# Patient Record
Sex: FEMALE | Race: WHITE | NOT HISPANIC OR LATINO | Employment: OTHER | URBAN - METROPOLITAN AREA
[De-identification: names, ages, dates, MRNs, and addresses within clinical notes are randomized per-mention and may not be internally consistent; named-entity substitution may affect disease eponyms.]

---

## 2020-07-24 ENCOUNTER — ANCILLARY PROCEDURE (OUTPATIENT)
Dept: MAMMOGRAPHY | Facility: CLINIC | Age: 75
End: 2020-07-24
Payer: COMMERCIAL

## 2020-07-24 DIAGNOSIS — Z12.31 VISIT FOR SCREENING MAMMOGRAM: ICD-10-CM

## 2020-07-24 PROCEDURE — 77067 SCR MAMMO BI INCL CAD: CPT | Mod: TC

## 2020-08-10 ENCOUNTER — ANCILLARY PROCEDURE (OUTPATIENT)
Dept: ULTRASOUND IMAGING | Facility: CLINIC | Age: 75
End: 2020-08-10
Attending: FAMILY MEDICINE
Payer: COMMERCIAL

## 2020-08-10 ENCOUNTER — ANCILLARY PROCEDURE (OUTPATIENT)
Dept: MAMMOGRAPHY | Facility: CLINIC | Age: 75
End: 2020-08-10
Attending: FAMILY MEDICINE
Payer: COMMERCIAL

## 2020-08-10 DIAGNOSIS — Z11.59 ENCOUNTER FOR SCREENING FOR OTHER VIRAL DISEASES: Primary | ICD-10-CM

## 2020-08-10 DIAGNOSIS — R92.8 ABNORMAL MAMMOGRAM: ICD-10-CM

## 2020-08-10 PROCEDURE — G0279 TOMOSYNTHESIS, MAMMO: HCPCS

## 2020-08-10 PROCEDURE — 76642 ULTRASOUND BREAST LIMITED: CPT | Mod: 50

## 2020-08-10 PROCEDURE — 77066 DX MAMMO INCL CAD BI: CPT

## 2020-08-18 DIAGNOSIS — Z11.59 ENCOUNTER FOR SCREENING FOR OTHER VIRAL DISEASES: ICD-10-CM

## 2020-08-18 PROCEDURE — U0003 INFECTIOUS AGENT DETECTION BY NUCLEIC ACID (DNA OR RNA); SEVERE ACUTE RESPIRATORY SYNDROME CORONAVIRUS 2 (SARS-COV-2) (CORONAVIRUS DISEASE [COVID-19]), AMPLIFIED PROBE TECHNIQUE, MAKING USE OF HIGH THROUGHPUT TECHNOLOGIES AS DESCRIBED BY CMS-2020-01-R: HCPCS | Performed by: FAMILY MEDICINE

## 2020-08-20 LAB
SARS-COV-2 RNA SPEC QL NAA+PROBE: NOT DETECTED
SPECIMEN SOURCE: NORMAL

## 2020-08-21 ENCOUNTER — ANCILLARY PROCEDURE (OUTPATIENT)
Dept: MAMMOGRAPHY | Facility: CLINIC | Age: 75
End: 2020-08-21
Attending: FAMILY MEDICINE
Payer: COMMERCIAL

## 2020-08-21 ENCOUNTER — ANCILLARY PROCEDURE (OUTPATIENT)
Dept: ULTRASOUND IMAGING | Facility: CLINIC | Age: 75
End: 2020-08-21
Attending: FAMILY MEDICINE
Payer: COMMERCIAL

## 2020-08-21 ENCOUNTER — ANCILLARY PROCEDURE (OUTPATIENT)
Dept: CT IMAGING | Facility: CLINIC | Age: 75
End: 2020-08-21
Attending: FAMILY MEDICINE
Payer: COMMERCIAL

## 2020-08-21 DIAGNOSIS — R92.8 ABNORMAL MAMMOGRAM: ICD-10-CM

## 2020-08-21 LAB
CREAT BLD-MCNC: 0.8 MG/DL (ref 0.52–1.04)
GFR SERPL CREATININE-BSD FRML MDRD: 70 ML/MIN/{1.73_M2}

## 2020-08-21 PROCEDURE — 88342 IMHCHEM/IMCYTCHM 1ST ANTB: CPT | Mod: 59

## 2020-08-21 PROCEDURE — 88305 TISSUE EXAM BY PATHOLOGIST: CPT

## 2020-08-21 PROCEDURE — 19083 BX BREAST 1ST LESION US IMAG: CPT | Mod: RT

## 2020-08-21 PROCEDURE — 88342 IMHCHEM/IMCYTCHM 1ST ANTB: CPT | Mod: 26

## 2020-08-21 PROCEDURE — 00000159 ZZHCL STATISTIC H-SEND OUTS PREP

## 2020-08-21 PROCEDURE — 99000 SPECIMEN HANDLING OFFICE-LAB: CPT

## 2020-08-21 PROCEDURE — 00000158 ZZHCL STATISTIC H-FISH PROCESS B/S

## 2020-08-21 PROCEDURE — 88360 TUMOR IMMUNOHISTOCHEM/MANUAL: CPT

## 2020-08-21 PROCEDURE — 88341 IMHCHEM/IMCYTCHM EA ADD ANTB: CPT | Mod: 59

## 2020-08-21 PROCEDURE — 88377 M/PHMTRC ALYS ISHQUANT/SEMIQ: CPT

## 2020-08-21 PROCEDURE — 77066 DX MAMMO INCL CAD BI: CPT

## 2020-08-21 RX ORDER — LIDOCAINE HYDROCHLORIDE 10 MG/ML
9 INJECTION, SOLUTION EPIDURAL; INFILTRATION; INTRACAUDAL; PERINEURAL ONCE
Status: COMPLETED | OUTPATIENT
Start: 2020-08-21 | End: 2020-08-21

## 2020-08-21 RX ORDER — IOPAMIDOL 755 MG/ML
100 INJECTION, SOLUTION INTRAVASCULAR ONCE
Status: COMPLETED | OUTPATIENT
Start: 2020-08-21 | End: 2020-08-21

## 2020-08-21 RX ADMIN — LIDOCAINE HYDROCHLORIDE 9 ML: 10 INJECTION, SOLUTION EPIDURAL; INFILTRATION; INTRACAUDAL; PERINEURAL at 14:38

## 2020-08-21 RX ADMIN — IOPAMIDOL 100 ML: 755 INJECTION, SOLUTION INTRAVASCULAR at 13:33

## 2020-08-26 ENCOUNTER — TELEPHONE (OUTPATIENT)
Dept: GENERAL RADIOLOGY | Facility: CLINIC | Age: 75
End: 2020-08-26

## 2020-08-26 DIAGNOSIS — C50.919 BREAST CANCER (H): Primary | ICD-10-CM

## 2020-08-26 NOTE — TELEPHONE ENCOUNTER
Spoke with patient regarding right breast biopsy results, which indicate high grade anaplastic cancer, favor breast primary. Notified pt that the radiologist's recommendation is oncologic and surgical consultations. Pt verbalized understanding of these results and all questions answered to her satisfaction. Referral order placed for consultations, pt will be contacted by scheduling.

## 2020-08-27 ENCOUNTER — PATIENT OUTREACH (OUTPATIENT)
Dept: SURGERY | Facility: CLINIC | Age: 75
End: 2020-08-27

## 2020-08-27 NOTE — TELEPHONE ENCOUNTER
New Patient Oncology Nurse Navigator Note     Referring provider: Dr. Carrington     Referring Clinic/Organization: [facility]     Referred to: Surgical Oncology - Breast Surgery    Requested provider (if applicable): First available provider    Referral Received: 08/27/20       Evaluation for : Invasive carcinoma is estrogen receptor positive and progesterone   receptor negative, HER2 -      Records Location: [facility]     Records Needed:     - None at this time.     Additional testing needed prior to consult:     - none at this time.     Referral updates and Plan:       Consult with Surgical Oncology   Labs      08/27/2020 2:11 PM - Called and spoke with patient regarding referral and that we are working on confirming appointment with Dr. Newsome. She requested we speak with her Daughter, added to chart regarding appointment info. Called and spoke with patients daughter and informed her that we are working on getting a schedule opened on 9/3 and that the scheduling team will reach out to confirm the time and information once that is opened.     She verbalized understanding and agreed with plan.       Christina Martin, RN, BSN   Surgical Oncology New Patient Nurse Navigator  [facility]  [phone]

## 2020-08-28 ENCOUNTER — PRE VISIT (OUTPATIENT)
Dept: ONCOLOGY | Facility: CLINIC | Age: 75
End: 2020-08-28

## 2020-08-28 NOTE — TELEPHONE ENCOUNTER
ONCOLOGY INTAKE: Records Information      APPT INFORMATION:  Referring provider:  Dr. Carrington  Referring provider s clinic:  [facility]  Reason for visit/diagnosis:  breast cancer  Has patient been notified of appointment date and time?: yes    RECORDS INFORMATION:  Were the records received with the referral (via [employer])? no    Has patient been seen for any external appt for this diagnosis? no    If yes, where? n/a    Has patient had any imaging or procedures outside of [place] for this condition? no      If Yes, where? n/a    ADDITIONAL INFORMATION:  none

## 2020-08-31 NOTE — TELEPHONE ENCOUNTER
RECORDS STATUS - BREAST    RECORDS REQUESTED FROM: [facility] - Internal Referral   DATE REQUESTED: 8/31/20   NOTES DETAILS STATUS   OFFICE NOTE from referring provider Epic Dr. Rita Carrington   OFFICE NOTE from medical oncologist     OFFICE NOTE from surgeon     OFFICE NOTE from radiation oncologist     DISCHARGE SUMMARY from hospital     DISCHARGE REPORT from the      OPERATIVE REPORT     MEDICATION LIST     CLINICAL TRIAL TREATMENTS TO DATE     LABS     PATHOLOGY REPORTS  (Tissue diagnosis, Stage, ER/KS percentage positive and intensity of staining, HER2 IHC, FISH, and all biopsies from breast and any distant metastasis)                 [facility] 8/21/20: Surg Path & Her 2   GENONOMIC TESTING     TYPE:   (Next Generation Sequencing, including Foundation One testing, and Oncotype score)     IMAGING (NEED IMAGES & REPORT)     CT SCANS     MRI     MAMMO PACS 8/21/20, 8/10/20, 7/24/20   ULTRASOUND PACS 8/21/20, 8/10/20   PET     BONE SCAN     BRAIN MRI

## 2020-09-03 ENCOUNTER — ONCOLOGY VISIT (OUTPATIENT)
Dept: ONCOLOGY | Facility: CLINIC | Age: 75
End: 2020-09-03
Attending: FAMILY MEDICINE
Payer: COMMERCIAL

## 2020-09-03 VITALS
WEIGHT: 169.3 LBS | SYSTOLIC BLOOD PRESSURE: 114 MMHG | RESPIRATION RATE: 16 BRPM | HEART RATE: 82 BPM | OXYGEN SATURATION: 98 % | TEMPERATURE: 98 F | HEIGHT: 66 IN | BODY MASS INDEX: 27.21 KG/M2 | DIASTOLIC BLOOD PRESSURE: 71 MMHG

## 2020-09-03 DIAGNOSIS — Z85.3 PERSONAL HISTORY OF MALIGNANT NEOPLASM OF BREAST: ICD-10-CM

## 2020-09-03 DIAGNOSIS — C50.919 BREAST CANCER (H): ICD-10-CM

## 2020-09-03 DIAGNOSIS — C50.911 INVASIVE DUCTAL CARCINOMA OF BREAST, FEMALE, RIGHT (H): ICD-10-CM

## 2020-09-03 PROCEDURE — 36415 COLL VENOUS BLD VENIPUNCTURE: CPT

## 2020-09-03 PROCEDURE — G0463 HOSPITAL OUTPT CLINIC VISIT: HCPCS | Mod: ZF

## 2020-09-03 PROCEDURE — 40000803 ZZHCL STATISTIC DNA ISOL HIGH PURITY: Performed by: SURGERY

## 2020-09-03 RX ORDER — ATORVASTATIN CALCIUM 40 MG/1
20 TABLET, FILM COATED ORAL 2 TIMES DAILY
COMMUNITY
Start: 2019-11-25 | End: 2021-11-19

## 2020-09-03 RX ORDER — FUROSEMIDE 80 MG
80 TABLET ORAL 2 TIMES DAILY
COMMUNITY
Start: 2019-11-26 | End: 2021-06-02

## 2020-09-03 RX ORDER — TRIAMCINOLONE ACETONIDE 1 MG/G
CREAM TOPICAL
COMMUNITY
Start: 2020-05-28 | End: 2021-06-02

## 2020-09-03 RX ORDER — BENAZEPRIL HYDROCHLORIDE 20 MG/1
TABLET ORAL
COMMUNITY
Start: 2019-11-26 | End: 2021-06-02

## 2020-09-03 RX ORDER — AMLODIPINE BESYLATE 5 MG/1
5 TABLET ORAL 2 TIMES DAILY
COMMUNITY
Start: 2020-05-28 | End: 2021-06-02

## 2020-09-03 RX ORDER — POTASSIUM CHLORIDE 1500 MG/1
40 TABLET, EXTENDED RELEASE ORAL 3 TIMES DAILY
COMMUNITY
Start: 2019-09-17 | End: 2021-06-02

## 2020-09-03 SDOH — HEALTH STABILITY: MENTAL HEALTH: HOW OFTEN DO YOU HAVE 6 OR MORE DRINKS ON ONE OCCASION?: DAILY OR ALMOST DAILY

## 2020-09-03 SDOH — HEALTH STABILITY: MENTAL HEALTH: HOW OFTEN DO YOU HAVE A DRINK CONTAINING ALCOHOL?: 4 OR MORE TIMES A WEEK

## 2020-09-03 SDOH — HEALTH STABILITY: MENTAL HEALTH: HOW MANY STANDARD DRINKS CONTAINING ALCOHOL DO YOU HAVE ON A TYPICAL DAY?: 3 OR 4

## 2020-09-03 ASSESSMENT — MIFFLIN-ST. JEOR: SCORE: 1266.75

## 2020-09-03 ASSESSMENT — PAIN SCALES - GENERAL: PAINLEVEL: NO PAIN (0)

## 2020-09-03 NOTE — PROGRESS NOTES
HISTORY OF PRESENT ILLNESS:  Talya Zuleta is a 75-year-old woman who presents with a new right breast cancer.  She underwent a screening mammogram which demonstrated bilateral scattered densities.  She had a diagnostic mammogram on 08/10.  There was a 2.7 cm mass in her right breast at the 12 o'clock position with extension towards the duct.  Her axilla looked normal.  She had a contrast-enhanced mammography which demonstrated the mass at 12 o'clock position with the total size including extension to 4.9 cm.  She underwent a needle biopsy which demonstrated invasive ductal cancer, grade 3, ER positive, TN negative, HER-2/nathaniel negative but the cancer had anaplastic giant cells present.  She has had no prior history of any breast problems.  She denies any other palpable masses.      PAST MEDICAL HISTORY:  Significant for:    1.  Hypertension.   2.  Diabetes.   3.  COPD.    4.  Hypothyroidism with resultant alopecia.        PAST SURGICAL HISTORY:  She had no major surgeries in the past.      FAMILY HISTORY:  Unknown.      PHYSICAL EXAMINATION:   GENERAL:  Shows a well-appearing woman in no apparent distress.   HEAD AND NECK EXAMINATION:  Reveals no cervical, supraclavicular or infraclavicular lymphadenopathy.   CHEST:  Right breast examination reveals a palpable mass at the 12 o'clock position.  It measures about 3 cm in size.  There are no other palpable masses.  Examination of her axilla demonstrates bilateral hidradenitis but I cannot appreciate any palpable lymphadenopathy.  Left breast examination is normal.      IMPRESSION:  Stage II to stage III, ER-positive breast cancer.      PLAN:  I talked to her about the various treatment options including a mastectomy with or without reconstruction versus a lumpectomy plus radiation.  She is not a candidate for immediate reconstruction right now due to her smoking about half a pack of cigarettes per day.  I did recommend a sentinel lymph node biopsy.  I told her that  endocrine therapy would be recommended.  The decision regarding chemotherapy would not be made until she met with a medical oncologist.  Because of her unusual tumor and large size, I am going to obtain a PET CT scan to make sure that she does not have a primary or that she does not have metastatic disease.  I am also going to obtain genetic testing on her as well.  I am going to have her see Medical Oncology as soon as possible to determine whether or not she would benefit from chemotherapy.  If the consensus is that she would benefit from chemotherapy, then we will do chemotherapy first followed by surgery.  All of her questions were asked and answered.  Will get her set up with medical oncologist.  TT: 40 min CT: 30 min

## 2020-09-03 NOTE — PATIENT INSTRUCTIONS
Surgical Oncology RN Care Coordination Note:     - Genetic testing.     - PET scan - our scheduling team will reach out to you to get this scheduled.     - Referral to the Medical oncology, we will plan to get PET scan prior to the visit so we have that result to finalize planning for treatment.     Christina Martin, RN, BSN  Care Coordinator   [phone]

## 2020-09-03 NOTE — NURSING NOTE
"Oncology Rooming Note    September 3, 2020 11:40 AM   Talya Zuleta is a 75 year old female who presents for:    Chief Complaint   Patient presents with     Oncology Clinic Visit     [facility] NEW - BREAST CANCER     Initial Vitals: /71 (BP Location: Right arm, Patient Position: Chair, Cuff Size: Adult Regular)   Pulse 82   Temp 98  F (36.7  C) (Oral)   Resp 16   Ht 1.664 m (5' 5.5\")   Wt 76.8 kg (169 lb 4.8 oz)   SpO2 98%   BMI 27.74 kg/m   Estimated body mass index is 27.74 kg/m  as calculated from the following:    Height as of this encounter: 1.664 m (5' 5.5\").    Weight as of this encounter: 76.8 kg (169 lb 4.8 oz). Body surface area is 1.88 meters squared.  No Pain (0) Comment: Data Unavailable   No LMP recorded.  Allergies reviewed: Yes  Medications reviewed: Yes    Medications: Medication refills not needed today.  Pharmacy name entered into [employer]: [id number] - [place], [place] - [address]    Clinical concerns: No new concerns. [place] was notified.      Daquan Cantu LPN            "

## 2020-09-03 NOTE — LETTER
9/3/2020     RE: Talya Zuleta  [address] [address]    Dear Colleague,    Thank you for referring your patient, Talya Zuleta, to the [facility]. Please see a copy of my visit note below.    HISTORY OF PRESENT ILLNESS:  Talya Zuleta is a 75-year-old woman who presents with a new right breast cancer.  She underwent a screening mammogram which demonstrated bilateral scattered densities.  She had a diagnostic mammogram on 08/10.  There was a 2.7 cm mass in her right breast at the 12 o'clock position with extension towards the duct.  Her axilla looked normal.  She had a contrast-enhanced mammography which demonstrated the mass at 12 o'clock position with the total size including extension to 4.9 cm.  She underwent a needle biopsy which demonstrated invasive ductal cancer, grade 3, ER positive, WI negative, HER-2/nathaniel negative but the cancer had anaplastic giant cells present.  She has had no prior history of any breast problems.  She denies any other palpable masses.      PAST MEDICAL HISTORY:  Significant for:    1.  Hypertension.   2.  Diabetes.   3.  COPD.    4.  Hypothyroidism with resultant alopecia.        PAST SURGICAL HISTORY:  She had no major surgeries in the past.      FAMILY HISTORY:  Unknown.      PHYSICAL EXAMINATION:   GENERAL:  Shows a well-appearing woman in no apparent distress.   HEAD AND NECK EXAMINATION:  Reveals no cervical, supraclavicular or infraclavicular lymphadenopathy.   CHEST:  Right breast examination reveals a palpable mass at the 12 o'clock position.  It measures about 3 cm in size.  There are no other palpable masses.  Examination of her axilla demonstrates bilateral hidradenitis but I cannot appreciate any palpable lymphadenopathy.  Left breast examination is normal.      IMPRESSION:  Stage II to stage III, ER-positive breast cancer.      PLAN:  I talked to her about the various treatment options including a mastectomy with or without  reconstruction versus a lumpectomy plus radiation.  She is not a candidate for immediate reconstruction right now due to her smoking about half a pack of cigarettes per day.  I did recommend a sentinel lymph node biopsy.  I told her that endocrine therapy would be recommended.  The decision regarding chemotherapy would not be made until she met with a medical oncologist.  Because of her unusual tumor and large size, I am going to obtain a PET CT scan to make sure that she does not have a primary or that she does not have metastatic disease.  I am also going to obtain genetic testing on her as well.  I am going to have her see Medical Oncology as soon as possible to determine whether or not she would benefit from chemotherapy.  If the consensus is that she would benefit from chemotherapy, then we will do chemotherapy first followed by surgery.  All of her questions were asked and answered.  Will get her set up with medical oncologist.  TT: 40 min CT: 30 min     Again, thank you for allowing me to participate in the care of your patient.      Sincerely,      Laci Newsome MD

## 2020-09-03 NOTE — PROGRESS NOTES
Chief Complaint   Patient presents with     Lab Only     vpt blood draw.     Venipuncture labs drawn from left arm.    Pam Hernandez MA

## 2020-09-04 LAB — COPATH REPORT: NORMAL

## 2020-09-04 NOTE — TELEPHONE ENCOUNTER
ONCOLOGY INTAKE: Records Information      APPT INFORMATION:  Referring provider:  Dr. Laci Newsome MD  Referring provider s clinic:  [facility]  Reason for visit/diagnosis:  breast cancer  Has patient been notified of appointment date and time?: Yes    RECORDS INFORMATION:  Were the records received with the referral (via [employer])? No,Internal Referral      Has patient been seen for any external appt for this diagnosis? NO    If yes, where? NA    ADDITIONAL INFORMATION:  None

## 2020-09-08 LAB — COPATH REPORT: NORMAL

## 2020-09-08 NOTE — TELEPHONE ENCOUNTER
RECORDS STATUS - ALL OTHER DIAGNOSIS      RECORDS RECEIVED FROM: [facility] - Internal Referral   DATE RECEIVED: 9/8/20   NOTES STATUS DETAILS   OFFICE NOTE from referring provider     OFFICE NOTE from medical oncologist     DISCHARGE SUMMARY from hospital     DISCHARGE REPORT from the ER     OPERATIVE REPORT     MEDICATION LIST     CLINICAL TRIAL TREATMENTS TO DATE     LABS     PATHOLOGY REPORTS [facility] 8/21/20: Surg Path/HER2 [place]   ANYTHING RELATED TO DIAGNOSIS     GENONOMIC TESTING     TYPE: Epic 9/3/20: [facility]   IMAGING (NEED IMAGES & REPORT)     CT SCANS     MRI     MAMMO     ULTRASOUND     PET

## 2020-09-11 LAB — COPATH REPORT: NORMAL

## 2020-09-14 NOTE — PROGRESS NOTES
Oncology Consultation:  Date on this visit: 9/21/2020    Talya Zuleta is referred by Dr.Todd DESIREE Newsome for an oncology consultation. She requires evaluation for new diagnosis of right breast cancer, clinical stage T2N0M0.    Primary Physician: Cole Weston     History Of Present Illness:  Ms. Zuleta is a 75 year old female with right breast cancer.  Routine screening mammogram in 07/2020 showed bilateral breast asymmetries.  Diagnostic mammograms and ultrasound showed a 2.7 cm mass in the right breast at 12:00, 5 cm from the nipple with ductal extension including a 6 mm mass at 3 cm from the nipple.  In the left breast were benign appearing cysts.  Contrast enhanced mammogram showed the right breast mass, including extension, to measure 4.9 cm.  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  Estrogen receptor was moderate in 50% and progesterone receptor staining was negative.  HER-2 was negative by IHC; HER2 FISH showed approximately 10% of tumor cells to have 6.8 HER2 signals/nucleus and a HER2/CEN17 ratio of 1.6.  The HER2 positive cells were noted to be the large pleomorphic cells.  Multiple neutrophils were noted to be infiltrating through tumor stroma.    Ms. Zuleta denies prior breast issues or biopsies.  She has a past medical history significant for HTN (on lasix, norvasc and benazepril), dyslipidemia (on statin), pre-diabetes on diet control only, chronic back pain related to vertebral disc herniation, and osteoarthritis.  She denies pain or discomfort of her breasts. She is not very active due to her osteoarthritis and joint pains secondary to it, but is able to do all of her ADLs.  She reports she is on her feet a lot, does all of her own cooking, and all of her own housework. She is able to walk 1 flight of stairs without getting short of breath. She recently lost 22 lbs since March/2020, non-intentionally. Her appetite has been  good. She does not exercise regularly due to joint and back pain.  She reports neuropathy in both her hands and feet related to her degenerative joint disease.  She also reports chronic alopecia.  She reports normal energy level.   She denies shortness of breath while at rest.  She has no chest pain, abdominal pain, n/v/d.  She has no headache, focal weakness or numbness.  She denies fevers/chills.  The remainder of a complete 12 point review of systems was reviewed with the patient and was negative with the exception of that mentioned above.    She had menarche at 10 yo.  First pregnancy at 19 yo, and has 3 children. Menopause at 47 yo. No HRT.  Hysterectomy due to benign uterine mass.  Single oophorectomy.    Past Medical/Surgical History:  1.  Breast cancer as per HPI  2.  Hypertension  3.  Diabetes  4.  COPD  5.  Hypothyroidism    Allergies:  Allergies as of 09/21/2020 - Reviewed 09/03/2020   Allergen Reaction Noted     Bacitracin-neomycin-polymyxin  04/18/2003     Latex  09/30/2005     Current Medications:  Current Outpatient Medications   Medication Sig Dispense Refill     amLODIPine (NORVASC) 5 MG tablet Take 5 mg by mouth       atorvastatin (LIPITOR) 40 MG tablet TAKE 1/2 TABLET DAILY       benazepril (LOTENSIN) 20 MG tablet TAKE 1 TABLET BY MOUTH TWO TIMES A DAY.       furosemide (LASIX) 80 MG tablet Take 80 mg by mouth 2 times daily       potassium chloride ER (KLOR-CON M) 20 MEQ CR tablet Take 40 mEq by mouth       triamcinolone (KENALOG) 0.1 % external cream         Family History:  Patient is not sure about her family history as she has not been close to her family.  She is unaware of a family history of malignancy.    Social History:  .  Retired.  She smokes 1/2 pack per day and has smoked for 66 years, drinks alcohol daily (at least 2oz of whiskey daily).     Physical Exam:  General:  Well appearing, well nourished adult female in NAD.  Wears a wig.  Eyes:  No erythema or  discharge  Respiratory:  Breathing comfortably on room air.  No wheezing or distress.  Musculoskeletal:  Full ROM of the bilateral upper extremities.  Skin:  No visible concerning skin rashes or lesions  Neuro:  No notable tremor and dyskinetic movements.  Psych:  Mood and affect appear normal.    The rest of a comprehensive physical examination is deferred due to PHE (public health emergency) video visit restrictions.    ECOG performance status: 1    Laboratory/Imaging Studies  7/24/2020 Bilateral screening mammogram:  Bilateral asymmetries.    8/10/2020 Bilateral breast ultrasound:  - In the right breast at 12:00 middle depth is an elliptical hyperdense mass with angulated margins measuring 2.7 cm..  Just inferior and medial to this is a small round mass with spiculated margins measuring 6 mm.  - No suspicious right axillary lymph node.  - In the left breast at 4:00, 3 cm from the nipple is a small benign appearing cysts measuring up to 1 cm.    8/21/2020 Contrast enhanced mammogram:  Elliptical mass with spiculated margins at 12:00 right breast measures up to 3.5 cm.  There is ductal extension from the anterior inferior margin.  Total size of mass plus ductal extension in 4.9 cm.    8/21/2020  Right breast biopsy, 12:00, 5 cm from the nipple:  - Grade 3 invasive ductal carcinoma with anaplastic tumor giant cells.  - There are multiple neutrophils infiltrating through the tumor stroma  - ER moderate 50%  - CA negative  - HER2 negative by IHC.  However, tumor cells show variable staining.  <10% of cells are weak.  >10% have faint, incomplete membrane staining.  - HER2 FISH show that approximately 10% of tumor cells (the large pleomorphic cells) have 6.8 HER2 signals/nucleus and a HER2/CEN17 ratio of 1.6.    ASSESSMENT/PLAN:  75 year old female with a history of HTN, dyslipidemia, COPD, diabetes, and OA with a newly diagnosed clinical stage, T2N0M0, right breast cancer, that is ER positive (50%), CA negative, and  HER2 positive.  Today we discussed the diagnosis, staging, and treatment options in detail with the patient and her daughter.  We reviewed the results of her imaging which demonstrates a 3.5 cm mass with extension to a second mass, with total area of abnormality in the right breast measuring 4.9 cm.  Ultrasound of the axilla is without evidence of lymphadenopathy.  We reviewed her tumor is ER positive and ND negative.  Her case was reviewed at breast conference on 9/18 and review of pathology shows two populations of cancer cells.  Smaller cells that were HER2 negative and larger cells with pleomorphic nuclei that were HER2 positive.  Taken altogether this is a clinical stage IIa breast cancer.  The goal of treatment is cure, or in other words, to reduce the future risk of recurrence as much as possible.    We reviewed that HER2 positive breast cancers are inherently prone to increased rates of proliferation, metastasis, and recurrence.  A course of chemotherapy and HER2 targeted therapy can significantly reduce this risk of recurrence.  We recommend administering chemotherapy before the surgery in order to decrease the tumor prior to breast conserving surgery, the ability to observe if chemotherapy is effective in treating her breast cancer, and the ability to choose adjuvant therapies based on response.     We explained to her that her breast cancer is positive for both estrogen receptor and HER2 receptor, so we plan to treat her with combined chemotherapy and HER2 targeted therapy, the treatment regimen we are planning is THP, with taxol, Herceptin and Perjeta. Taxol is a chemotherapy medication with side effects of causing peripheral neuropathy (numbness and tingling sensation of hands and feet), low blood counts that will put her at risk of infection and bleeding, nausea, vomiting, poor appetite, low energy level, peripheral neuropathy, elevation of liver tests, GERD, arthralgias, and hair loss. Both Herceptin  and Perjeta are HER2 directed therapy, with main side effects to be cardiotoxicity, diarrhea and abdominal discomfort. We will need to obtained 2D Echo to check for her baseline cardiac function before initiation of chemotherapy and repeat an echocardiogram once every three months while on HER2 targeted therapy.   In this regimen, Taxol and Herceptin are administered IV weekly; pertuzumab IV once every three weeks, the combination for a total of 12 weeks.  We recommend she receive the treatment via a [id number].  We will help to arrange this as soon as possible, and plan to start treatment later this week or early next week.     We discussed that if she has residual breast disease after 12 weeks of THP, we may need to add additional chemotherapy of dose dense AC (adriamycin and cyclophosphamide) before her surgery.    We further explained that she would go for surgery after completing chemotherapy. She will be eligible for lumpectomy or mastectomy. She can discuss with Dr Newsome regarding to surgical plan and what type of surgery she prefers. She will need to have sentinel lymph node biopsy along with her tumor resection. If Dr Newsome and her decide to go for lumpectomy or she has a positive lymph node at the time of surgery, then she will need breast radiation after her surgery. Finally, given ER+ breast cancer, plan will be treat with a minimum 5 years of endocrine therapy. All patients questions are answered to their satisfaction.     Plan:  1. Obtain 2D Echo   2. Arrange medi-port placement with interventional radiology  3. Planning to start THP within 1 week  4. Labs and RUPA visit before chemotherapy infusion  5. Return to clinic to visit Dr Alvarez in 1 month.       Patient was seen and discussed with Dr Alvarez.     Michelle Smith  Hem/Onco Fellow    This video visit was conducted with the patient, her daughter, Dr. Smith, and myself.  I have edited the above note to reflect our joint assessment and plan.   I spent a total 60 minutes in video visit with the patient today.    Perlita Avlarez MD

## 2020-09-18 ENCOUNTER — ANCILLARY PROCEDURE (OUTPATIENT)
Dept: PET IMAGING | Facility: CLINIC | Age: 75
End: 2020-09-18
Attending: SURGERY
Payer: COMMERCIAL

## 2020-09-18 DIAGNOSIS — C50.919 BREAST CANCER (H): ICD-10-CM

## 2020-09-18 DIAGNOSIS — Z85.3 PERSONAL HISTORY OF MALIGNANT NEOPLASM OF BREAST: ICD-10-CM

## 2020-09-18 LAB — RADIOLOGIST FLAGS: ABNORMAL

## 2020-09-18 PROCEDURE — 74177 CT ABD & PELVIS W/CONTRAST: CPT | Mod: 59

## 2020-09-18 PROCEDURE — A9552 F18 FDG: HCPCS | Performed by: SURGERY

## 2020-09-18 PROCEDURE — 71260 CT THORAX DX C+: CPT | Mod: 59

## 2020-09-18 PROCEDURE — 78816 PET IMAGE W/CT FULL BODY: CPT | Mod: PI | Performed by: RADIOLOGY

## 2020-09-18 RX ORDER — IOPAMIDOL 755 MG/ML
10-135 INJECTION, SOLUTION INTRAVASCULAR ONCE
Status: COMPLETED | OUTPATIENT
Start: 2020-09-18 | End: 2020-09-18

## 2020-09-18 RX ADMIN — IOPAMIDOL 104 ML: 755 INJECTION, SOLUTION INTRAVASCULAR at 11:01

## 2020-09-21 ENCOUNTER — PRE VISIT (OUTPATIENT)
Dept: ONCOLOGY | Facility: CLINIC | Age: 75
End: 2020-09-21

## 2020-09-21 ENCOUNTER — VIRTUAL VISIT (OUTPATIENT)
Dept: ONCOLOGY | Facility: CLINIC | Age: 75
End: 2020-09-21
Attending: SURGERY
Payer: COMMERCIAL

## 2020-09-21 DIAGNOSIS — C50.811 MALIGNANT NEOPLASM OF OVERLAPPING SITES OF RIGHT BREAST IN FEMALE, ESTROGEN RECEPTOR POSITIVE (H): ICD-10-CM

## 2020-09-21 DIAGNOSIS — Z51.11 ENCOUNTER FOR ANTINEOPLASTIC CHEMOTHERAPY: Primary | ICD-10-CM

## 2020-09-21 DIAGNOSIS — C50.919 BREAST CANCER (H): ICD-10-CM

## 2020-09-21 DIAGNOSIS — Z51.11 ENCOUNTER FOR ANTINEOPLASTIC CHEMOTHERAPY: ICD-10-CM

## 2020-09-21 DIAGNOSIS — Z17.0 MALIGNANT NEOPLASM OF OVERLAPPING SITES OF RIGHT BREAST IN FEMALE, ESTROGEN RECEPTOR POSITIVE (H): ICD-10-CM

## 2020-09-21 PROCEDURE — 99205 OFFICE O/P NEW HI 60 MIN: CPT | Mod: 95 | Performed by: INTERNAL MEDICINE

## 2020-09-21 PROCEDURE — U0003 INFECTIOUS AGENT DETECTION BY NUCLEIC ACID (DNA OR RNA); SEVERE ACUTE RESPIRATORY SYNDROME CORONAVIRUS 2 (SARS-COV-2) (CORONAVIRUS DISEASE [COVID-19]), AMPLIFIED PROBE TECHNIQUE, MAKING USE OF HIGH THROUGHPUT TECHNOLOGIES AS DESCRIBED BY CMS-2020-01-R: HCPCS | Performed by: INTERNAL MEDICINE

## 2020-09-21 PROCEDURE — 40001009 ZZH VIDEO/TELEPHONE VISIT; NO CHARGE

## 2020-09-21 NOTE — PROGRESS NOTES
"Talya Zuleta is a 75 year old female who is being evaluated via a billable video visit.      The patient has been notified of following:     \"This video visit will be conducted via a call between you and your physician/provider. We have found that certain health care needs can be provided without the need for an in-person physical exam.  This service lets us provide the care you need with a video conversation.  If a prescription is necessary we can send it directly to your pharmacy.  If lab work is needed we can place an order for that and you can then stop by our lab to have the test done at a later time.    Video visits are billed at different rates depending on your insurance coverage.  Please reach out to your insurance provider with any questions.    If during the course of the call the physician/provider feels a video visit is not appropriate, you will not be charged for this service.\"    Patient has given verbal consent for Video visit? Yes    How would you like to obtain your AVS? MyChart     If you are dropped from the video visit, the video invite should be resent to: Send to e-mail at: [email or web address]     Please call Carolyne if unable to find pt in waiting room:   [phone]    Will anyone else be joining your video visit?         I have reviewed and updated the patient's allergies and pt confirmed any changes to their medication list.  Patient was asked to provide any patient recorded vital signs, height and/or weight.  Please see \"Patient Reported Vital Signs\" tab for that information.  Patient instructed to be in the virtual waiting room 10-15 minutes prior to appointment time.      Concerns: Patient has had a lot of vertigo and she is wondering if she could/should get the flu shot.    Refills: None        Jaleel Pearson, EMT         Video-Visit Details    Type of service:  Video Visit    Video Start Time: 10:47 AM  Video End Time: 12:07    Originating Location (pt. Location): Home    Distant " Location (provider location):  [facility]     Platform used for Video Visit: Kelly Smith MD

## 2020-09-21 NOTE — LETTER
9/21/2020         RE: Talya Zuleta  [address] [address]        Dear Colleague,    Thank you for referring your patient, Talya Zuleta, to the [facility]. Please see a copy of my visit note below.    Oncology Consultation:  Date on this visit: 9/21/2020    Talya Zuleta is referred by Dr.Todd DESIREE Newsome for an oncology consultation. She requires evaluation for new diagnosis of right breast cancer, clinical stage T2N0M0.    Primary Physician: Cole Weston     History Of Present Illness:  Ms. Zuleta is a 75 year old female with right breast cancer.  Routine screening mammogram in 07/2020 showed bilateral breast asymmetries.  Diagnostic mammograms and ultrasound showed a 2.7 cm mass in the right breast at 12:00, 5 cm from the nipple with ductal extension including a 6 mm mass at 3 cm from the nipple.  In the left breast were benign appearing cysts.  Contrast enhanced mammogram showed the right breast mass, including extension, to measure 4.9 cm.  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  Estrogen receptor was moderate in 50% and progesterone receptor staining was negative.  HER-2 was negative by IHC; HER2 FISH showed approximately 10% of tumor cells to have 6.8 HER2 signals/nucleus and a HER2/CEN17 ratio of 1.6.  The HER2 positive cells were noted to be the large pleomorphic cells.  Multiple neutrophils were noted to be infiltrating through tumor stroma.    Ms. Zuleta denies prior breast issues or biopsies.  She has a past medical history significant for HTN (on lasix, norvasc and benazepril), dyslipidemia (on statin), pre-diabetes on diet control only, chronic back pain related to vertebral disc herniation, and osteoarthritis.  She denies pain or discomfort of her breasts. She is not very active due to her osteoarthritis and joint pains secondary to it, but is able to do all of her ADLs.   She reports she is on her feet a lot, does all of her own cooking, and all of her own housework. She is able to walk 1 flight of stairs without getting short of breath. She recently lost 22 lbs since March/2020, non-intentionally. Her appetite has been good. She does not exercise regularly due to joint and back pain.  She reports neuropathy in both her hands and feet related to her degenerative joint disease.  She also reports chronic alopecia.  She reports normal energy level.   She denies shortness of breath while at rest.  She has no chest pain, abdominal pain, n/v/d.  She has no headache, focal weakness or numbness.  She denies fevers/chills.  The remainder of a complete 12 point review of systems was reviewed with the patient and was negative with the exception of that mentioned above.    She had menarche at 12 yo.  First pregnancy at 17 yo, and has 3 children. Menopause at 49 yo. No HRT.  Hysterectomy due to benign uterine mass.  Single oophorectomy.    Past Medical/Surgical History:  1.  Breast cancer as per HPI  2.  Hypertension  3.  Diabetes  4.  COPD  5.  Hypothyroidism    Allergies:  Allergies as of 09/21/2020 - Reviewed 09/03/2020   Allergen Reaction Noted     Bacitracin-neomycin-polymyxin  04/18/2003     Latex  09/30/2005     Current Medications:  Current Outpatient Medications   Medication Sig Dispense Refill     amLODIPine (NORVASC) 5 MG tablet Take 5 mg by mouth       atorvastatin (LIPITOR) 40 MG tablet TAKE 1/2 TABLET DAILY       benazepril (LOTENSIN) 20 MG tablet TAKE 1 TABLET BY MOUTH TWO TIMES A DAY.       furosemide (LASIX) 80 MG tablet Take 80 mg by mouth 2 times daily       potassium chloride ER (KLOR-CON M) 20 MEQ CR tablet Take 40 mEq by mouth       triamcinolone (KENALOG) 0.1 % external cream         Family History:  Patient is not sure about her family history as she has not been close to her family.  She is unaware of a family history of malignancy.    Social History:  .  Retired.   She smokes 1/2 pack per day and has smoked for 66 years, drinks alcohol daily (at least 2oz of whiskey daily).     Physical Exam:  General:  Well appearing, well nourished adult female in NAD.  Wears a wig.  Eyes:  No erythema or discharge  Respiratory:  Breathing comfortably on room air.  No wheezing or distress.  Musculoskeletal:  Full ROM of the bilateral upper extremities.  Skin:  No visible concerning skin rashes or lesions  Neuro:  No notable tremor and dyskinetic movements.  Psych:  Mood and affect appear normal.    The rest of a comprehensive physical examination is deferred due to PHE (public health emergency) video visit restrictions.    ECOG performance status: 1    Laboratory/Imaging Studies  7/24/2020 Bilateral screening mammogram:  Bilateral asymmetries.    8/10/2020 Bilateral breast ultrasound:  - In the right breast at 12:00 middle depth is an elliptical hyperdense mass with angulated margins measuring 2.7 cm..  Just inferior and medial to this is a small round mass with spiculated margins measuring 6 mm.  - No suspicious right axillary lymph node.  - In the left breast at 4:00, 3 cm from the nipple is a small benign appearing cysts measuring up to 1 cm.    8/21/2020 Contrast enhanced mammogram:  Elliptical mass with spiculated margins at 12:00 right breast measures up to 3.5 cm.  There is ductal extension from the anterior inferior margin.  Total size of mass plus ductal extension in 4.9 cm.    8/21/2020  Right breast biopsy, 12:00, 5 cm from the nipple:  - Grade 3 invasive ductal carcinoma with anaplastic tumor giant cells.  - There are multiple neutrophils infiltrating through the tumor stroma  - ER moderate 50%  - VT negative  - HER2 negative by IHC.  However, tumor cells show variable staining.  <10% of cells are weak.  >10% have faint, incomplete membrane staining.  - HER2 FISH show that approximately 10% of tumor cells (the large pleomorphic cells) have 6.8 HER2 signals/nucleus and a  HER2/CEN17 ratio of 1.6.    ASSESSMENT/PLAN:  75 year old female with a history of HTN, dyslipidemia, COPD, diabetes, and OA with a newly diagnosed clinical stage, T2N0M0, right breast cancer, that is ER positive (50%), ID negative, and HER2 positive.  Today we discussed the diagnosis, staging, and treatment options in detail with the patient and her daughter.  We reviewed the results of her imaging which demonstrates a 3.5 cm mass with extension to a second mass, with total area of abnormality in the right breast measuring 4.9 cm.  Ultrasound of the axilla is without evidence of lymphadenopathy.  We reviewed her tumor is ER positive and ID negative.  Her case was reviewed at breast conference on 9/18 and review of pathology shows two populations of cancer cells.  Smaller cells that were HER2 negative and larger cells with pleomorphic nuclei that were HER2 positive.  Taken altogether this is a clinical stage IIa breast cancer.  The goal of treatment is cure, or in other words, to reduce the future risk of recurrence as much as possible.    We reviewed that HER2 positive breast cancers are inherently prone to increased rates of proliferation, metastasis, and recurrence.  A course of chemotherapy and HER2 targeted therapy can significantly reduce this risk of recurrence.  We recommend administering chemotherapy before the surgery in order to decrease the tumor prior to breast conserving surgery, the ability to observe if chemotherapy is effective in treating her breast cancer, and the ability to choose adjuvant therapies based on response.     We explained to her that her breast cancer is positive for both estrogen receptor and HER2 receptor, so we plan to treat her with combined chemotherapy and HER2 targeted therapy, the treatment regimen we are planning is THP, with taxol, Herceptin and Perjeta. Taxol is a chemotherapy medication with side effects of causing peripheral neuropathy (numbness and tingling sensation of  hands and feet), low blood counts that will put her at risk of infection and bleeding, nausea, vomiting, poor appetite, low energy level, peripheral neuropathy, elevation of liver tests, GERD, arthralgias, and hair loss. Both Herceptin and Perjeta are HER2 directed therapy, with main side effects to be cardiotoxicity, diarrhea and abdominal discomfort. We will need to obtained 2D Echo to check for her baseline cardiac function before initiation of chemotherapy and repeat an echocardiogram once every three months while on HER2 targeted therapy.   In this regimen, Taxol and Herceptin are administered IV weekly; pertuzumab IV once every three weeks, the combination for a total of 12 weeks.  We recommend she receive the treatment via a [id number].  We will help to arrange this as soon as possible, and plan to start treatment later this week or early next week.     We discussed that if she has residual breast disease after 12 weeks of THP, we may need to add additional chemotherapy of dose dense AC (adriamycin and cyclophosphamide) before her surgery.    We further explained that she would go for surgery after completing chemotherapy. She will be eligible for lumpectomy or mastectomy. She can discuss with Dr Newsome regarding to surgical plan and what type of surgery she prefers. She will need to have sentinel lymph node biopsy along with her tumor resection. If Dr Newsome and her decide to go for lumpectomy or she has a positive lymph node at the time of surgery, then she will need breast radiation after her surgery. Finally, given ER+ breast cancer, plan will be treat with a minimum 5 years of endocrine therapy. All patients questions are answered to their satisfaction.     Plan:  1. Obtain 2D Echo   2. Arrange medi-port placement with interventional radiology  3. Planning to start THP within 1 week  4. Labs and RUPA visit before chemotherapy infusion  5. Return to clinic to visit Dr Alvarez in 1 month.  "      Patient was seen and discussed with Dr Alvarez.     Michelle Smith  Hem/Onco Fellow    This video visit was conducted with the patient, her daughter, Dr. Smith, and myself.  I have edited the above note to reflect our joint assessment and plan.  I spent a total 60 minutes in video visit with the patient today.    Perlita Alvarez MD        Talya Zuleta is a 75 year old female who is being evaluated via a billable video visit.      The patient has been notified of following:     \"This video visit will be conducted via a call between you and your physician/provider. We have found that certain health care needs can be provided without the need for an in-person physical exam.  This service lets us provide the care you need with a video conversation.  If a prescription is necessary we can send it directly to your pharmacy.  If lab work is needed we can place an order for that and you can then stop by our lab to have the test done at a later time.    Video visits are billed at different rates depending on your insurance coverage.  Please reach out to your insurance provider with any questions.    If during the course of the call the physician/provider feels a video visit is not appropriate, you will not be charged for this service.\"    Patient has given verbal consent for Video visit? Yes    How would you like to obtain your AVS? MyChart     If you are dropped from the video visit, the video invite should be resent to: Send to e-mail at: [email or web address]     Please call Carolyne if unable to find pt in waiting room:   [phone]    Will anyone else be joining your video visit?         I have reviewed and updated the patient's allergies and pt confirmed any changes to their medication list.  Patient was asked to provide any patient recorded vital signs, height and/or weight.  Please see \"Patient Reported Vital Signs\" tab for that information.  Patient instructed to be in the virtual waiting room 10-15 minutes " prior to appointment time.      Concerns: Patient has had a lot of vertigo and she is wondering if she could/should get the flu shot.    Refills: None        MARY Urrutia         Video-Visit Details    Type of service:  Video Visit    Video Start Time: 10:47 AM  Video End Time: 12:07    Originating Location (pt. Location): Home    Distant Location (provider location):  [facility]     Platform used for Video Visit: Kelly Smith MD          Again, thank you for allowing me to participate in the care of your patient.        Sincerely,        Perlita Alvarez MD

## 2020-09-22 LAB
SARS-COV-2 RNA SPEC QL NAA+PROBE: NOT DETECTED
SPECIMEN SOURCE: NORMAL

## 2020-09-23 ENCOUNTER — PREP FOR PROCEDURE (OUTPATIENT)
Dept: SURGERY | Facility: CLINIC | Age: 75
End: 2020-09-23

## 2020-09-23 ENCOUNTER — TELEPHONE (OUTPATIENT)
Dept: SURGERY | Facility: CLINIC | Age: 75
End: 2020-09-23

## 2020-09-23 DIAGNOSIS — C50.919 BREAST CANCER (H): Primary | ICD-10-CM

## 2020-09-23 NOTE — TELEPHONE ENCOUNTER
According to message below pt needs to have port placement done. Routing to  and her assistant to place orders and schedule if appropriate. Thank you...Nicole Mcmanus RN

## 2020-09-23 NOTE — TELEPHONE ENCOUNTER
[facility] Call Center    Phone Message    May a detailed message be left on voicemail: yes     Reason for Call: Order has been placed for Pt to have a port placed.  Protocols say to send a telephone encounter.  Please contact Pt to schedule.  Thanks.    Action Taken: Message routed to:  Adult Clinics: Surgery, General p [id number]    Travel Screening: Not Applicable

## 2020-09-23 NOTE — TELEPHONE ENCOUNTER
Received communication from  that she reached out to her assistant Bruna to assist with scheduling....Nicole Mcmanus RN

## 2020-09-24 ENCOUNTER — TELEPHONE (OUTPATIENT)
Dept: SURGERY | Facility: CLINIC | Age: 75
End: 2020-09-24

## 2020-09-24 DIAGNOSIS — Z11.59 ENCOUNTER FOR SCREENING FOR OTHER VIRAL DISEASES: Primary | ICD-10-CM

## 2020-09-24 PROBLEM — C50.919 BREAST CANCER (H): Status: ACTIVE | Noted: 2020-09-24

## 2020-09-24 NOTE — TELEPHONE ENCOUNTER
[facility] Call Center    Phone Message    May a detailed message be left on voicemail: yes     Reason for Call: Other: need to schedule port placement for patient. no one avail to help at time of call. per protocols, to be sched by dept.     Action Taken: Message routed to:  Adult Clinics: Surgery, General p [id number]    Travel Screening: Not Applicable

## 2020-09-24 NOTE — TELEPHONE ENCOUNTER
RN called pt, no answer. Unable to leave a vm .Pt called back while RN documenting and states that she spoke with someone and she is scheduled for a port placement next Friday October 2nd at [facility]. No further assistance needed...Nicole Mcmanus RN

## 2020-09-25 RX ORDER — ALBUTEROL SULFATE 0.83 MG/ML
2.5 SOLUTION RESPIRATORY (INHALATION)
Status: CANCELLED | OUTPATIENT
Start: 2020-10-07

## 2020-09-25 RX ORDER — DIPHENHYDRAMINE HCL 25 MG
50 CAPSULE ORAL ONCE
Status: CANCELLED
Start: 2020-10-07

## 2020-09-25 RX ORDER — MEPERIDINE HYDROCHLORIDE 25 MG/ML
25 INJECTION INTRAMUSCULAR; INTRAVENOUS; SUBCUTANEOUS EVERY 30 MIN PRN
Status: CANCELLED | OUTPATIENT
Start: 2020-10-07

## 2020-09-25 RX ORDER — ALBUTEROL SULFATE 90 UG/1
1-2 AEROSOL, METERED RESPIRATORY (INHALATION)
Status: CANCELLED
Start: 2020-10-07

## 2020-09-25 RX ORDER — HEPARIN SODIUM (PORCINE) LOCK FLUSH IV SOLN 100 UNIT/ML 100 UNIT/ML
5 SOLUTION INTRAVENOUS
Status: CANCELLED | OUTPATIENT
Start: 2020-10-07

## 2020-09-25 RX ORDER — LORAZEPAM 2 MG/ML
0.5 INJECTION INTRAMUSCULAR EVERY 4 HOURS PRN
Status: CANCELLED
Start: 2020-10-07

## 2020-09-25 RX ORDER — EPINEPHRINE 1 MG/ML
0.3 INJECTION, SOLUTION INTRAMUSCULAR; SUBCUTANEOUS EVERY 5 MIN PRN
Status: CANCELLED | OUTPATIENT
Start: 2020-10-07

## 2020-09-25 RX ORDER — DIPHENHYDRAMINE HYDROCHLORIDE 50 MG/ML
50 INJECTION INTRAMUSCULAR; INTRAVENOUS
Status: CANCELLED
Start: 2020-10-07

## 2020-09-25 RX ORDER — METHYLPREDNISOLONE SODIUM SUCCINATE 125 MG/2ML
125 INJECTION, POWDER, LYOPHILIZED, FOR SOLUTION INTRAMUSCULAR; INTRAVENOUS
Status: CANCELLED
Start: 2020-10-07

## 2020-09-25 RX ORDER — HEPARIN SODIUM,PORCINE 10 UNIT/ML
5 VIAL (ML) INTRAVENOUS
Status: CANCELLED | OUTPATIENT
Start: 2020-10-07

## 2020-09-25 RX ORDER — ACETAMINOPHEN 325 MG/1
650 TABLET ORAL ONCE
Status: CANCELLED | OUTPATIENT
Start: 2020-10-07

## 2020-09-25 RX ORDER — SODIUM CHLORIDE 9 MG/ML
1000 INJECTION, SOLUTION INTRAVENOUS CONTINUOUS PRN
Status: CANCELLED
Start: 2020-10-07

## 2020-09-25 RX ORDER — NALOXONE HYDROCHLORIDE 0.4 MG/ML
.1-.4 INJECTION, SOLUTION INTRAMUSCULAR; INTRAVENOUS; SUBCUTANEOUS
Status: CANCELLED | OUTPATIENT
Start: 2020-10-07

## 2020-09-28 ENCOUNTER — OFFICE VISIT (OUTPATIENT)
Dept: FAMILY MEDICINE | Facility: CLINIC | Age: 75
End: 2020-09-28
Payer: COMMERCIAL

## 2020-09-28 VITALS
WEIGHT: 167 LBS | DIASTOLIC BLOOD PRESSURE: 70 MMHG | TEMPERATURE: 98 F | SYSTOLIC BLOOD PRESSURE: 115 MMHG | HEART RATE: 76 BPM | BODY MASS INDEX: 27.37 KG/M2

## 2020-09-28 DIAGNOSIS — C50.919 MALIGNANT NEOPLASM OF FEMALE BREAST, UNSPECIFIED ESTROGEN RECEPTOR STATUS, UNSPECIFIED LATERALITY, UNSPECIFIED SITE OF BREAST (H): ICD-10-CM

## 2020-09-28 DIAGNOSIS — Z13.6 CARDIOVASCULAR SCREENING; LDL GOAL LESS THAN 100: ICD-10-CM

## 2020-09-28 DIAGNOSIS — R73.01 IMPAIRED FASTING GLUCOSE: ICD-10-CM

## 2020-09-28 DIAGNOSIS — Z01.818 PREOP GENERAL PHYSICAL EXAM: Primary | ICD-10-CM

## 2020-09-28 DIAGNOSIS — R53.83 FATIGUE, UNSPECIFIED TYPE: ICD-10-CM

## 2020-09-28 LAB
ALBUMIN SERPL-MCNC: 3.6 G/DL (ref 3.4–5)
ALP SERPL-CCNC: 79 U/L (ref 40–150)
ALT SERPL W P-5'-P-CCNC: 13 U/L (ref 0–50)
ANION GAP SERPL CALCULATED.3IONS-SCNC: 7 MMOL/L (ref 3–14)
AST SERPL W P-5'-P-CCNC: 13 U/L (ref 0–45)
BASOPHILS # BLD AUTO: 0 10E9/L (ref 0–0.2)
BASOPHILS NFR BLD AUTO: 0.4 %
BILIRUB SERPL-MCNC: 0.4 MG/DL (ref 0.2–1.3)
BUN SERPL-MCNC: 14 MG/DL (ref 7–30)
CALCIUM SERPL-MCNC: 9.1 MG/DL (ref 8.5–10.1)
CHLORIDE SERPL-SCNC: 104 MMOL/L (ref 94–109)
CHOLEST SERPL-MCNC: 156 MG/DL
CO2 SERPL-SCNC: 27 MMOL/L (ref 20–32)
CREAT SERPL-MCNC: 0.87 MG/DL (ref 0.52–1.04)
DIFFERENTIAL METHOD BLD: ABNORMAL
EOSINOPHIL # BLD AUTO: 0.1 10E9/L (ref 0–0.7)
EOSINOPHIL NFR BLD AUTO: 0.7 %
ERYTHROCYTE [DISTWIDTH] IN BLOOD BY AUTOMATED COUNT: 15.7 % (ref 10–15)
GFR SERPL CREATININE-BSD FRML MDRD: 65 ML/MIN/{1.73_M2}
GLUCOSE SERPL-MCNC: 106 MG/DL (ref 70–99)
HBA1C MFR BLD: 5.7 % (ref 0–5.6)
HCT VFR BLD AUTO: 42.8 % (ref 35–47)
HDLC SERPL-MCNC: 66 MG/DL
HGB BLD-MCNC: 14.4 G/DL (ref 11.7–15.7)
LDLC SERPL CALC-MCNC: 69 MG/DL
LYMPHOCYTES # BLD AUTO: 1.6 10E9/L (ref 0.8–5.3)
LYMPHOCYTES NFR BLD AUTO: 22.4 %
MCH RBC QN AUTO: 33.5 PG (ref 26.5–33)
MCHC RBC AUTO-ENTMCNC: 33.6 G/DL (ref 31.5–36.5)
MCV RBC AUTO: 100 FL (ref 78–100)
MONOCYTES # BLD AUTO: 0.6 10E9/L (ref 0–1.3)
MONOCYTES NFR BLD AUTO: 8.1 %
NEUTROPHILS # BLD AUTO: 4.9 10E9/L (ref 1.6–8.3)
NEUTROPHILS NFR BLD AUTO: 68.4 %
NONHDLC SERPL-MCNC: 90 MG/DL
PLATELET # BLD AUTO: 291 10E9/L (ref 150–450)
POTASSIUM SERPL-SCNC: 4.1 MMOL/L (ref 3.4–5.3)
PROT SERPL-MCNC: 7.6 G/DL (ref 6.8–8.8)
RBC # BLD AUTO: 4.3 10E12/L (ref 3.8–5.2)
SODIUM SERPL-SCNC: 138 MMOL/L (ref 133–144)
TRIGL SERPL-MCNC: 105 MG/DL
TSH SERPL DL<=0.005 MIU/L-ACNC: 1.59 MU/L (ref 0.4–4)
WBC # BLD AUTO: 7.1 10E9/L (ref 4–11)

## 2020-09-28 PROCEDURE — 84443 ASSAY THYROID STIM HORMONE: CPT | Performed by: FAMILY MEDICINE

## 2020-09-28 PROCEDURE — 36415 COLL VENOUS BLD VENIPUNCTURE: CPT | Performed by: FAMILY MEDICINE

## 2020-09-28 PROCEDURE — 99215 OFFICE O/P EST HI 40 MIN: CPT | Performed by: FAMILY MEDICINE

## 2020-09-28 PROCEDURE — 80061 LIPID PANEL: CPT | Performed by: FAMILY MEDICINE

## 2020-09-28 PROCEDURE — 80053 COMPREHEN METABOLIC PANEL: CPT | Performed by: FAMILY MEDICINE

## 2020-09-28 PROCEDURE — 85025 COMPLETE CBC W/AUTO DIFF WBC: CPT | Performed by: FAMILY MEDICINE

## 2020-09-28 PROCEDURE — 83036 HEMOGLOBIN GLYCOSYLATED A1C: CPT | Performed by: FAMILY MEDICINE

## 2020-09-28 ASSESSMENT — PAIN SCALES - GENERAL: PAINLEVEL: NO PAIN (0)

## 2020-09-28 NOTE — PATIENT INSTRUCTIONS

## 2020-09-28 NOTE — PROGRESS NOTES
[address] [id number] [phone]  Dept: [phone]    PRE-OP EVALUATION:  Today's date: 2020    Talya Zuleta (: 1945) presents for pre-operative evaluation assessment as requested by Dr. Lowery.  She requires evaluation and anesthesia risk assessment prior to undergoing surgery/procedure for treatment of breast cancer .    Fax number for surgical facility: [phone]  Primary Physician: Cole Weston  Type of Anesthesia Anticipated: to be determined    Preop Questionnnaire:  Pre-op Questionnaire 2020   Surgery Location: [facility]   Surgeon: ?   Surgery/Procedure: port placement for chemo   Surgery Date: 10/2/2020   Time of Surgery: early? have to be there at 6am   Where patient plans to recover: At home with family   1. Have you ever had a heart attack or stroke? No   2. Have you ever had surgery on your heart or blood vessels, such as a stent placement, a coronary artery bypass, or surgery on an artery in your head, neck, heart, or legs? No   3. Do you have chest pain with activity? No   4. Do you have a history of  heart failure? No   5. Do you currently have a cold, bronchitis or symptoms of other infection? No   6. Do you have a cough, shortness of breath, or wheezing? No   7. Do you or anyone in your family have previous history of blood clots? UNKNOWN    8. Do you or does anyone in your family have a serious bleeding problem such as prolonged bleeding following surgeries or cuts? UNKNOWN    9. Have you ever had problems with anemia or been told to take iron pills? No   10. Have you had any abnormal blood loss such as black, tarry or bloody stools, or abnormal vaginal bleeding? No   11. Have you ever had a blood transfusion? No   Are you willing to have a blood transfusion if it is medically needed before, during, or after your surgery? Yes   13. Have you or any of your relatives ever had  problems with anesthesia? No   14. Do you have sleep apnea, excessive snoring or daytime drowsiness? YES  Has sleep apnea but not  Able to tolerate cpap   14a. Do you have a CPAP machine? No   15. Do you have any artifical heart valves or other implanted medical devices like a pacemaker, defibrillator, or continuous glucose monitor? No   16. Do you have artificial joints? No   17. Are you allergic to latex? YES:    18. Is there any chance that you may be pregnant? No         HPI:     HPI related to upcoming procedure: 75 years old female with breast cancer.  To get port placement  For chemo.           MEDICAL HISTORY:     Patient Active Problem List    Diagnosis Date Noted     Breast cancer (H) 09/24/2020     Priority: Medium     Added automatically from request for surgery [id number]       Malignant neoplasm of overlapping sites of right breast in female, estrogen receptor positive (H) 09/21/2020     Priority: Medium      History reviewed. No pertinent past medical history.  History reviewed. No pertinent surgical history.     Correction to above,had hysterectomy, cholecystectomy and thyroid procedure\    Current Outpatient Medications   Medication Sig Dispense Refill     amLODIPine (NORVASC) 5 MG tablet Take 5 mg by mouth       atorvastatin (LIPITOR) 40 MG tablet TAKE 1/2 TABLET DAILY       benazepril (LOTENSIN) 20 MG tablet TAKE 1 TABLET BY MOUTH TWO TIMES A DAY.       furosemide (LASIX) 80 MG tablet Take 80 mg by mouth 2 times daily       potassium chloride ER (KLOR-CON M) 20 MEQ CR tablet Take 40 mEq by mouth       triamcinolone (KENALOG) 0.1 % external cream        OTC products: multivitamin and calcium    Allergies   Allergen Reactions     Bacitracin-Neomycin-Polymyxin      PN: LW Reaction: Unknown Reaction     Latex       Latex Allergy: YES: Precautions to take: avoids latex    Social History     Tobacco Use     Smoking status: Current Every Day Smoker     Types: Cigarettes     Smokeless tobacco: Former User    Substance Use Topics     Alcohol use: Yes     Frequency: 4 or more times a week     Drinks per session: 3 or 4     Binge frequency: Daily or almost daily     Comment: scotch & water 2-3 classes a day     History   Drug Use Unknown       REVIEW OF SYSTEMS:   Constitutional, neuro, ENT, endocrine, pulmonary, cardiac, gastrointestinal, genitourinary, musculoskeletal, integument and psychiatric systems are negative, except as otherwise noted.    No recent illnesses    The breast cancer was diagnosed April or May of this year    No surgery done for that    Has not started the chemo yet    No radiation done    Had half the thyroid removed in past, but not needing thyroid medication     No heart problems , has murmur    No lung problems    Had quit smoking for years but resarted with pandemic    Retired        EXAM:   /70 (BP Location: Left arm, Patient Position: Chair, Cuff Size: Adult Regular)   Pulse 76   Temp 98  F (36.7  C) (Oral)   Wt 75.8 kg (167 lb)   Breastfeeding No   BMI 27.37 kg/m      GENERAL APPEARANCE: healthy, alert and no distress     EYES: EOMI, PERRL     HENT: ear canals and TM's normal and nose and mouth without ulcers or lesions     NECK: no adenopathy, no asymmetry, masses, or scars and thyroid normal to palpation     RESP: lungs clear to auscultation - no rales, rhonchi or wheezes     CV: regular rates and rhythm, normal S1 S2, no S3 or S4 and no murmur, click or rub     ABDOMEN:  soft, nontender, no HSM or masses and bowel sounds normal     MS: extremities normal- no gross deformities noted, no evidence of inflammation in joints, FROM in all extremities.     SKIN: no suspicious lesions or rashes     NEURO: Normal strength and tone, sensory exam grossly normal, mentation intact and speech normal     PSYCH: mentation appears normal. and affect normal/bright     LYMPHATICS: No cervical adenopathy    DIAGNOSTICS:    patient already scheduled for echo tomorrow; the port placement  Is  Friday    Labs drawn today, pending    No results for input(s): HGB, PLT, INR, NA, POTASSIUM, CR, A1C in the last 01279 hours.     IMPRESSION:   Reason for surgery/procedure: to have port placement for chemo for breast cancer   Diagnosis/reason for consult: preop clearance    The proposed surgical procedure is considered INTERMEDIATE risk.    REVISED CARDIAC RISK INDEX  The patient has the following serious cardiovascular risks for perioperative complications such as (MI, PE, VFib and 3  AV Block):  No serious cardiac risks  INTERPRETATION: 0 risks: Class I (very low risk - 0.4% complication rate)    The patient has the following additional risks for perioperative complications:  No identified additional risks      ICD-10-CM    1. Preop general physical exam  Z01.818        RECOMMENDATIONS:        --Patient is to take all scheduled medications on the day of surgery EXCEPT for modifications listed below.     Blood pressure very well controlled.  Okay to hold all meds the morning of procedure ( has to be there very early am of procedure)    Not on any blood thinners            APPROVAL GIVEN to proceed with proposed procedure, without further diagnostic evaluation, providing labs are okay    Of not the furosemide is for blood pressure per patient.  No history of heart issues.    Did encourage complete smoking cessation    No history of anesthesia problem    No bleeding or clotting disorders       Signed Electronically by: Darrell Douglas MD    Copy of this evaluation report is provided to requesting physician.    [facility] Preop Guidelines    Revised Cardiac Risk Index

## 2020-09-29 ENCOUNTER — HOSPITAL ENCOUNTER (OUTPATIENT)
Dept: CARDIOLOGY | Facility: CLINIC | Age: 75
Discharge: HOME | End: 2020-09-29
Attending: INTERNAL MEDICINE | Admitting: INTERNAL MEDICINE
Payer: COMMERCIAL

## 2020-09-29 DIAGNOSIS — Z51.11 ENCOUNTER FOR ANTINEOPLASTIC CHEMOTHERAPY: ICD-10-CM

## 2020-09-29 PROCEDURE — 93306 TTE W/DOPPLER COMPLETE: CPT | Mod: 26 | Performed by: INTERNAL MEDICINE

## 2020-09-29 PROCEDURE — 93306 TTE W/DOPPLER COMPLETE: CPT

## 2020-09-29 PROCEDURE — 76376 3D RENDER W/INTRP POSTPROCES: CPT

## 2020-09-29 NOTE — RESULT ENCOUNTER NOTE
The hemoglobin a1c is barely into the prediabetes range.  No medication needed for this.  Just  Keep working on healthy diet/exercise as you are able.    Other labs are good.    Okay for procedure.    Darrell Douglas MD

## 2020-09-30 ENCOUNTER — PATIENT OUTREACH (OUTPATIENT)
Dept: ONCOLOGY | Facility: CLINIC | Age: 75
End: 2020-09-30

## 2020-09-30 DIAGNOSIS — Z11.59 ENCOUNTER FOR SCREENING FOR OTHER VIRAL DISEASES: ICD-10-CM

## 2020-09-30 LAB
SARS-COV-2 RNA SPEC QL NAA+PROBE: NORMAL
SPECIMEN SOURCE: NORMAL

## 2020-09-30 PROCEDURE — U0003 INFECTIOUS AGENT DETECTION BY NUCLEIC ACID (DNA OR RNA); SEVERE ACUTE RESPIRATORY SYNDROME CORONAVIRUS 2 (SARS-COV-2) (CORONAVIRUS DISEASE [COVID-19]), AMPLIFIED PROBE TECHNIQUE, MAKING USE OF HIGH THROUGHPUT TECHNOLOGIES AS DESCRIBED BY CMS-2020-01-R: HCPCS | Performed by: SURGERY

## 2020-09-30 NOTE — PROGRESS NOTES
Spoke  with Talya and her daughter in-law Carolyne to discuss chemotherapy and resources available at the [facility]. Provided patient with  [facility] printouts on Taxol, herceptin, perjeta. Reviewed administration, side effects (including myelosuppression, nausea/vomiting, diarrhea/constipation, hair loss, mouth sores) and ongoing symptom management by APPs in clinic. Provided phone numbers for triage and after hours care. Highlighted steps to expect when getting chemotherapy (check in, labs, pre- meds, infusion), Discussed that chemo treatment may be delayed a day or two due to blood counts, infusion schedule or patient's need to modify. Included a one page resource of symptoms and when to contact the Cancer Clinic with questions or concerns.      Talya has drug coverage through her [employer]. She will check to see if the antiemetics are covered. She will notify writer is prescriptions need to be sent.     Answered all Talya's questions to her stated satisfaction.

## 2020-10-01 DIAGNOSIS — C50.919 BREAST CANCER (H): Primary | ICD-10-CM

## 2020-10-01 LAB
LABORATORY COMMENT REPORT: NORMAL
SARS-COV-2 RNA SPEC QL NAA+PROBE: NEGATIVE
SPECIMEN SOURCE: NORMAL

## 2020-10-01 PROCEDURE — 81408 MOPATH PROCEDURE LEVEL 9: CPT | Performed by: SURGERY

## 2020-10-01 PROCEDURE — 81162 BRCA1&2 GEN FULL SEQ DUP/DEL: CPT | Performed by: SURGERY

## 2020-10-01 PROCEDURE — 81321 PTEN GENE FULL SEQUENCE: CPT | Performed by: SURGERY

## 2020-10-01 PROCEDURE — 81307 PALB2 GENE FULL GENE SEQ: CPT | Performed by: SURGERY

## 2020-10-01 PROCEDURE — 81405 MOPATH PROCEDURE LEVEL 6: CPT | Performed by: SURGERY

## 2020-10-01 PROCEDURE — 81408 MOPATH PROCEDURE LEVEL 9: CPT | Mod: 59 | Performed by: SURGERY

## 2020-10-01 PROCEDURE — 81405 MOPATH PROCEDURE LEVEL 6: CPT | Mod: 59 | Performed by: SURGERY

## 2020-10-01 PROCEDURE — 81479 UNLISTED MOLECULAR PATHOLOGY: CPT | Mod: GZ | Performed by: SURGERY

## 2020-10-01 PROCEDURE — 81323 PTEN GENE DUP/DELET VARIANT: CPT | Mod: XU | Performed by: SURGERY

## 2020-10-01 PROCEDURE — 81406 MOPATH PROCEDURE LEVEL 7: CPT | Performed by: SURGERY

## 2020-10-01 PROCEDURE — 81323 PTEN GENE DUP/DELET VARIANT: CPT | Performed by: SURGERY

## 2020-10-01 PROCEDURE — G0452 MOLECULAR PATHOLOGY INTERPR: HCPCS | Performed by: PATHOLOGY

## 2020-10-01 PROCEDURE — 81479 UNLISTED MOLECULAR PATHOLOGY: CPT | Performed by: SURGERY

## 2020-10-02 ENCOUNTER — APPOINTMENT (OUTPATIENT)
Dept: GENERAL RADIOLOGY | Facility: CLINIC | Age: 75
End: 2020-10-02
Attending: SURGERY
Payer: COMMERCIAL

## 2020-10-02 ENCOUNTER — ANESTHESIA (OUTPATIENT)
Dept: SURGERY | Facility: CLINIC | Age: 75
End: 2020-10-02
Payer: COMMERCIAL

## 2020-10-02 ENCOUNTER — HOSPITAL ENCOUNTER (OUTPATIENT)
Facility: CLINIC | Age: 75
Discharge: HOME | End: 2020-10-02
Attending: SURGERY | Admitting: SURGERY
Payer: COMMERCIAL

## 2020-10-02 ENCOUNTER — ANESTHESIA EVENT (OUTPATIENT)
Dept: SURGERY | Facility: CLINIC | Age: 75
End: 2020-10-02
Payer: COMMERCIAL

## 2020-10-02 ENCOUNTER — APPOINTMENT (OUTPATIENT)
Dept: SURGERY | Facility: PHYSICIAN GROUP | Age: 75
End: 2020-10-02
Payer: COMMERCIAL

## 2020-10-02 VITALS
TEMPERATURE: 97.7 F | WEIGHT: 167 LBS | OXYGEN SATURATION: 98 % | RESPIRATION RATE: 18 BRPM | SYSTOLIC BLOOD PRESSURE: 132 MMHG | BODY MASS INDEX: 27.37 KG/M2 | DIASTOLIC BLOOD PRESSURE: 64 MMHG | HEART RATE: 77 BPM

## 2020-10-02 DIAGNOSIS — C50.919 BREAST CANCER (H): ICD-10-CM

## 2020-10-02 DIAGNOSIS — G89.18 POSTOPERATIVE PAIN: Primary | ICD-10-CM

## 2020-10-02 PROCEDURE — 258N000003 HC RX IP 258 OP 636: Performed by: NURSE ANESTHETIST, CERTIFIED REGISTERED

## 2020-10-02 PROCEDURE — 250N000011 HC RX IP 250 OP 636: Performed by: SURGERY

## 2020-10-02 PROCEDURE — 999N000179 XR SURGERY CARM FLUORO LESS THAN 5 MIN W STILLS

## 2020-10-02 PROCEDURE — 250N000009 HC RX 250: Performed by: SURGERY

## 2020-10-02 PROCEDURE — 761N000001 HC RECOVERY PHASE 1 LEVEL 1 FIRST HR: Performed by: SURGERY

## 2020-10-02 PROCEDURE — 258N000003 HC RX IP 258 OP 636: Performed by: SURGERY

## 2020-10-02 PROCEDURE — 761N000007 HC RECOVERY PHASE 2 EACH 15 MINS: Performed by: SURGERY

## 2020-10-02 PROCEDURE — 370N000001 HC ANESTHESIA TECHNICAL FEE, 1ST 30 MIN: Performed by: SURGERY

## 2020-10-02 PROCEDURE — C1788 PORT, INDWELLING, IMP: HCPCS | Performed by: SURGERY

## 2020-10-02 PROCEDURE — 370N000002 HC ANESTHESIA TECHNICAL FEE, EACH ADDTL 15 MIN: Performed by: SURGERY

## 2020-10-02 PROCEDURE — 360N000018 HC SURGERY LEVEL 2 W FLUORO 1ST 30 MIN: Performed by: SURGERY

## 2020-10-02 PROCEDURE — 250N000011 HC RX IP 250 OP 636: Performed by: NURSE ANESTHETIST, CERTIFIED REGISTERED

## 2020-10-02 PROCEDURE — 250N000009 HC RX 250: Performed by: NURSE ANESTHETIST, CERTIFIED REGISTERED

## 2020-10-02 PROCEDURE — 272N000001 HC OR GENERAL SUPPLY STERILE: Performed by: SURGERY

## 2020-10-02 PROCEDURE — 999N000139 HC STATISTIC PRE-PROCEDURE ASSESSMENT II: Performed by: SURGERY

## 2020-10-02 PROCEDURE — 360N000015 HC SURGERY LEVEL 2 EA 15 ADDTL MIN: Performed by: SURGERY

## 2020-10-02 DEVICE — POWERPORT CLEARVUE ISP IMPLANTABLE PORT WITH ATTACHABLE 8F POLYURETHANE OPEN-ENDED SINGLE-LUMEN VENOUS CATHETER PROCEDURAL KIT
Type: IMPLANTABLE DEVICE | Site: CHEST  WALL | Status: FUNCTIONAL
Brand: POWERPORT CLEARVUE

## 2020-10-02 RX ORDER — ONDANSETRON 4 MG/1
4 TABLET, ORALLY DISINTEGRATING ORAL EVERY 30 MIN PRN
Status: DISCONTINUED | OUTPATIENT
Start: 2020-10-02 | End: 2020-10-02 | Stop reason: HOSPADM

## 2020-10-02 RX ORDER — ACETAMINOPHEN 650 MG/1
650 SUPPOSITORY RECTAL EVERY 4 HOURS PRN
Status: DISCONTINUED | OUTPATIENT
Start: 2020-10-02 | End: 2020-10-02 | Stop reason: HOSPADM

## 2020-10-02 RX ORDER — SODIUM CHLORIDE, SODIUM LACTATE, POTASSIUM CHLORIDE, CALCIUM CHLORIDE 600; 310; 30; 20 MG/100ML; MG/100ML; MG/100ML; MG/100ML
INJECTION, SOLUTION INTRAVENOUS CONTINUOUS
Status: DISCONTINUED | OUTPATIENT
Start: 2020-10-02 | End: 2020-10-02 | Stop reason: HOSPADM

## 2020-10-02 RX ORDER — SODIUM CHLORIDE, SODIUM LACTATE, POTASSIUM CHLORIDE, CALCIUM CHLORIDE 600; 310; 30; 20 MG/100ML; MG/100ML; MG/100ML; MG/100ML
INJECTION, SOLUTION INTRAVENOUS CONTINUOUS PRN
Status: DISCONTINUED | OUTPATIENT
Start: 2020-10-02 | End: 2020-10-02

## 2020-10-02 RX ORDER — ONDANSETRON 2 MG/ML
INJECTION INTRAMUSCULAR; INTRAVENOUS PRN
Status: DISCONTINUED | OUTPATIENT
Start: 2020-10-02 | End: 2020-10-02

## 2020-10-02 RX ORDER — ONDANSETRON 2 MG/ML
4 INJECTION INTRAMUSCULAR; INTRAVENOUS EVERY 30 MIN PRN
Status: DISCONTINUED | OUTPATIENT
Start: 2020-10-02 | End: 2020-10-02 | Stop reason: HOSPADM

## 2020-10-02 RX ORDER — AMOXICILLIN 250 MG
1-2 CAPSULE ORAL 2 TIMES DAILY
Qty: 15 TABLET | Refills: 0 | Status: SHIPPED | OUTPATIENT
Start: 2020-10-02 | End: 2020-10-06

## 2020-10-02 RX ORDER — CEFAZOLIN SODIUM 2 G/100ML
2 INJECTION, SOLUTION INTRAVENOUS
Status: COMPLETED | OUTPATIENT
Start: 2020-10-02 | End: 2020-10-02

## 2020-10-02 RX ORDER — HYDROCODONE BITARTRATE AND ACETAMINOPHEN 5; 325 MG/1; MG/1
1 TABLET ORAL
Status: DISCONTINUED | OUTPATIENT
Start: 2020-10-02 | End: 2020-10-02 | Stop reason: HOSPADM

## 2020-10-02 RX ORDER — FENTANYL CITRATE 50 UG/ML
25-50 INJECTION, SOLUTION INTRAMUSCULAR; INTRAVENOUS
Status: DISCONTINUED | OUTPATIENT
Start: 2020-10-02 | End: 2020-10-02 | Stop reason: HOSPADM

## 2020-10-02 RX ORDER — NALOXONE HYDROCHLORIDE 0.4 MG/ML
.1-.4 INJECTION, SOLUTION INTRAMUSCULAR; INTRAVENOUS; SUBCUTANEOUS
Status: DISCONTINUED | OUTPATIENT
Start: 2020-10-02 | End: 2020-10-02 | Stop reason: HOSPADM

## 2020-10-02 RX ORDER — HYDROMORPHONE HYDROCHLORIDE 1 MG/ML
.3-.5 INJECTION, SOLUTION INTRAMUSCULAR; INTRAVENOUS; SUBCUTANEOUS EVERY 10 MIN PRN
Status: DISCONTINUED | OUTPATIENT
Start: 2020-10-02 | End: 2020-10-02 | Stop reason: HOSPADM

## 2020-10-02 RX ORDER — PROPOFOL 10 MG/ML
INJECTION, EMULSION INTRAVENOUS PRN
Status: DISCONTINUED | OUTPATIENT
Start: 2020-10-02 | End: 2020-10-02

## 2020-10-02 RX ORDER — CEFAZOLIN SODIUM 1 G/3ML
1 INJECTION, POWDER, FOR SOLUTION INTRAMUSCULAR; INTRAVENOUS SEE ADMIN INSTRUCTIONS
Status: DISCONTINUED | OUTPATIENT
Start: 2020-10-02 | End: 2020-10-02 | Stop reason: HOSPADM

## 2020-10-02 RX ORDER — MEPERIDINE HYDROCHLORIDE 25 MG/ML
12.5 INJECTION INTRAMUSCULAR; INTRAVENOUS; SUBCUTANEOUS
Status: DISCONTINUED | OUTPATIENT
Start: 2020-10-02 | End: 2020-10-02 | Stop reason: HOSPADM

## 2020-10-02 RX ORDER — FENTANYL CITRATE 0.05 MG/ML
25-50 INJECTION, SOLUTION INTRAMUSCULAR; INTRAVENOUS
Status: DISCONTINUED | OUTPATIENT
Start: 2020-10-02 | End: 2020-10-02 | Stop reason: HOSPADM

## 2020-10-02 RX ORDER — HYDROCODONE BITARTRATE AND ACETAMINOPHEN 5; 325 MG/1; MG/1
1 TABLET ORAL EVERY 4 HOURS PRN
Qty: 5 TABLET | Refills: 0 | Status: SHIPPED | OUTPATIENT
Start: 2020-10-02 | End: 2021-03-08

## 2020-10-02 RX ORDER — ALBUTEROL SULFATE 0.83 MG/ML
2.5 SOLUTION RESPIRATORY (INHALATION) EVERY 4 HOURS PRN
Status: DISCONTINUED | OUTPATIENT
Start: 2020-10-02 | End: 2020-10-02 | Stop reason: HOSPADM

## 2020-10-02 RX ORDER — FENTANYL CITRATE 50 UG/ML
INJECTION, SOLUTION INTRAMUSCULAR; INTRAVENOUS PRN
Status: DISCONTINUED | OUTPATIENT
Start: 2020-10-02 | End: 2020-10-02

## 2020-10-02 RX ORDER — PROPOFOL 10 MG/ML
INJECTION, EMULSION INTRAVENOUS CONTINUOUS PRN
Status: DISCONTINUED | OUTPATIENT
Start: 2020-10-02 | End: 2020-10-02

## 2020-10-02 RX ADMIN — PROPOFOL 30 MG: 10 INJECTION, EMULSION INTRAVENOUS at 08:58

## 2020-10-02 RX ADMIN — DEXMEDETOMIDINE HYDROCHLORIDE 12 MCG: 100 INJECTION, SOLUTION INTRAVENOUS at 08:58

## 2020-10-02 RX ADMIN — CEFAZOLIN SODIUM 2 G: 2 INJECTION, SOLUTION INTRAVENOUS at 09:08

## 2020-10-02 RX ADMIN — ONDANSETRON 4 MG: 2 INJECTION INTRAMUSCULAR; INTRAVENOUS at 08:58

## 2020-10-02 RX ADMIN — SODIUM CHLORIDE, POTASSIUM CHLORIDE, SODIUM LACTATE AND CALCIUM CHLORIDE: 600; 310; 30; 20 INJECTION, SOLUTION INTRAVENOUS at 08:57

## 2020-10-02 RX ADMIN — MIDAZOLAM 0.5 MG: 1 INJECTION INTRAMUSCULAR; INTRAVENOUS at 08:58

## 2020-10-02 RX ADMIN — FENTANYL CITRATE 50 MCG: 50 INJECTION, SOLUTION INTRAMUSCULAR; INTRAVENOUS at 08:58

## 2020-10-02 RX ADMIN — PROPOFOL 150 MCG/KG/MIN: 10 INJECTION, EMULSION INTRAVENOUS at 08:58

## 2020-10-02 ASSESSMENT — ENCOUNTER SYMPTOMS: ORTHOPNEA: 0

## 2020-10-02 ASSESSMENT — LIFESTYLE VARIABLES: TOBACCO_USE: 1

## 2020-10-02 NOTE — ANESTHESIA PREPROCEDURE EVALUATION
Anesthesia Pre-Procedure Evaluation    Patient: Talya Zuleta   MRN: [id number] : 1945          Preoperative Diagnosis: Breast cancer (H) [C50.919]    Procedure(s):  PORT PLACEMENT    Past Medical History:   Diagnosis Date     Breast cancer (H)      Sleep apnea      Past Surgical History:   Procedure Laterality Date     CHOLECYSTECTOMY       GYN SURGERY       HYSTERECTOMY       THYROIDECTOMY       partial       Anesthesia Evaluation     . Pt has had prior anesthetic.            ROS/MED HX    ENT/Pulmonary:     (+)sleep apnea, tobacco use, Current use doesn't use CPAP , . .    Neurologic:       Cardiovascular:     (+) Dyslipidemia, hypertension----. : . . . :. .      (-) CASTILLO and orthopnea/PND   METS/Exercise Tolerance:  >4 METS   Hematologic:         Musculoskeletal:         GI/Hepatic: Comment: EtOH use        (-) GERD   Renal/Genitourinary:         Endo:         Psychiatric:         Infectious Disease:         Malignancy:   (+) Malignancy History of Breast          Other:                          Physical Exam      Airway   Mallampati: II  TM distance: >3 FB  Neck ROM: full    Dental   (+) implants, missing and caps    Cardiovascular   Rhythm and rate: regular      Pulmonary    breath sounds clear to auscultation            Lab Results   Component Value Date    WBC 7.1 2020    HGB 14.4 2020    HCT 42.8 2020     2020     2020    POTASSIUM 4.1 2020    CHLORIDE 104 2020    CO2 27 2020    BUN 14 2020    CR 0.87 2020     (H) 2020    JIMBO 9.1 2020    ALBUMIN 3.6 2020    PROTTOTAL 7.6 2020    ALT 13 2020    AST 13 2020    ALKPHOS 79 2020    BILITOTAL 0.4 2020    TSH 1.59 2020       Preop Vitals  BP Readings from Last 3 Encounters:   20 115/70   20 114/71    Pulse Readings from Last 3 Encounters:   20 76   20 82      Resp Readings from Last 3 Encounters:  "  09/03/20 16    SpO2 Readings from Last 3 Encounters:   09/03/20 98%      Temp Readings from Last 1 Encounters:   09/28/20 36.7  C (98  F) (Oral)    Ht Readings from Last 1 Encounters:   09/03/20 1.664 m (5' 5.5\")      Wt Readings from Last 1 Encounters:   09/28/20 75.8 kg (167 lb)    Estimated body mass index is 27.37 kg/m  as calculated from the following:    Height as of 9/3/20: 1.664 m (5' 5.5\").    Weight as of 9/28/20: 75.8 kg (167 lb).     Allergies   Allergen Reactions     Bacitracin-Neomycin-Polymyxin      PN: LW Reaction: Unknown Reaction     Latex      Social History     Tobacco Use     Smoking status: Current Every Day Smoker     Types: Cigarettes     Smokeless tobacco: Former User   Substance Use Topics     Alcohol use: Yes     Frequency: 4 or more times a week     Drinks per session: 3 or 4     Binge frequency: Daily or almost daily     Comment: scotch & water 2-3 classes a day     Prior to Admission medications    Medication Sig Start Date End Date Taking? Authorizing Provider   amLODIPine (NORVASC) 5 MG tablet Take 5 mg by mouth 5/28/20  Yes Reported, Patient   atorvastatin (LIPITOR) 40 MG tablet TAKE 1/2 TABLET DAILY 11/25/19  Yes Reported, Patient   benazepril (LOTENSIN) 20 MG tablet TAKE 1 TABLET BY MOUTH TWO TIMES A DAY. 11/26/19  Yes Reported, Patient   furosemide (LASIX) 80 MG tablet Take 80 mg by mouth 2 times daily 11/26/19  Yes Reported, Patient   potassium chloride ER (KLOR-CON M) 20 MEQ CR tablet Take 40 mEq by mouth 9/17/19  Yes Reported, Patient   triamcinolone (KENALOG) 0.1 % external cream  5/28/20  Yes Reported, Patient     Current Facility-Administered Medications Ordered in Epic   Medication Dose Route Frequency Last Rate Last Dose     ceFAZolin (ANCEF) 1 g vial to attach to  ml bag for ADULT or 50 ml bag for PEDS  1 g Intravenous See Admin Instructions         ceFAZolin (ANCEF) intermittent infusion 2 g in 100 mL dextrose PRE-MIX  2 g Intravenous Pre-Op/Pre-procedure x 1 " dose         No current [facility]-ordered outpatient medications on file.       Recent Labs   Lab Test 09/28/20  1058      POTASSIUM 4.1   CHLORIDE 104   CO2 27   ANIONGAP 7   *   BUN 14   CR 0.87   JIMBO 9.1     Recent Labs   Lab Test 09/28/20  1058   WBC 7.1   HGB 14.4        No results for input(s): ABO, RH in the last 01447 hours.  No results for input(s): TROPI in the last 97757 hours.  No results for input(s): PH, PCO2, PO2, HCO3 in the last 97254 hours.  No results for input(s): HCG in the last 95234 hours.  Recent Results (from the past 744 hour(s))   PET Oncology Whole Body   Result Value    Radiologist flags (Urgent)     Patchy groundglass opacities in the lungs suggesting    Narrative    Combined Report of:    PET and CT on  9/18/2020 10:59 AM :    1. PET of the neck, chest, abdomen, and pelvis.  2. PET CT Fusion for Attenuation Correction and Anatomical  Localization:    3. Diagnostic CT scan of the chest, abdomen, and pelvis with  intravenous contrast for interpretation.  3. CT of the chest, abdomen and pelvis obtained for diagnostic  interpretation.  4. 3D MIP and PET-CT fused images were processed on an independent  workstation and archived to PACS and reviewed by a radiologist.    Technique:    1. PET: The patient received 12.6 mCi of F-18-FDG; the serum glucose  was 106 prior to administration, body weight was 68 kg. Images were  evaluated in the axial, sagittal, and coronal planes as well as the  rotational whole body MIP. Images were acquired from the Vertex to the  Feet.    UPTAKE WAS MEASURED AT 60 MINUTES.     BACKGROUND:  Liver SUV max= 4.4,   Aorta Blood SUV Max: 3.4.     2. CT: Volumetric acquisition for clinical interpretation of the  chest, abdomen, and pelvis acquired at 3 mm sections after the  uneventful administration of intravenous contrast. The chest, abdomen,  and pelvis were evaluated at 5 mm sections in bone, soft tissue, and  lung windows.      The patient received  104 cc. Of Isovue 370 intravenously and 500 mL  oral Breeza contrast for the examination.      3. 3D MIP and PET-CT fused images were processed on an independent  workstation and archived to PACS and reviewed by a radiologist.    INDICATION: Inflammatory breast cancer    ADDITIONAL INFORMATION OBTAINED FROM EMR: 75-year-old woman with new  diagnosis of invasive mammary carcinoma of the right breast, with  positive biopsy 8/21/2020.    COMPARISON: Ultrasound and mammogram 8/21/2020, ultrasound and  mammogram 8/10/2020    FINDINGS:     HEAD/NECK:  There is no  suspicious FDG uptake in the neck.     The paranasal sinuses are clear. The mastoid air cells are clear. The  mucosal pharyngeal space, the , prevertebral and carotid  spaces are within normal limits. No masses, mass effect or  pathologically enlarged lymph nodes are evident. Multiple nodules in  the right thyroid lobe, the largest measuring 1.9 x 1.5 cm (series 5,  image 139). These do not demonstrate significant FDG uptake. The left  thyroid lobe is surgically absent. There is a 1.1 x 0.6 cm nodule  extending from the isthmus to the left thyroidectomy bed.    CHEST:  Hypermetabolic mass in the right breast measuring 2.6 x 2.4 x 2.3 cm  with SUV max 23.4 (series 5, image 175). This corresponds with the  biopsy-proven primary breast malignancy. There is asymmetric skin  thickening of the right breast in particular around the periareolar  tissues which demonstrates low levels of FDG uptake, corresponding  with the inflammatory changes of inflammatory breast cancer.    No enlarged or hypermetabolic lymph nodes in the axillae or elsewhere  in the chest. Heart size is within normal limits. No pericardial  effusion. Severe coronary artery calcifications. Normal caliber of the  thoracic aorta and main pulmonary artery. Esophagus is unremarkable.    The central tracheobronchial tree is patent. No pleural effusion or  pneumothorax. No focal consolidation. Mild  dependent atelectasis.  There are patchy subpleural groundglass opacities in the left upper  lobe (series 10, image 62), and to a lesser extent in the right upper  lobe (series 10, image 63). Scattered subcentimeter pulmonary nodules,  for example 3 mm nodule in the left lower lobe (series 10, image 61),  and 6 mm groundglass nodule in the right upper lobe (series 10, image  63). These are too small to characterize by PET resolution.    ABDOMEN AND PELVIS:  There is no suspicious FDG uptake in the abdomen or pelvis.    Hypoattenuation within the liver near the falx most likely secondary  to focal hepatic steatosis. The gallbladder is surgically absent. No  intrahepatic or extrahepatic biliary ductal dilatation. The pancreas,  spleen, and adrenal glands are unremarkable. Symmetric nephrographic  enhancement of the kidneys. Multiple hypodensities within the renal  cortices, including subcentimeter hypodensities which are too small to  fully characterize. These are nonspecific but suggestive of cysts.  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 The bladder is partially distended and unremarkable. The uterus  is absent.     Normal caliber of the small and large bowel. Partially calcified  nodule adjacent to the descending colon suggesting sequela of prior  epiploic appendagitis. Normal caliber of the abdominal aorta. Severe  atherosclerotic calcifications of the abdominal aorta. No free fluid  or fluid collection.    LOWER EXTREMITIES:   No abnormal masses or hypermetabolic lesions.    BONES:   There is no abnormal FDG uptake in the skeleton. Multilevel  degenerative changes in the spine.        Impression    IMPRESSION: In this patient with new diagnosis of inflammatory right  breast cancer:    1. Hypermetabolic mass in the right breast corresponds with the  biopsy-proven invasive mammary carcinoma.    2. No enlarged or hypermetabolic axillary lymph nodes.    3. Scattered subcentimeter  pulmonary nodules, indeterminate.  Comparison with prior studies would be helpful if available. Otherwise  consider following with chest CT.    4. Scattered patchy groundglass opacities, most suggestive of an  infectious process, including possibly atypical organisms.    5. 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    6. Multiple thyroid nodules. Consider further evaluation with thyroid  ultrasound if not already performed.      [Access Center: Patchy groundglass opacities in the lungs suggesting  infection.]    This report will be copied to the [facility] to ensure a  provider acknowledges the finding. Access Center is available Monday  through Friday 8am-3:30 pm.     I have personally reviewed the examination and initial interpretation  and I agree with the findings.    SHELLY MCCULLOUGH MD   Echocardiogram Complete    Narrative    [id number]  [id number]  [id number]  [id number]^SHARI^HARLAN^ROBERT           [facility],[place]  Echocardiography Laboratory  [address] [place] [id number]     Name: OMER POTTER  MRN: [id number]  : 1945  Study Date: 2020 01:19 PM  Age: 75 yrs  Gender: Female  [facility]  Reason For Study: Encounter for antineoplastic chemotherapy  Ordering Physician: HARLAN MCCARTHY  Referring Physician: HARLAN MCCARTHY  Performed By: Sho Blanchard RDCS     BSA: 1.8 m2  Height: 65 in  Weight: 167 lb  HR: 72  BP: 115/70 mmHg  _____________________________________________________________________________  __        Procedure  Echocardiogram with two-dimensional, color and spectral Doppler performed.  Good quality two-dimensional was performed and interpreted. Good quality color  and spectral Doppler were performed and interpreted.  _____________________________________________________________________________  __        Interpretation Summary     Normal  LV size and systolic function (LVEF 63% by 3D biplane).  Normal RV size and systolic function.  No significant valvular dysfunction.  No pericardial effusion present.     No prior studies available for comparison.     _____________________________________________________________________________  __        Left Ventricle  Global and regional left ventricular function is normal with an EF of 60-65%.  LVEF 63% based on volumetric 3D analysis. Thickening of the anterobasal septum  is present. Left ventricular diastolic function is indeterminate. Global peak  LV longitudinal strain is averaged at -19%. This is within reported normal  limits (normal <-18%).     Right Ventricle  Right ventricular function, chamber size, wall motion, and thickness are  normal.     Atria  Both atria appear normal. The atrial septum is intact as assessed by color  Doppler .     Mitral Valve  The mitral valve is normal.        Aortic Valve  Aortic valve is normal in structure and function. The aortic valve is  tricuspid.     Tricuspid Valve  The tricuspid valve is normal. Pulmonary artery systolic pressure cannot be  assessed.     Pulmonic Valve  The pulmonic valve is normal. Trace to mild pulmonic insufficiency is present.     Vessels  The aorta root is normal. The pulmonary artery cannot be assessed. The  inferior vena cava was normal in size with preserved respiratory variability.  Ascending aorta 3.3 cm. IVC diameter <2.1 cm collapsing >50% with sniff  suggests a normal RA pressure of 3 mmHg.     Pericardium  No pericardial effusion is present.        Compared to Previous Study  Previous study not available for comparison.     Attestation  I have personally viewed the imaging and agree with the interpretation and  report as documented by the fellow, Pedro Haynes, and/or edited by me.  _____________________________________________________________________________  __     MMode/2D Measurements & Calculations  RVDd: 3.3 cm  IVSd: 0.94  cm  LVIDd: 4.9 cm  LVIDs: 3.0 cm  LVPWd: 0.91 cm  FS: 39.2 %  LV mass(C)d: 161.2 grams  LV mass(C)dI: 88.0 grams/m2  asc Aorta Diam: 3.3 cm  LVOT diam: 2.2 cm  LVOT area: 3.8 cm2  LA Volume (BP): 48.9 ml  LA Volume Index (BP): 26.7 ml/m2     RWT: 0.37        Doppler Measurements & Calculations  MV E max mckenna: 53.4 cm/sec  MV A max mckenna: 79.5 cm/sec  MV E/A: 0.67  MV dec slope: 202.0 cm/sec2  LV V1 max P.2 mmHg  LV V1 max: 113.5 cm/sec  LV V1 VTI: 21.4 cm  SV(LVOT): 81.5 ml  SI(LVOT): 44.5 ml/m2  TR max mckenna: 241.6 cm/sec  TR max P.4 mmHg  E/E' av.0  Lateral E/e': 6.5  Medial E/e': 7.6     QLAB 2DQ/CMQ  10_EDV(AP2)(aCMQ): 41.8 ml  10_ESV(AP2)(aCMQ): 15.1 ml  10_EDV(AP4)(aCMQ): 59.3 ml     10_ESV(AP4)(aCMQ): 21.0 ml  10_EDV(Bi-Plane)(aCMQ): 51.2 ml  10_EF(Bi-Plane)(aCMQ): 63.3 %  10_EF(AP2)(aCMQ): 64.0 %  10_EF(AP4)(aCMQ): 64.6 %  10_ESV(Bi-Plane)(aCMQ): 18.8 ml     QLAB 3DQ Advanced  Stroke Vol: 37.1 ml  10_EDV(3DQA): 63.0 ml  10_ESV(3DQA): 25.9 ml  10_EF(3DQA): 58.9 %  10_Tmsv 3-6: 6.0 msec  10_Tmsv 3-5: -3.0 msec  10_Tmsv 3-6 (%): 0.72 %  10_Tmsv 3-5 (%): -0.38 %     _____________________________________________________________________________  __           Report approved by: Tonya Quevedo 2020 02:31 PM        Recent Results (from the past 4320 hour(s))   Echocardiogram Complete    Narrative    [id number]  [id number]  [id number]  [id number]^SHARI^HARLAN^ROBERT           [facility],[place]  Echocardiography Laboratory  [address] [place] [id number]     Name: OMER POTTER  MRN: [id number]  : 1945  Study Date: 2020 01:19 PM  Age: 75 yrs  Gender: Female  Patient Location: [facility]  Reason For Study: Encounter for antineoplastic chemotherapy  Ordering Physician: HARLAN MCCARTHY  Referring Physician: HARLAN MCCARTHY  Performed By: Sho Blanchard RDCS     BSA: 1.8 m2  Height: 65 in  Weight: 167 lb  HR: 72  BP: 115/70  mmHg  _____________________________________________________________________________  __        Procedure  Echocardiogram with two-dimensional, color and spectral Doppler performed.  Good quality two-dimensional was performed and interpreted. Good quality color  and spectral Doppler were performed and interpreted.  _____________________________________________________________________________  __        Interpretation Summary     Normal LV size and systolic function (LVEF 63% by 3D biplane).  Normal RV size and systolic function.  No significant valvular dysfunction.  No pericardial effusion present.     No prior studies available for comparison.     _____________________________________________________________________________  __        Left Ventricle  Global and regional left ventricular function is normal with an EF of 60-65%.  LVEF 63% based on volumetric 3D analysis. Thickening of the anterobasal septum  is present. Left ventricular diastolic function is indeterminate. Global peak  LV longitudinal strain is averaged at -19%. This is within reported normal  limits (normal <-18%).     Right Ventricle  Right ventricular function, chamber size, wall motion, and thickness are  normal.     Atria  Both atria appear normal. The atrial septum is intact as assessed by color  Doppler .     Mitral Valve  The mitral valve is normal.        Aortic Valve  Aortic valve is normal in structure and function. The aortic valve is  tricuspid.     Tricuspid Valve  The tricuspid valve is normal. Pulmonary artery systolic pressure cannot be  assessed.     Pulmonic Valve  The pulmonic valve is normal. Trace to mild pulmonic insufficiency is present.     Vessels  The aorta root is normal. The pulmonary artery cannot be assessed. The  inferior vena cava was normal in size with preserved respiratory variability.  Ascending aorta 3.3 cm. IVC diameter <2.1 cm collapsing >50% with sniff  suggests a normal RA pressure of 3 mmHg.      Pericardium  No pericardial effusion is present.        Compared to Previous Study  Previous study not available for comparison.     Attestation  I have personally viewed the imaging and agree with the interpretation and  report as documented by the fellow, Pedro Haynes, and/or edited by me.  _____________________________________________________________________________  __     MMode/2D Measurements & Calculations  RVDd: 3.3 cm  IVSd: 0.94 cm  LVIDd: 4.9 cm  LVIDs: 3.0 cm  LVPWd: 0.91 cm  FS: 39.2 %  LV mass(C)d: 161.2 grams  LV mass(C)dI: 88.0 grams/m2  asc Aorta Diam: 3.3 cm  LVOT diam: 2.2 cm  LVOT area: 3.8 cm2  LA Volume (BP): 48.9 ml  LA Volume Index (BP): 26.7 ml/m2     RWT: 0.37        Doppler Measurements & Calculations  MV E max mckenna: 53.4 cm/sec  MV A max mckenna: 79.5 cm/sec  MV E/A: 0.67  MV dec slope: 202.0 cm/sec2  LV V1 max P.2 mmHg  LV V1 max: 113.5 cm/sec  LV V1 VTI: 21.4 cm  SV(LVOT): 81.5 ml  SI(LVOT): 44.5 ml/m2  TR max mckenna: 241.6 cm/sec  TR max P.4 mmHg  E/E' av.0  Lateral E/e': 6.5  Medial E/e': 7.6     QLAB 2DQ/CMQ  10_EDV(AP2)(aCMQ): 41.8 ml  10_ESV(AP2)(aCMQ): 15.1 ml  10_EDV(AP4)(aCMQ): 59.3 ml     10_ESV(AP4)(aCMQ): 21.0 ml  10_EDV(Bi-Plane)(aCMQ): 51.2 ml  10_EF(Bi-Plane)(aCMQ): 63.3 %  10_EF(AP2)(aCMQ): 64.0 %  10_EF(AP4)(aCMQ): 64.6 %  10_ESV(Bi-Plane)(aCMQ): 18.8 ml     QLAB 3DQ Advanced  Stroke Vol: 37.1 ml  10_EDV(3DQA): 63.0 ml  10_ESV(3DQA): 25.9 ml  10_EF(3DQA): 58.9 %  10_Tmsv 3-6: 6.0 msec  10_Tmsv 3-5: -3.0 msec  10_Tmsv 3-6 (%): 0.72 %  10_Tmsv 3-5 (%): -0.38 %     _____________________________________________________________________________  __           Report approved by: Tonya Quevedo 2020 02:31 PM            RECENT LABS:       Anesthesia Plan      History & Physical Review      ASA Status:  2 .        Plan for MAC   PONV prophylaxis:  Ondansetron (or other 5HT-3)         Postoperative Care      Consents  Anesthetic plan, risks, benefits and  alternatives discussed with:  Patient..                 Nevin Becerra MD

## 2020-10-02 NOTE — OP NOTE
General Surgery Operative Note      Pre-operative diagnosis: Breast cancer (H) [C50.919]   Post-operative diagnosis: Same   Procedure: Left internal jugular Power Port placement    Surgeon: Hawa Lowery MD   Assistant(s): Radha Hutchison PA-C  The PA s assistance was medically necessary to provide adequate exposure in the operating field, maintain hemostasis, cutting suture, clamping and ligating bleeding vessels, and visualization of anatomic structures throughout the surgical procedure.    Anesthesia                                   Local with MAC    Estimated blood loss:  Findings:                                        2 cc  Tip of the catheter at the atriocaval junction           DESCRIPTION OF PROCEDURE:  The patient was taken to the operating room after obtaining informed consent.  The patient was placed on the table in supine position.  A roll was placed between her shoulder blades.  IV anesthetic was administered.  The bilateral neck and upper chest were prepped and draped in standard sterile fashion.  We anesthetized the skin of the upper left chest.  The patient was placed in Trendelenburg.  Local anesthetic was injected into the skin in the lower neck, upper chest for the port site and proposed track for the catheter. We made an incision in the skin.  We cannulated the internal jugular vein using ultrasound guidance with a needle.  We then passed a wire through the needle into the internal jugular vein.  Fluoroscopy was used and confirmed the wire was traveling into the SVC. We then used a #15 blade to make a skin incision in the left upper chest wall. The subcutaneous tissue was divided with cautery. The fascia was identified. A pocket was created above the fascia with blunt dissection. The port fit well into the pocket. We then used a tunneling device to place the catheter through the incision in the chest and tunneled to the neck incision. We then passed a dilator over the wire under  fluoroscopy.  The catheter was then placed through the catheter introducer and threaded, using fluoroscopy until the tip of the catheter was at the atrial caval junction.  The catheter introducer was removed.  We flushed the port with injectable saline.  We attached the catheter to the port and secured it in place with the catheter hub.   The port was secured to the fascia with two 2-0 prolene sutures on either side.  We then closed the subcutaneous tissue with 3-0 interrupted Vicryl sutures.  The skin was closed with a running 4-0 Vicryl subcuticular suture and Dermabond.  A final flush of full strength heparin (2ml) was injected into the port using a Garcia needle.  The patient tolerated the procedure well.  All sponge and instrument counts were correct.    Hawa Lowery MD

## 2020-10-02 NOTE — BRIEF OP NOTE
[facility]    Brief Operative Note    Pre-operative diagnosis: Breast cancer (H) [C50.919]  Post-operative diagnosis Breast cancer    Procedure: Procedure(s):  PORT PLACEMENT  Surgeon: Surgeon(s) and Role:     * Hawa Lowery MD - Primary     * Radha Hutchison PA-C - Assisting  Anesthesia: Monitor Anesthesia Care   Estimated blood loss: 5 ml  Drains: None  Specimens: * No specimens in log *  Findings:   Port placed at atriocaval junction.  Complications: None.  Implants:   Implant Name Type Inv. Item Serial No.  Lot No. LRB No. Used Action   CATH PORT POWERPORT CLEARVUE ISP [id number] Catheter CATH PORT POWERPORT CLEARVUE ISP [id number]  [id number] N/A 1 Implanted

## 2020-10-02 NOTE — ANESTHESIA POSTPROCEDURE EVALUATION
Patient: Talya Zuleta    Procedure(s):  PORT PLACEMENT    Diagnosis:Breast cancer (H) [C50.919]  Diagnosis Additional Information: No value filed.    Anesthesia Type:  MAC    Note:  Anesthesia Post Evaluation    Patient location during evaluation: PACU  Patient participation: Able to fully participate in evaluation  Level of consciousness: awake and alert  Pain management: adequate  Airway patency: patent  Cardiovascular status: acceptable  Respiratory status: acceptable  Hydration status: acceptable  PONV: none     Anesthetic complications: None          Last vitals:  Vitals:    10/02/20 1000 10/02/20 1015 10/02/20 1100   BP: 114/60 119/64 132/64   Pulse: 67 77    Resp: 14 21 18   Temp:      SpO2: 97% 99% 98%         Electronically Signed By: Linda Camp MD, MD  October 2, 2020  11:39 AM

## 2020-10-02 NOTE — DISCHARGE INSTRUCTIONS
[facility]  Discharge Instructions: Post-Operative Port Placement    ACTIVITY    Take frequent, short walks and increase your activity gradually.      Avoid strenuous physical activity or heavy lifting greater than 15-20 lbs. for 1 week.  You may climb stairs.    You may drive without restrictions when you are not using any prescription pain medication and feel comfortable in a car.    You may return to work/school when you are comfortable without any prescription pain medication.    WOUND CARE    You may shower 48 hours after the surgery.  Pat your incisions dry and leave them open to air.  Re-apply dressing (Band-Aids or gauze/tape) as needed for comfort or drainage.    You have skin glue on your incisions.  Let this peel up and fall off on its own.    Do not soak your incisions in a tub or pool for 2 weeks.     Do not apply any lotions, creams, or ointments to your incision(s).    A ridge under your incision(s) is normal and will gradually resolve.    DIET    Start with liquids, then gradually resume your regular diet as tolerated.     Drink plenty of liquids to stay hydrated.    PAIN    Expect some tenderness and discomfort at the incision site(s).  Use the prescribed pain medication at your discretion.  Expect gradual resolution of your pain over several days.    You may take ibuprofen with food (unless you have been told not to) or acetaminophen/Tylenol instead of or in addition to your prescribed pain medication.  If you are taking Norco or Percocet, do not take any additional acetaminophen/Tylenol.    Do not drink alcohol or drive while you are taking pain medications.    You may apply ice to your incisions in 20 minute intervals as needed for the next 48 hours.  After that time, consider switching to heat if you prefer.    EXPECTATIONS    Pain medications can cause constipation.  Limit use when possible.  Take over the counter stool softener/stimulant, such as Colace or Senna,  1-2 times a day with plenty of water.  You may take a mild over the counter laxative, such as Miralax or a suppository, as needed.      You may discontinue these medications once you are having regular bowel movements and/or are no longer taking your narcotic pain medication.    FOLLOW UP    You do not need to follow up with our office unless you have questions or concerns.    CALL OUR OFFICE -[phone] IF YOU HAVE:     Chills or fever above 101 F.    Increased redness, warmth, or drainage at your incisions.    Significant bleeding.    Pain not relieved by your pain medication or rest.    Increasing pain after the first 48 hours.    Any other concerns or questions.               **If you have concerns or questions about your procedure,    please contact Dr. Lowery at  [phone]**      Same Day Surgery Discharge Instructions for  Sedation and General Anesthesia       It's not unusual to feel dizzy, light-headed or faint for up to 24 hours after surgery or while taking pain medication.  If you have these symptoms: sit for a few minutes before standing and have someone assist you when you get up to walk or use the bathroom.      You should rest and relax for the next 24 hours. We recommend you make arrangements to have an adult stay with you for at least 24 hours after your discharge.  Avoid hazardous and strenuous activity.      DO NOT DRIVE any vehicle or operate mechanical equipment for 24 hours following the end of your surgery.  Even though you may feel normal, your reactions may be affected by the medication you have received.      Do not drink alcoholic beverages for 24 hours following surgery.       Slowly progress to your regular diet as you feel able. It's not unusual to feel nauseated and/or vomit after receiving anesthesia.  If you develop these symptoms, drink clear liquids (apple juice, ginger ale, broth, 7-up, etc. ) until you feel better.  If your nausea and vomiting persists for 24 hours,  please notify your surgeon.        All narcotic pain medications, along with inactivity and anesthesia, can cause constipation. Drinking plenty of liquids and increasing fiber intake will help.      For any questions of a medical nature, call your surgeon.      Do not make important decisions for 24 hours.      If you had general anesthesia, you may have a sore throat for a couple of days related to the breathing tube used during surgery.  You may use Cepacol lozenges to help with this discomfort.  If it worsens or if you develop a fever, contact your surgeon.       If you feel your pain is not well managed with the pain medications prescribed by your surgeon, please contact your surgeon's office to let them know so they can address your concerns.       CoVid 19 Information    We want to give you information regarding Covid. Please consult your primary care provider with any questions you might have.     Patient who have symptoms (cough, fever, or shortness of breath), need to isolate for 7 days from when symptoms started OR 72 hours after fever resolves (without fever reducing medications) AND improvement of respiratory symptoms (whichever is longer).      Isolate yourself at home (in own room/own bathroom if possible)    Do Not allow any visitors    Do Not go to work or school    Do Not go to [employer],  centers, shopping, or other public places.    Do Not shake hands.    Avoid close and intimate contact with others (hugging, kissing).    Follow CDC recommendations for household cleaning of frequently touched services.     After the initial 7 days, continue to isolate yourself from household members as much as possible. To continue decrease the risk of community spread and exposure, you and any members of your household should limit activities in public for 14 days after starting home isolation.     You can reference the following CDC link for helpful home isolation/care  tips:  https://www.cdc.gov/coronavirus/2019-ncov/downloads/10Things.pdf    Protect Others:    Cover Your Mouth and Nose with a mask, disposable tissue or wash cloth to avoid spreading germs to others.    Wash your hands and face frequently with soap and water    Call Your Primary Doctor If: Breathing difficulty develops or you become worse.    For more information about COVID19 and options for caring for yourself at home, please visit the CDC website at https://www.cdc.gov/coronavirus/2019-ncov/about/steps-when-sick.html  For more options for care at [facility], please visit our website at [email or web address]

## 2020-10-02 NOTE — ANESTHESIA CARE TRANSFER NOTE
Patient: Talya Zuleta    Procedure(s):  PORT PLACEMENT    Diagnosis: Breast cancer (H) [C50.919]  Diagnosis Additional Information: No value filed.    Anesthesia Type:   MAC     Note:  Airway :Face Mask  Patient transferred to:PACU  Comments: Emma Report: Identifed the Patient, Identified the Reponsible Provider, Reviewed the pertinent medical history, Discussed the surgical course, Reviewed Intra-OP anesthesia mangement and issues during anesthesia, Set expectations for post-procedure period and Allowed opportunity for questions and acknowledgement of understanding      Vitals: (Last set prior to Anesthesia Care Transfer)    CRNA VITALS  10/2/2020 0916 - 10/2/2020 0946      10/2/2020             NIBP:  98/50    NIBP Mean:  68    Resp Rate (set):  10                Electronically Signed By: PEDRO Russ CRNA  October 2, 2020  9:46 AM

## 2020-10-05 ENCOUNTER — PATIENT OUTREACH (OUTPATIENT)
Dept: ONCOLOGY | Facility: CLINIC | Age: 75
End: 2020-10-05

## 2020-10-05 DIAGNOSIS — Z17.0 MALIGNANT NEOPLASM OF OVERLAPPING SITES OF RIGHT BREAST IN FEMALE, ESTROGEN RECEPTOR POSITIVE (H): Primary | ICD-10-CM

## 2020-10-05 DIAGNOSIS — C50.811 MALIGNANT NEOPLASM OF OVERLAPPING SITES OF RIGHT BREAST IN FEMALE, ESTROGEN RECEPTOR POSITIVE (H): Primary | ICD-10-CM

## 2020-10-05 LAB — COPATH REPORT: NORMAL

## 2020-10-05 RX ORDER — ONDANSETRON 8 MG/1
8 TABLET, ORALLY DISINTEGRATING ORAL EVERY 8 HOURS PRN
Qty: 90 TABLET | Refills: 1 | Status: SHIPPED | OUTPATIENT
Start: 2020-10-05 | End: 2021-06-01

## 2020-10-05 NOTE — PROGRESS NOTES
Oncology Follow Up:  Date on this visit: 10/6/2020    Diagnosis:  right breast cancer, clinical stage T2N0M0.    Primary Physician: Cole Weston     History Of Present Illness:  Ms. Zuleta is a 75 year old female with right breast cancer.  Routine screening mammogram in 07/2020 showed bilateral breast asymmetries.  Diagnostic mammograms and ultrasound showed a 2.7 cm mass in the right breast at 12:00, 5 cm from the nipple with ductal extension including a 6 mm mass at 3 cm from the nipple.  In the left breast were benign appearing cysts.  Ultrasound of the right axilla was without lymphadenopathy.  Contrast enhanced mammogram showed the right breast mass, including extension, to measure 4.9 cm.  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  Estrogen receptor was moderate in 50% and progesterone receptor staining was negative.  HER-2 was negative by IHC; HER2 FISH showed approximately 10% of tumor cells to have 6.8 HER2 signals/nucleus and a HER2/CEN17 ratio of 1.6.  The HER2 positive cells were noted to be the large pleomorphic cells.  Multiple neutrophils were noted to be infiltrating through tumor stroma.    Interval History:  Talya presents to clinic today for evaluation prior to cycle #1 of Taxol, Herceptin, and pertuzumab chemotherapy.  She reports health is good.  She denies fevers, chills, cough, shortness of breath, chest discomfort, or other infectious complaints.  She has been adhering to COVID precautions.  Her  brought her to the appointment today. She is somewhat nervous to start treatment.  She denies changes in the right breast mass.  She is unable to palpate any lumps under the arm.  She has a history of hypohidrotic ectodermal dysplasia.  Due to this she has fibrosis of the sweat glands of her bilateral axillae, alopecia, and areas of exzema.  She states her symptoms have been stable.  She has a h/o narcolepsy and drinks 2  "pots of coffee per day.  She denies current abdominal complaints.  The remainder of a complete 12 point review of systems was reviewed with the patient and was negative with the exception of that mentioned above.    Past Medical/Surgical History:  1.  Breast cancer as per HPI  2.  Hypertension  3.  Diabetes  4.  COPD  5.  Hypothyroidism    Allergies:  Allergies as of 10/06/2020 - Reviewed 10/02/2020   Allergen Reaction Noted     Bacitracin-neomycin-polymyxin  04/18/2003     Latex  09/30/2005     Current Medications:  Current Outpatient Medications   Medication Sig Dispense Refill     amLODIPine (NORVASC) 5 MG tablet Take 5 mg by mouth       atorvastatin (LIPITOR) 40 MG tablet TAKE 1/2 TABLET DAILY       benazepril (LOTENSIN) 20 MG tablet TAKE 1 TABLET BY MOUTH TWO TIMES A DAY.       furosemide (LASIX) 80 MG tablet Take 80 mg by mouth 2 times daily       HYDROcodone-acetaminophen (NORCO) 5-325 MG tablet Take 1 tablet by mouth every 4 hours as needed for moderate to severe pain 5 tablet 0     potassium chloride ER (KLOR-CON M) 20 MEQ CR tablet Take 40 mEq by mouth       senna-docusate (SENOKOT-S/PERICOLACE) 8.6-50 MG tablet Take 1-2 tablets by mouth 2 times daily 15 tablet 0     triamcinolone (KENALOG) 0.1 % external cream         Family and Social History:  Please see initial consultation dated 9/21/2020 for further details.    Physical Exam:  /78   Pulse 83   Resp 16   Ht 1.664 m (5' 5.5\")   Wt 76.4 kg (168 lb 6.4 oz)   SpO2 98%   BMI 27.60 kg/m    General:  Well appearing, well-nourished adult female in NAD.  A&Ox3.    HEENT:  Normocephalic.  Sclera anicteric.  MMM.  No lesions of the oropharynx.  Lymph:  No palpable cervical, supraclavicular, or axillary LAD.  Chest:  CTA bilaterally.  No wheezes or crackles.  CV:  RRR.  Nl S1 and S2.  No m/r/g.  Breast:  Firm palpable mass at 12:00, 5 cm from the nipple measures 4 x 4.5 cm (L x W).  There are no other discretely palpable masses in either breast.  Left " breast 12:00 is a small round defect in the skin - per patient this is in an area of a cyst that burst.  Bilateral nipples are everted.  No nipple discharge.  Abd:  Soft/NT/ND.  Ext:  No pitting edema of the bilateral lower extremities.  Pulses 2+ and symmetric.  Musculo:  Full ROM of the bilateral upper extremities.  Neuro:  Cranial nerves grossly intact.  Psych:  Mood and affect appear normal.  Skin:  There is significant fibrosis of the skin in each axilla.    Laboratory/Imaging Studies  9/28/2020 Labs:  Electrolytes, creatinine, and LFTs are wnl.  Hemoglobin A1C is wnl at 5.7%    WBC, hemoglobin, and platelets are wnl.    9/30/2020 COVID-19 PCR:  Negative    10/1/2020 Breast Actionable panel:  Negative    ASSESSMENT/PLAN:  75 year old female with a history of HTN, dyslipidemia, COPD, diabetes, and OA with a newly diagnosed clinical stage, T2N0M0, right breast cancer, that is ER positive (50%), MI negative, and HER2 positive.       1.  Right breast cancer:  Presents to clinic prior to cycle #1 of THP chemotherapy.  We reviewed the premedications of this regimen.  We specifically reviewed the use of dexamethasone and benadryl as premedications for the first two weeks of Taxol in case of hypersensitivity reaction.  If no hypersensitivity reaction the first two weeks, will eliminate dexamethasone and benadryl as premedications from future cycles.  We reviewed the anticipated side effects of THP.  We specifically reviewed the use of anti-emetics.  We discussed high risk of diarrhea with this regimen and she confirmed she has imodium at home should she need it.  She will have labs drawn tomorrow, as long as these are within parameters, will proceed with tomorrow's infusion.     Overall treatment plan is to complete a total of 12 weeks of Taxol, Herceptin, and pertuzumab.  I would like her to have an in person clinic visit at least once a month to measure the breast tumor.    We discussed that if she has residual  breast disease after 12 weeks of THP, we would plan to proceed with dose dense AC (adriamycin and cyclophosphamide) chemotherapy.  If no palpable tumor after 12 weeks of THP, would obtain a breast MRI.  If no/little residual disease on the breast MRI, may consider forgoing AC given her medical comorbidities.  Following completion of chemotherapy, will proceed with surgery.  If she decides to have a  lumpectomy or she has a positive lymph node at the time of surgery, then she will need breast radiation after her surgery. Finally, given ER+ breast cancer, plan will be treat with a minimum 5 years of endocrine therapy.     2.  Hypohidrosis ectodermal dysplasia:  She inquired as to whether the chemotherapy would affect this.  I am uncertain.  If this is an autoimmune condition, perhaps may have some improvement in symptoms with treatment.  We will monitor for potential increase in skin toxicities.    3.  Narcolepsy:  I asked that she cut back on coffee and try to drink 48-64 oz of non-caffeinated fluids per day.    4.  Follow Up:    - Labs, Taxol, and Herceptin infusion 10/14/2020  - Labs, RUPA visit, Taxol, and Herceptin 10/21/2020  - Labs, Taxol, Herceptin, and pertuzumab 10/28/2020  - In person visit with me 11/3, okay to take CM slot.    - Labs and Taxol and Herceptin on 11/4/2020    I spent a total of 30 minutes in face to face visit with this patient today.

## 2020-10-05 NOTE — PROGRESS NOTES
Patient requests 90 day prescription of Zofran to be filled through [facility], she receives this for free through her [facility] pharmacy. Due to shipping the medication will not arrive in time for her first treatment. She will  the first prescription the day of treatment.   Prescription escribed to N.

## 2020-10-06 ENCOUNTER — ONCOLOGY VISIT (OUTPATIENT)
Dept: ONCOLOGY | Facility: CLINIC | Age: 75
End: 2020-10-06
Attending: INTERNAL MEDICINE
Payer: COMMERCIAL

## 2020-10-06 VITALS
WEIGHT: 168.4 LBS | HEIGHT: 66 IN | OXYGEN SATURATION: 98 % | RESPIRATION RATE: 16 BRPM | DIASTOLIC BLOOD PRESSURE: 78 MMHG | SYSTOLIC BLOOD PRESSURE: 129 MMHG | BODY MASS INDEX: 27.06 KG/M2 | HEART RATE: 83 BPM

## 2020-10-06 DIAGNOSIS — Z17.0 MALIGNANT NEOPLASM OF OVERLAPPING SITES OF RIGHT BREAST IN FEMALE, ESTROGEN RECEPTOR POSITIVE (H): Primary | ICD-10-CM

## 2020-10-06 DIAGNOSIS — C50.811 MALIGNANT NEOPLASM OF OVERLAPPING SITES OF RIGHT BREAST IN FEMALE, ESTROGEN RECEPTOR POSITIVE (H): Primary | ICD-10-CM

## 2020-10-06 PROCEDURE — G0463 HOSPITAL OUTPT CLINIC VISIT: HCPCS

## 2020-10-06 PROCEDURE — 99214 OFFICE O/P EST MOD 30 MIN: CPT | Performed by: INTERNAL MEDICINE

## 2020-10-06 ASSESSMENT — MIFFLIN-ST. JEOR: SCORE: 1262.67

## 2020-10-06 ASSESSMENT — PAIN SCALES - GENERAL: PAINLEVEL: NO PAIN (0)

## 2020-10-06 NOTE — LETTER
10/6/2020         RE: Talya Zuleta  [address] [address]        Dear Colleague,    Thank you for referring your patient, Talya Zuleta, to the [facility]. Please see a copy of my visit note below.    Oncology Follow Up:  Date on this visit: 10/6/2020    Diagnosis:  right breast cancer, clinical stage T2N0M0.    Primary Physician: Cole Weston     History Of Present Illness:  Ms. Zuleta is a 75 year old female with right breast cancer.  Routine screening mammogram in 07/2020 showed bilateral breast asymmetries.  Diagnostic mammograms and ultrasound showed a 2.7 cm mass in the right breast at 12:00, 5 cm from the nipple with ductal extension including a 6 mm mass at 3 cm from the nipple.  In the left breast were benign appearing cysts.  Ultrasound of the right axilla was without lymphadenopathy.  Contrast enhanced mammogram showed the right breast mass, including extension, to measure 4.9 cm.  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  Estrogen receptor was moderate in 50% and progesterone receptor staining was negative.  HER-2 was negative by IHC; HER2 FISH showed approximately 10% of tumor cells to have 6.8 HER2 signals/nucleus and a HER2/CEN17 ratio of 1.6.  The HER2 positive cells were noted to be the large pleomorphic cells.  Multiple neutrophils were noted to be infiltrating through tumor stroma.    Interval History:  Talya presents to clinic today for evaluation prior to cycle #1 of Taxol, Herceptin, and pertuzumab chemotherapy.  She reports health is good.  She denies fevers, chills, cough, shortness of breath, chest discomfort, or other infectious complaints.  She has been adhering to COVID precautions.  Her  brought her to the appointment today. She is somewhat nervous to start treatment.  She denies changes in the right breast mass.  She is unable to palpate any  "lumps under the arm.  She has a history of hypohidrotic ectodermal dysplasia.  Due to this she has fibrosis of the sweat glands of her bilateral axillae, alopecia, and areas of exzema.  She states her symptoms have been stable.  She has a h/o narcolepsy and drinks 2 pots of coffee per day.  She denies current abdominal complaints.  The remainder of a complete 12 point review of systems was reviewed with the patient and was negative with the exception of that mentioned above.    Past Medical/Surgical History:  1.  Breast cancer as per HPI  2.  Hypertension  3.  Diabetes  4.  COPD  5.  Hypothyroidism    Allergies:  Allergies as of 10/06/2020 - Reviewed 10/02/2020   Allergen Reaction Noted     Bacitracin-neomycin-polymyxin  04/18/2003     Latex  09/30/2005     Current Medications:  Current Outpatient Medications   Medication Sig Dispense Refill     amLODIPine (NORVASC) 5 MG tablet Take 5 mg by mouth       atorvastatin (LIPITOR) 40 MG tablet TAKE 1/2 TABLET DAILY       benazepril (LOTENSIN) 20 MG tablet TAKE 1 TABLET BY MOUTH TWO TIMES A DAY.       furosemide (LASIX) 80 MG tablet Take 80 mg by mouth 2 times daily       HYDROcodone-acetaminophen (NORCO) 5-325 MG tablet Take 1 tablet by mouth every 4 hours as needed for moderate to severe pain 5 tablet 0     potassium chloride ER (KLOR-CON M) 20 MEQ CR tablet Take 40 mEq by mouth       senna-docusate (SENOKOT-S/PERICOLACE) 8.6-50 MG tablet Take 1-2 tablets by mouth 2 times daily 15 tablet 0     triamcinolone (KENALOG) 0.1 % external cream         Family and Social History:  Please see initial consultation dated 9/21/2020 for further details.    Physical Exam:  /78   Pulse 83   Resp 16   Ht 1.664 m (5' 5.5\")   Wt 76.4 kg (168 lb 6.4 oz)   SpO2 98%   BMI 27.60 kg/m    General:  Well appearing, well-nourished adult female in NAD.  A&Ox3.    HEENT:  Normocephalic.  Sclera anicteric.  MMM.  No lesions of the oropharynx.  Lymph:  No palpable cervical, " supraclavicular, or axillary LAD.  Chest:  CTA bilaterally.  No wheezes or crackles.  CV:  RRR.  Nl S1 and S2.  No m/r/g.  Breast:  Firm palpable mass at 12:00, 5 cm from the nipple measures 4 x 4.5 cm (L x W).  There are no other discretely palpable masses in either breast.  Left breast 12:00 is a small round defect in the skin - per patient this is in an area of a cyst that burst.  Bilateral nipples are everted.  No nipple discharge.  Abd:  Soft/NT/ND.  Ext:  No pitting edema of the bilateral lower extremities.  Pulses 2+ and symmetric.  Musculo:  Full ROM of the bilateral upper extremities.  Neuro:  Cranial nerves grossly intact.  Psych:  Mood and affect appear normal.  Skin:  There is significant fibrosis of the skin in each axilla.    Laboratory/Imaging Studies  9/28/2020 Labs:  Electrolytes, creatinine, and LFTs are wnl.  Hemoglobin A1C is wnl at 5.7%    WBC, hemoglobin, and platelets are wnl.    9/30/2020 COVID-19 PCR:  Negative    10/1/2020 Breast Actionable panel:  Negative    ASSESSMENT/PLAN:  75 year old female with a history of HTN, dyslipidemia, COPD, diabetes, and OA with a newly diagnosed clinical stage, T2N0M0, right breast cancer, that is ER positive (50%), TX negative, and HER2 positive.       1.  Right breast cancer:  Presents to clinic prior to cycle #1 of THP chemotherapy.  We reviewed the premedications of this regimen.  We specifically reviewed the use of dexamethasone and benadryl as premedications for the first two weeks of Taxol in case of hypersensitivity reaction.  If no hypersensitivity reaction the first two weeks, will eliminate dexamethasone and benadryl as premedications from future cycles.  We reviewed the anticipated side effects of THP.  We specifically reviewed the use of anti-emetics.  We discussed high risk of diarrhea with this regimen and she confirmed she has imodium at home should she need it.  She will have labs drawn tomorrow, as long as these are within parameters, will  proceed with tomorrow's infusion.     Overall treatment plan is to complete a total of 12 weeks of Taxol, Herceptin, and pertuzumab.  I would like her to have an in person clinic visit at least once a month to measure the breast tumor.    We discussed that if she has residual breast disease after 12 weeks of THP, we would plan to proceed with dose dense AC (adriamycin and cyclophosphamide) chemotherapy.  If no palpable tumor after 12 weeks of THP, would obtain a breast MRI.  If no/little residual disease on the breast MRI, may consider forgoing AC given her medical comorbidities.  Following completion of chemotherapy, will proceed with surgery.  If she decides to have a  lumpectomy or she has a positive lymph node at the time of surgery, then she will need breast radiation after her surgery. Finally, given ER+ breast cancer, plan will be treat with a minimum 5 years of endocrine therapy.     2.  Hypohidrosis ectodermal dysplasia:  She inquired as to whether the chemotherapy would affect this.  I am uncertain.  If this is an autoimmune condition, perhaps may have some improvement in symptoms with treatment.  We will monitor for potential increase in skin toxicities.    3.  Narcolepsy:  I asked that she cut back on coffee and try to drink 48-64 oz of non-caffeinated fluids per day.    4.  Follow Up:    - Labs, Taxol, and Herceptin infusion 10/14/2020  - Labs, RUPA visit, Taxol, and Herceptin 10/21/2020  - Labs, Taxol, Herceptin, and pertuzumab 10/28/2020  - In person visit with me 11/3, okay to take CM slot.    - Labs and Taxol and Herceptin on 11/4/2020    I spent a total of 30 minutes in face to face visit with this patient today.       Again, thank you for allowing me to participate in the care of your patient.        Sincerely,        Perlita Alvarez MD

## 2020-10-06 NOTE — NURSING NOTE
"Oncology Rooming Note    October 6, 2020 12:48 PM   Talya Zuleta is a 75 year old female who presents for:    Chief Complaint   Patient presents with     Oncology Clinic Visit     BREAST CANCER      Initial Vitals: /78   Pulse 83   Resp 16   Ht 1.664 m (5' 5.5\")   Wt 76.4 kg (168 lb 6.4 oz)   SpO2 98%   BMI 27.60 kg/m   Estimated body mass index is 27.6 kg/m  as calculated from the following:    Height as of this encounter: 1.664 m (5' 5.5\").    Weight as of this encounter: 76.4 kg (168 lb 6.4 oz). Body surface area is 1.88 meters squared.  No Pain (0) Comment: Data Unavailable   No LMP recorded. Patient is postmenopausal.  Allergies reviewed: Yes  Medications reviewed: Yes    Medications: Medication refills not needed today.  Pharmacy name entered into [employer]:    [id number] - [place], [place] - [address] AT COR[address] OF [address] [place] [address] - [place], [place] - [address], [address]  [facility] [address]    Clinical concerns: Patient is wondering what she should bring for chemotherapy. What type of clothing should wear. And does she need an emesis bag.  Dr Alvarez  was notified.      Kathryn Singleton              "

## 2020-10-07 ENCOUNTER — INFUSION THERAPY VISIT (OUTPATIENT)
Dept: ONCOLOGY | Facility: CLINIC | Age: 75
End: 2020-10-07
Attending: INTERNAL MEDICINE
Payer: COMMERCIAL

## 2020-10-07 VITALS
HEART RATE: 80 BPM | SYSTOLIC BLOOD PRESSURE: 118 MMHG | TEMPERATURE: 98 F | DIASTOLIC BLOOD PRESSURE: 64 MMHG | BODY MASS INDEX: 27.56 KG/M2 | WEIGHT: 168.2 LBS | RESPIRATION RATE: 18 BRPM | OXYGEN SATURATION: 96 %

## 2020-10-07 DIAGNOSIS — Z17.0 MALIGNANT NEOPLASM OF OVERLAPPING SITES OF RIGHT BREAST IN FEMALE, ESTROGEN RECEPTOR POSITIVE (H): Primary | ICD-10-CM

## 2020-10-07 DIAGNOSIS — C50.811 MALIGNANT NEOPLASM OF OVERLAPPING SITES OF RIGHT BREAST IN FEMALE, ESTROGEN RECEPTOR POSITIVE (H): Primary | ICD-10-CM

## 2020-10-07 LAB
ALBUMIN SERPL-MCNC: 3.1 G/DL (ref 3.4–5)
ALP SERPL-CCNC: 74 U/L (ref 40–150)
ALT SERPL W P-5'-P-CCNC: 13 U/L (ref 0–50)
ANION GAP SERPL CALCULATED.3IONS-SCNC: 6 MMOL/L (ref 3–14)
AST SERPL W P-5'-P-CCNC: 13 U/L (ref 0–45)
BASOPHILS # BLD AUTO: 0 10E9/L (ref 0–0.2)
BASOPHILS NFR BLD AUTO: 0.5 %
BILIRUB SERPL-MCNC: 0.3 MG/DL (ref 0.2–1.3)
BUN SERPL-MCNC: 16 MG/DL (ref 7–30)
CALCIUM SERPL-MCNC: 8.8 MG/DL (ref 8.5–10.1)
CHLORIDE SERPL-SCNC: 105 MMOL/L (ref 94–109)
CO2 SERPL-SCNC: 26 MMOL/L (ref 20–32)
CREAT SERPL-MCNC: 0.8 MG/DL (ref 0.52–1.04)
DIFFERENTIAL METHOD BLD: ABNORMAL
EOSINOPHIL # BLD AUTO: 0.1 10E9/L (ref 0–0.7)
EOSINOPHIL NFR BLD AUTO: 1 %
ERYTHROCYTE [DISTWIDTH] IN BLOOD BY AUTOMATED COUNT: 15.4 % (ref 10–15)
GFR SERPL CREATININE-BSD FRML MDRD: 72 ML/MIN/{1.73_M2}
GLUCOSE SERPL-MCNC: 182 MG/DL (ref 70–99)
HCT VFR BLD AUTO: 37.3 % (ref 35–47)
HGB BLD-MCNC: 12.4 G/DL (ref 11.7–15.7)
IMM GRANULOCYTES # BLD: 0.1 10E9/L (ref 0–0.4)
IMM GRANULOCYTES NFR BLD: 0.6 %
LYMPHOCYTES # BLD AUTO: 1.2 10E9/L (ref 0.8–5.3)
LYMPHOCYTES NFR BLD AUTO: 14.7 %
MCH RBC QN AUTO: 33.5 PG (ref 26.5–33)
MCHC RBC AUTO-ENTMCNC: 33.2 G/DL (ref 31.5–36.5)
MCV RBC AUTO: 101 FL (ref 78–100)
MONOCYTES # BLD AUTO: 0.5 10E9/L (ref 0–1.3)
MONOCYTES NFR BLD AUTO: 6.5 %
NEUTROPHILS # BLD AUTO: 6.2 10E9/L (ref 1.6–8.3)
NEUTROPHILS NFR BLD AUTO: 76.7 %
NRBC # BLD AUTO: 0 10*3/UL
NRBC BLD AUTO-RTO: 0 /100
PLATELET # BLD AUTO: 232 10E9/L (ref 150–450)
POTASSIUM SERPL-SCNC: 3.3 MMOL/L (ref 3.4–5.3)
PROT SERPL-MCNC: 7.1 G/DL (ref 6.8–8.8)
RBC # BLD AUTO: 3.7 10E12/L (ref 3.8–5.2)
SODIUM SERPL-SCNC: 137 MMOL/L (ref 133–144)
WBC # BLD AUTO: 8.1 10E9/L (ref 4–11)

## 2020-10-07 PROCEDURE — 96375 TX/PRO/DX INJ NEW DRUG ADDON: CPT

## 2020-10-07 PROCEDURE — 250N000013 HC RX MED GY IP 250 OP 250 PS 637: Performed by: INTERNAL MEDICINE

## 2020-10-07 PROCEDURE — 96413 CHEMO IV INFUSION 1 HR: CPT

## 2020-10-07 PROCEDURE — 96415 CHEMO IV INFUSION ADDL HR: CPT

## 2020-10-07 PROCEDURE — 85025 COMPLETE CBC W/AUTO DIFF WBC: CPT | Performed by: PATHOLOGY

## 2020-10-07 PROCEDURE — 96417 CHEMO IV INFUS EACH ADDL SEQ: CPT

## 2020-10-07 PROCEDURE — 99207 PR NO CHARGE LOS: CPT

## 2020-10-07 PROCEDURE — 250N000011 HC RX IP 250 OP 636: Performed by: INTERNAL MEDICINE

## 2020-10-07 PROCEDURE — 80053 COMPREHEN METABOLIC PANEL: CPT | Performed by: PATHOLOGY

## 2020-10-07 PROCEDURE — 250N000009 HC RX 250: Performed by: INTERNAL MEDICINE

## 2020-10-07 PROCEDURE — 258N000003 HC RX IP 258 OP 636: Performed by: INTERNAL MEDICINE

## 2020-10-07 RX ORDER — ONDANSETRON 8 MG/1
8 TABLET, FILM COATED ORAL EVERY 8 HOURS PRN
Qty: 30 TABLET | Refills: 3 | Status: SHIPPED | OUTPATIENT
Start: 2020-10-07 | End: 2020-12-27

## 2020-10-07 RX ORDER — POTASSIUM CHLORIDE 1500 MG/1
40 TABLET, EXTENDED RELEASE ORAL ONCE
Status: COMPLETED | OUTPATIENT
Start: 2020-10-07 | End: 2020-10-07

## 2020-10-07 RX ORDER — DIPHENHYDRAMINE HCL 25 MG
50 CAPSULE ORAL ONCE
Status: COMPLETED | OUTPATIENT
Start: 2020-10-07 | End: 2020-10-07

## 2020-10-07 RX ORDER — ACETAMINOPHEN 325 MG/1
650 TABLET ORAL ONCE
Status: COMPLETED | OUTPATIENT
Start: 2020-10-07 | End: 2020-10-07

## 2020-10-07 RX ORDER — HEPARIN SODIUM (PORCINE) LOCK FLUSH IV SOLN 100 UNIT/ML 100 UNIT/ML
5 SOLUTION INTRAVENOUS ONCE
Status: COMPLETED | OUTPATIENT
Start: 2020-10-07 | End: 2020-10-07

## 2020-10-07 RX ORDER — LORAZEPAM 0.5 MG/1
0.5 TABLET ORAL EVERY 4 HOURS PRN
Qty: 30 TABLET | Refills: 2 | Status: SHIPPED | OUTPATIENT
Start: 2020-10-07 | End: 2020-12-27

## 2020-10-07 RX ORDER — PROCHLORPERAZINE MALEATE 10 MG
5 TABLET ORAL EVERY 6 HOURS PRN
Qty: 30 TABLET | Refills: 2 | Status: SHIPPED | OUTPATIENT
Start: 2020-10-07 | End: 2020-12-27

## 2020-10-07 RX ORDER — HEPARIN SODIUM (PORCINE) LOCK FLUSH IV SOLN 100 UNIT/ML 100 UNIT/ML
5 SOLUTION INTRAVENOUS
Status: DISCONTINUED | OUTPATIENT
Start: 2020-10-07 | End: 2020-10-07 | Stop reason: HOSPADM

## 2020-10-07 RX ADMIN — Medication 5 ML: at 14:40

## 2020-10-07 RX ADMIN — FAMOTIDINE 20 MG: 10 INJECTION INTRAVENOUS at 12:41

## 2020-10-07 RX ADMIN — PERTUZUMAB 840 MG: 30 INJECTION, SOLUTION, CONCENTRATE INTRAVENOUS at 09:57

## 2020-10-07 RX ADMIN — DEXAMETHASONE SODIUM PHOSPHATE 20 MG: 10 INJECTION, SOLUTION INTRAMUSCULAR; INTRAVENOUS at 12:46

## 2020-10-07 RX ADMIN — ACETAMINOPHEN 650 MG: 325 TABLET ORAL at 10:27

## 2020-10-07 RX ADMIN — TRASTUZUMAB-ANNS 310 MG: 420 INJECTION, POWDER, LYOPHILIZED, FOR SOLUTION INTRAVENOUS at 11:04

## 2020-10-07 RX ADMIN — POTASSIUM CHLORIDE 40 MEQ: 1500 TABLET, EXTENDED RELEASE ORAL at 10:27

## 2020-10-07 RX ADMIN — Medication 5 ML: at 08:33

## 2020-10-07 RX ADMIN — PACLITAXEL 150 MG: 6 INJECTION, SOLUTION INTRAVENOUS at 13:21

## 2020-10-07 RX ADMIN — DIPHENHYDRAMINE HYDROCHLORIDE 50 MG: 25 CAPSULE ORAL at 10:27

## 2020-10-07 RX ADMIN — SODIUM CHLORIDE 250 ML: 9 INJECTION, SOLUTION INTRAVENOUS at 10:00

## 2020-10-07 ASSESSMENT — PAIN SCALES - GENERAL: PAINLEVEL: NO PAIN (0)

## 2020-10-07 NOTE — PROGRESS NOTES
Infusion Nursing Note:  Talya Zuleta presents today for Cycle 1 Day 1 Perjeta, Herceptin, Taxol.    Patient seen by provider today: No   present during visit today: Not Applicable.    Note: Patient is new to the infusion room today and is receiving Perjeta, Herceptin, Taxol for the first time.  Patient oriented to infusion room, location of bathrooms and nutrition stations, and call light.  Verified that patient recieved written chemotherapy information previously.  Verbally reviewed chemotherapy teaching, side effects, take-home medications, and follow-up schedule with patient. Patient instructed to call triage with any questions or if she experiences a temperature >100.4, shaking chills, uncontrolled nausea/vomiting/diarrhea, dizziness, shortness of breath, bleeding not relieved with pressure, or with any other concerns.     Pt had a visit with Dr. Alvarez yesterday. No changes or new complaints overnight.     TORB 10/7/20 0953 Dr. Alvarez / Kalee Brand RN  - Replace Potassium per protocol.    Intravenous Access:  Implanted Port.    Treatment Conditions:  Lab Results   Component Value Date    HGB 12.4 10/07/2020     Lab Results   Component Value Date    WBC 8.1 10/07/2020      Lab Results   Component Value Date    ANEU 6.2 10/07/2020     Lab Results   Component Value Date     10/07/2020      Lab Results   Component Value Date     10/07/2020                   Lab Results   Component Value Date    POTASSIUM 3.3 10/07/2020               Lab Results   Component Value Date    CR 0.80 10/07/2020                   Lab Results   Component Value Date    JIMBO 8.8 10/07/2020                Lab Results   Component Value Date    BILITOTAL 0.3 10/07/2020           Lab Results   Component Value Date    ALBUMIN 3.1 10/07/2020                    Lab Results   Component Value Date    ALT 13 10/07/2020           Lab Results   Component Value Date    AST 13 10/07/2020     Results reviewed, labs  MET treatment parameters, ok to proceed with treatment.  ECHO/MUGA completed 9/29/20  EF 60-65%.    Post Infusion Assessment:  Patient tolerated infusion without incident.  Blood return noted pre and post infusion.  Site patent and intact, free from redness, edema or discomfort.  No evidence of extravasations.  Access discontinued per protocol.     Discharge Plan:   Prescription refills given for Zofran, Compazine and Ativan.  Discharge instructions reviewed with: Patient.  Patient and/or family verbalized understanding of discharge instructions and all questions answered.  Copy of AVS reviewed with patient and/or family.  Patient will return 10/13 for next appointment.  Patient discharged in stable condition accompanied by: self.  Departure Mode: Ambulatory.  Face to Face time: 5.    Kalee Brand RN

## 2020-10-07 NOTE — PATIENT INSTRUCTIONS
[facility] Triage and after hours / weekends / holidays:  [phone]    Please call the triage or after hours line if you experience a temperature greater than or equal to 100.5, shaking chills, have uncontrolled nausea, vomiting and/or diarrhea, dizziness, shortness of breath, chest pain, bleeding, unexplained bruising, or if you have any other new/concerning symptoms, questions or concerns.      If you are having any concerning symptoms or wish to speak to a provider before your next infusion visit, please call your care coordinator or triage to notify them so we can adequately serve you.     If you need a refill on a narcotic prescription or other medication, please call before your infusion appointment.         October 2020 Sunday Monday Tuesday Wednesday Thursday Friday Saturday                       1    LAB  10:15 AM   (15 min.)   SPECIMEN MGMT   [facility] Laboratory 2    Admission   6:29 AM   Hawa Lowery [facility]   [facility]   (Discharge: 10/2/2020)    INSERTION, VASCULAR ACCESS PORT   8:35 AM   Hawa Lowery MD   [facility] BRITTANI SDS   8:45 AM   (90 min.)   Hawa Lowery MD   [facility] [place]    XR SURG CHAVO <5 MIN FL W STILL   9:35 AM   (30 min.)   SHCARM2   [facility] 3       4     5     6    [facility] RETURN  12:45 PM   (30 min.)   Perlita Alvarez MD   [facility] 7    [facility] LAB DRAW   8:30 AM   (15 min.)    [facility] LAB DRAW   [facility] ONC INFUSION 360   9:00 AM   (360 min.)   [facility]FUSION   [facility] 8     9     10       11     12     13    NURSE ONLY   9:00 AM   (30 min.)   NURSE ONLY CANCER CENTER   [facility]    VIDEO VISIT NEW   9:45 AM   (60 min.)   Jacquelyn Miller, PEDRO HERRERA   [facility]  [place]    LEVEL 5  11:00 AM   (300 min.)   Ray 10 INFUSION   [facility] 14     15     16     17       18     19     20    NURSE ONLY   2:00 PM   (30 min.)   NURSE ONLY CANCER [facility]    LEVEL 3   2:30 PM   (180 min.)   Ray 9 INFUSION   [facility] 21     22     23     24       25     26    [facility] RETURN   9:15 AM   (30 min.)   Perlita Alvarez MD   [facility] 27    NURSE ONLY   2:00 PM   (30 min.)   NURSE ONLY CANCER [facility]    LEVEL 3   2:30 PM   (180 min.)   Ray 7 [facility] 28     29     30     31 November 2020 Sunday Monday Tuesday Wednesday Thursday Friday Saturday   1     2     3    NURSE ONLY  10:00 AM   (30 min.)   NURSE ONLY CANCER [facility]    VIDEO VISIT RETURN  10:45 AM   (60 min.)   Jacquelyn Miller APRN CNP   [facility]    LEVEL 3  12:00 PM   (180 min.)   Ray 5 [facility] 4     5     6     7       8     9     10    NURSE ONLY   2:00 PM   (30 min.)   NURSE ONLY CANCER [facility]    LEVEL 3   2:30 PM   (180 min.)   Ray 7 [facility] 11     12     13     14       15     16     17    NURSE ONLY   1:15 PM   (30 min.)   NURSE ONLY CANCER [facility]    LEVEL 3   2:00 PM   (180 min.)   Ray 7 [facility] 18     19     20     21       22     23     24    NURSE ONLY  10:15 AM   (30 min.)   NURSE ONLY CANCER [facility]    VIDEO VISIT RETURN  10:45 AM   (60 min.)   Jacquelyn Miller APRN CNP   [facility]  [place]    LEVEL 3  12:00 PM   (180 min.)   [address] [place] 25     26     27     28       29     30                                             Recent Results (from the past 24 hour(s))   Comprehensive metabolic panel    Collection Time: 10/07/20  8:44 AM   Result Value Ref Range    Sodium 137 133 - 144 mmol/L    Potassium 3.3 (L) 3.4 - 5.3 mmol/L    Chloride 105 94 - 109 mmol/L    Carbon Dioxide 26 20 - 32 mmol/L    Anion Gap 6 3 - 14 mmol/L    Glucose 182 (H) 70 - 99 mg/dL    Urea Nitrogen 16 7 - 30 mg/dL    Creatinine 0.80 0.52 - 1.04 mg/dL    GFR Estimate 72 >60 mL/min/[1.73_m2]    GFR Estimate If Black 83 >60 mL/min/[1.73_m2]    Calcium 8.8 8.5 - 10.1 mg/dL    Bilirubin Total 0.3 0.2 - 1.3 mg/dL    Albumin 3.1 (L) 3.4 - 5.0 g/dL    Protein Total 7.1 6.8 - 8.8 g/dL    Alkaline Phosphatase 74 40 - 150 U/L    ALT 13 0 - 50 U/L    AST 13 0 - 45 U/L   *CBC with platelets differential    Collection Time: 10/07/20  8:44 AM   Result Value Ref Range    WBC 8.1 4.0 - 11.0 10e9/L    RBC Count 3.70 (L) 3.8 - 5.2 10e12/L    Hemoglobin 12.4 11.7 - 15.7 g/dL    Hematocrit 37.3 35.0 - 47.0 %     (H) 78 - 100 fl    MCH 33.5 (H) 26.5 - 33.0 pg    MCHC 33.2 31.5 - 36.5 g/dL    RDW 15.4 (H) 10.0 - 15.0 %    Platelet Count 232 150 - 450 10e9/L    Diff Method Automated Method     % Neutrophils 76.7 %    % Lymphocytes 14.7 %    % Monocytes 6.5 %    % Eosinophils 1.0 %    % Basophils 0.5 %    % Immature Granulocytes 0.6 %    Nucleated RBCs 0 0 /100    Absolute Neutrophil 6.2 1.6 - 8.3 10e9/L    Absolute Lymphocytes 1.2 0.8 - 5.3 10e9/L    Absolute Monocytes 0.5 0.0 - 1.3 10e9/L    Absolute Eosinophils 0.1 0.0 - 0.7 10e9/L    Absolute Basophils 0.0 0.0 - 0.2 10e9/L    Abs Immature Granulocytes 0.1 0 - 0.4 10e9/L    Absolute Nucleated RBC 0.0

## 2020-10-07 NOTE — NURSING NOTE
Chief Complaint   Patient presents with     Port Draw     labs drawn via port by RN in lab     /64 (BP Location: Left arm, Patient Position: Chair, Cuff Size: Adult Regular)   Pulse 80   Temp 98  F (36.7  C) (Oral)   Resp 18   Wt 76.3 kg (168 lb 3.2 oz)   SpO2 96%   BMI 27.56 kg/m      Port accessed by RN in lab. Labs collected and sent. Pt tolerated well. Line flushed with Normal Saline & Heparin.   Pt checked in for next appointment.    Makayla Landry RN

## 2020-10-13 ENCOUNTER — ALLIED HEALTH/NURSE VISIT (OUTPATIENT)
Dept: ONCOLOGY | Facility: CLINIC | Age: 75
End: 2020-10-13
Payer: COMMERCIAL

## 2020-10-13 ENCOUNTER — VIRTUAL VISIT (OUTPATIENT)
Dept: ONCOLOGY | Facility: CLINIC | Age: 75
End: 2020-10-13
Attending: INTERNAL MEDICINE
Payer: COMMERCIAL

## 2020-10-13 ENCOUNTER — INFUSION THERAPY VISIT (OUTPATIENT)
Dept: INFUSION THERAPY | Facility: CLINIC | Age: 75
End: 2020-10-13
Attending: INTERNAL MEDICINE
Payer: COMMERCIAL

## 2020-10-13 VITALS
SYSTOLIC BLOOD PRESSURE: 121 MMHG | WEIGHT: 168.1 LBS | DIASTOLIC BLOOD PRESSURE: 77 MMHG | TEMPERATURE: 97.6 F | OXYGEN SATURATION: 98 % | RESPIRATION RATE: 18 BRPM | BODY MASS INDEX: 27.55 KG/M2 | HEART RATE: 78 BPM

## 2020-10-13 DIAGNOSIS — Z17.0 MALIGNANT NEOPLASM OF OVERLAPPING SITES OF RIGHT BREAST IN FEMALE, ESTROGEN RECEPTOR POSITIVE (H): Primary | ICD-10-CM

## 2020-10-13 DIAGNOSIS — C50.811 MALIGNANT NEOPLASM OF OVERLAPPING SITES OF RIGHT BREAST IN FEMALE, ESTROGEN RECEPTOR POSITIVE (H): Primary | ICD-10-CM

## 2020-10-13 LAB
BASOPHILS # BLD AUTO: 0.1 10E9/L (ref 0–0.2)
BASOPHILS NFR BLD AUTO: 0.9 %
DIFFERENTIAL METHOD BLD: NORMAL
EOSINOPHIL # BLD AUTO: 0.1 10E9/L (ref 0–0.7)
EOSINOPHIL NFR BLD AUTO: 0.8 %
ERYTHROCYTE [DISTWIDTH] IN BLOOD BY AUTOMATED COUNT: 14.7 % (ref 10–15)
HCT VFR BLD AUTO: 37.8 % (ref 35–47)
HGB BLD-MCNC: 12.6 G/DL (ref 11.7–15.7)
IMM GRANULOCYTES # BLD: 0.1 10E9/L (ref 0–0.4)
IMM GRANULOCYTES NFR BLD: 1.1 %
LYMPHOCYTES # BLD AUTO: 1.3 10E9/L (ref 0.8–5.3)
LYMPHOCYTES NFR BLD AUTO: 20.7 %
MCH RBC QN AUTO: 33 PG (ref 26.5–33)
MCHC RBC AUTO-ENTMCNC: 33.3 G/DL (ref 31.5–36.5)
MCV RBC AUTO: 99 FL (ref 78–100)
MONOCYTES # BLD AUTO: 0.3 10E9/L (ref 0–1.3)
MONOCYTES NFR BLD AUTO: 3.9 %
NEUTROPHILS # BLD AUTO: 4.7 10E9/L (ref 1.6–8.3)
NEUTROPHILS NFR BLD AUTO: 72.6 %
PLATELET # BLD AUTO: 224 10E9/L (ref 150–450)
RBC # BLD AUTO: 3.82 10E12/L (ref 3.8–5.2)
WBC # BLD AUTO: 6.5 10E9/L (ref 4–11)

## 2020-10-13 PROCEDURE — 85025 COMPLETE CBC W/AUTO DIFF WBC: CPT | Performed by: INTERNAL MEDICINE

## 2020-10-13 PROCEDURE — 96375 TX/PRO/DX INJ NEW DRUG ADDON: CPT | Performed by: INTERNAL MEDICINE

## 2020-10-13 PROCEDURE — 99207 PR NO CHARGE LOS: CPT

## 2020-10-13 PROCEDURE — 96415 CHEMO IV INFUSION ADDL HR: CPT | Performed by: INTERNAL MEDICINE

## 2020-10-13 PROCEDURE — 96413 CHEMO IV INFUSION 1 HR: CPT | Performed by: INTERNAL MEDICINE

## 2020-10-13 PROCEDURE — 96417 CHEMO IV INFUS EACH ADDL SEQ: CPT | Performed by: INTERNAL MEDICINE

## 2020-10-13 PROCEDURE — 99214 OFFICE O/P EST MOD 30 MIN: CPT | Mod: 95 | Performed by: INTERNAL MEDICINE

## 2020-10-13 PROCEDURE — 999N001193 HC VIDEO/TELEPHONE VISIT; NO CHARGE

## 2020-10-13 PROCEDURE — 99207 PR NO CHARGE NURSE ONLY: CPT

## 2020-10-13 PROCEDURE — 96367 TX/PROPH/DG ADDL SEQ IV INF: CPT | Performed by: INTERNAL MEDICINE

## 2020-10-13 RX ORDER — DIPHENHYDRAMINE HYDROCHLORIDE 50 MG/ML
50 INJECTION INTRAMUSCULAR; INTRAVENOUS
Status: CANCELLED
Start: 2020-10-13

## 2020-10-13 RX ORDER — DIPHENHYDRAMINE HCL 25 MG
50 CAPSULE ORAL ONCE
Status: CANCELLED
Start: 2020-10-13

## 2020-10-13 RX ORDER — HEPARIN SODIUM (PORCINE) LOCK FLUSH IV SOLN 100 UNIT/ML 100 UNIT/ML
5 SOLUTION INTRAVENOUS
Status: DISCONTINUED | OUTPATIENT
Start: 2020-10-13 | End: 2020-10-13 | Stop reason: HOSPADM

## 2020-10-13 RX ORDER — LORAZEPAM 2 MG/ML
0.5 INJECTION INTRAMUSCULAR EVERY 4 HOURS PRN
Status: CANCELLED
Start: 2020-10-13

## 2020-10-13 RX ORDER — METHYLPREDNISOLONE SODIUM SUCCINATE 125 MG/2ML
125 INJECTION, POWDER, LYOPHILIZED, FOR SOLUTION INTRAMUSCULAR; INTRAVENOUS
Status: CANCELLED
Start: 2020-10-20

## 2020-10-13 RX ORDER — EPINEPHRINE 1 MG/ML
0.3 INJECTION, SOLUTION INTRAMUSCULAR; SUBCUTANEOUS EVERY 5 MIN PRN
Status: CANCELLED | OUTPATIENT
Start: 2020-10-20

## 2020-10-13 RX ORDER — LORAZEPAM 2 MG/ML
0.5 INJECTION INTRAMUSCULAR EVERY 4 HOURS PRN
Status: CANCELLED
Start: 2020-10-20

## 2020-10-13 RX ORDER — MEPERIDINE HYDROCHLORIDE 25 MG/ML
25 INJECTION INTRAMUSCULAR; INTRAVENOUS; SUBCUTANEOUS EVERY 30 MIN PRN
Status: CANCELLED | OUTPATIENT
Start: 2020-10-20

## 2020-10-13 RX ORDER — DIPHENHYDRAMINE HYDROCHLORIDE 50 MG/ML
50 INJECTION INTRAMUSCULAR; INTRAVENOUS
Status: CANCELLED
Start: 2020-10-20

## 2020-10-13 RX ORDER — EPINEPHRINE 1 MG/ML
0.3 INJECTION, SOLUTION INTRAMUSCULAR; SUBCUTANEOUS EVERY 5 MIN PRN
Status: CANCELLED | OUTPATIENT
Start: 2020-10-13

## 2020-10-13 RX ORDER — MEPERIDINE HYDROCHLORIDE 25 MG/ML
25 INJECTION INTRAMUSCULAR; INTRAVENOUS; SUBCUTANEOUS EVERY 30 MIN PRN
Status: CANCELLED | OUTPATIENT
Start: 2020-10-13

## 2020-10-13 RX ORDER — DIPHENHYDRAMINE HCL 25 MG
50 CAPSULE ORAL ONCE
Status: COMPLETED | OUTPATIENT
Start: 2020-10-13 | End: 2020-10-13

## 2020-10-13 RX ORDER — HEPARIN SODIUM (PORCINE) LOCK FLUSH IV SOLN 100 UNIT/ML 100 UNIT/ML
5 SOLUTION INTRAVENOUS
Status: CANCELLED | OUTPATIENT
Start: 2020-10-20

## 2020-10-13 RX ORDER — ALBUTEROL SULFATE 0.83 MG/ML
2.5 SOLUTION RESPIRATORY (INHALATION)
Status: CANCELLED | OUTPATIENT
Start: 2020-10-20

## 2020-10-13 RX ORDER — HEPARIN SODIUM,PORCINE 10 UNIT/ML
5 VIAL (ML) INTRAVENOUS
Status: CANCELLED | OUTPATIENT
Start: 2020-10-20

## 2020-10-13 RX ORDER — SODIUM CHLORIDE 9 MG/ML
1000 INJECTION, SOLUTION INTRAVENOUS CONTINUOUS PRN
Status: CANCELLED
Start: 2020-10-20

## 2020-10-13 RX ORDER — METHYLPREDNISOLONE SODIUM SUCCINATE 125 MG/2ML
125 INJECTION, POWDER, LYOPHILIZED, FOR SOLUTION INTRAMUSCULAR; INTRAVENOUS
Status: CANCELLED
Start: 2020-10-13

## 2020-10-13 RX ORDER — ALBUTEROL SULFATE 90 UG/1
1-2 AEROSOL, METERED RESPIRATORY (INHALATION)
Status: CANCELLED
Start: 2020-10-13

## 2020-10-13 RX ORDER — ALBUTEROL SULFATE 0.83 MG/ML
2.5 SOLUTION RESPIRATORY (INHALATION)
Status: CANCELLED | OUTPATIENT
Start: 2020-10-13

## 2020-10-13 RX ORDER — NALOXONE HYDROCHLORIDE 0.4 MG/ML
.1-.4 INJECTION, SOLUTION INTRAMUSCULAR; INTRAVENOUS; SUBCUTANEOUS
Status: CANCELLED | OUTPATIENT
Start: 2020-10-13

## 2020-10-13 RX ORDER — NALOXONE HYDROCHLORIDE 0.4 MG/ML
.1-.4 INJECTION, SOLUTION INTRAMUSCULAR; INTRAVENOUS; SUBCUTANEOUS
Status: CANCELLED | OUTPATIENT
Start: 2020-10-20

## 2020-10-13 RX ORDER — HEPARIN SODIUM (PORCINE) LOCK FLUSH IV SOLN 100 UNIT/ML 100 UNIT/ML
5 SOLUTION INTRAVENOUS
Status: CANCELLED | OUTPATIENT
Start: 2020-10-13

## 2020-10-13 RX ORDER — SODIUM CHLORIDE 9 MG/ML
1000 INJECTION, SOLUTION INTRAVENOUS CONTINUOUS PRN
Status: CANCELLED
Start: 2020-10-13

## 2020-10-13 RX ORDER — ALBUTEROL SULFATE 90 UG/1
1-2 AEROSOL, METERED RESPIRATORY (INHALATION)
Status: CANCELLED
Start: 2020-10-20

## 2020-10-13 RX ORDER — HEPARIN SODIUM,PORCINE 10 UNIT/ML
5 VIAL (ML) INTRAVENOUS
Status: CANCELLED | OUTPATIENT
Start: 2020-10-13

## 2020-10-13 RX ADMIN — HEPARIN SODIUM (PORCINE) LOCK FLUSH IV SOLN 100 UNIT/ML 5 ML: 100 SOLUTION at 14:54

## 2020-10-13 RX ADMIN — Medication 50 MG: at 11:26

## 2020-10-13 RX ADMIN — Medication 250 ML: at 11:30

## 2020-10-13 RX ADMIN — HEPARIN SODIUM (PORCINE) LOCK FLUSH IV SOLN 100 UNIT/ML 5 ML: 100 SOLUTION at 10:29

## 2020-10-13 ASSESSMENT — PAIN SCALES - GENERAL: PAINLEVEL: NO PAIN (0)

## 2020-10-13 NOTE — PROGRESS NOTES
Port needle left accessed for treatment. Tolerated port access and draw without complaint. Port site scrubbed with Chloraprep for 30 seconds and allowed to dry completely prior to dressing application. Accessed using sterile technique. [place] tubes drawn-Red gel/Green/Purple tubes. Double signed by patient and RN. See documentation flowsheet. Lila Mckinley, RN, BSN, OCN

## 2020-10-13 NOTE — PROGRESS NOTES
SPIRITUAL HEALTH SERVICES Progress Note  [facility]    Visited Talya GANT Song in the infusion center to introduce Spiritual Health support here at [place]. Offered reflective conversation, support and affirmation as she shared her  journey.       Illness Narrative - Patient reports that she was diagnosed with breast cancer in March. So far she has been tolerating the chemotherapy treatments without too much difficulty.      Concerns - Patient states that she is doing fine.  No concerns or distress noted or discussed.       Coping - Patient has two adult sons and a daughter and four adult stepchildren and over 18 grandchildren.  Patient was orphaned at a young age and helped to raise her younger brothers.  She has had many challenges in her life and has been resilient throughtout. She used humor at times during our discussion today.       Meaning-Making - Not discussed today.       Plan -  I gave the patient my contact information and she knows that she can request a Spiritual Health encounter as needed.     Guillermina Cervantes  Staff        Judy Haider(Resident)

## 2020-10-13 NOTE — LETTER
"    10/13/2020         RE: Talya Zuleta  [address] [address]        Dear Colleague,    Thank you for referring your patient, Talya Zuleta, to the [facility] CANCER [facility]. Please see a copy of my visit note below.    Talya Zuelta is a 75 year old female who is being evaluated via a billable video visit.      The patient has been notified of following:     \"This video visit will be conducted via a call between you and your physician/provider. We have found that certain health care needs can be provided without the need for an in-person physical exam.  This service lets us provide the care you need with a video conversation.  If a prescription is necessary we can send it directly to your pharmacy.  If lab work is needed we can place an order for that and you can then stop by our lab to have the test done at a later time.    Video visits are billed at different rates depending on your insurance coverage.  Please reach out to your insurance provider with any questions.    If during the course of the call the physician/provider feels a video visit is not appropriate, you will not be charged for this service.\"    Patient has given verbal consent for Video visit? Yes  How would you like to obtain your AVS? MyChart    Vitals - Patient Reported  Weight (Patient Reported): 74.2 kg (163 lb 8 oz)  Height (Patient Reported): 166.4 cm (5' 5.5\")  BMI (Based on Pt Reported Ht/Wt): 26.79  Pain Score: No Pain (0)    Kathryn Singleton CMA October 13, 2020  8:43 AM       Video-Visit Details    Type of service:  Video Visit    Video Start Time: 8:38  Video End Time: 8:58    Originating Location (pt. Location): Home    Distant Location (provider location):  [facility]     Platform used for Video Visit: Kelly Alvarez MD      Oncology Follow Up:  Date on this visit: 10/13/2020    Diagnosis:  right breast cancer, clinical stage " T2N0M0.    Primary Physician: Cole Weston     History Of Present Illness:  Ms. Zuleta is a 75 year old female with right breast cancer.  Routine screening mammogram in 07/2020 showed bilateral breast asymmetries.  Diagnostic mammograms and ultrasound showed a 2.7 cm mass in the right breast at 12:00, 5 cm from the nipple with ductal extension including a 6 mm mass at 3 cm from the nipple.  In the left breast were benign appearing cysts.  Ultrasound of the right axilla was without lymphadenopathy.  Contrast enhanced mammogram showed the right breast mass, including extension, to measure 4.9 cm.  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  Estrogen receptor was moderate in 50% and progesterone receptor staining was negative.  HER-2 was negative by IHC; HER2 FISH showed approximately 10% of tumor cells to have 6.8 HER2 signals/nucleus and a HER2/CEN17 ratio of 1.6.  The HER2 positive cells were noted to be the large pleomorphic cells.  Multiple neutrophils were noted to be infiltrating through tumor stroma.    She began treatment with neoadjuvant Taxol, Herceptin and pertuzumab on 10/6/2020.    Interval History:  Ms. Zuleta was contacted today prior to week #2 of Taxol and Herceptin chemotherapy.  She received Taxol, Herceptin and pertuzumab last week.  She states she had a single day where she had diarrhea.  This was characterized as 3 watery stools within a single day.  She did not take Imodium.  She did drink extra fluids.  She had no symptoms of lightheadedness, no blood in the stool.  She denies nausea, vomiting or abdominal pain.  She had some achiness following the chemotherapy.  It was not bothersome enough for her to take Tylenol.  She states in general she does not like to take medications.  She has chronic acid reflux that improves if she does not eat after 4 p.m. and if she lies on her left side in bed.  She does not feel that this has  gotten worse with chemotherapy.  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  She denies new rashes or skin lesions.  She has some tingling in her toes that is new.  She has chronic neuropathy in her left hand, which is unchanged from prior.  She denies headaches or focal neurologic complaints.  She has had no fevers, chills or infectious complaints.  The remainder of a complete 12-point review of systems was reviewed with the patient and was negative with the exception of that mentioned above.     Past Medical/Surgical History:  1.  Breast cancer as per HPI  2.  Hypertension  3.  Diabetes  4.  COPD  5.  Hypothyroidism    Allergies:  Allergies as of 10/13/2020 - Reviewed 10/06/2020   Allergen Reaction Noted     Bacitracin-neomycin-polymyxin  04/18/2003     Latex  09/30/2005     Current Medications:  Current Outpatient Medications   Medication Sig Dispense Refill     amLODIPine (NORVASC) 5 MG tablet Take 5 mg by mouth       atorvastatin (LIPITOR) 40 MG tablet TAKE 1/2 TABLET DAILY       benazepril (LOTENSIN) 20 MG tablet TAKE 1 TABLET BY MOUTH TWO TIMES A DAY.       furosemide (LASIX) 80 MG tablet Take 80 mg by mouth 2 times daily       HYDROcodone-acetaminophen (NORCO) 5-325 MG tablet Take 1 tablet by mouth every 4 hours as needed for moderate to severe pain 5 tablet 0     LORazepam (ATIVAN) 0.5 MG tablet Take 1 tablet (0.5 mg) by mouth every 4 hours as needed (Anxiety, Nausea/Vomiting or Sleep) 30 tablet 2     ondansetron (ZOFRAN) 8 MG tablet Take 1 tablet (8 mg) by mouth every 8 hours as needed for nausea 30 tablet 3     ondansetron (ZOFRAN-ODT) 8 MG ODT tab Take 1 tablet (8 mg) by mouth every 8 hours as needed for nausea 90 tablet 1     potassium chloride ER (KLOR-CON M) 20 MEQ CR tablet Take 40 mEq by mouth       prochlorperazine (COMPAZINE) 10 MG tablet Take 0.5 tablets (5 mg) by mouth every 6 hours as needed (Nausea/Vomiting) 30 tablet 2      triamcinolone (KENALOG) 0.1 % external cream         Family and Social History:  Please see initial consultation dated 9/21/2020 for further details.    Physical Exam:  General:  Well appearing, well nourished adult female in NAD  Eyes:  No erythema or discharge  Respiratory:  Breathing comfortably on room air.  No wheezing or distress.  Musculoskeletal:  Full ROM of the bilateral upper extremities.  Skin:  No visible concerning skin rashes or lesions  Neuro:  No notable tremor and dyskinetic movements.  Psych:  Mood and affect appear normal.    The rest of a comprehensive physical examination is deferred due to PHE (public health emergency) video visit restrictions.    Laboratory/Imaging Studies  10/13/2020 Labs:  Pending, schedule to be done later today at [facility] prior to chemotherapy infusion.      ASSESSMENT/PLAN:  75 year old female with a history of HTN, dyslipidemia, COPD, diabetes, and OA with a newly diagnosed clinical stage, T2N0M0, right breast cancer, that is ER positive (50%), NM negative, and HER2 positive.       1.  Right breast cancer:  Presents to clinic prior to cycle #2 of THP chemotherapy.  She will receive Taxol and Herceptin only this week.  She tolerated her first cycle of chemotherapy remarkably well.  As long as blood counts are within parameters today, okay to proceed with cycle #2.  We discussed if no hypersensitivity reaction to the chemotherapy today, we can eliminate dexamethasone and benadryl premedications for the remaining cycles of Taxol, Herceptin, and Perjeta.  She does not need a provider visit prior to every chemotherapy.  We will adjust her schedule so that she is seeing someone prior to every other chemotherapy.     Overall treatment plan is to complete a total of 12 weeks of Taxol, Herceptin, and pertuzumab.  I would like her to have an in person clinic visit at least once a month to measure the breast tumor.  If she has residual breast disease after 12 weeks of  THP (as assessed by breast exam +/- breast MRI), we would plan to proceed with dose dense AC (adriamycin and cyclophosphamide) chemotherapy.  If no palpable or radiographic evidence of tumor after 12 weeks of THP, would forgo AC chemotherapy given her medical comorbidities.  Following completion of chemotherapy, will proceed with surgery.  If she decides to have a  lumpectomy or she has a positive lymph node at the time of surgery, she will need breast radiation after her surgery. Finally, given ER+ breast cancer, plan will be treat with a minimum 5 years of endocrine therapy.     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  We discussed this is an autoimmune condition in some patients and in that case, may have some improvement in symptoms with treatment.  We will monitor for potential increase in skin toxicities.    3.  Diarrhea:  Recommend she take imodium prn.  Advised to contact the clinic if >3 stools per day.  Continue to drink 48-64 oz of noncaffeinated fluids per day.    4.  Arthralgias:  Taxane induced.  Okay to take tylenol prn.    5.  Follow Up:  Labs and Taxol and Herceptin infusion at [facility] on 10/20.  Okay to cancel provider visit with me on 10/20.    I spent a total of 20 minutes in video visit with the patient and her  today.                 Again, thank you for allowing me to participate in the care of your patient.        Sincerely,        Perlita Alvarez MD

## 2020-10-13 NOTE — PROGRESS NOTES
"Talya Zuleta is a 75 year old female who is being evaluated via a billable video visit.      The patient has been notified of following:     \"This video visit will be conducted via a call between you and your physician/provider. We have found that certain health care needs can be provided without the need for an in-person physical exam.  This service lets us provide the care you need with a video conversation.  If a prescription is necessary we can send it directly to your pharmacy.  If lab work is needed we can place an order for that and you can then stop by our lab to have the test done at a later time.    Video visits are billed at different rates depending on your insurance coverage.  Please reach out to your insurance provider with any questions.    If during the course of the call the physician/provider feels a video visit is not appropriate, you will not be charged for this service.\"    Patient has given verbal consent for Video visit? Yes  How would you like to obtain your AVS? MyChart    Vitals - Patient Reported  Weight (Patient Reported): 74.2 kg (163 lb 8 oz)  Height (Patient Reported): 166.4 cm (5' 5.5\")  BMI (Based on Pt Reported Ht/Wt): 26.79  Pain Score: No Pain (0)    Kathryn Singleton CMA October 13, 2020  8:43 AM       Video-Visit Details    Type of service:  Video Visit    Video Start Time: 8:38  Video End Time: 8:58    Originating Location (pt. Location): Home    Distant Location (provider location):  [facility]     Platform used for Video Visit: Kelly Alvarez MD      Oncology Follow Up:  Date on this visit: 10/13/2020    Diagnosis:  right breast cancer, clinical stage T2N0M0.    Primary Physician: Cole Weston     History Of Present Illness:  Ms. Zuleta is a 75 year old female with right breast cancer.  Routine screening mammogram in 07/2020 showed bilateral breast asymmetries.  Diagnostic mammograms and ultrasound showed a 2.7 cm " mass in the right breast at 12:00, 5 cm from the nipple with ductal extension including a 6 mm mass at 3 cm from the nipple.  In the left breast were benign appearing cysts.  Ultrasound of the right axilla was without lymphadenopathy.  Contrast enhanced mammogram showed the right breast mass, including extension, to measure 4.9 cm.  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  Estrogen receptor was moderate in 50% and progesterone receptor staining was negative.  HER-2 was negative by IHC; HER2 FISH showed approximately 10% of tumor cells to have 6.8 HER2 signals/nucleus and a HER2/CEN17 ratio of 1.6.  The HER2 positive cells were noted to be the large pleomorphic cells.  Multiple neutrophils were noted to be infiltrating through tumor stroma.    She began treatment with neoadjuvant Taxol, Herceptin and pertuzumab on 10/6/2020.    Interval History:  Ms. Zuleta was contacted today prior to week #2 of Taxol and Herceptin chemotherapy.  She received Taxol, Herceptin and pertuzumab last week.  She states she had a single day where she had diarrhea.  This was characterized as 3 watery stools within a single day.  She did not take Imodium.  She did drink extra fluids.  She had no symptoms of lightheadedness, no blood in the stool.  She denies nausea, vomiting or abdominal pain.  She had some achiness following the chemotherapy.  It was not bothersome enough for her to take Tylenol.  She states in general she does not like to take medications.  She has chronic acid reflux that improves if she does not eat after 4 p.m. and if she lies on her left side in bed.  She does not feel that this has gotten worse with chemotherapy.  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  She denies new rashes or skin lesions.  She has some tingling in her toes that is new.  She has chronic neuropathy in her left hand,  which is unchanged from prior.  She denies headaches or focal neurologic complaints.  She has had no fevers, chills or infectious complaints.  The remainder of a complete 12-point review of systems was reviewed with the patient and was negative with the exception of that mentioned above.     Past Medical/Surgical History:  1.  Breast cancer as per HPI  2.  Hypertension  3.  Diabetes  4.  COPD  5.  Hypothyroidism    Allergies:  Allergies as of 10/13/2020 - Reviewed 10/06/2020   Allergen Reaction Noted     Bacitracin-neomycin-polymyxin  04/18/2003     Latex  09/30/2005     Current Medications:  Current Outpatient Medications   Medication Sig Dispense Refill     amLODIPine (NORVASC) 5 MG tablet Take 5 mg by mouth       atorvastatin (LIPITOR) 40 MG tablet TAKE 1/2 TABLET DAILY       benazepril (LOTENSIN) 20 MG tablet TAKE 1 TABLET BY MOUTH TWO TIMES A DAY.       furosemide (LASIX) 80 MG tablet Take 80 mg by mouth 2 times daily       HYDROcodone-acetaminophen (NORCO) 5-325 MG tablet Take 1 tablet by mouth every 4 hours as needed for moderate to severe pain 5 tablet 0     LORazepam (ATIVAN) 0.5 MG tablet Take 1 tablet (0.5 mg) by mouth every 4 hours as needed (Anxiety, Nausea/Vomiting or Sleep) 30 tablet 2     ondansetron (ZOFRAN) 8 MG tablet Take 1 tablet (8 mg) by mouth every 8 hours as needed for nausea 30 tablet 3     ondansetron (ZOFRAN-ODT) 8 MG ODT tab Take 1 tablet (8 mg) by mouth every 8 hours as needed for nausea 90 tablet 1     potassium chloride ER (KLOR-CON M) 20 MEQ CR tablet Take 40 mEq by mouth       prochlorperazine (COMPAZINE) 10 MG tablet Take 0.5 tablets (5 mg) by mouth every 6 hours as needed (Nausea/Vomiting) 30 tablet 2     triamcinolone (KENALOG) 0.1 % external cream         Family and Social History:  Please see initial consultation dated 9/21/2020 for further details.    Physical Exam:  General:  Well appearing, well nourished adult female in NAD  Eyes:  No erythema or discharge  Respiratory:   Breathing comfortably on room air.  No wheezing or distress.  Musculoskeletal:  Full ROM of the bilateral upper extremities.  Skin:  No visible concerning skin rashes or lesions  Neuro:  No notable tremor and dyskinetic movements.  Psych:  Mood and affect appear normal.    The rest of a comprehensive physical examination is deferred due to PHE (public health emergency) video visit restrictions.    Laboratory/Imaging Studies  10/13/2020 Labs:  Pending, schedule to be done later today at [facility] prior to chemotherapy infusion.      ASSESSMENT/PLAN:  75 year old female with a history of HTN, dyslipidemia, COPD, diabetes, and OA with a newly diagnosed clinical stage, T2N0M0, right breast cancer, that is ER positive (50%), NH negative, and HER2 positive.       1.  Right breast cancer:  Presents to clinic prior to cycle #2 of THP chemotherapy.  She will receive Taxol and Herceptin only this week.  She tolerated her first cycle of chemotherapy remarkably well.  As long as blood counts are within parameters today, okay to proceed with cycle #2.  We discussed if no hypersensitivity reaction to the chemotherapy today, we can eliminate dexamethasone and benadryl premedications for the remaining cycles of Taxol, Herceptin, and Perjeta.  She does not need a provider visit prior to every chemotherapy.  We will adjust her schedule so that she is seeing someone prior to every other chemotherapy.     Overall treatment plan is to complete a total of 12 weeks of Taxol, Herceptin, and pertuzumab.  I would like her to have an in person clinic visit at least once a month to measure the breast tumor.  If she has residual breast disease after 12 weeks of THP (as assessed by breast exam +/- breast MRI), we would plan to proceed with dose dense AC (adriamycin and cyclophosphamide) chemotherapy.  If no palpable or radiographic evidence of tumor after 12 weeks of THP, would forgo AC chemotherapy given her medical comorbidities.   Following completion of chemotherapy, will proceed with surgery.  If she decides to have a  lumpectomy or she has a positive lymph node at the time of surgery, she will need breast radiation after her surgery. Finally, given ER+ breast cancer, plan will be treat with a minimum 5 years of endocrine therapy.     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  We discussed this is an autoimmune condition in some patients and in that case, may have some improvement in symptoms with treatment.  We will monitor for potential increase in skin toxicities.    3.  Diarrhea:  Recommend she take imodium prn.  Advised to contact the clinic if >3 stools per day.  Continue to drink 48-64 oz of noncaffeinated fluids per day.    4.  Arthralgias:  Taxane induced.  Okay to take tylenol prn.    5.  Follow Up:  Labs and Taxol and Herceptin infusion at [facility] on 10/20.  Okay to cancel provider visit with me on 10/20.    I spent a total of 20 minutes in video visit with the patient and her  today.

## 2020-10-13 NOTE — PROGRESS NOTES
Infusion Nursing Note:  Talya Zuleta presents today for C1,D8 of Taxol/Herceptin    Patient seen by provider today: No   present during visit today: Not Applicable.    Note: [place] states [place] [facility] [place] states her  will be driving her home today , oriented to the [facility], plan of care, schedule, when to call the RN staff, pt verbalized understanding.  Will treat as ordered.    Intravenous Access:  Implanted Port.    Treatment Conditions:  Lab Results   Component Value Date    HGB 12.6 10/13/2020     Lab Results   Component Value Date    WBC 6.5 10/13/2020      Lab Results   Component Value Date    ANEU 4.7 10/13/2020     Lab Results   Component Value Date     10/13/2020      Results reviewed, labs MET treatment parameters, ok to proceed with treatment.  ECHO/MUGA completed 09/29/2020  EF 60-65%.      Post Infusion Assessment:  Patient tolerated infusion without incident.  Blood return noted pre and post infusion.  Site patent and intact, free from redness, edema or discomfort.  No evidence of extravasations.  Access discontinued per protocol.       Discharge Plan:   Return 10/20/2020 for Taxol/Herceptin  Discharge instructions reviewed with: Patient.  Patient and/or family verbalized understanding of discharge instructions and all questions answered.  Patient discharged in stable condition accompanied by: self.  Departure Mode: Ambulatory.    Fatou Otero RN

## 2020-10-15 ENCOUNTER — TELEPHONE (OUTPATIENT)
Dept: FAMILY MEDICINE | Facility: CLINIC | Age: 75
End: 2020-10-15

## 2020-10-15 NOTE — TELEPHONE ENCOUNTER
Reason for Call:  Form, our goal is to have forms completed with 72 hours, however, some forms may require a visit or additional information.    Type of letter, form or note:  medical    Who is the form from?: Patient    Where did the form come from: Patient or family brought in       What clinic location was the form placed at?: [place] ()    Where the form was placed: Box    What number is listed as a contact on the form?: [phone]       Additional comments: Please fax form to [facility] Attn: Dr Perlita Alvarez. Fax: [phone] Office: [phone]. Patient daughter dropped off form and would like care team to call her after forms have been faxed to make sure there is nothing more that needs to be done.     Call taken on 10/15/2020 at 1:34 PM by Hawa Quinn

## 2020-10-20 ENCOUNTER — INFUSION THERAPY VISIT (OUTPATIENT)
Dept: INFUSION THERAPY | Facility: CLINIC | Age: 75
End: 2020-10-20
Payer: COMMERCIAL

## 2020-10-20 ENCOUNTER — ALLIED HEALTH/NURSE VISIT (OUTPATIENT)
Dept: ONCOLOGY | Facility: CLINIC | Age: 75
End: 2020-10-20
Payer: COMMERCIAL

## 2020-10-20 VITALS
HEART RATE: 75 BPM | SYSTOLIC BLOOD PRESSURE: 116 MMHG | DIASTOLIC BLOOD PRESSURE: 76 MMHG | OXYGEN SATURATION: 97 % | TEMPERATURE: 98.6 F | RESPIRATION RATE: 16 BRPM | WEIGHT: 168.4 LBS | BODY MASS INDEX: 27.6 KG/M2

## 2020-10-20 DIAGNOSIS — Z17.0 MALIGNANT NEOPLASM OF OVERLAPPING SITES OF RIGHT BREAST IN FEMALE, ESTROGEN RECEPTOR POSITIVE (H): Primary | ICD-10-CM

## 2020-10-20 DIAGNOSIS — C50.811 MALIGNANT NEOPLASM OF OVERLAPPING SITES OF RIGHT BREAST IN FEMALE, ESTROGEN RECEPTOR POSITIVE (H): Primary | ICD-10-CM

## 2020-10-20 LAB
BASOPHILS # BLD AUTO: 0.1 10E9/L (ref 0–0.2)
BASOPHILS NFR BLD AUTO: 1.1 %
DIFFERENTIAL METHOD BLD: ABNORMAL
EOSINOPHIL # BLD AUTO: 0 10E9/L (ref 0–0.7)
EOSINOPHIL NFR BLD AUTO: 0.7 %
ERYTHROCYTE [DISTWIDTH] IN BLOOD BY AUTOMATED COUNT: 15.1 % (ref 10–15)
HCT VFR BLD AUTO: 34.2 % (ref 35–47)
HGB BLD-MCNC: 11.5 G/DL (ref 11.7–15.7)
IMM GRANULOCYTES # BLD: 0 10E9/L (ref 0–0.4)
IMM GRANULOCYTES NFR BLD: 0.4 %
LYMPHOCYTES # BLD AUTO: 1.2 10E9/L (ref 0.8–5.3)
LYMPHOCYTES NFR BLD AUTO: 25.4 %
MCH RBC QN AUTO: 33 PG (ref 26.5–33)
MCHC RBC AUTO-ENTMCNC: 33.6 G/DL (ref 31.5–36.5)
MCV RBC AUTO: 98 FL (ref 78–100)
MONOCYTES # BLD AUTO: 0.4 10E9/L (ref 0–1.3)
MONOCYTES NFR BLD AUTO: 8.1 %
NEUTROPHILS # BLD AUTO: 2.9 10E9/L (ref 1.6–8.3)
NEUTROPHILS NFR BLD AUTO: 64.3 %
PLATELET # BLD AUTO: 248 10E9/L (ref 150–450)
RBC # BLD AUTO: 3.48 10E12/L (ref 3.8–5.2)
WBC # BLD AUTO: 4.6 10E9/L (ref 4–11)

## 2020-10-20 PROCEDURE — 99207 PR NO CHARGE NURSE ONLY: CPT

## 2020-10-20 PROCEDURE — 99207 PR NO CHARGE LOS: CPT

## 2020-10-20 PROCEDURE — 96413 CHEMO IV INFUSION 1 HR: CPT | Performed by: INTERNAL MEDICINE

## 2020-10-20 PROCEDURE — 85025 COMPLETE CBC W/AUTO DIFF WBC: CPT | Performed by: INTERNAL MEDICINE

## 2020-10-20 PROCEDURE — 96417 CHEMO IV INFUS EACH ADDL SEQ: CPT | Performed by: INTERNAL MEDICINE

## 2020-10-20 RX ORDER — HEPARIN SODIUM (PORCINE) LOCK FLUSH IV SOLN 100 UNIT/ML 100 UNIT/ML
5 SOLUTION INTRAVENOUS
Status: DISCONTINUED | OUTPATIENT
Start: 2020-10-20 | End: 2020-10-22 | Stop reason: HOSPADM

## 2020-10-20 RX ORDER — HEPARIN SODIUM (PORCINE) LOCK FLUSH IV SOLN 100 UNIT/ML 100 UNIT/ML
5 SOLUTION INTRAVENOUS
Status: DISCONTINUED | OUTPATIENT
Start: 2020-10-20 | End: 2020-10-20 | Stop reason: HOSPADM

## 2020-10-20 RX ADMIN — Medication 250 ML: at 14:51

## 2020-10-20 RX ADMIN — HEPARIN SODIUM (PORCINE) LOCK FLUSH IV SOLN 100 UNIT/ML 5 ML: 100 SOLUTION at 16:51

## 2020-10-20 RX ADMIN — HEPARIN SODIUM (PORCINE) LOCK FLUSH IV SOLN 100 UNIT/ML 5 ML: 100 SOLUTION at 14:15

## 2020-10-20 ASSESSMENT — PAIN SCALES - GENERAL: PAINLEVEL: NO PAIN (0)

## 2020-10-20 NOTE — PROGRESS NOTES
Infusion Nursing Note:  Talya Zuleta presents today for C1D15 Taxol/Kanjinti.    Patient seen by provider today: No   present during visit today: Not Applicable.    Note: N/A.    Intravenous Access:  Implanted Port.    Treatment Conditions:  Lab Results   Component Value Date    HGB 11.5 10/20/2020     Lab Results   Component Value Date    WBC 4.6 10/20/2020      Lab Results   Component Value Date    ANEU 2.9 10/20/2020     Lab Results   Component Value Date     10/20/2020      Results reviewed, labs MET treatment parameters, ok to proceed with treatment.    Post Infusion Assessment:  Patient tolerated infusion without incident.  Blood return noted pre and post infusion.  Site patent and intact, free from redness, edema or discomfort.  No evidence of extravasations.  Access discontinued per protocol.       Discharge Plan:   Patient will return 10/27/2020 for next appointment.   Patient discharged in stable condition accompanied by: self.  Departure Mode: Ambulatory.    Magaly Osorio RN-BSN, PHN, OCN  [facility]

## 2020-10-22 NOTE — PROGRESS NOTES
"Patient's name and  were verified.  See Doc Flowsheet - IV assess for details.  IVAD accessed with 20G 3/4\" araujo gripper plus needle  blood return positive: YES  Site without redness, tenderness or swelling: YES  flushed with 20cc NS and 5cc 100u/ml heparin  Needle: Left accessed for infusion  Comments: Labs drawn.  Patient tolerated procedure without incident.    Sho Brewster RN, BSN, OCN      "

## 2020-10-25 RX ORDER — MEPERIDINE HYDROCHLORIDE 25 MG/ML
25 INJECTION INTRAMUSCULAR; INTRAVENOUS; SUBCUTANEOUS EVERY 30 MIN PRN
Status: CANCELLED | OUTPATIENT
Start: 2020-10-27

## 2020-10-25 RX ORDER — HEPARIN SODIUM (PORCINE) LOCK FLUSH IV SOLN 100 UNIT/ML 100 UNIT/ML
5 SOLUTION INTRAVENOUS
Status: CANCELLED | OUTPATIENT
Start: 2020-11-03

## 2020-10-25 RX ORDER — ACETAMINOPHEN 325 MG/1
650 TABLET ORAL
Status: CANCELLED | OUTPATIENT
Start: 2020-10-27

## 2020-10-25 RX ORDER — METHYLPREDNISOLONE SODIUM SUCCINATE 125 MG/2ML
125 INJECTION, POWDER, LYOPHILIZED, FOR SOLUTION INTRAMUSCULAR; INTRAVENOUS
Status: CANCELLED
Start: 2020-11-10

## 2020-10-25 RX ORDER — EPINEPHRINE 1 MG/ML
0.3 INJECTION, SOLUTION INTRAMUSCULAR; SUBCUTANEOUS EVERY 5 MIN PRN
Status: CANCELLED | OUTPATIENT
Start: 2020-11-10

## 2020-10-25 RX ORDER — DIPHENHYDRAMINE HYDROCHLORIDE 50 MG/ML
50 INJECTION INTRAMUSCULAR; INTRAVENOUS
Status: CANCELLED
Start: 2020-11-10

## 2020-10-25 RX ORDER — HEPARIN SODIUM,PORCINE 10 UNIT/ML
5 VIAL (ML) INTRAVENOUS
Status: CANCELLED | OUTPATIENT
Start: 2020-11-10

## 2020-10-25 RX ORDER — EPINEPHRINE 1 MG/ML
0.3 INJECTION, SOLUTION INTRAMUSCULAR; SUBCUTANEOUS EVERY 5 MIN PRN
Status: CANCELLED | OUTPATIENT
Start: 2020-11-03

## 2020-10-25 RX ORDER — LORAZEPAM 2 MG/ML
0.5 INJECTION INTRAMUSCULAR EVERY 4 HOURS PRN
Status: CANCELLED
Start: 2020-11-03

## 2020-10-25 RX ORDER — LORAZEPAM 2 MG/ML
0.5 INJECTION INTRAMUSCULAR EVERY 4 HOURS PRN
Status: CANCELLED
Start: 2020-10-27

## 2020-10-25 RX ORDER — EPINEPHRINE 1 MG/ML
0.3 INJECTION, SOLUTION INTRAMUSCULAR; SUBCUTANEOUS EVERY 5 MIN PRN
Status: CANCELLED | OUTPATIENT
Start: 2020-10-27

## 2020-10-25 RX ORDER — ALBUTEROL SULFATE 0.83 MG/ML
2.5 SOLUTION RESPIRATORY (INHALATION)
Status: CANCELLED | OUTPATIENT
Start: 2020-11-03

## 2020-10-25 RX ORDER — HEPARIN SODIUM (PORCINE) LOCK FLUSH IV SOLN 100 UNIT/ML 100 UNIT/ML
5 SOLUTION INTRAVENOUS
Status: CANCELLED | OUTPATIENT
Start: 2020-10-27

## 2020-10-25 RX ORDER — HEPARIN SODIUM,PORCINE 10 UNIT/ML
5 VIAL (ML) INTRAVENOUS
Status: CANCELLED | OUTPATIENT
Start: 2020-11-03

## 2020-10-25 RX ORDER — SODIUM CHLORIDE 9 MG/ML
1000 INJECTION, SOLUTION INTRAVENOUS CONTINUOUS PRN
Status: CANCELLED
Start: 2020-10-27

## 2020-10-25 RX ORDER — ALBUTEROL SULFATE 0.83 MG/ML
2.5 SOLUTION RESPIRATORY (INHALATION)
Status: CANCELLED | OUTPATIENT
Start: 2020-10-27

## 2020-10-25 RX ORDER — MEPERIDINE HYDROCHLORIDE 25 MG/ML
25 INJECTION INTRAMUSCULAR; INTRAVENOUS; SUBCUTANEOUS EVERY 30 MIN PRN
Status: CANCELLED | OUTPATIENT
Start: 2020-11-03

## 2020-10-25 RX ORDER — METHYLPREDNISOLONE SODIUM SUCCINATE 125 MG/2ML
125 INJECTION, POWDER, LYOPHILIZED, FOR SOLUTION INTRAMUSCULAR; INTRAVENOUS
Status: CANCELLED
Start: 2020-11-03

## 2020-10-25 RX ORDER — MEPERIDINE HYDROCHLORIDE 25 MG/ML
25 INJECTION INTRAMUSCULAR; INTRAVENOUS; SUBCUTANEOUS EVERY 30 MIN PRN
Status: CANCELLED | OUTPATIENT
Start: 2020-11-10

## 2020-10-25 RX ORDER — METHYLPREDNISOLONE SODIUM SUCCINATE 125 MG/2ML
125 INJECTION, POWDER, LYOPHILIZED, FOR SOLUTION INTRAMUSCULAR; INTRAVENOUS
Status: CANCELLED
Start: 2020-10-27

## 2020-10-25 RX ORDER — NALOXONE HYDROCHLORIDE 0.4 MG/ML
.1-.4 INJECTION, SOLUTION INTRAMUSCULAR; INTRAVENOUS; SUBCUTANEOUS
Status: CANCELLED | OUTPATIENT
Start: 2020-11-03

## 2020-10-25 RX ORDER — HEPARIN SODIUM (PORCINE) LOCK FLUSH IV SOLN 100 UNIT/ML 100 UNIT/ML
5 SOLUTION INTRAVENOUS
Status: CANCELLED | OUTPATIENT
Start: 2020-11-10

## 2020-10-25 RX ORDER — ALBUTEROL SULFATE 90 UG/1
1-2 AEROSOL, METERED RESPIRATORY (INHALATION)
Status: CANCELLED
Start: 2020-11-03

## 2020-10-25 RX ORDER — ALBUTEROL SULFATE 90 UG/1
1-2 AEROSOL, METERED RESPIRATORY (INHALATION)
Status: CANCELLED
Start: 2020-11-10

## 2020-10-25 RX ORDER — DIPHENHYDRAMINE HYDROCHLORIDE 50 MG/ML
50 INJECTION INTRAMUSCULAR; INTRAVENOUS
Status: CANCELLED
Start: 2020-11-03

## 2020-10-25 RX ORDER — LORAZEPAM 2 MG/ML
0.5 INJECTION INTRAMUSCULAR EVERY 4 HOURS PRN
Status: CANCELLED
Start: 2020-11-10

## 2020-10-25 RX ORDER — SODIUM CHLORIDE 9 MG/ML
1000 INJECTION, SOLUTION INTRAVENOUS CONTINUOUS PRN
Status: CANCELLED
Start: 2020-11-03

## 2020-10-25 RX ORDER — ALBUTEROL SULFATE 90 UG/1
1-2 AEROSOL, METERED RESPIRATORY (INHALATION)
Status: CANCELLED
Start: 2020-10-27

## 2020-10-25 RX ORDER — NALOXONE HYDROCHLORIDE 0.4 MG/ML
.1-.4 INJECTION, SOLUTION INTRAMUSCULAR; INTRAVENOUS; SUBCUTANEOUS
Status: CANCELLED | OUTPATIENT
Start: 2020-10-27

## 2020-10-25 RX ORDER — NALOXONE HYDROCHLORIDE 0.4 MG/ML
.1-.4 INJECTION, SOLUTION INTRAMUSCULAR; INTRAVENOUS; SUBCUTANEOUS
Status: CANCELLED | OUTPATIENT
Start: 2020-11-10

## 2020-10-25 RX ORDER — DIPHENHYDRAMINE HCL 25 MG
50 CAPSULE ORAL
Status: CANCELLED
Start: 2020-10-27

## 2020-10-25 RX ORDER — HEPARIN SODIUM,PORCINE 10 UNIT/ML
5 VIAL (ML) INTRAVENOUS
Status: CANCELLED | OUTPATIENT
Start: 2020-10-27

## 2020-10-25 RX ORDER — SODIUM CHLORIDE 9 MG/ML
1000 INJECTION, SOLUTION INTRAVENOUS CONTINUOUS PRN
Status: CANCELLED
Start: 2020-11-10

## 2020-10-25 RX ORDER — ALBUTEROL SULFATE 0.83 MG/ML
2.5 SOLUTION RESPIRATORY (INHALATION)
Status: CANCELLED | OUTPATIENT
Start: 2020-11-10

## 2020-10-25 RX ORDER — DIPHENHYDRAMINE HYDROCHLORIDE 50 MG/ML
50 INJECTION INTRAMUSCULAR; INTRAVENOUS
Status: CANCELLED
Start: 2020-10-27

## 2020-10-26 ENCOUNTER — ONCOLOGY VISIT (OUTPATIENT)
Dept: ONCOLOGY | Facility: CLINIC | Age: 75
End: 2020-10-26
Attending: INTERNAL MEDICINE
Payer: COMMERCIAL

## 2020-10-26 VITALS
OXYGEN SATURATION: 99 % | WEIGHT: 169.7 LBS | TEMPERATURE: 97.8 F | DIASTOLIC BLOOD PRESSURE: 77 MMHG | HEART RATE: 74 BPM | BODY MASS INDEX: 27.27 KG/M2 | HEIGHT: 66 IN | SYSTOLIC BLOOD PRESSURE: 146 MMHG

## 2020-10-26 DIAGNOSIS — Z17.0 MALIGNANT NEOPLASM OF OVERLAPPING SITES OF RIGHT BREAST IN FEMALE, ESTROGEN RECEPTOR POSITIVE (H): Primary | ICD-10-CM

## 2020-10-26 DIAGNOSIS — C50.811 MALIGNANT NEOPLASM OF OVERLAPPING SITES OF RIGHT BREAST IN FEMALE, ESTROGEN RECEPTOR POSITIVE (H): Primary | ICD-10-CM

## 2020-10-26 PROCEDURE — 99214 OFFICE O/P EST MOD 30 MIN: CPT | Performed by: INTERNAL MEDICINE

## 2020-10-26 PROCEDURE — G0463 HOSPITAL OUTPT CLINIC VISIT: HCPCS

## 2020-10-26 ASSESSMENT — PAIN SCALES - GENERAL: PAINLEVEL: NO PAIN (0)

## 2020-10-26 ASSESSMENT — MIFFLIN-ST. JEOR: SCORE: 1272.5

## 2020-10-26 NOTE — LETTER
10/26/2020         RE: Talya Zuleta  [address] [address]        Dear Colleague,    Thank you for referring your patient, Talya Zuleta, to the [facility]. Please see a copy of my visit note below.    Oncology Follow Up:  Date on this visit: 10/26/2020    Diagnosis:  right breast cancer, clinical stage T2N0M0.    Primary Physician: Cole Weston     History Of Present Illness:  Ms. Zuleta is a 75 year old female with right breast cancer.  Routine screening mammogram in 07/2020 showed bilateral breast asymmetries.  Diagnostic mammograms and ultrasound showed a 2.7 cm mass in the right breast at 12:00, 5 cm from the nipple with ductal extension including a 6 mm mass at 3 cm from the nipple.  In the left breast were benign appearing cysts.  Ultrasound of the right axilla was without lymphadenopathy.  Contrast enhanced mammogram showed the right breast mass, including extension, to measure 4.9 cm.  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  Estrogen receptor was moderate in 50% and progesterone receptor staining was negative.  HER-2 was negative by IHC; HER2 FISH showed approximately 10% of tumor cells to have 6.8 HER2 signals/nucleus and a HER2/CEN17 ratio of 1.6.  The HER2 positive cells were noted to be the large pleomorphic cells.  Multiple neutrophils were noted to be infiltrating through tumor stroma.    Interval History:  Talya presents to clinic today for evaluation prior to week #4 of Taxol, Herceptin, and pertuzumab chemotherapy.  She tolerates chemotherapy quite well. She has had no nausea or vomiting with chemo, she has some intermittent diarrhea. She has 1-3 stools daily on the days she has diarrhea.  Diarrhea is relieved with imodium.  She has chronic numbness and tingling sensation of her feet and her left 4th and 5th fingers, which has been stable.  She denies any  worsening of numbness of tingling sensation. She has okay appetite and her weight has been stable with chemotherapy. She reports nose bleeding recently. She is active and doing lot of chores at home. She is not exercise regularly due to herniated discs of her back. She denies dyspnea, no chest pain, no fever/chill, stays home mostly given COVID-19.      She has narcolepsy so she can not sit for long time, she drinks lots of coffee to keep herself wake.     The remainder of a complete 12 point review of systems was reviewed with the patient and was negative with the exception of that mentioned above.    Past Medical/Surgical History:  1.  Breast cancer as per HPI  2.  Hypertension  3.  Diabetes  4.  COPD  5.  Hypothyroidism  6.  Hypohidrosis    Allergies:  Allergies as of 10/26/2020 - Reviewed 10/20/2020   Allergen Reaction Noted     Bacitracin-neomycin-polymyxin  04/18/2003     Latex  09/30/2005     Current Medications:  Current Outpatient Medications   Medication Sig Dispense Refill     amLODIPine (NORVASC) 5 MG tablet Take 5 mg by mouth       atorvastatin (LIPITOR) 40 MG tablet TAKE 1/2 TABLET DAILY       benazepril (LOTENSIN) 20 MG tablet TAKE 1 TABLET BY MOUTH TWO TIMES A DAY.       furosemide (LASIX) 80 MG tablet Take 80 mg by mouth 2 times daily       HYDROcodone-acetaminophen (NORCO) 5-325 MG tablet Take 1 tablet by mouth every 4 hours as needed for moderate to severe pain (Patient not taking: Reported on 10/13/2020) 5 tablet 0     LORazepam (ATIVAN) 0.5 MG tablet Take 1 tablet (0.5 mg) by mouth every 4 hours as needed (Anxiety, Nausea/Vomiting or Sleep) (Patient not taking: Reported on 10/13/2020) 30 tablet 2     ondansetron (ZOFRAN) 8 MG tablet Take 1 tablet (8 mg) by mouth every 8 hours as needed for nausea (Patient not taking: Reported on 10/13/2020) 30 tablet 3     ondansetron (ZOFRAN-ODT) 8 MG ODT tab Take 1 tablet (8 mg) by mouth every 8 hours as needed for nausea (Patient not taking: Reported on  "10/13/2020) 90 tablet 1     potassium chloride ER (KLOR-CON M) 20 MEQ CR tablet Take 40 mEq by mouth       prochlorperazine (COMPAZINE) 10 MG tablet Take 0.5 tablets (5 mg) by mouth every 6 hours as needed (Nausea/Vomiting) (Patient not taking: Reported on 10/13/2020) 30 tablet 2     triamcinolone (KENALOG) 0.1 % external cream         Family and Social History:  Please see initial consultation dated 9/21/2020 for further details.  Breast Actionable Panel on 10/1/2020 was negative for mutation.    Physical Exam:  BP (!) 146/77   Pulse 74   Temp 97.8  F (36.6  C) (Oral)   Ht 1.67 m (5' 5.75\")   Wt 77 kg (169 lb 11.2 oz)   SpO2 99%   BMI 27.60 kg/m    General:  Well appearing, well-nourished adult female in NAD.  A&Ox3.    HEENT:  Normocephalic.  Sclera anicteric.  MMM.  No lesions of the oropharynx.  Lymph:  No palpable cervical, supraclavicular, or axillary LAD.  Chest:  CTA bilaterally.  No wheezes or crackles.  CV:  RRR.  Nl S1 and S2.  No m/r/g.  Breast:  Firm palpable mass at 12:00, 5 cm from the nipple measures 2.5 x 3 cm (W x L).  There are no other discretely palpable masses in either breast.  Abd:  Soft/NT/ND.  Ext:  No pitting edema of the bilateral lower extremities.  Pulses 2+ and symmetric.  Musculo:  Full ROM of the bilateral upper extremities.  Neuro:  Cranial nerves grossly intact.  Psych:  Mood and affect appear normal.  Skin:  There is significant fibrosis of the skin in each axilla.    Laboratory/Imaging Studies  10/20/2020 Labs:   CBC RESULTS:   Recent Labs   Lab Test 10/20/20  1410   WBC 4.6   RBC 3.48*   HGB 11.5*   HCT 34.2*   MCV 98   MCH 33.0   MCHC 33.6   RDW 15.1*          ASSESSMENT/PLAN:  75 year old female with a history of HTN, dyslipidemia, COPD, diabetes, and OA with a newly diagnosed clinical stage, T2N0M0, right breast cancer, that is ER positive (50%), [place] negative, and HER2 positive.       1.  Right breast cancer:  Presents to clinic prior to week #4 of THP neoadjuvant " chemotherapy, which is scheduled at [facility] tomorrow at 2 PM. She overall tolerated treatment very well and the tumor is smaller today than on prior exam.  As long as tomorrow's labs are within parameters, we will proceed with treatment tomorrow.     Overall treatment plan is to complete a total of 12 weeks of Taxol, Herceptin, and pertuzumab.   If she has residual breast disease after 12 weeks of THP, we would plan to proceed with dose dense AC (adriamycin and cyclophosphamide) chemotherapy.  If no palpable tumor after 12 weeks of THP, would obtain a breast MRI.  If no/little residual disease on the breast MRI, may consider forgoing AC given her medical comorbidities.  Following completion of chemotherapy, will proceed with surgery.  If she decides to have a  lumpectomy or she has a positive lymph node at the time of surgery, she will need breast radiation after her surgery. Finally, given ER+ breast cancer, plan will be treat with a minimum 5 years of endocrine therapy.     2. Diarrhea:  Imodium prn. Well controlled      3. Arthralgias: Taxane induced.  Okay to take tylenol prn.    4. Nose bleeding: CBC showed normal PLT, ask her to use humidifier at home to increase the air moisture. Also advised to apply a thin layer of vaseline on the inside of the nares at bedtime and to use saline spray prn during the day.    5.  Cancer risk management: Breast actionable panel resulted on 10/5.  We went over the results which were negative for mutation.  She was provided with a copy of these results.    6. Follow Up:    - Labs, Taxol, Herceptin, and pertuzumab 10/27/2020  - Labs, Taxol and Herceptin on 11/3/2020  - Labs, visit with Jacquelyn Miller, Taxol and Herceptin on 11/10/2020       Patient was seen and discussed with Dr Alvarez.     Michelle Smith  Hem/Onco Fellow     Patient was seen and discussed with Dr. Smith.  I have edited the above note to reflect our joint assessment and plan.  A total of 25 minutes was spent in  face to face visit with the patient today.    Perlita Alvarez MD

## 2020-10-26 NOTE — PROGRESS NOTES
Oncology Follow Up:  Date on this visit: 10/26/2020    Diagnosis:  right breast cancer, clinical stage T2N0M0.    Primary Physician: Cole Weston     History Of Present Illness:  Ms. Zuleta is a 75 year old female with right breast cancer.  Routine screening mammogram in 07/2020 showed bilateral breast asymmetries.  Diagnostic mammograms and ultrasound showed a 2.7 cm mass in the right breast at 12:00, 5 cm from the nipple with ductal extension including a 6 mm mass at 3 cm from the nipple.  In the left breast were benign appearing cysts.  Ultrasound of the right axilla was without lymphadenopathy.  Contrast enhanced mammogram showed the right breast mass, including extension, to measure 4.9 cm.  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  Estrogen receptor was moderate in 50% and progesterone receptor staining was negative.  HER-2 was negative by IHC; HER2 FISH showed approximately 10% of tumor cells to have 6.8 HER2 signals/nucleus and a HER2/CEN17 ratio of 1.6.  The HER2 positive cells were noted to be the large pleomorphic cells.  Multiple neutrophils were noted to be infiltrating through tumor stroma.    Interval History:  Talya presents to clinic today for evaluation prior to week #4 of Taxol, Herceptin, and pertuzumab chemotherapy.  She tolerates chemotherapy quite well. She has had no nausea or vomiting with chemo, she has some intermittent diarrhea. She has 1-3 stools daily on the days she has diarrhea.  Diarrhea is relieved with imodium.  She has chronic numbness and tingling sensation of her feet and her left 4th and 5th fingers, which has been stable.  She denies any worsening of numbness of tingling sensation. She has okay appetite and her weight has been stable with chemotherapy. She reports nose bleeding recently. She is active and doing lot of chores at home. She is not exercise regularly due to herniated discs of her back. She  denies dyspnea, no chest pain, no fever/chill, stays home mostly given COVID-19.      She has narcolepsy so she can not sit for long time, she drinks lots of coffee to keep herself wake.     The remainder of a complete 12 point review of systems was reviewed with the patient and was negative with the exception of that mentioned above.    Past Medical/Surgical History:  1.  Breast cancer as per HPI  2.  Hypertension  3.  Diabetes  4.  COPD  5.  Hypothyroidism  6.  Hypohidrosis    Allergies:  Allergies as of 10/26/2020 - Reviewed 10/20/2020   Allergen Reaction Noted     Bacitracin-neomycin-polymyxin  04/18/2003     Latex  09/30/2005     Current Medications:  Current Outpatient Medications   Medication Sig Dispense Refill     amLODIPine (NORVASC) 5 MG tablet Take 5 mg by mouth       atorvastatin (LIPITOR) 40 MG tablet TAKE 1/2 TABLET DAILY       benazepril (LOTENSIN) 20 MG tablet TAKE 1 TABLET BY MOUTH TWO TIMES A DAY.       furosemide (LASIX) 80 MG tablet Take 80 mg by mouth 2 times daily       HYDROcodone-acetaminophen (NORCO) 5-325 MG tablet Take 1 tablet by mouth every 4 hours as needed for moderate to severe pain (Patient not taking: Reported on 10/13/2020) 5 tablet 0     LORazepam (ATIVAN) 0.5 MG tablet Take 1 tablet (0.5 mg) by mouth every 4 hours as needed (Anxiety, Nausea/Vomiting or Sleep) (Patient not taking: Reported on 10/13/2020) 30 tablet 2     ondansetron (ZOFRAN) 8 MG tablet Take 1 tablet (8 mg) by mouth every 8 hours as needed for nausea (Patient not taking: Reported on 10/13/2020) 30 tablet 3     ondansetron (ZOFRAN-ODT) 8 MG ODT tab Take 1 tablet (8 mg) by mouth every 8 hours as needed for nausea (Patient not taking: Reported on 10/13/2020) 90 tablet 1     potassium chloride ER (KLOR-CON M) 20 MEQ CR tablet Take 40 mEq by mouth       prochlorperazine (COMPAZINE) 10 MG tablet Take 0.5 tablets (5 mg) by mouth every 6 hours as needed (Nausea/Vomiting) (Patient not taking: Reported on 10/13/2020) 30  "tablet 2     triamcinolone (KENALOG) 0.1 % external cream         Family and Social History:  Please see initial consultation dated 9/21/2020 for further details.  Breast Actionable Panel on 10/1/2020 was negative for mutation.    Physical Exam:  BP (!) 146/77   Pulse 74   Temp 97.8  F (36.6  C) (Oral)   Ht 1.67 m (5' 5.75\")   Wt 77 kg (169 lb 11.2 oz)   SpO2 99%   BMI 27.60 kg/m    General:  Well appearing, well-nourished adult female in NAD.  A&Ox3.    HEENT:  Normocephalic.  Sclera anicteric.  MMM.  No lesions of the oropharynx.  Lymph:  No palpable cervical, supraclavicular, or axillary LAD.  Chest:  CTA bilaterally.  No wheezes or crackles.  CV:  RRR.  Nl S1 and S2.  No m/r/g.  Breast:  Firm palpable mass at 12:00, 5 cm from the nipple measures 2.5 x 3 cm (W x L).  There are no other discretely palpable masses in either breast.  Abd:  Soft/NT/ND.  Ext:  No pitting edema of the bilateral lower extremities.  Pulses 2+ and symmetric.  Musculo:  Full ROM of the bilateral upper extremities.  Neuro:  Cranial nerves grossly intact.  Psych:  Mood and affect appear normal.  Skin:  There is significant fibrosis of the skin in each axilla.    Laboratory/Imaging Studies  10/20/2020 Labs:   CBC RESULTS:   Recent Labs   Lab Test 10/20/20  1410   WBC 4.6   RBC 3.48*   HGB 11.5*   HCT 34.2*   MCV 98   MCH 33.0   MCHC 33.6   RDW 15.1*          ASSESSMENT/PLAN:  75 year old female with a history of HTN, dyslipidemia, COPD, diabetes, and OA with a newly diagnosed clinical stage, T2N0M0, right breast cancer, that is ER positive (50%), MS negative, and HER2 positive.       1.  Right breast cancer:  Presents to clinic prior to week #4 of THP neoadjuvant chemotherapy, which is scheduled at [facility] tomorrow at 2 PM. She overall tolerated treatment very well and the tumor is smaller today than on prior exam.  As long as tomorrow's labs are within parameters, we will proceed with treatment tomorrow.     Overall treatment " plan is to complete a total of 12 weeks of Taxol, Herceptin, and pertuzumab.   If she has residual breast disease after 12 weeks of THP, we would plan to proceed with dose dense AC (adriamycin and cyclophosphamide) chemotherapy.  If no palpable tumor after 12 weeks of THP, would obtain a breast MRI.  If no/little residual disease on the breast MRI, may consider forgoing AC given her medical comorbidities.  Following completion of chemotherapy, will proceed with surgery.  If she decides to have a  lumpectomy or she has a positive lymph node at the time of surgery, she will need breast radiation after her surgery. Finally, given ER+ breast cancer, plan will be treat with a minimum 5 years of endocrine therapy.     2. Diarrhea:  Imodium prn. Well controlled      3. Arthralgias: Taxane induced.  Okay to take tylenol prn.    4. Nose bleeding: CBC showed normal PLT, ask her to use humidifier at home to increase the air moisture. Also advised to apply a thin layer of vaseline on the inside of the nares at bedtime and to use saline spray prn during the day.    5.  Cancer risk management: Breast actionable panel resulted on 10/5.  We went over the results which were negative for mutation.  She was provided with a copy of these results.    6. Follow Up:    - Labs, Taxol, Herceptin, and pertuzumab 10/27/2020  - Labs, Taxol and Herceptin on 11/3/2020  - Labs, visit with Jacquelyn Miller, Taxol and Herceptin on 11/10/2020       Patient was seen and discussed with Dr Alvarez.     Michelle Smith  Hem/Onco Fellow     Patient was seen and discussed with Dr. Smith.  I have edited the above note to reflect our joint assessment and plan.  A total of 25 minutes was spent in face to face visit with the patient today.    Perlita Alvarez MD

## 2020-10-26 NOTE — NURSING NOTE
"Oncology Rooming Note    October 26, 2020 9:27 AM   Talya Zuleta is a 75 year old female who presents for:    Chief Complaint   Patient presents with     Oncology Clinic Visit     BREAST CANCER      Initial Vitals: BP (!) 146/77   Pulse 74   Temp 97.8  F (36.6  C) (Oral)   Ht 1.67 m (5' 5.75\")   Wt 77 kg (169 lb 11.2 oz)   SpO2 99%   BMI 27.60 kg/m   Estimated body mass index is 27.6 kg/m  as calculated from the following:    Height as of this encounter: 1.67 m (5' 5.75\").    Weight as of this encounter: 77 kg (169 lb 11.2 oz). Body surface area is 1.89 meters squared.  No Pain (0) Comment: Data Unavailable   No LMP recorded. Patient is postmenopausal.  Allergies reviewed: Yes  Medications reviewed: Yes    Medications: Medication refills not needed today.  Pharmacy name entered into [employer]:    [id number] - [place], [place] - [address] AT [address] OF [address] [place] [address] [facility] - [place], [place] - [address], [id number]  [facility] - [facility] [address]    Clinical concerns: No new concerns today.       Susi Enrique MA            "

## 2020-10-27 ENCOUNTER — INFUSION THERAPY VISIT (OUTPATIENT)
Dept: INFUSION THERAPY | Facility: CLINIC | Age: 75
End: 2020-10-27
Payer: COMMERCIAL

## 2020-10-27 ENCOUNTER — ALLIED HEALTH/NURSE VISIT (OUTPATIENT)
Dept: ONCOLOGY | Facility: CLINIC | Age: 75
End: 2020-10-27
Payer: COMMERCIAL

## 2020-10-27 VITALS
HEART RATE: 75 BPM | SYSTOLIC BLOOD PRESSURE: 127 MMHG | RESPIRATION RATE: 18 BRPM | DIASTOLIC BLOOD PRESSURE: 70 MMHG | TEMPERATURE: 97.1 F | OXYGEN SATURATION: 98 % | WEIGHT: 166.7 LBS | BODY MASS INDEX: 27.11 KG/M2

## 2020-10-27 DIAGNOSIS — C50.811 MALIGNANT NEOPLASM OF OVERLAPPING SITES OF RIGHT BREAST IN FEMALE, ESTROGEN RECEPTOR POSITIVE (H): Primary | ICD-10-CM

## 2020-10-27 DIAGNOSIS — Z17.0 MALIGNANT NEOPLASM OF OVERLAPPING SITES OF RIGHT BREAST IN FEMALE, ESTROGEN RECEPTOR POSITIVE (H): Primary | ICD-10-CM

## 2020-10-27 LAB
ALBUMIN SERPL-MCNC: 3.3 G/DL (ref 3.4–5)
ALP SERPL-CCNC: 66 U/L (ref 40–150)
ALT SERPL W P-5'-P-CCNC: 27 U/L (ref 0–50)
ANION GAP SERPL CALCULATED.3IONS-SCNC: 7 MMOL/L (ref 3–14)
AST SERPL W P-5'-P-CCNC: 20 U/L (ref 0–45)
BASOPHILS # BLD AUTO: 0 10E9/L (ref 0–0.2)
BASOPHILS NFR BLD AUTO: 0.6 %
BILIRUB SERPL-MCNC: 0.2 MG/DL (ref 0.2–1.3)
BUN SERPL-MCNC: 15 MG/DL (ref 7–30)
CALCIUM SERPL-MCNC: 8.8 MG/DL (ref 8.5–10.1)
CHLORIDE SERPL-SCNC: 110 MMOL/L (ref 94–109)
CO2 SERPL-SCNC: 26 MMOL/L (ref 20–32)
CREAT SERPL-MCNC: 0.66 MG/DL (ref 0.52–1.04)
DIFFERENTIAL METHOD BLD: ABNORMAL
EOSINOPHIL # BLD AUTO: 0 10E9/L (ref 0–0.7)
EOSINOPHIL NFR BLD AUTO: 0.4 %
ERYTHROCYTE [DISTWIDTH] IN BLOOD BY AUTOMATED COUNT: 15 % (ref 10–15)
GFR SERPL CREATININE-BSD FRML MDRD: 85 ML/MIN/{1.73_M2}
GLUCOSE SERPL-MCNC: 84 MG/DL (ref 70–99)
HCT VFR BLD AUTO: 35.8 % (ref 35–47)
HGB BLD-MCNC: 12.1 G/DL (ref 11.7–15.7)
IMM GRANULOCYTES # BLD: 0 10E9/L (ref 0–0.4)
IMM GRANULOCYTES NFR BLD: 0.6 %
LYMPHOCYTES # BLD AUTO: 1.3 10E9/L (ref 0.8–5.3)
LYMPHOCYTES NFR BLD AUTO: 27.3 %
MCH RBC QN AUTO: 33.6 PG (ref 26.5–33)
MCHC RBC AUTO-ENTMCNC: 33.8 G/DL (ref 31.5–36.5)
MCV RBC AUTO: 99 FL (ref 78–100)
MONOCYTES # BLD AUTO: 0.4 10E9/L (ref 0–1.3)
MONOCYTES NFR BLD AUTO: 8.8 %
NEUTROPHILS # BLD AUTO: 2.9 10E9/L (ref 1.6–8.3)
NEUTROPHILS NFR BLD AUTO: 62.3 %
PLATELET # BLD AUTO: 243 10E9/L (ref 150–450)
POTASSIUM SERPL-SCNC: 3.8 MMOL/L (ref 3.4–5.3)
PROT SERPL-MCNC: 6.7 G/DL (ref 6.8–8.8)
RBC # BLD AUTO: 3.6 10E12/L (ref 3.8–5.2)
SODIUM SERPL-SCNC: 143 MMOL/L (ref 133–144)
WBC # BLD AUTO: 4.7 10E9/L (ref 4–11)

## 2020-10-27 PROCEDURE — 99207 PR NO CHARGE LOS: CPT

## 2020-10-27 PROCEDURE — 96413 CHEMO IV INFUSION 1 HR: CPT | Performed by: NURSE PRACTITIONER

## 2020-10-27 PROCEDURE — 80053 COMPREHEN METABOLIC PANEL: CPT | Performed by: INTERNAL MEDICINE

## 2020-10-27 PROCEDURE — 96417 CHEMO IV INFUS EACH ADDL SEQ: CPT | Performed by: NURSE PRACTITIONER

## 2020-10-27 PROCEDURE — 85025 COMPLETE CBC W/AUTO DIFF WBC: CPT | Performed by: INTERNAL MEDICINE

## 2020-10-27 RX ORDER — HEPARIN SODIUM (PORCINE) LOCK FLUSH IV SOLN 100 UNIT/ML 100 UNIT/ML
5 SOLUTION INTRAVENOUS
Status: DISCONTINUED | OUTPATIENT
Start: 2020-10-27 | End: 2020-10-27 | Stop reason: HOSPADM

## 2020-10-27 RX ADMIN — HEPARIN SODIUM (PORCINE) LOCK FLUSH IV SOLN 100 UNIT/ML 5 ML: 100 SOLUTION at 14:11

## 2020-10-27 RX ADMIN — Medication 250 ML: at 14:48

## 2020-10-27 RX ADMIN — HEPARIN SODIUM (PORCINE) LOCK FLUSH IV SOLN 100 UNIT/ML 5 ML: 100 SOLUTION at 17:25

## 2020-10-27 ASSESSMENT — PAIN SCALES - GENERAL: PAINLEVEL: NO PAIN (0)

## 2020-10-27 NOTE — PROGRESS NOTES
"Patient's name and  were verified.  See Doc Flowsheet - IV assess for details.  IVAD accessed with 20G  3/4\" araujo gripper plus needle  blood return positive: YES  Site without redness, tenderness or swelling: YES  flushed with 30cc NS and 5cc 100u/ml heparin  Needle: Left accessed for infusion    Comments: Labs drawn.  Patient tolerated procedure without incident.    Carolyne Jeffries  RN, BSN, OCN        "

## 2020-10-27 NOTE — PROGRESS NOTES
Infusion Nursing Note:  Talya Zuleta presents today for C2 of Taxol/Herceptin/Perjeta    Patient seen by provider today: No   present during visit today: Not Applicable.    Note: Pt states she is doing well, denies any problems with her last infusion, states she has not had any nausea and denies the need for premeds with her Taxol infusions. Will treat as ordered.    Intravenous Access:  Implanted Port.    Treatment Conditions:  Lab Results   Component Value Date    HGB 12.1 10/27/2020     Lab Results   Component Value Date    WBC 4.7 10/27/2020      Lab Results   Component Value Date    ANEU 2.9 10/27/2020     Lab Results   Component Value Date     10/27/2020      Lab Results   Component Value Date     10/27/2020                   Lab Results   Component Value Date    POTASSIUM 3.8 10/27/2020           No results found for: MAG         Lab Results   Component Value Date    CR 0.66 10/27/2020                   Lab Results   Component Value Date    JIMBO 8.8 10/27/2020                Lab Results   Component Value Date    BILITOTAL 0.2 10/27/2020           Lab Results   Component Value Date    ALBUMIN 3.3 10/27/2020                    Lab Results   Component Value Date    ALT 27 10/27/2020           Lab Results   Component Value Date    AST 20 10/27/2020       Results reviewed, labs MET treatment parameters, ok to proceed with treatment.  ECHO/MUGA completed 09/29/2020  EF 60-65 %.      Post Infusion Assessment:  Patient tolerated infusion without incident.  Blood return noted pre and post infusion.  Site patent and intact, free from redness, edema or discomfort.  No evidence of extravasations.  Access discontinued per protocol.       Discharge Plan:   Return 11/03/2020 for C2 D8.  Discharge instructions reviewed with: Patient.  Patient and/or family verbalized understanding of discharge instructions and all questions answered.  Patient discharged in stable condition accompanied by:  self.  Departure Mode: Ambulatory.    Fatou Otero, RN

## 2020-11-03 ENCOUNTER — INFUSION THERAPY VISIT (OUTPATIENT)
Dept: INFUSION THERAPY | Facility: CLINIC | Age: 75
End: 2020-11-03
Payer: COMMERCIAL

## 2020-11-03 ENCOUNTER — ALLIED HEALTH/NURSE VISIT (OUTPATIENT)
Dept: ONCOLOGY | Facility: CLINIC | Age: 75
End: 2020-11-03
Payer: COMMERCIAL

## 2020-11-03 VITALS
BODY MASS INDEX: 27.49 KG/M2 | WEIGHT: 169 LBS | SYSTOLIC BLOOD PRESSURE: 109 MMHG | TEMPERATURE: 98.4 F | OXYGEN SATURATION: 98 % | DIASTOLIC BLOOD PRESSURE: 65 MMHG | RESPIRATION RATE: 16 BRPM | HEART RATE: 72 BPM

## 2020-11-03 DIAGNOSIS — Z17.0 MALIGNANT NEOPLASM OF OVERLAPPING SITES OF RIGHT BREAST IN FEMALE, ESTROGEN RECEPTOR POSITIVE (H): Primary | ICD-10-CM

## 2020-11-03 DIAGNOSIS — C50.811 MALIGNANT NEOPLASM OF OVERLAPPING SITES OF RIGHT BREAST IN FEMALE, ESTROGEN RECEPTOR POSITIVE (H): Primary | ICD-10-CM

## 2020-11-03 LAB
BASOPHILS # BLD AUTO: 0 10E9/L (ref 0–0.2)
BASOPHILS NFR BLD AUTO: 0.9 %
DIFFERENTIAL METHOD BLD: ABNORMAL
EOSINOPHIL # BLD AUTO: 0 10E9/L (ref 0–0.7)
EOSINOPHIL NFR BLD AUTO: 0.5 %
ERYTHROCYTE [DISTWIDTH] IN BLOOD BY AUTOMATED COUNT: 15.3 % (ref 10–15)
HCT VFR BLD AUTO: 34 % (ref 35–47)
HGB BLD-MCNC: 11.3 G/DL (ref 11.7–15.7)
IMM GRANULOCYTES # BLD: 0 10E9/L (ref 0–0.4)
IMM GRANULOCYTES NFR BLD: 0.7 %
LYMPHOCYTES # BLD AUTO: 1.3 10E9/L (ref 0.8–5.3)
LYMPHOCYTES NFR BLD AUTO: 28.2 %
MCH RBC QN AUTO: 32.9 PG (ref 26.5–33)
MCHC RBC AUTO-ENTMCNC: 33.2 G/DL (ref 31.5–36.5)
MCV RBC AUTO: 99 FL (ref 78–100)
MONOCYTES # BLD AUTO: 0.3 10E9/L (ref 0–1.3)
MONOCYTES NFR BLD AUTO: 7.2 %
NEUTROPHILS # BLD AUTO: 2.8 10E9/L (ref 1.6–8.3)
NEUTROPHILS NFR BLD AUTO: 62.5 %
PLATELET # BLD AUTO: 246 10E9/L (ref 150–450)
RBC # BLD AUTO: 3.43 10E12/L (ref 3.8–5.2)
WBC # BLD AUTO: 4.4 10E9/L (ref 4–11)

## 2020-11-03 PROCEDURE — 96417 CHEMO IV INFUS EACH ADDL SEQ: CPT | Performed by: NURSE PRACTITIONER

## 2020-11-03 PROCEDURE — 99207 PR NO CHARGE NURSE ONLY: CPT

## 2020-11-03 PROCEDURE — 99207 PR NO CHARGE LOS: CPT

## 2020-11-03 PROCEDURE — 96413 CHEMO IV INFUSION 1 HR: CPT | Performed by: NURSE PRACTITIONER

## 2020-11-03 PROCEDURE — 85025 COMPLETE CBC W/AUTO DIFF WBC: CPT | Performed by: INTERNAL MEDICINE

## 2020-11-03 RX ORDER — HEPARIN SODIUM (PORCINE) LOCK FLUSH IV SOLN 100 UNIT/ML 100 UNIT/ML
5 SOLUTION INTRAVENOUS
Status: DISCONTINUED | OUTPATIENT
Start: 2020-11-03 | End: 2020-11-03 | Stop reason: HOSPADM

## 2020-11-03 RX ADMIN — HEPARIN SODIUM (PORCINE) LOCK FLUSH IV SOLN 100 UNIT/ML 5 ML: 100 SOLUTION at 11:23

## 2020-11-03 RX ADMIN — HEPARIN SODIUM (PORCINE) LOCK FLUSH IV SOLN 100 UNIT/ML 5 ML: 100 SOLUTION at 14:32

## 2020-11-03 RX ADMIN — Medication 250 ML: at 12:30

## 2020-11-03 ASSESSMENT — PAIN SCALES - GENERAL: PAINLEVEL: NO PAIN (0)

## 2020-11-03 NOTE — PROGRESS NOTES
Port needle left accessed for treatment. Tolerated port access and draw without complaint. Port site scrubbed with Chloraprep for 30 seconds and allowed to dry completely prior to dressing application. Accessed using sterile technique. Eads tubes drawn-Red gel/Green/Purple tubes. Double signed by patient and RN. See documentation flowsheet. Lila Mckinley, RN, BSN, OCN

## 2020-11-03 NOTE — PROGRESS NOTES
Infusion Nursing Note:  Talya Zuleta presents today for Kanjinti/Taxol C2/D8.    Patient seen by provider today: No   present during visit today: Not Applicable.    Note:   Denies any problems with her last infusion, states she has not had any nausea and denies the need for premeds with her Taxol infusions. Will treat as ordered.  Intravenous Access:  Implanted Port.    Treatment Conditions:  Lab Results   Component Value Date    HGB 11.3 11/03/2020     Lab Results   Component Value Date    WBC 4.4 11/03/2020      Lab Results   Component Value Date    ANEU 2.8 11/03/2020     Lab Results   Component Value Date     11/03/2020      Results reviewed, labs MET treatment parameters, ok to proceed with treatment.      Post Infusion Assessment:  Patient tolerated infusion without incident.  Blood return noted pre and post infusion.  Site patent and intact, free from redness, edema or discomfort.  No evidence of extravasations.  Access discontinued per protocol.       Discharge Plan:   AVS to patient via MYCHART.  Patient will return 11/10/2020 for next appointment.   Patient discharged in stable condition accompanied by: self.  Departure Mode: Ambulatory.    Michaela Anderson RN

## 2020-11-10 ENCOUNTER — ALLIED HEALTH/NURSE VISIT (OUTPATIENT)
Dept: ONCOLOGY | Facility: CLINIC | Age: 75
End: 2020-11-10
Payer: COMMERCIAL

## 2020-11-10 ENCOUNTER — VIRTUAL VISIT (OUTPATIENT)
Dept: ONCOLOGY | Facility: CLINIC | Age: 75
End: 2020-11-10
Payer: COMMERCIAL

## 2020-11-10 ENCOUNTER — INFUSION THERAPY VISIT (OUTPATIENT)
Dept: INFUSION THERAPY | Facility: CLINIC | Age: 75
End: 2020-11-10
Payer: COMMERCIAL

## 2020-11-10 DIAGNOSIS — R04.0 EPISTAXIS: ICD-10-CM

## 2020-11-10 DIAGNOSIS — Z17.0 MALIGNANT NEOPLASM OF OVERLAPPING SITES OF RIGHT BREAST IN FEMALE, ESTROGEN RECEPTOR POSITIVE (H): Primary | ICD-10-CM

## 2020-11-10 DIAGNOSIS — Z17.0 MALIGNANT NEOPLASM OF OVERLAPPING SITES OF RIGHT BREAST IN FEMALE, ESTROGEN RECEPTOR POSITIVE (H): ICD-10-CM

## 2020-11-10 DIAGNOSIS — R19.5 LOOSE STOOLS: ICD-10-CM

## 2020-11-10 DIAGNOSIS — C50.811 MALIGNANT NEOPLASM OF OVERLAPPING SITES OF RIGHT BREAST IN FEMALE, ESTROGEN RECEPTOR POSITIVE (H): Primary | ICD-10-CM

## 2020-11-10 DIAGNOSIS — G62.0 DRUG-INDUCED POLYNEUROPATHY (H): ICD-10-CM

## 2020-11-10 DIAGNOSIS — C50.811 MALIGNANT NEOPLASM OF OVERLAPPING SITES OF RIGHT BREAST IN FEMALE, ESTROGEN RECEPTOR POSITIVE (H): ICD-10-CM

## 2020-11-10 LAB
BASOPHILS # BLD AUTO: 0 10E9/L (ref 0–0.2)
BASOPHILS NFR BLD AUTO: 0.7 %
DIFFERENTIAL METHOD BLD: ABNORMAL
EOSINOPHIL # BLD AUTO: 0 10E9/L (ref 0–0.7)
EOSINOPHIL NFR BLD AUTO: 0.4 %
ERYTHROCYTE [DISTWIDTH] IN BLOOD BY AUTOMATED COUNT: 15.7 % (ref 10–15)
HCT VFR BLD AUTO: 33.7 % (ref 35–47)
HGB BLD-MCNC: 11.2 G/DL (ref 11.7–15.7)
IMM GRANULOCYTES # BLD: 0 10E9/L (ref 0–0.4)
IMM GRANULOCYTES NFR BLD: 0.6 %
LYMPHOCYTES # BLD AUTO: 1.2 10E9/L (ref 0.8–5.3)
LYMPHOCYTES NFR BLD AUTO: 21.3 %
MCH RBC QN AUTO: 33.3 PG (ref 26.5–33)
MCHC RBC AUTO-ENTMCNC: 33.2 G/DL (ref 31.5–36.5)
MCV RBC AUTO: 100 FL (ref 78–100)
MONOCYTES # BLD AUTO: 0.4 10E9/L (ref 0–1.3)
MONOCYTES NFR BLD AUTO: 7.6 %
NEUTROPHILS # BLD AUTO: 3.8 10E9/L (ref 1.6–8.3)
NEUTROPHILS NFR BLD AUTO: 69.4 %
PLATELET # BLD AUTO: 249 10E9/L (ref 150–450)
RBC # BLD AUTO: 3.36 10E12/L (ref 3.8–5.2)
WBC # BLD AUTO: 5.4 10E9/L (ref 4–11)

## 2020-11-10 PROCEDURE — 99207 PR NO CHARGE LOS: CPT

## 2020-11-10 PROCEDURE — 96417 CHEMO IV INFUS EACH ADDL SEQ: CPT | Performed by: NURSE PRACTITIONER

## 2020-11-10 PROCEDURE — 85025 COMPLETE CBC W/AUTO DIFF WBC: CPT | Performed by: INTERNAL MEDICINE

## 2020-11-10 PROCEDURE — 99214 OFFICE O/P EST MOD 30 MIN: CPT | Mod: 95 | Performed by: NURSE PRACTITIONER

## 2020-11-10 PROCEDURE — 96413 CHEMO IV INFUSION 1 HR: CPT | Performed by: NURSE PRACTITIONER

## 2020-11-10 RX ORDER — HEPARIN SODIUM (PORCINE) LOCK FLUSH IV SOLN 100 UNIT/ML 100 UNIT/ML
5 SOLUTION INTRAVENOUS
Status: DISCONTINUED | OUTPATIENT
Start: 2020-11-10 | End: 2020-11-18 | Stop reason: HOSPADM

## 2020-11-10 RX ORDER — HEPARIN SODIUM (PORCINE) LOCK FLUSH IV SOLN 100 UNIT/ML 100 UNIT/ML
5 SOLUTION INTRAVENOUS
Status: DISCONTINUED | OUTPATIENT
Start: 2020-11-10 | End: 2020-11-10 | Stop reason: HOSPADM

## 2020-11-10 RX ADMIN — HEPARIN SODIUM (PORCINE) LOCK FLUSH IV SOLN 100 UNIT/ML 5 ML: 100 SOLUTION at 16:54

## 2020-11-10 RX ADMIN — HEPARIN SODIUM (PORCINE) LOCK FLUSH IV SOLN 100 UNIT/ML 5 ML: 100 SOLUTION at 14:20

## 2020-11-10 RX ADMIN — Medication 250 ML: at 14:46

## 2020-11-10 NOTE — PROGRESS NOTES
Infusion Nursing Note:  Talya Zuleta presents today for C2,D15 of Taxol/Kanjinti    Patient seen by provider today: Yes: V.V with Jacquelyn Portillo NP   present during visit today: Not Applicable.    Note: Pt states she is doing well today, denies any issues with her last infusion.  Will treat as ordered.    Times verified per pharmacy -     Taxol given 1501 -1601  Kanjinti given 4494-1852    Intravenous Access:  Implanted Port.    Treatment Conditions:  Lab Results   Component Value Date    HGB 11.2 11/10/2020     Lab Results   Component Value Date    WBC 5.4 11/10/2020      Lab Results   Component Value Date    ANEU 3.8 11/10/2020     Lab Results   Component Value Date     11/10/2020      Results reviewed, labs MET treatment parameters, ok to proceed with treatment.      Post Infusion Assessment:  Patient tolerated infusion without incident.  Blood return noted pre and post infusion.  Site patent and intact, free from redness, edema or discomfort.  No evidence of extravasations.  Access discontinued per protocol.       Discharge Plan:   Return 11/17/2020 for C3 of Perjeta,Kanjinti,Taxol  Discharge instructions reviewed with: Patient.  Patient and/or family verbalized understanding of discharge instructions and all questions answered.  Patient discharged in stable condition accompanied by: self.  Departure Mode: Ambulatory.    Fatou Otero RN

## 2020-11-10 NOTE — LETTER
11/10/2020         RE: Talya Zuleta  [address] [id number]        Dear Colleague,    Thank you for referring your patient, Talya Zuleta, to the [facility]. Please see a copy of my visit note below.    Oncology Follow Up Visit: November 10, 2020    Oncologist: Dr Perlita Alvarez  PCP: Cole Weston    Diagnosis: Right Breast Cancer  Talya Zuleta is a 76 yo female with Diabetes, HTN and COPD who was found to have bilateral breast asymmetry on screening mammogram in 7/2020. Further diagnostic exam found 2.7 cm mass at 12:00, 5 cm from nipple with ductal extension +6 mm mass at 3 cm from nipple. Contrast enhanced mammogram showed the right breast mass, including extension, to measure 4.9 cm.  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  Estrogen receptor was moderate in 50% and progesterone receptor staining was negative.  HER-2 was negative by IHC; HER2 FISH showed approximately 10% of tumor cells to have 6.8 HER2 signals/nucleus and a HER2/CEN17 ratio of 1.6.  The HER2 positive cells were noted to be the large pleomorphic cells.  Multiple infiltrating neutrophils were noted through tumor stroma.  Treatment:   10/7/2020 began THP- 9/29/2020 echo and port placed    Interval History: Ms. Zuleta comes to clinic for review prior to continuation of breast cancer treatment-  Due for cycle 2 day 15 of THP today.  Patient is reporting that she is having side effects from medications including recurrent nosebleeds intermittent diarrhea and neuropathy to the fingers.  She also reports the first night of treatment she has difficulty with sleep and is using the Ativan but otherwise is sleeping well.  She feels that the mass is significantly smaller to the right breast.  She feels she is eating well with no nausea shortness of breath fevers or illnesses weakness or depression.  Continues to  smoke but is trying to slowly decrease the number of cigarettes and has not yet set a quit date.  Mentions she does have lower leg neuropathy but that is related to her back issues.  Has tried Vaseline in the nose to help with the balance bleeds and it has turned on a humidifier.  For the diarrhea she has used Imodium but not on a regular basis.  She continues to take fluids well and feels she is not dehydrated.  Admits she has not received flu vaccination due to previous reaction in 2008.   Rest of comprehensive and complete ROS is reviewed and is negative.   Past Medical History:   Diagnosis Date     Breast cancer (H)      Sleep apnea      Current Outpatient Medications   Medication     amLODIPine (NORVASC) 5 MG tablet     atorvastatin (LIPITOR) 40 MG tablet     benazepril (LOTENSIN) 20 MG tablet     furosemide (LASIX) 80 MG tablet     HYDROcodone-acetaminophen (NORCO) 5-325 MG tablet     LORazepam (ATIVAN) 0.5 MG tablet     ondansetron (ZOFRAN) 8 MG tablet     ondansetron (ZOFRAN-ODT) 8 MG ODT tab     potassium chloride ER (KLOR-CON M) 20 MEQ CR tablet     prochlorperazine (COMPAZINE) 10 MG tablet     triamcinolone (KENALOG) 0.1 % external cream     No current facility-administered medications for this visit.    - Long tobacco use history- current use of 1/2 ppd and daily alcohol use of 1 drink daily.   Allergies   Allergen Reactions     Bacitracin-Neomycin-Polymyxin      PN: LW Reaction: Unknown Reaction     Latex        Physical Exam:There were no vitals taken for this visit.   GENERAL: Healthy, alert and no distress  EYES: Eyes grossly normal to inspection.  No discharge or erythema, or obvious scleral/conjunctival abnormalities.  RESP: No audible wheeze, cough, or visible cyanosis.  No visible retractions or increased work of breathing.    SKIN: Visible skin clear. No significant rash, abnormal pigmentation or lesions.  NEURO: Cranial nerves grossly intact.  Mentation and speech appropriate for age.  PSYCH:  Mentation appears normal, affect normal/bright, judgement and insight intact, normal speech and appearance well-groomed.with easy smile and good conversation.  The rest of a comprehensive physical examination is deferred due to PHE (public health emergency) video visit restrictions     Laboratory Results: To be completed prior to infusion.     Assessment and Plan:   Right Breast Cancer- Pt is due to continue with THP treatment- cycle 2 day 15 today.  She is having some side effects from treatment with intermittent loose stools, recurrent nose bleeds and starting peripheral neuropathy. These symptoms are reviewed with pt and at this time she is meeting goals for treatment with review so will be in later today for treatment with labs to be completed prior to infusion.   Patient is noting decreased in mass size at this time.  Plan is to completed 4 cycles of THP prior to further imaging for planning for surgery.   Loose stools-related to the Perjeta -patient is having very random and intermittent loose stools but is aware of use of Imodium as needed.  Also reminded to increase fluids to cover loss with the loose stools.  Epistaxis-intermittent but happening at least weekly if not daily for several days in a row.  Patient has been using Vaseline but asked to switch over to K-Y jelly and use to the nares couple times a day as well as the use of the humidifier that she has started should be improving if not we can send her to ENT for review for cauterization  Peripheral neuropathy-probably related to the Taxol -the lower extremity neuropathy previous even noted by patient due to changes to her spine she is now seeing some peripheral neuropathy to her fingers with some fumbling due to the changes.  But also reports she has some arthritis that is worse on better days than others and feels it is tolerable at this point.  Suggested B6 B12 can be started at this point to help with nerve health.  But no changes to the plan will  be made at this time.  Based upon CDC guidance and to observe social distancing in the setting of the COVID-19 pandemic, the patient was seen virtually via video visit.   Location of pt-Home  Location of provider-office in clinic  Start of visit -10:52 AM  End of visit-11:11 AM  Total Time 19 min visit  Jacquelyn Miller CNp          Again, thank you for allowing me to participate in the care of your patient.        Sincerely,        Jacquelyn Miller NP, APRN CNP

## 2020-11-10 NOTE — PROGRESS NOTES
Oncology Follow Up Visit: November 10, 2020    Oncologist: Dr Perlita Alvarez  PCP: Cole Weston    Diagnosis: Right Breast Cancer  Talya Zuleta is a 74 yo female with Diabetes, HTN and COPD who was found to have bilateral breast asymmetry on screening mammogram in 7/2020. Further diagnostic exam found 2.7 cm mass at 12:00, 5 cm from nipple with ductal extension +6 mm mass at 3 cm from nipple. Contrast enhanced mammogram showed the right breast mass, including extension, to measure 4.9 cm.  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  Estrogen receptor was moderate in 50% and progesterone receptor staining was negative.  HER-2 was negative by IHC; HER2 FISH showed approximately 10% of tumor cells to have 6.8 HER2 signals/nucleus and a HER2/CEN17 ratio of 1.6.  The HER2 positive cells were noted to be the large pleomorphic cells.  Multiple infiltrating neutrophils were noted through tumor stroma.  Treatment:   10/7/2020 began THP- 9/29/2020 echo and port placed    Interval History: Ms. Zuleta comes to clinic for review prior to continuation of breast cancer treatment-  Due for cycle 2 day 15 of THP today.  Patient is reporting that she is having side effects from medications including recurrent nosebleeds intermittent diarrhea and neuropathy to the fingers.  She also reports the first night of treatment she has difficulty with sleep and is using the Ativan but otherwise is sleeping well.  She feels that the mass is significantly smaller to the right breast.  She feels she is eating well with no nausea shortness of breath fevers or illnesses weakness or depression.  Continues to smoke but is trying to slowly decrease the number of cigarettes and has not yet set a quit date.  Mentions she does have lower leg neuropathy but that is related to her back issues.  Has tried Vaseline in the nose to help with the balance bleeds and it has turned on a  humidifier.  For the diarrhea she has used Imodium but not on a regular basis.  She continues to take fluids well and feels she is not dehydrated.  Admits she has not received flu vaccination due to previous reaction in 2008.   Rest of comprehensive and complete ROS is reviewed and is negative.   Past Medical History:   Diagnosis Date     Breast cancer (H)      Sleep apnea      Current Outpatient Medications   Medication     amLODIPine (NORVASC) 5 MG tablet     atorvastatin (LIPITOR) 40 MG tablet     benazepril (LOTENSIN) 20 MG tablet     furosemide (LASIX) 80 MG tablet     HYDROcodone-acetaminophen (NORCO) 5-325 MG tablet     LORazepam (ATIVAN) 0.5 MG tablet     ondansetron (ZOFRAN) 8 MG tablet     ondansetron (ZOFRAN-ODT) 8 MG ODT tab     potassium chloride ER (KLOR-CON M) 20 MEQ CR tablet     prochlorperazine (COMPAZINE) 10 MG tablet     triamcinolone (KENALOG) 0.1 % external cream     No current facility-administered medications for this visit.    - Long tobacco use history- current use of 1/2 ppd and daily alcohol use of 1 drink daily.   Allergies   Allergen Reactions     Bacitracin-Neomycin-Polymyxin      PN: LW Reaction: Unknown Reaction     Latex        Physical Exam:There were no vitals taken for this visit.   GENERAL: Healthy, alert and no distress  EYES: Eyes grossly normal to inspection.  No discharge or erythema, or obvious scleral/conjunctival abnormalities.  RESP: No audible wheeze, cough, or visible cyanosis.  No visible retractions or increased work of breathing.    SKIN: Visible skin clear. No significant rash, abnormal pigmentation or lesions.  NEURO: Cranial nerves grossly intact.  Mentation and speech appropriate for age.  PSYCH: Mentation appears normal, affect normal/bright, judgement and insight intact, normal speech and appearance well-groomed.with easy smile and good conversation.  The rest of a comprehensive physical examination is deferred due to PHE (public health emergency) video visit  restrictions     Laboratory Results: To be completed prior to infusion.     Assessment and Plan:   Right Breast Cancer- Pt is due to continue with THP treatment- cycle 2 day 15 today.  She is having some side effects from treatment with intermittent loose stools, recurrent nose bleeds and starting peripheral neuropathy. These symptoms are reviewed with pt and at this time she is meeting goals for treatment with review so will be in later today for treatment with labs to be completed prior to infusion.   Patient is noting decreased in mass size at this time.  Plan is to completed 4 cycles of THP prior to further imaging for planning for surgery.   Loose stools-related to the Perjeta -patient is having very random and intermittent loose stools but is aware of use of Imodium as needed.  Also reminded to increase fluids to cover loss with the loose stools.  Epistaxis-intermittent but happening at least weekly if not daily for several days in a row.  Patient has been using Vaseline but asked to switch over to K-Y jelly and use to the nares couple times a day as well as the use of the humidifier that she has started should be improving if not we can send her to ENT for review for cauterization  Peripheral neuropathy-probably related to the Taxol -the lower extremity neuropathy previous even noted by patient due to changes to her spine she is now seeing some peripheral neuropathy to her fingers with some fumbling due to the changes.  But also reports she has some arthritis that is worse on better days than others and feels it is tolerable at this point.  Suggested B6 B12 can be started at this point to help with nerve health.  But no changes to the plan will be made at this time.  Based upon CDC guidance and to observe social distancing in the setting of the COVID-19 pandemic, the patient was seen virtually via video visit.   Location of pt-Home  Location of provider-office in clinic  Start of visit -10:52 AM  End of  visit-11:11 AM  Total Time 19 min visit  Jacquelyn Miller,CNp

## 2020-11-10 NOTE — NURSING NOTE
"Talya Zuleta is a 75 year old female who is being evaluated via a billable video visit.      The patient has been notified of following:     \"This video visit will be conducted via a call between you and your physician/provider. We have found that certain health care needs can be provided without the need for an in-person physical exam.  This service lets us provide the care you need with a video conversation.  If a prescription is necessary we can send it directly to your pharmacy.  If lab work is needed we can place an order for that and you can then stop by our lab to have the test done at a later time.    Video visits are billed at different rates depending on your insurance coverage.  Please reach out to your insurance provider with any questions.    If during the course of the call the physician/provider feels a video visit is not appropriate, you will not be charged for this service.\"    Patient has given verbal consent for Video visit? Yes  How would you like to obtain your AVS? MyChart  If you are dropped from the video visit, the video invite should be resent to: Text to cell phone: [phone]  Will anyone else be joining your video visit? No      Video-Visit Details    Type of service:  Video Visit    Originating Location (pt. Location): Home    Distant Location (provider location):  [facility]     Platform used for Video Visit: Kelly Galicia CMA        "

## 2020-11-12 ENCOUNTER — TELEPHONE (OUTPATIENT)
Dept: ONCOLOGY | Facility: CLINIC | Age: 75
End: 2020-11-12

## 2020-11-12 NOTE — TELEPHONE ENCOUNTER
Tobacco Treatment Program at the [facility] attempted to reach Ms. Zuleta on 11/12/2020 regarding the tobacco cessation program to help Ms. Zuleta to quit smoking. We will attempt to reach Ms. Zuleta another time.     [facility]a Bagley [facility]  Tobacco Treatment Specialist  PH: [phone]    Pt is undecided. Would like to quit and has plans to quit but she has a lot going on right now, requests call back in the future to assess readiness.

## 2020-11-13 DIAGNOSIS — C50.811 MALIGNANT NEOPLASM OF OVERLAPPING SITES OF RIGHT BREAST IN FEMALE, ESTROGEN RECEPTOR POSITIVE (H): Primary | ICD-10-CM

## 2020-11-13 DIAGNOSIS — Z17.0 MALIGNANT NEOPLASM OF OVERLAPPING SITES OF RIGHT BREAST IN FEMALE, ESTROGEN RECEPTOR POSITIVE (H): Primary | ICD-10-CM

## 2020-11-13 RX ORDER — HEPARIN SODIUM,PORCINE 10 UNIT/ML
5 VIAL (ML) INTRAVENOUS
Status: CANCELLED | OUTPATIENT
Start: 2020-12-01

## 2020-11-13 RX ORDER — SODIUM CHLORIDE 9 MG/ML
1000 INJECTION, SOLUTION INTRAVENOUS CONTINUOUS PRN
Status: CANCELLED
Start: 2020-11-17

## 2020-11-13 RX ORDER — DIPHENHYDRAMINE HYDROCHLORIDE 50 MG/ML
50 INJECTION INTRAMUSCULAR; INTRAVENOUS
Status: CANCELLED
Start: 2020-11-17

## 2020-11-13 RX ORDER — HEPARIN SODIUM (PORCINE) LOCK FLUSH IV SOLN 100 UNIT/ML 100 UNIT/ML
5 SOLUTION INTRAVENOUS
Status: CANCELLED | OUTPATIENT
Start: 2020-12-01

## 2020-11-13 RX ORDER — LORAZEPAM 2 MG/ML
0.5 INJECTION INTRAMUSCULAR EVERY 4 HOURS PRN
Status: CANCELLED
Start: 2020-12-01

## 2020-11-13 RX ORDER — HEPARIN SODIUM,PORCINE 10 UNIT/ML
5 VIAL (ML) INTRAVENOUS
Status: CANCELLED | OUTPATIENT
Start: 2020-11-17

## 2020-11-13 RX ORDER — MEPERIDINE HYDROCHLORIDE 25 MG/ML
25 INJECTION INTRAMUSCULAR; INTRAVENOUS; SUBCUTANEOUS EVERY 30 MIN PRN
Status: CANCELLED | OUTPATIENT
Start: 2020-12-01

## 2020-11-13 RX ORDER — HEPARIN SODIUM,PORCINE 10 UNIT/ML
5 VIAL (ML) INTRAVENOUS
Status: CANCELLED | OUTPATIENT
Start: 2020-11-24

## 2020-11-13 RX ORDER — MEPERIDINE HYDROCHLORIDE 25 MG/ML
25 INJECTION INTRAMUSCULAR; INTRAVENOUS; SUBCUTANEOUS EVERY 30 MIN PRN
Status: CANCELLED | OUTPATIENT
Start: 2020-11-17

## 2020-11-13 RX ORDER — METHYLPREDNISOLONE SODIUM SUCCINATE 125 MG/2ML
125 INJECTION, POWDER, LYOPHILIZED, FOR SOLUTION INTRAMUSCULAR; INTRAVENOUS
Status: CANCELLED
Start: 2020-11-24

## 2020-11-13 RX ORDER — EPINEPHRINE 1 MG/ML
0.3 INJECTION, SOLUTION INTRAMUSCULAR; SUBCUTANEOUS EVERY 5 MIN PRN
Status: CANCELLED | OUTPATIENT
Start: 2020-11-24

## 2020-11-13 RX ORDER — ALBUTEROL SULFATE 0.83 MG/ML
2.5 SOLUTION RESPIRATORY (INHALATION)
Status: CANCELLED | OUTPATIENT
Start: 2020-11-24

## 2020-11-13 RX ORDER — LORAZEPAM 2 MG/ML
0.5 INJECTION INTRAMUSCULAR EVERY 4 HOURS PRN
Status: CANCELLED
Start: 2020-11-24

## 2020-11-13 RX ORDER — NALOXONE HYDROCHLORIDE 0.4 MG/ML
.1-.4 INJECTION, SOLUTION INTRAMUSCULAR; INTRAVENOUS; SUBCUTANEOUS
Status: CANCELLED | OUTPATIENT
Start: 2020-12-01

## 2020-11-13 RX ORDER — DIPHENHYDRAMINE HYDROCHLORIDE 50 MG/ML
50 INJECTION INTRAMUSCULAR; INTRAVENOUS
Status: CANCELLED
Start: 2020-11-24

## 2020-11-13 RX ORDER — LORAZEPAM 2 MG/ML
0.5 INJECTION INTRAMUSCULAR EVERY 4 HOURS PRN
Status: CANCELLED
Start: 2020-11-17

## 2020-11-13 RX ORDER — ALBUTEROL SULFATE 90 UG/1
1-2 AEROSOL, METERED RESPIRATORY (INHALATION)
Status: CANCELLED
Start: 2020-11-17

## 2020-11-13 RX ORDER — NALOXONE HYDROCHLORIDE 0.4 MG/ML
.1-.4 INJECTION, SOLUTION INTRAMUSCULAR; INTRAVENOUS; SUBCUTANEOUS
Status: CANCELLED | OUTPATIENT
Start: 2020-11-17

## 2020-11-13 RX ORDER — HEPARIN SODIUM (PORCINE) LOCK FLUSH IV SOLN 100 UNIT/ML 100 UNIT/ML
5 SOLUTION INTRAVENOUS
Status: CANCELLED | OUTPATIENT
Start: 2020-11-24

## 2020-11-13 RX ORDER — MEPERIDINE HYDROCHLORIDE 25 MG/ML
25 INJECTION INTRAMUSCULAR; INTRAVENOUS; SUBCUTANEOUS EVERY 30 MIN PRN
Status: CANCELLED | OUTPATIENT
Start: 2020-11-24

## 2020-11-13 RX ORDER — SODIUM CHLORIDE 9 MG/ML
1000 INJECTION, SOLUTION INTRAVENOUS CONTINUOUS PRN
Status: CANCELLED
Start: 2020-12-01

## 2020-11-13 RX ORDER — METHYLPREDNISOLONE SODIUM SUCCINATE 125 MG/2ML
125 INJECTION, POWDER, LYOPHILIZED, FOR SOLUTION INTRAMUSCULAR; INTRAVENOUS
Status: CANCELLED
Start: 2020-12-01

## 2020-11-13 RX ORDER — SODIUM CHLORIDE 9 MG/ML
1000 INJECTION, SOLUTION INTRAVENOUS CONTINUOUS PRN
Status: CANCELLED
Start: 2020-11-24

## 2020-11-13 RX ORDER — ALBUTEROL SULFATE 0.83 MG/ML
2.5 SOLUTION RESPIRATORY (INHALATION)
Status: CANCELLED | OUTPATIENT
Start: 2020-11-17

## 2020-11-13 RX ORDER — ALBUTEROL SULFATE 90 UG/1
1-2 AEROSOL, METERED RESPIRATORY (INHALATION)
Status: CANCELLED
Start: 2020-11-24

## 2020-11-13 RX ORDER — HEPARIN SODIUM (PORCINE) LOCK FLUSH IV SOLN 100 UNIT/ML 100 UNIT/ML
5 SOLUTION INTRAVENOUS
Status: CANCELLED | OUTPATIENT
Start: 2020-11-17

## 2020-11-13 RX ORDER — EPINEPHRINE 1 MG/ML
0.3 INJECTION, SOLUTION INTRAMUSCULAR; SUBCUTANEOUS EVERY 5 MIN PRN
Status: CANCELLED | OUTPATIENT
Start: 2020-11-17

## 2020-11-13 RX ORDER — DIPHENHYDRAMINE HCL 25 MG
50 CAPSULE ORAL
Status: CANCELLED
Start: 2020-11-17

## 2020-11-13 RX ORDER — ALBUTEROL SULFATE 90 UG/1
1-2 AEROSOL, METERED RESPIRATORY (INHALATION)
Status: CANCELLED
Start: 2020-12-01

## 2020-11-13 RX ORDER — NALOXONE HYDROCHLORIDE 0.4 MG/ML
.1-.4 INJECTION, SOLUTION INTRAMUSCULAR; INTRAVENOUS; SUBCUTANEOUS
Status: CANCELLED | OUTPATIENT
Start: 2020-11-24

## 2020-11-13 RX ORDER — ACETAMINOPHEN 325 MG/1
650 TABLET ORAL
Status: CANCELLED | OUTPATIENT
Start: 2020-11-17

## 2020-11-13 RX ORDER — DIPHENHYDRAMINE HYDROCHLORIDE 50 MG/ML
50 INJECTION INTRAMUSCULAR; INTRAVENOUS
Status: CANCELLED
Start: 2020-12-01

## 2020-11-13 RX ORDER — EPINEPHRINE 1 MG/ML
0.3 INJECTION, SOLUTION INTRAMUSCULAR; SUBCUTANEOUS EVERY 5 MIN PRN
Status: CANCELLED | OUTPATIENT
Start: 2020-12-01

## 2020-11-13 RX ORDER — ALBUTEROL SULFATE 0.83 MG/ML
2.5 SOLUTION RESPIRATORY (INHALATION)
Status: CANCELLED | OUTPATIENT
Start: 2020-12-01

## 2020-11-13 RX ORDER — METHYLPREDNISOLONE SODIUM SUCCINATE 125 MG/2ML
125 INJECTION, POWDER, LYOPHILIZED, FOR SOLUTION INTRAMUSCULAR; INTRAVENOUS
Status: CANCELLED
Start: 2020-11-17

## 2020-11-17 ENCOUNTER — INFUSION THERAPY VISIT (OUTPATIENT)
Dept: INFUSION THERAPY | Facility: CLINIC | Age: 75
End: 2020-11-17
Payer: COMMERCIAL

## 2020-11-17 ENCOUNTER — ALLIED HEALTH/NURSE VISIT (OUTPATIENT)
Dept: ONCOLOGY | Facility: CLINIC | Age: 75
End: 2020-11-17
Payer: COMMERCIAL

## 2020-11-17 VITALS
BODY MASS INDEX: 27.19 KG/M2 | SYSTOLIC BLOOD PRESSURE: 136 MMHG | WEIGHT: 167.2 LBS | TEMPERATURE: 98.1 F | DIASTOLIC BLOOD PRESSURE: 76 MMHG | HEART RATE: 71 BPM | RESPIRATION RATE: 16 BRPM | OXYGEN SATURATION: 98 %

## 2020-11-17 DIAGNOSIS — C50.811 MALIGNANT NEOPLASM OF OVERLAPPING SITES OF RIGHT BREAST IN FEMALE, ESTROGEN RECEPTOR POSITIVE (H): Primary | ICD-10-CM

## 2020-11-17 DIAGNOSIS — Z17.0 MALIGNANT NEOPLASM OF OVERLAPPING SITES OF RIGHT BREAST IN FEMALE, ESTROGEN RECEPTOR POSITIVE (H): Primary | ICD-10-CM

## 2020-11-17 LAB
ALBUMIN SERPL-MCNC: 3.5 G/DL (ref 3.4–5)
ALP SERPL-CCNC: 68 U/L (ref 40–150)
ALT SERPL W P-5'-P-CCNC: 19 U/L (ref 0–50)
ANION GAP SERPL CALCULATED.3IONS-SCNC: 3 MMOL/L (ref 3–14)
AST SERPL W P-5'-P-CCNC: 17 U/L (ref 0–45)
BASOPHILS # BLD AUTO: 0 10E9/L (ref 0–0.2)
BASOPHILS NFR BLD AUTO: 0.8 %
BILIRUB SERPL-MCNC: 0.2 MG/DL (ref 0.2–1.3)
BUN SERPL-MCNC: 10 MG/DL (ref 7–30)
CALCIUM SERPL-MCNC: 9.3 MG/DL (ref 8.5–10.1)
CHLORIDE SERPL-SCNC: 110 MMOL/L (ref 94–109)
CO2 SERPL-SCNC: 29 MMOL/L (ref 20–32)
CREAT SERPL-MCNC: 0.77 MG/DL (ref 0.52–1.04)
DIFFERENTIAL METHOD BLD: ABNORMAL
EOSINOPHIL # BLD AUTO: 0.1 10E9/L (ref 0–0.7)
EOSINOPHIL NFR BLD AUTO: 1 %
ERYTHROCYTE [DISTWIDTH] IN BLOOD BY AUTOMATED COUNT: 16.2 % (ref 10–15)
GFR SERPL CREATININE-BSD FRML MDRD: 75 ML/MIN/{1.73_M2}
GLUCOSE SERPL-MCNC: 117 MG/DL (ref 70–99)
HCT VFR BLD AUTO: 35.5 % (ref 35–47)
HGB BLD-MCNC: 11.9 G/DL (ref 11.7–15.7)
IMM GRANULOCYTES # BLD: 0 10E9/L (ref 0–0.4)
IMM GRANULOCYTES NFR BLD: 0.6 %
LYMPHOCYTES # BLD AUTO: 1.4 10E9/L (ref 0.8–5.3)
LYMPHOCYTES NFR BLD AUTO: 27.3 %
MCH RBC QN AUTO: 33.5 PG (ref 26.5–33)
MCHC RBC AUTO-ENTMCNC: 33.5 G/DL (ref 31.5–36.5)
MCV RBC AUTO: 100 FL (ref 78–100)
MONOCYTES # BLD AUTO: 0.4 10E9/L (ref 0–1.3)
MONOCYTES NFR BLD AUTO: 7 %
NEUTROPHILS # BLD AUTO: 3.2 10E9/L (ref 1.6–8.3)
NEUTROPHILS NFR BLD AUTO: 63.3 %
PLATELET # BLD AUTO: 272 10E9/L (ref 150–450)
POTASSIUM SERPL-SCNC: 3.8 MMOL/L (ref 3.4–5.3)
PROT SERPL-MCNC: 7 G/DL (ref 6.8–8.8)
RBC # BLD AUTO: 3.55 10E12/L (ref 3.8–5.2)
SODIUM SERPL-SCNC: 142 MMOL/L (ref 133–144)
WBC # BLD AUTO: 5 10E9/L (ref 4–11)

## 2020-11-17 PROCEDURE — 99207 PR NO CHARGE NURSE ONLY: CPT

## 2020-11-17 PROCEDURE — 80053 COMPREHEN METABOLIC PANEL: CPT | Performed by: INTERNAL MEDICINE

## 2020-11-17 PROCEDURE — 99207 PR NO CHARGE LOS: CPT

## 2020-11-17 PROCEDURE — 96417 CHEMO IV INFUS EACH ADDL SEQ: CPT | Performed by: NURSE PRACTITIONER

## 2020-11-17 PROCEDURE — 96413 CHEMO IV INFUSION 1 HR: CPT | Performed by: NURSE PRACTITIONER

## 2020-11-17 PROCEDURE — 85025 COMPLETE CBC W/AUTO DIFF WBC: CPT | Performed by: INTERNAL MEDICINE

## 2020-11-17 RX ORDER — HEPARIN SODIUM (PORCINE) LOCK FLUSH IV SOLN 100 UNIT/ML 100 UNIT/ML
5 SOLUTION INTRAVENOUS
Status: DISCONTINUED | OUTPATIENT
Start: 2020-11-17 | End: 2020-11-17 | Stop reason: HOSPADM

## 2020-11-17 RX ADMIN — Medication 250 ML: at 14:51

## 2020-11-17 RX ADMIN — HEPARIN SODIUM (PORCINE) LOCK FLUSH IV SOLN 100 UNIT/ML 5 ML: 100 SOLUTION at 17:28

## 2020-11-17 RX ADMIN — HEPARIN SODIUM (PORCINE) LOCK FLUSH IV SOLN 100 UNIT/ML 5 ML: 100 SOLUTION at 13:12

## 2020-11-17 ASSESSMENT — PAIN SCALES - GENERAL: PAINLEVEL: NO PAIN (0)

## 2020-11-17 NOTE — PROGRESS NOTES
Infusion Nursing Note:  Talya Zuleta presents today for C3D1 Perjeta/Kanjinti/Taxol.    Patient seen by provider today: No   present during visit today: Not Applicable.    Note: N/A.    Intravenous Access:  Implanted Port.    Treatment Conditions:  Lab Results   Component Value Date    HGB 11.9 11/17/2020     Lab Results   Component Value Date    WBC 5.0 11/17/2020      Lab Results   Component Value Date    ANEU 3.2 11/17/2020     Lab Results   Component Value Date     11/17/2020      Lab Results   Component Value Date     11/17/2020                   Lab Results   Component Value Date    POTASSIUM 3.8 11/17/2020           No results found for: MAG         Lab Results   Component Value Date    CR 0.77 11/17/2020                   Lab Results   Component Value Date    JIMBO 9.3 11/17/2020                Lab Results   Component Value Date    BILITOTAL 0.2 11/17/2020           Lab Results   Component Value Date    ALBUMIN 3.5 11/17/2020                    Lab Results   Component Value Date    ALT 19 11/17/2020           Lab Results   Component Value Date    AST 17 11/17/2020       Results reviewed, labs MET treatment parameters, ok to proceed with treatment.    Post Infusion Assessment:  Patient tolerated infusion without incident.  Blood return noted pre and post infusion.  Site patent and intact, free from redness, edema or discomfort.  No evidence of extravasations.  Access discontinued per protocol.       Discharge Plan:   Patient will return 11/24/2020 for next appointment.   Patient discharged in stable condition accompanied by: self.  Departure Mode: Ambulatory.    Magaly Osorio RN-BSN, PHN, OCN  [facility]

## 2020-11-17 NOTE — PROGRESS NOTES
Port needle left accessed for treatment. Tolerated port access and draw without complaint. Port site scrubbed with Chloraprep for 30 seconds and allowed to dry completely prior to dressing application. Accessed using sterile technique. Ney tubes drawn-Red gel/Green/Purple tubes. Double signed by patient and RN. See documentation flowsheet. Lila Mckinley, RN, BSN, OCN

## 2020-11-22 NOTE — PROGRESS NOTES
"Talya Zuleta is a 75 year old female who is being evaluated via a billable video visit.      The patient has been notified of following:     \"This video visit will be conducted via a call between you and your physician/provider. We have found that certain health care needs can be provided without the need for an in-person physical exam.  This service lets us provide the care you need with a video conversation.  If a prescription is necessary we can send it directly to your pharmacy.  If lab work is needed we can place an order for that and you can then stop by our lab to have the test done at a later time.    Video visits are billed at different rates depending on your insurance coverage.  Please reach out to your insurance provider with any questions.    If during the course of the call the physician/provider feels a video visit is not appropriate, you will not be charged for this service.\"    Patient has given verbal consent for Video visit? Yes  How would you like to obtain your AVS? MyChart  If you are dropped from the video visit, the video invite should be resent to: Send to e-mail at: [email or web address]  Will anyone else be joining your video visit? No    Video-Visit Details    Type of service:  Video Visit    Total time of video visit:  21 minutes    Originating Location (pt. Location): Home    Distant Location (provider location):  [facility]     Platform used for Video Visit: Kelly Alvarez MD      I have reviewed and updated the patient's allergies and pt confirmed any changes to their medication list.  Patient was asked to provide any patient recorded vital signs, height and/or weight.  Please see \"Patient Reported Vital Signs\" tab for that information.  Patient instructed to be in the virtual waiting room 10-15 minutes prior to appointment time.    There were no vitals taken for this visit.      Concerns: Pt concerned about with dry nose, onset 3 " weeks.  Nothing has worked to give relief.      Refills: No refills        Tiffany Garcia CMA          Oncology Follow Up:  Date on this visit: 11/23/2020    Diagnosis:  right breast cancer, clinical stage T2N0M0.    Primary Physician: Cole Weston     History Of Present Illness:  Ms. Zuleta is a 75 year old female with right breast cancer.  Routine screening mammogram in 07/2020 showed bilateral breast asymmetries.  Diagnostic mammograms and ultrasound showed a 2.7 cm mass in the right breast at 12:00, 5 cm from the nipple with ductal extension including a 6 mm mass at 3 cm from the nipple.  In the left breast were benign appearing cysts.  Ultrasound of the right axilla was without lymphadenopathy.  Contrast enhanced mammogram showed the right breast mass, including extension, to measure 4.9 cm.  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  Estrogen receptor was moderate in 50% and progesterone receptor staining was negative.  HER-2 was negative by IHC; HER2 FISH showed approximately 10% of tumor cells to have 6.8 HER2 signals/nucleus and a HER2/CEN17 ratio of 1.6.  The HER2 positive cells were noted to be the large pleomorphic cells.  Multiple neutrophils were noted to be infiltrating through tumor stroma.    Interval History:  Ms. Zuleta was seen in clinic today for evaluation prior to week #8 of paclitaxel and Herceptin infusion.  She is receiving neoadjuvant THP chemotherapy.  Since last visit, she has had a significant increase in diarrhea.  She is having approximately 7 diarrheal stools per day.  She classifies them as watery to loose.  She has been taking 4-5 Imodium per day without relief.  She has not yet started taking Lomotil.  She has had no fevers or chills.  She denies bright red blood per rectum.      She also reports an increase in neuropathy.  Neuropathy is not yet interfering with fine motor skills beyond her baseline.  She has  chronic back issues and associated neuropathy, however, believes the neuropathy in her hands is worse from previous.  She has significantly dry skin for which she is using a Eucerin cream.  She also reports nasal sores which are quite bothersome.  She is using a saline nasal spray, sleeping with a humidifier, and using Ayr nasal gel.  She has not had any relief with any of these interventions.  She denies nausea or vomiting.  She is eating 2 good meals per day and is able to maintain her weight.  She has no headaches visual changes or focal neurologic complaints.  She occasionally gets lightheaded with standing.  The remainder of a complete 12 point review of systems was reviewed with the patient was negative with exception of that mentioned above.    Past Medical/Surgical History:  1.  Breast cancer as per HPI  2.  Hypertension  3.  Diabetes  4.  COPD  5.  Hypothyroidism  6.  Hypohidrosis    Allergies:  Allergies as of 11/23/2020 - Reviewed 11/17/2020   Allergen Reaction Noted     Bacitracin-neomycin-polymyxin  04/18/2003     Latex  09/30/2005     Current Medications:  Current Outpatient Medications   Medication Sig Dispense Refill     amLODIPine (NORVASC) 5 MG tablet Take 5 mg by mouth       atorvastatin (LIPITOR) 40 MG tablet TAKE 1/2 TABLET DAILY       benazepril (LOTENSIN) 20 MG tablet TAKE 1 TABLET BY MOUTH TWO TIMES A DAY.       furosemide (LASIX) 80 MG tablet Take 80 mg by mouth 2 times daily       HYDROcodone-acetaminophen (NORCO) 5-325 MG tablet Take 1 tablet by mouth every 4 hours as needed for moderate to severe pain 5 tablet 0     LORazepam (ATIVAN) 0.5 MG tablet Take 1 tablet (0.5 mg) by mouth every 4 hours as needed (Anxiety, Nausea/Vomiting or Sleep) 30 tablet 2     ondansetron (ZOFRAN) 8 MG tablet Take 1 tablet (8 mg) by mouth every 8 hours as needed for nausea 30 tablet 3     ondansetron (ZOFRAN-ODT) 8 MG ODT tab Take 1 tablet (8 mg) by mouth every 8 hours as needed for nausea 90 tablet 1      potassium chloride ER (KLOR-CON M) 20 MEQ CR tablet Take 40 mEq by mouth       prochlorperazine (COMPAZINE) 10 MG tablet Take 0.5 tablets (5 mg) by mouth every 6 hours as needed (Nausea/Vomiting) 30 tablet 2     triamcinolone (KENALOG) 0.1 % external cream         Family and Social History:  Please see initial consultation dated 9/21/2020 for further details.  Breast Actionable Panel on 10/1/2020 was negative for mutation.    Physical Exam:  General:  Well appearing, well nourished adult female in NAD  Eyes:  No erythema or discharge  Respiratory:  Breathing comfortably on room air.  No wheezing or distress.  Musculoskeletal:  Full ROM of the bilateral upper extremities.  Skin:  No visible concerning skin rashes or lesions  Neuro:  No notable tremor and dyskinetic movements.  Psych:  Mood and affect appear normal.    The rest of a comprehensive physical examination is deferred due to PHE (public health emergency) video visit restrictions.    Laboratory/Imaging Studies  11/17/2020 Labs:  Electrolytes are wnl with the exception of chloride elevated at 110  Creatinine is wnl.  LFTs are wnl.    Blood counts are wnl.    ASSESSMENT/PLAN:  75 year old female with a history of HTN, dyslipidemia, COPD, diabetes, and OA with a newly diagnosed clinical stage, T2N0M0, right breast cancer, that is ER positive (50%), ID negative, and HER2 positive.       1.  Right breast cancer:  Presents to clinic prior to week #8 of THP neoadjuvant chemotherapy, which is scheduled at [facility] tomorrow. She is overall tolerating treatment well with the exception of diarrhea and increasing neuropathy.  Given these symptoms, will dose reduce Taxol to 60 mg/m2 with this cycle.  Per patient, she continues to note decrease in size of the breast mass.    Overall treatment plan is to complete a total of 12 weeks of Taxol, Herceptin, and pertuzumab.  If she has residual breast disease after 12 weeks of THP, we would plan to proceed with dose dense AC  (adriamycin and cyclophosphamide) chemotherapy.  If no palpable tumor after 12 weeks of THP, would obtain a breast MRI.  If no/little residual disease on the breast MRI, may consider forgoing AC given her medical comorbidities.  Following completion of chemotherapy, will proceed with surgery.  If she decides to have a  lumpectomy or she has a positive lymph node at the time of surgery, she will need breast radiation after surgery. Finally, will treat with a minimum 5 years of endocrine therapy.     2. Diarrhea/orthostasis:  Worse since last visit. Will add lomotil and asked her to take scheduled twice daily.  She should also continue imodium, 2 tabs with first diarrheal stool of the day, 1 with each diarrheal stool thereafter.  Will add on a liter of IV fluids to her infusion tomorrow.    3.  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  Dose reduce Taxol as above.  Also advised okay to cool hands and feet during the infusions.    4. Nose bleeds/sores: Prescribed Ayr gel last week and is using up to 4 times per day.  Continue saline nasal spray prn and sleep with a humidifier in the bedroom.    5. Follow Up:    - Labs, Taxol and Herceptin at [facility] tomorrow (already scheduled).  - Labs, Taxol, Herceptin, and pertuzumab at [facility] on 12/1/2020  (needs to be scheduled!)

## 2020-11-23 ENCOUNTER — VIRTUAL VISIT (OUTPATIENT)
Dept: ONCOLOGY | Facility: CLINIC | Age: 75
End: 2020-11-23
Attending: INTERNAL MEDICINE
Payer: COMMERCIAL

## 2020-11-23 DIAGNOSIS — C50.811 MALIGNANT NEOPLASM OF OVERLAPPING SITES OF RIGHT BREAST IN FEMALE, ESTROGEN RECEPTOR POSITIVE (H): Primary | ICD-10-CM

## 2020-11-23 DIAGNOSIS — T45.1X5A CHEMOTHERAPY INDUCED DIARRHEA: ICD-10-CM

## 2020-11-23 DIAGNOSIS — Z17.0 MALIGNANT NEOPLASM OF OVERLAPPING SITES OF RIGHT BREAST IN FEMALE, ESTROGEN RECEPTOR POSITIVE (H): Primary | ICD-10-CM

## 2020-11-23 DIAGNOSIS — K52.1 CHEMOTHERAPY INDUCED DIARRHEA: ICD-10-CM

## 2020-11-23 PROCEDURE — 99214 OFFICE O/P EST MOD 30 MIN: CPT | Mod: 95 | Performed by: INTERNAL MEDICINE

## 2020-11-23 RX ORDER — DIPHENOXYLATE HCL/ATROPINE 2.5-.025MG
1 TABLET ORAL 2 TIMES DAILY
Qty: 60 TABLET | Refills: 2 | Status: SHIPPED | OUTPATIENT
Start: 2020-11-23 | End: 2021-02-15

## 2020-11-23 NOTE — LETTER
"    11/23/2020         RE: Talya Zuleta  [address] [facility] [address]        Dear Colleague,    Thank you for referring your patient, Talya Zuleta, to the [facility]. Please see a copy of my visit note below.    Talya Zuleta is a 75 year old female who is being evaluated via a billable video visit.      The patient has been notified of following:     \"This video visit will be conducted via a call between you and your physician/provider. We have found that certain health care needs can be provided without the need for an in-person physical exam.  This service lets us provide the care you need with a video conversation.  If a prescription is necessary we can send it directly to your pharmacy.  If lab work is needed we can place an order for that and you can then stop by our lab to have the test done at a later time.    Video visits are billed at different rates depending on your insurance coverage.  Please reach out to your insurance provider with any questions.    If during the course of the call the physician/provider feels a video visit is not appropriate, you will not be charged for this service.\"    Patient has given verbal consent for Video visit? Yes  How would you like to obtain your AVS? MyChart  If you are dropped from the video visit, the video invite should be resent to: Send to e-mail at: [email or web address]  Will anyone else be joining your video visit? No    Video-Visit Details    Type of service:  Video Visit    Total time of video visit:  21 minutes    Originating Location (pt. Location): Home    Distant Location (provider location):  [facility]     Platform used for Video Visit: Kelly Alvarez MD      I have reviewed and updated the patient's allergies and pt confirmed any changes to their medication list.  Patient was asked to provide any patient recorded vital signs, height and/or " "weight.  Please see \"Patient Reported Vital Signs\" tab for that information.  Patient instructed to be in the virtual waiting room 10-15 minutes prior to appointment time.    There were no vitals taken for this visit.      Concerns: Pt concerned about with dry nose, onset 3 weeks.  Nothing has worked to give relief.      Refills: No refills        Tiffany Garcia CMA          Oncology Follow Up:  Date on this visit: 11/23/2020    Diagnosis:  right breast cancer, clinical stage T2N0M0.    Primary Physician: Cole Weston     History Of Present Illness:  Ms. Zuleta is a 75 year old female with right breast cancer.  Routine screening mammogram in 07/2020 showed bilateral breast asymmetries.  Diagnostic mammograms and ultrasound showed a 2.7 cm mass in the right breast at 12:00, 5 cm from the nipple with ductal extension including a 6 mm mass at 3 cm from the nipple.  In the left breast were benign appearing cysts.  Ultrasound of the right axilla was without lymphadenopathy.  Contrast enhanced mammogram showed the right breast mass, including extension, to measure 4.9 cm.  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  Estrogen receptor was moderate in 50% and progesterone receptor staining was negative.  HER-2 was negative by IHC; HER2 FISH showed approximately 10% of tumor cells to have 6.8 HER2 signals/nucleus and a HER2/CEN17 ratio of 1.6.  The HER2 positive cells were noted to be the large pleomorphic cells.  Multiple neutrophils were noted to be infiltrating through tumor stroma.    Interval History:  Ms. Zuleta was seen in clinic today for evaluation prior to week #8 of paclitaxel and Herceptin infusion.  She is receiving neoadjuvant THP chemotherapy.  Since last visit, she has had a significant increase in diarrhea.  She is having approximately 7 diarrheal stools per day.  She classifies them as watery to loose.  She has been taking 4-5 Imodium per " day without relief.  She has not yet started taking Lomotil.  She has had no fevers or chills.  She denies bright red blood per rectum.      She also reports an increase in neuropathy.  Neuropathy is not yet interfering with fine motor skills beyond her baseline.  She has chronic back issues and associated neuropathy, however, believes the neuropathy in her hands is worse from previous.  She has significantly dry skin for which she is using a Eucerin cream.  She also reports nasal sores which are quite bothersome.  She is using a saline nasal spray, sleeping with a humidifier, and using Ayr nasal gel.  She has not had any relief with any of these interventions.  She denies nausea or vomiting.  She is eating 2 good meals per day and is able to maintain her weight.  She has no headaches visual changes or focal neurologic complaints.  She occasionally gets lightheaded with standing.  The remainder of a complete 12 point review of systems was reviewed with the patient was negative with exception of that mentioned above.    Past Medical/Surgical History:  1.  Breast cancer as per HPI  2.  Hypertension  3.  Diabetes  4.  COPD  5.  Hypothyroidism  6.  Hypohidrosis    Allergies:  Allergies as of 11/23/2020 - Reviewed 11/17/2020   Allergen Reaction Noted     Bacitracin-neomycin-polymyxin  04/18/2003     Latex  09/30/2005     Current Medications:  Current Outpatient Medications   Medication Sig Dispense Refill     amLODIPine (NORVASC) 5 MG tablet Take 5 mg by mouth       atorvastatin (LIPITOR) 40 MG tablet TAKE 1/2 TABLET DAILY       benazepril (LOTENSIN) 20 MG tablet TAKE 1 TABLET BY MOUTH TWO TIMES A DAY.       furosemide (LASIX) 80 MG tablet Take 80 mg by mouth 2 times daily       HYDROcodone-acetaminophen (NORCO) 5-325 MG tablet Take 1 tablet by mouth every 4 hours as needed for moderate to severe pain 5 tablet 0     LORazepam (ATIVAN) 0.5 MG tablet Take 1 tablet (0.5 mg) by mouth every 4 hours as needed (Anxiety,  Nausea/Vomiting or Sleep) 30 tablet 2     ondansetron (ZOFRAN) 8 MG tablet Take 1 tablet (8 mg) by mouth every 8 hours as needed for nausea 30 tablet 3     ondansetron (ZOFRAN-ODT) 8 MG ODT tab Take 1 tablet (8 mg) by mouth every 8 hours as needed for nausea 90 tablet 1     potassium chloride ER (KLOR-CON M) 20 MEQ CR tablet Take 40 mEq by mouth       prochlorperazine (COMPAZINE) 10 MG tablet Take 0.5 tablets (5 mg) by mouth every 6 hours as needed (Nausea/Vomiting) 30 tablet 2     triamcinolone (KENALOG) 0.1 % external cream         Family and Social History:  Please see initial consultation dated 9/21/2020 for further details.  Breast Actionable Panel on 10/1/2020 was negative for mutation.    Physical Exam:  General:  Well appearing, well nourished adult female in NAD  Eyes:  No erythema or discharge  Respiratory:  Breathing comfortably on room air.  No wheezing or distress.  Musculoskeletal:  Full ROM of the bilateral upper extremities.  Skin:  No visible concerning skin rashes or lesions  Neuro:  No notable tremor and dyskinetic movements.  Psych:  Mood and affect appear normal.    The rest of a comprehensive physical examination is deferred due to PHE (public health emergency) video visit restrictions.    Laboratory/Imaging Studies  11/17/2020 Labs:  Electrolytes are wnl with the exception of chloride elevated at 110  Creatinine is wnl.  LFTs are wnl.    Blood counts are wnl.    ASSESSMENT/PLAN:  75 year old female with a history of HTN, dyslipidemia, COPD, diabetes, and OA with a newly diagnosed clinical stage, T2N0M0, right breast cancer, that is ER positive (50%), ID negative, and HER2 positive.       1.  Right breast cancer:  Presents to clinic prior to week #8 of THP neoadjuvant chemotherapy, which [place] [facility] [place]. She is overall tolerating treatment well with the exception of diarrhea and increasing neuropathy.  Given these symptoms, will dose reduce Taxol to 60 mg/m2 with this cycle.  Per  patient, she continues to note decrease in size of the breast mass.    Overall treatment plan is to complete a total of 12 weeks of Taxol, Herceptin, and pertuzumab.  If she has residual breast disease after 12 weeks of THP, we would plan to proceed with dose dense AC (adriamycin and cyclophosphamide) chemotherapy.  If no palpable tumor after 12 weeks of THP, would obtain a breast MRI.  If no/little residual disease on the breast MRI, may consider forgoing AC given her medical comorbidities.  Following completion of chemotherapy, will proceed with surgery.  If she decides to have a  lumpectomy or she has a positive lymph node at the time of surgery, she will need breast radiation after surgery. Finally, will treat with a minimum 5 years of endocrine therapy.     2. Diarrhea/orthostasis:  Worse since last visit. Will add lomotil and asked her to take scheduled twice daily.  She should also continue imodium, 2 tabs with first diarrheal stool of the day, 1 with each diarrheal stool thereafter.  Will add on a liter of IV fluids to her infusion tomorrow.    3.  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  Dose reduce Taxol as above.  Also advised okay to cool hands and feet during the infusions.    4. Nose bleeds/sores: Prescribed Ayr gel last week and is using up to 4 times per day.  Continue saline nasal spray prn and sleep with a humidifier in the bedroom.    5. Follow Up:    - Labs, Taxol and Herceptin at [facility] tomorrow (already scheduled).  - Labs, Taxol, Herceptin, and pertuzumab at [facility] on 12/1/2020  (needs to be scheduled!)          Again, thank you for allowing me to participate in the care of your patient.        Sincerely,        Perlita Alvarez MD

## 2020-11-24 ENCOUNTER — ALLIED HEALTH/NURSE VISIT (OUTPATIENT)
Dept: ONCOLOGY | Facility: CLINIC | Age: 75
End: 2020-11-24
Payer: COMMERCIAL

## 2020-11-24 ENCOUNTER — INFUSION THERAPY VISIT (OUTPATIENT)
Dept: INFUSION THERAPY | Facility: CLINIC | Age: 75
End: 2020-11-24
Payer: COMMERCIAL

## 2020-11-24 VITALS
DIASTOLIC BLOOD PRESSURE: 84 MMHG | OXYGEN SATURATION: 98 % | BODY MASS INDEX: 27.37 KG/M2 | HEART RATE: 77 BPM | SYSTOLIC BLOOD PRESSURE: 142 MMHG | TEMPERATURE: 98.1 F | WEIGHT: 168.3 LBS | RESPIRATION RATE: 16 BRPM

## 2020-11-24 DIAGNOSIS — C50.811 MALIGNANT NEOPLASM OF OVERLAPPING SITES OF RIGHT BREAST IN FEMALE, ESTROGEN RECEPTOR POSITIVE (H): Primary | ICD-10-CM

## 2020-11-24 DIAGNOSIS — Z17.0 MALIGNANT NEOPLASM OF OVERLAPPING SITES OF RIGHT BREAST IN FEMALE, ESTROGEN RECEPTOR POSITIVE (H): Primary | ICD-10-CM

## 2020-11-24 LAB
BASOPHILS # BLD AUTO: 0 10E9/L (ref 0–0.2)
BASOPHILS NFR BLD AUTO: 0.8 %
DIFFERENTIAL METHOD BLD: ABNORMAL
EOSINOPHIL # BLD AUTO: 0 10E9/L (ref 0–0.7)
EOSINOPHIL NFR BLD AUTO: 0.6 %
ERYTHROCYTE [DISTWIDTH] IN BLOOD BY AUTOMATED COUNT: 16.7 % (ref 10–15)
HCT VFR BLD AUTO: 32.5 % (ref 35–47)
HGB BLD-MCNC: 10.7 G/DL (ref 11.7–15.7)
IMM GRANULOCYTES # BLD: 0 10E9/L (ref 0–0.4)
IMM GRANULOCYTES NFR BLD: 0.6 %
LYMPHOCYTES # BLD AUTO: 1.1 10E9/L (ref 0.8–5.3)
LYMPHOCYTES NFR BLD AUTO: 21.7 %
MCH RBC QN AUTO: 33.1 PG (ref 26.5–33)
MCHC RBC AUTO-ENTMCNC: 32.9 G/DL (ref 31.5–36.5)
MCV RBC AUTO: 101 FL (ref 78–100)
MONOCYTES # BLD AUTO: 0.4 10E9/L (ref 0–1.3)
MONOCYTES NFR BLD AUTO: 7.6 %
NEUTROPHILS # BLD AUTO: 3.3 10E9/L (ref 1.6–8.3)
NEUTROPHILS NFR BLD AUTO: 68.7 %
PLATELET # BLD AUTO: 263 10E9/L (ref 150–450)
RBC # BLD AUTO: 3.23 10E12/L (ref 3.8–5.2)
WBC # BLD AUTO: 4.8 10E9/L (ref 4–11)

## 2020-11-24 PROCEDURE — 96413 CHEMO IV INFUSION 1 HR: CPT | Performed by: NURSE PRACTITIONER

## 2020-11-24 PROCEDURE — 85025 COMPLETE CBC W/AUTO DIFF WBC: CPT | Performed by: INTERNAL MEDICINE

## 2020-11-24 PROCEDURE — 99207 PR NO CHARGE LOS: CPT

## 2020-11-24 PROCEDURE — 96417 CHEMO IV INFUS EACH ADDL SEQ: CPT | Performed by: NURSE PRACTITIONER

## 2020-11-24 PROCEDURE — 99207 PR NO CHARGE NURSE ONLY: CPT

## 2020-11-24 RX ORDER — HEPARIN SODIUM (PORCINE) LOCK FLUSH IV SOLN 100 UNIT/ML 100 UNIT/ML
5 SOLUTION INTRAVENOUS
Status: DISCONTINUED | OUTPATIENT
Start: 2020-11-24 | End: 2020-11-24 | Stop reason: HOSPADM

## 2020-11-24 RX ADMIN — HEPARIN SODIUM (PORCINE) LOCK FLUSH IV SOLN 100 UNIT/ML 5 ML: 100 SOLUTION at 14:41

## 2020-11-24 RX ADMIN — HEPARIN SODIUM (PORCINE) LOCK FLUSH IV SOLN 100 UNIT/ML 5 ML: 100 SOLUTION at 11:50

## 2020-11-24 RX ADMIN — Medication 1000 ML: at 12:25

## 2020-11-24 ASSESSMENT — PAIN SCALES - GENERAL: PAINLEVEL: NO PAIN (0)

## 2020-11-24 NOTE — PROGRESS NOTES
Infusion Nursing Note:  Talya Zuleta presents today for C3 D8 Taxol/Kanjinti.    Patient seen by provider today: No   present during visit today: Not Applicable.    Note: N/A.    Intravenous Access:  Implanted Port.    Treatment Conditions:  Lab Results   Component Value Date    HGB 10.7 11/24/2020     Lab Results   Component Value Date    WBC 4.8 11/24/2020      Lab Results   Component Value Date    ANEU 3.3 11/24/2020     Lab Results   Component Value Date     11/24/2020      Results reviewed, labs MET treatment parameters, ok to proceed with treatment.      Post Infusion Assessment:  Patient tolerated infusion without incident.  Blood return noted pre and post infusion.  Site patent and intact, free from redness, edema or discomfort.  No evidence of extravasations.  Access discontinued per protocol.       Discharge Plan:   Discharge instructions reviewed with: Patient.  Patient and/or family verbalized understanding of discharge instructions and all questions answered.  Patient discharged in stable condition accompanied by: self.  Departure Mode: Ambulatory.    Yessi Naqvi RN

## 2020-12-01 ENCOUNTER — OFFICE VISIT (OUTPATIENT)
Dept: ONCOLOGY | Facility: CLINIC | Age: 75
End: 2020-12-01
Payer: COMMERCIAL

## 2020-12-01 ENCOUNTER — INFUSION THERAPY VISIT (OUTPATIENT)
Dept: INFUSION THERAPY | Facility: CLINIC | Age: 75
End: 2020-12-01
Payer: COMMERCIAL

## 2020-12-01 VITALS
HEART RATE: 75 BPM | WEIGHT: 169.5 LBS | DIASTOLIC BLOOD PRESSURE: 76 MMHG | RESPIRATION RATE: 18 BRPM | SYSTOLIC BLOOD PRESSURE: 126 MMHG | BODY MASS INDEX: 27.57 KG/M2 | TEMPERATURE: 98.3 F | OXYGEN SATURATION: 100 %

## 2020-12-01 DIAGNOSIS — C50.811 MALIGNANT NEOPLASM OF OVERLAPPING SITES OF RIGHT BREAST IN FEMALE, ESTROGEN RECEPTOR POSITIVE (H): Primary | ICD-10-CM

## 2020-12-01 DIAGNOSIS — Z17.0 MALIGNANT NEOPLASM OF OVERLAPPING SITES OF RIGHT BREAST IN FEMALE, ESTROGEN RECEPTOR POSITIVE (H): Primary | ICD-10-CM

## 2020-12-01 LAB
BASOPHILS # BLD AUTO: 0 10E9/L (ref 0–0.2)
BASOPHILS NFR BLD AUTO: 0.7 %
DIFFERENTIAL METHOD BLD: ABNORMAL
EOSINOPHIL # BLD AUTO: 0 10E9/L (ref 0–0.7)
EOSINOPHIL NFR BLD AUTO: 0.4 %
ERYTHROCYTE [DISTWIDTH] IN BLOOD BY AUTOMATED COUNT: 16.8 % (ref 10–15)
HCT VFR BLD AUTO: 34.1 % (ref 35–47)
HGB BLD-MCNC: 11.5 G/DL (ref 11.7–15.7)
IMM GRANULOCYTES # BLD: 0 10E9/L (ref 0–0.4)
IMM GRANULOCYTES NFR BLD: 0.4 %
LYMPHOCYTES # BLD AUTO: 1.3 10E9/L (ref 0.8–5.3)
LYMPHOCYTES NFR BLD AUTO: 24.1 %
MCH RBC QN AUTO: 33.4 PG (ref 26.5–33)
MCHC RBC AUTO-ENTMCNC: 33.7 G/DL (ref 31.5–36.5)
MCV RBC AUTO: 99 FL (ref 78–100)
MONOCYTES # BLD AUTO: 0.5 10E9/L (ref 0–1.3)
MONOCYTES NFR BLD AUTO: 9.5 %
NEUTROPHILS # BLD AUTO: 3.5 10E9/L (ref 1.6–8.3)
NEUTROPHILS NFR BLD AUTO: 64.9 %
PLATELET # BLD AUTO: 267 10E9/L (ref 150–450)
RBC # BLD AUTO: 3.44 10E12/L (ref 3.8–5.2)
WBC # BLD AUTO: 5.4 10E9/L (ref 4–11)

## 2020-12-01 PROCEDURE — 96417 CHEMO IV INFUS EACH ADDL SEQ: CPT | Performed by: NURSE PRACTITIONER

## 2020-12-01 PROCEDURE — 99207 PR NO CHARGE LOS: CPT

## 2020-12-01 PROCEDURE — 96413 CHEMO IV INFUSION 1 HR: CPT | Performed by: NURSE PRACTITIONER

## 2020-12-01 PROCEDURE — 85025 COMPLETE CBC W/AUTO DIFF WBC: CPT | Performed by: INTERNAL MEDICINE

## 2020-12-01 RX ORDER — HEPARIN SODIUM (PORCINE) LOCK FLUSH IV SOLN 100 UNIT/ML 100 UNIT/ML
5 SOLUTION INTRAVENOUS
Status: DISCONTINUED | OUTPATIENT
Start: 2020-12-01 | End: 2020-12-01 | Stop reason: HOSPADM

## 2020-12-01 RX ADMIN — HEPARIN SODIUM (PORCINE) LOCK FLUSH IV SOLN 100 UNIT/ML 5 ML: 100 SOLUTION at 17:22

## 2020-12-01 RX ADMIN — Medication 1000 ML: at 14:50

## 2020-12-01 RX ADMIN — HEPARIN SODIUM (PORCINE) LOCK FLUSH IV SOLN 100 UNIT/ML 5 ML: 100 SOLUTION at 14:01

## 2020-12-01 ASSESSMENT — PAIN SCALES - GENERAL: PAINLEVEL: EXTREME PAIN (8)

## 2020-12-01 NOTE — PROGRESS NOTES
Infusion Nursing Note:  Talya Zuleta presents today for  C3,D15 of Taxol/Kanjinti    Patient seen by provider today: No   present during visit today: Not Applicable.    Note: Pt states she is doing well, has had trouble with her nose sores, has discussed with Dr Alvarez and is trying application of AYR - pt states it has helped more than the neosporin.    Intravenous Access:  Implanted Port.    Treatment Conditions:  Lab Results   Component Value Date    HGB 11.5 12/01/2020     Lab Results   Component Value Date    WBC 5.4 12/01/2020      Lab Results   Component Value Date    ANEU 3.5 12/01/2020     Lab Results   Component Value Date     12/01/2020      Results reviewed, labs MET treatment parameters, ok to proceed with treatment.  ECHO/MUGA completed 09/28/2020  EF 63%.      Post Infusion Assessment:  Patient tolerated infusion without incident.  Blood return noted pre and post infusion.  Site patent and intact, free from redness, edema or discomfort.  No evidence of extravasations.  Access discontinued per protocol.       Discharge Plan:   Patient will return 12/8/2020 for next appointment.   Patient discharged in stable condition accompanied by: self.  Departure Mode: Ambulatory.    Fatou Otero, SONIA/Magaly Osorio RN

## 2020-12-08 ENCOUNTER — VIRTUAL VISIT (OUTPATIENT)
Dept: ONCOLOGY | Facility: CLINIC | Age: 75
End: 2020-12-08
Payer: COMMERCIAL

## 2020-12-08 ENCOUNTER — ALLIED HEALTH/NURSE VISIT (OUTPATIENT)
Dept: ONCOLOGY | Facility: CLINIC | Age: 75
End: 2020-12-08
Payer: COMMERCIAL

## 2020-12-08 ENCOUNTER — INFUSION THERAPY VISIT (OUTPATIENT)
Dept: INFUSION THERAPY | Facility: CLINIC | Age: 75
End: 2020-12-08
Payer: COMMERCIAL

## 2020-12-08 VITALS
TEMPERATURE: 97.2 F | HEART RATE: 78 BPM | OXYGEN SATURATION: 99 % | BODY MASS INDEX: 27.24 KG/M2 | SYSTOLIC BLOOD PRESSURE: 151 MMHG | WEIGHT: 167.5 LBS | DIASTOLIC BLOOD PRESSURE: 73 MMHG

## 2020-12-08 VITALS — WEIGHT: 169 LBS | BODY MASS INDEX: 27.49 KG/M2

## 2020-12-08 DIAGNOSIS — C50.811 MALIGNANT NEOPLASM OF OVERLAPPING SITES OF RIGHT BREAST IN FEMALE, ESTROGEN RECEPTOR POSITIVE (H): Primary | ICD-10-CM

## 2020-12-08 DIAGNOSIS — Z17.0 MALIGNANT NEOPLASM OF OVERLAPPING SITES OF RIGHT BREAST IN FEMALE, ESTROGEN RECEPTOR POSITIVE (H): Primary | ICD-10-CM

## 2020-12-08 DIAGNOSIS — Z79.899 ENCOUNTER FOR MONITORING CARDIOTOXIC DRUG THERAPY: ICD-10-CM

## 2020-12-08 DIAGNOSIS — C50.919 BREAST CANCER (H): ICD-10-CM

## 2020-12-08 DIAGNOSIS — J34.89 NASAL SORE: ICD-10-CM

## 2020-12-08 DIAGNOSIS — R19.5 LOOSE STOOLS: ICD-10-CM

## 2020-12-08 DIAGNOSIS — Z51.81 ENCOUNTER FOR MONITORING CARDIOTOXIC DRUG THERAPY: ICD-10-CM

## 2020-12-08 DIAGNOSIS — G62.0 DRUG-INDUCED POLYNEUROPATHY (H): ICD-10-CM

## 2020-12-08 LAB
ALBUMIN SERPL-MCNC: 3.6 G/DL (ref 3.4–5)
ALP SERPL-CCNC: 70 U/L (ref 40–150)
ALT SERPL W P-5'-P-CCNC: 29 U/L (ref 0–50)
ANION GAP SERPL CALCULATED.3IONS-SCNC: 4 MMOL/L (ref 3–14)
AST SERPL W P-5'-P-CCNC: 22 U/L (ref 0–45)
BASOPHILS # BLD AUTO: 0 10E9/L (ref 0–0.2)
BASOPHILS NFR BLD AUTO: 0.8 %
BILIRUB SERPL-MCNC: 0.3 MG/DL (ref 0.2–1.3)
BUN SERPL-MCNC: 14 MG/DL (ref 7–30)
CALCIUM SERPL-MCNC: 8.6 MG/DL (ref 8.5–10.1)
CHLORIDE SERPL-SCNC: 108 MMOL/L (ref 94–109)
CO2 SERPL-SCNC: 27 MMOL/L (ref 20–32)
CREAT SERPL-MCNC: 0.77 MG/DL (ref 0.52–1.04)
DIFFERENTIAL METHOD BLD: ABNORMAL
EOSINOPHIL # BLD AUTO: 0 10E9/L (ref 0–0.7)
EOSINOPHIL NFR BLD AUTO: 0.6 %
ERYTHROCYTE [DISTWIDTH] IN BLOOD BY AUTOMATED COUNT: 17.4 % (ref 10–15)
GFR SERPL CREATININE-BSD FRML MDRD: 75 ML/MIN/{1.73_M2}
GLUCOSE SERPL-MCNC: 135 MG/DL (ref 70–99)
HCT VFR BLD AUTO: 34.7 % (ref 35–47)
HGB BLD-MCNC: 11.4 G/DL (ref 11.7–15.7)
IMM GRANULOCYTES # BLD: 0 10E9/L (ref 0–0.4)
IMM GRANULOCYTES NFR BLD: 0.6 %
LYMPHOCYTES # BLD AUTO: 1.1 10E9/L (ref 0.8–5.3)
LYMPHOCYTES NFR BLD AUTO: 21.6 %
MCH RBC QN AUTO: 33.1 PG (ref 26.5–33)
MCHC RBC AUTO-ENTMCNC: 32.9 G/DL (ref 31.5–36.5)
MCV RBC AUTO: 101 FL (ref 78–100)
MONOCYTES # BLD AUTO: 0.4 10E9/L (ref 0–1.3)
MONOCYTES NFR BLD AUTO: 7.2 %
NEUTROPHILS # BLD AUTO: 3.6 10E9/L (ref 1.6–8.3)
NEUTROPHILS NFR BLD AUTO: 69.2 %
PLATELET # BLD AUTO: 262 10E9/L (ref 150–450)
POTASSIUM SERPL-SCNC: 3.7 MMOL/L (ref 3.4–5.3)
PROT SERPL-MCNC: 6.7 G/DL (ref 6.8–8.8)
RBC # BLD AUTO: 3.44 10E12/L (ref 3.8–5.2)
SODIUM SERPL-SCNC: 139 MMOL/L (ref 133–144)
WBC # BLD AUTO: 5.1 10E9/L (ref 4–11)

## 2020-12-08 PROCEDURE — 99207 PR NO CHARGE LOS: CPT

## 2020-12-08 PROCEDURE — 96417 CHEMO IV INFUS EACH ADDL SEQ: CPT | Performed by: NURSE PRACTITIONER

## 2020-12-08 PROCEDURE — 99214 OFFICE O/P EST MOD 30 MIN: CPT | Mod: 95 | Performed by: NURSE PRACTITIONER

## 2020-12-08 PROCEDURE — 36593 DECLOT VASCULAR DEVICE: CPT

## 2020-12-08 PROCEDURE — 80053 COMPREHEN METABOLIC PANEL: CPT | Performed by: INTERNAL MEDICINE

## 2020-12-08 PROCEDURE — 96413 CHEMO IV INFUSION 1 HR: CPT | Performed by: NURSE PRACTITIONER

## 2020-12-08 PROCEDURE — 85025 COMPLETE CBC W/AUTO DIFF WBC: CPT | Performed by: INTERNAL MEDICINE

## 2020-12-08 PROCEDURE — 36415 COLL VENOUS BLD VENIPUNCTURE: CPT | Performed by: INTERNAL MEDICINE

## 2020-12-08 RX ORDER — HEPARIN SODIUM (PORCINE) LOCK FLUSH IV SOLN 100 UNIT/ML 100 UNIT/ML
5 SOLUTION INTRAVENOUS
Status: CANCELLED | OUTPATIENT
Start: 2020-12-08

## 2020-12-08 RX ORDER — HEPARIN SODIUM,PORCINE 10 UNIT/ML
5 VIAL (ML) INTRAVENOUS
Status: CANCELLED | OUTPATIENT
Start: 2020-12-22

## 2020-12-08 RX ORDER — METHYLPREDNISOLONE SODIUM SUCCINATE 125 MG/2ML
125 INJECTION, POWDER, LYOPHILIZED, FOR SOLUTION INTRAMUSCULAR; INTRAVENOUS
Status: CANCELLED
Start: 2020-12-08

## 2020-12-08 RX ORDER — ALBUTEROL SULFATE 90 UG/1
1-2 AEROSOL, METERED RESPIRATORY (INHALATION)
Status: CANCELLED
Start: 2020-12-08

## 2020-12-08 RX ORDER — HEPARIN SODIUM (PORCINE) LOCK FLUSH IV SOLN 100 UNIT/ML 100 UNIT/ML
5 SOLUTION INTRAVENOUS
Status: CANCELLED | OUTPATIENT
Start: 2020-12-22

## 2020-12-08 RX ORDER — ALBUTEROL SULFATE 90 UG/1
1-2 AEROSOL, METERED RESPIRATORY (INHALATION)
Status: CANCELLED
Start: 2020-12-22

## 2020-12-08 RX ORDER — SODIUM CHLORIDE 9 MG/ML
1000 INJECTION, SOLUTION INTRAVENOUS CONTINUOUS PRN
Status: CANCELLED
Start: 2020-12-08

## 2020-12-08 RX ORDER — METHYLPREDNISOLONE SODIUM SUCCINATE 125 MG/2ML
125 INJECTION, POWDER, LYOPHILIZED, FOR SOLUTION INTRAMUSCULAR; INTRAVENOUS
Status: CANCELLED
Start: 2020-12-22

## 2020-12-08 RX ORDER — LORAZEPAM 2 MG/ML
0.5 INJECTION INTRAMUSCULAR EVERY 4 HOURS PRN
Status: CANCELLED
Start: 2020-12-22

## 2020-12-08 RX ORDER — NALOXONE HYDROCHLORIDE 0.4 MG/ML
.1-.4 INJECTION, SOLUTION INTRAMUSCULAR; INTRAVENOUS; SUBCUTANEOUS
Status: CANCELLED | OUTPATIENT
Start: 2020-12-22

## 2020-12-08 RX ORDER — EPINEPHRINE 1 MG/ML
0.3 INJECTION, SOLUTION INTRAMUSCULAR; SUBCUTANEOUS EVERY 5 MIN PRN
Status: CANCELLED | OUTPATIENT
Start: 2020-12-15

## 2020-12-08 RX ORDER — HEPARIN SODIUM (PORCINE) LOCK FLUSH IV SOLN 100 UNIT/ML 100 UNIT/ML
5 SOLUTION INTRAVENOUS
Status: DISCONTINUED | OUTPATIENT
Start: 2020-12-08 | End: 2020-12-08 | Stop reason: HOSPADM

## 2020-12-08 RX ORDER — ALBUTEROL SULFATE 0.83 MG/ML
2.5 SOLUTION RESPIRATORY (INHALATION)
Status: CANCELLED | OUTPATIENT
Start: 2020-12-15

## 2020-12-08 RX ORDER — HEPARIN SODIUM (PORCINE) LOCK FLUSH IV SOLN 100 UNIT/ML 100 UNIT/ML
5 SOLUTION INTRAVENOUS
Status: CANCELLED | OUTPATIENT
Start: 2020-12-15

## 2020-12-08 RX ORDER — DIPHENHYDRAMINE HYDROCHLORIDE 50 MG/ML
50 INJECTION INTRAMUSCULAR; INTRAVENOUS
Status: CANCELLED
Start: 2020-12-22

## 2020-12-08 RX ORDER — EPINEPHRINE 1 MG/ML
0.3 INJECTION, SOLUTION INTRAMUSCULAR; SUBCUTANEOUS EVERY 5 MIN PRN
Status: CANCELLED | OUTPATIENT
Start: 2020-12-22

## 2020-12-08 RX ORDER — DIPHENHYDRAMINE HYDROCHLORIDE 50 MG/ML
50 INJECTION INTRAMUSCULAR; INTRAVENOUS
Status: CANCELLED
Start: 2020-12-15

## 2020-12-08 RX ORDER — ALBUTEROL SULFATE 0.83 MG/ML
2.5 SOLUTION RESPIRATORY (INHALATION)
Status: CANCELLED | OUTPATIENT
Start: 2020-12-08

## 2020-12-08 RX ORDER — METHYLPREDNISOLONE SODIUM SUCCINATE 125 MG/2ML
125 INJECTION, POWDER, LYOPHILIZED, FOR SOLUTION INTRAMUSCULAR; INTRAVENOUS
Status: CANCELLED
Start: 2020-12-15

## 2020-12-08 RX ORDER — EPINEPHRINE 1 MG/ML
0.3 INJECTION, SOLUTION INTRAMUSCULAR; SUBCUTANEOUS EVERY 5 MIN PRN
Status: CANCELLED | OUTPATIENT
Start: 2020-12-08

## 2020-12-08 RX ORDER — MEPERIDINE HYDROCHLORIDE 25 MG/ML
25 INJECTION INTRAMUSCULAR; INTRAVENOUS; SUBCUTANEOUS EVERY 30 MIN PRN
Status: CANCELLED | OUTPATIENT
Start: 2020-12-08

## 2020-12-08 RX ORDER — LORAZEPAM 2 MG/ML
0.5 INJECTION INTRAMUSCULAR EVERY 4 HOURS PRN
Status: CANCELLED
Start: 2020-12-08

## 2020-12-08 RX ORDER — MEPERIDINE HYDROCHLORIDE 25 MG/ML
25 INJECTION INTRAMUSCULAR; INTRAVENOUS; SUBCUTANEOUS EVERY 30 MIN PRN
Status: CANCELLED | OUTPATIENT
Start: 2020-12-22

## 2020-12-08 RX ORDER — HEPARIN SODIUM,PORCINE 10 UNIT/ML
5 VIAL (ML) INTRAVENOUS
Status: CANCELLED | OUTPATIENT
Start: 2020-12-15

## 2020-12-08 RX ORDER — HEPARIN SODIUM,PORCINE 10 UNIT/ML
5 VIAL (ML) INTRAVENOUS
Status: CANCELLED | OUTPATIENT
Start: 2020-12-08

## 2020-12-08 RX ORDER — DIPHENHYDRAMINE HYDROCHLORIDE 50 MG/ML
50 INJECTION INTRAMUSCULAR; INTRAVENOUS
Status: CANCELLED
Start: 2020-12-08

## 2020-12-08 RX ORDER — NALOXONE HYDROCHLORIDE 0.4 MG/ML
.1-.4 INJECTION, SOLUTION INTRAMUSCULAR; INTRAVENOUS; SUBCUTANEOUS
Status: CANCELLED | OUTPATIENT
Start: 2020-12-08

## 2020-12-08 RX ORDER — NALOXONE HYDROCHLORIDE 0.4 MG/ML
.1-.4 INJECTION, SOLUTION INTRAMUSCULAR; INTRAVENOUS; SUBCUTANEOUS
Status: CANCELLED | OUTPATIENT
Start: 2020-12-15

## 2020-12-08 RX ORDER — ACETAMINOPHEN 325 MG/1
650 TABLET ORAL
Status: CANCELLED | OUTPATIENT
Start: 2020-12-08

## 2020-12-08 RX ORDER — DIPHENHYDRAMINE HCL 25 MG
50 CAPSULE ORAL
Status: CANCELLED
Start: 2020-12-08

## 2020-12-08 RX ORDER — SODIUM CHLORIDE 9 MG/ML
1000 INJECTION, SOLUTION INTRAVENOUS CONTINUOUS PRN
Status: CANCELLED
Start: 2020-12-15

## 2020-12-08 RX ORDER — ALBUTEROL SULFATE 0.83 MG/ML
2.5 SOLUTION RESPIRATORY (INHALATION)
Status: CANCELLED | OUTPATIENT
Start: 2020-12-22

## 2020-12-08 RX ORDER — LORAZEPAM 2 MG/ML
0.5 INJECTION INTRAMUSCULAR EVERY 4 HOURS PRN
Status: CANCELLED
Start: 2020-12-15

## 2020-12-08 RX ORDER — ALBUTEROL SULFATE 90 UG/1
1-2 AEROSOL, METERED RESPIRATORY (INHALATION)
Status: CANCELLED
Start: 2020-12-15

## 2020-12-08 RX ORDER — SODIUM CHLORIDE 9 MG/ML
1000 INJECTION, SOLUTION INTRAVENOUS CONTINUOUS PRN
Status: CANCELLED
Start: 2020-12-22

## 2020-12-08 RX ORDER — MEPERIDINE HYDROCHLORIDE 25 MG/ML
25 INJECTION INTRAMUSCULAR; INTRAVENOUS; SUBCUTANEOUS EVERY 30 MIN PRN
Status: CANCELLED | OUTPATIENT
Start: 2020-12-15

## 2020-12-08 RX ADMIN — Medication 250 ML: at 15:05

## 2020-12-08 RX ADMIN — HEPARIN SODIUM (PORCINE) LOCK FLUSH IV SOLN 100 UNIT/ML 5 ML: 100 SOLUTION at 17:36

## 2020-12-08 RX ADMIN — Medication 2 MG: at 14:28

## 2020-12-08 ASSESSMENT — PAIN SCALES - GENERAL
PAINLEVEL: NO PAIN (0)
PAINLEVEL: NO PAIN (0)

## 2020-12-08 NOTE — NURSING NOTE
"Talya Zuleta is a 75 year old female who is being evaluated via a billable video visit.      The patient has been notified of following:     \"This video visit will be conducted via a call between you and your physician/provider. We have found that certain health care needs can be provided without the need for an in-person physical exam.  This service lets us provide the care you need with a video conversation.  If a prescription is necessary we can send it directly to your pharmacy.  If lab work is needed we can place an order for that and you can then stop by our lab to have the test done at a later time.    Video visits are billed at different rates depending on your insurance coverage.  Please reach out to your insurance provider with any questions.    If during the course of the call the physician/provider feels a video visit is not appropriate, you will not be charged for this service.\"    Patient has given verbal consent for Video visit? Yes  How would you like to obtain your AVS? MyChart    SYMPTOM QUESTIONNAIRE    Pain: no    Nausea/Vomiting: no    Mouth Sores: no    Shortness of Breath: no    Smoking: no    Fever or Chills: no    Hard Stools: no    Soft Stools: yes, taking Lomotil- helps some    Weight Loss: no    Weakness: no    Burning, numbness or tingling in hands or feet: yes    Problems with skin or swelling: some swelling around ankles- not new    Memory Loss: no    Anxiety or Depression: no    Trouble Sleeping: occasional    Tawana Shahid LPN on 12/8/2020 at 9:02 AM        "

## 2020-12-08 NOTE — LETTER
12/8/2020         RE: Talya Zuleta  [address] [facility] [address]        Dear Colleague,    Thank you for referring your patient, Talya Zuleta, to the [facility]. Please see a copy of my visit note below.    Oncology Follow Up Visit: December 8, 2020     Oncologist: Dr Perlita Alvarez  PCP: Cole Weston    Diagnosis: Right Breast Cancer  Talya Zuleta is a 76 yo female with Diabetes, HTN and COPD who was found to have bilateral breast asymmetry on screening mammogram in 7/2020. Further diagnostic exam found 2.7 cm mass at 12:00, 5 cm from nipple with ductal extension +6 mm mass at 3 cm from nipple. Contrast enhanced mammogram showed the right breast mass, including extension, to measure 4.9 cm.  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  Estrogen receptor was moderate in 50% and progesterone receptor staining was negative.  HER-2 was negative by IHC; HER2 FISH showed approximately 10% of tumor cells to have 6.8 HER2 signals/nucleus and a HER2/CEN17 ratio of 1.6.  The HER2 positive cells were noted to be the large pleomorphic cells.  Multiple infiltrating neutrophils were noted through tumor stroma.  Treatment:   10/7/2020 began THP- 9/29/2020 echo and port placed    Interval History: Ms. Zuleta comes to clinic for review prior to continuation of breast cancer treatment-  Due for cycle 4 of THP today.Pt noticing neuropathy to the hands and mildly to the feet- now noting it is harder to hold items by 1 hand -fine motor is more difficult. Dose reduction with last week is not noted to have changed function.She is also using B Complex vitamins now. Diarrhea continues - starts day with imodium and using as needed in day- also using lomotil about every other day as well. She is taking many fluids she feels and is eating well. Shares continued mild ankle edema from prior to chemotherapy.  Pt also denies pain, nausea, SOB chest pain, weakness or depression. States they are taking precautions against the pandemic and spent Thanksgiving alone.   Rest of comprehensive and complete ROS is reviewed and is negative.   Past Medical History:   Diagnosis Date     Breast cancer (H)      Sleep apnea      Current Outpatient Medications   Medication     amLODIPine (NORVASC) 5 MG tablet     atorvastatin (LIPITOR) 40 MG tablet     benazepril (LOTENSIN) 20 MG tablet     diphenoxylate-atropine (LOMOTIL) 2.5-0.025 MG tablet     furosemide (LASIX) 80 MG tablet     HYDROcodone-acetaminophen (NORCO) 5-325 MG tablet     LORazepam (ATIVAN) 0.5 MG tablet     ondansetron (ZOFRAN) 8 MG tablet     ondansetron (ZOFRAN-ODT) 8 MG ODT tab     potassium chloride ER (KLOR-CON M) 20 MEQ CR tablet     prochlorperazine (COMPAZINE) 10 MG tablet     triamcinolone (KENALOG) 0.1 % external cream     No current facility-administered medications for this visit.    - Long tobacco use history- current use of 1/2 ppd and daily alcohol use of 1 drink daily.   Allergies   Allergen Reactions     Bacitracin-Neomycin-Polymyxin      PN: LW Reaction: Unknown Reaction     Latex        Physical Exam:There were no vitals taken for this visit.   GENERAL: Healthy, alert and no distress  EYES: Eyes grossly normal to inspection.  No discharge or erythema, or obvious scleral/conjunctival abnormalities.  RESP: No audible wheeze, cough, or visible cyanosis.  No visible retractions or increased work of breathing.    SKIN: Visible skin clear. No significant rash, abnormal pigmentation or lesions.  MS: can make good fist and can  cup though using 2 hands when full.   NEURO: Cranial nerves grossly intact.  Mentation and speech appropriate for age.  PSYCH: Mentation appears normal, affect normal/bright, judgement and insight intact, normal speech and appearance well-groomed.with easy smile and good conversation.  The rest of a comprehensive physical  examination is deferred due to PHE (public health emergency) video visit restrictions     Laboratory Results: To be completed prior to infusion.     Assessment and Plan:   Right Breast Cancer- Pt we will continue with cycle 4 of THP treatment today.  She continues with loose stools from the Perjeta as well as some nose sores and has some progression peripheral neuropathy related to her Taxol.  We will continue her decrease in her Taxol from previous week as below.  If labs are acceptable we will continue with cycle 4 treatment.  We did have a discussion of expectations to continue the Herceptin Perjeta every 3 weeks to complete 1 year.  Patient is aware she will be meeting with Dr. Newsome on 12/18/2020 for review of her surgery and that radiation will be decided with results of surgery.  She will be meeting with Dr. Alvarez prior to final THP week on 12/28.  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  She had a dose reduction to her Taxol with last infusion decreased from 80 mg per metered square to 60 mg per metered square and now patient states she has not noticed a significant difference but has not progressed.  She feels she can continue with this cycle with the lower dose but this provider did warn if it progressed she needs to share with us and we may be able to hold the Taxol with the final weeks.  She is using B complex vitamins daily.  Loose stools-related to the Perjeta -patient is having very random and intermittent loose stools but is aware of use of Imodium as needed.  Also reminded to increase fluids to cover loss with the loose stools.  Nose sores- now using triamcinolone cream with better results.  Reminded that should heal after Taxol is completed.  Based upon CDC guidance and to observe social distancing in the setting of the COVID-19  pandemic, the patient was seen virtually via video visit.   Location of pt-Home  Location of provider-office in clinic  Start of visit -9:12 AM  End of visit-9:30 AM  Total Time 18 min visit  Jacquelyn Miller CNp            Again, thank you for allowing me to participate in the care of your patient.        Sincerely,        Jacquelyn Miller NP, APRN CNP

## 2020-12-08 NOTE — PROGRESS NOTES
Infusion Nursing Note:  Talya Zuleta presents today for C4D1 Perjeta/Kanjinti/Taxol.    Patient seen by provider today: Yes: Jacquelyn Miller NP   present during visit today: Not Applicable.    Note: Pt presented to infusion room with TPA in port x 30minutes.  Excellent, guerrero blood return when TPA withdrawn from port.    Pt c/o nose sores that ointment is helping, decreased appetite, nausea and diarrhea.  See flow sheet for assessment.    Intravenous Access:  Implanted Port.    Treatment Conditions:  Lab Results   Component Value Date    HGB 11.4 12/08/2020     Lab Results   Component Value Date    WBC 5.1 12/08/2020      Lab Results   Component Value Date    ANEU 3.6 12/08/2020     Lab Results   Component Value Date     12/08/2020      Lab Results   Component Value Date     12/08/2020                   Lab Results   Component Value Date    POTASSIUM 3.7 12/08/2020           No results found for: MAG         Lab Results   Component Value Date    CR 0.77 12/08/2020                   Lab Results   Component Value Date    JIMBO 8.6 12/08/2020                Lab Results   Component Value Date    BILITOTAL 0.3 12/08/2020           Lab Results   Component Value Date    ALBUMIN 3.6 12/08/2020                    Lab Results   Component Value Date    ALT 29 12/08/2020           Lab Results   Component Value Date    AST 22 12/08/2020       Results reviewed, labs MET treatment parameters, ok to proceed with treatment.      Post Infusion Assessment:  Patient tolerated infusion without incident.  Blood return noted pre and post infusion.  Site patent and intact, free from redness, edema or discomfort.  No evidence of extravasations.  Access discontinued per protocol.       Discharge Plan:   Patient discharged in stable condition accompanied by: self.  Departure Mode: Ambulatory.  Pt will RTC 12/15/20 for C4D8 Kanjinti/Taxol.    Remi Herron RN

## 2020-12-08 NOTE — PROGRESS NOTES
"Patient's name and  were verified.  See Doc Flowsheet - IV assess for details.  IVAD accessed with 20G 3/4\" araujo gripper plus needle  blood return positive: YES, but not enough for lab tests  Site without redness, tenderness or swelling: YES  Alteplase instilled  Needle: Left accessed for infusion  Comments: Labs drawn peripherally.  Patient tolerated procedure without incident.    Sho Brewster RN, BSN, OCN      "

## 2020-12-08 NOTE — PROGRESS NOTES
Oncology Follow Up Visit: December 8, 2020     Oncologist: Dr Perlita Alvarez  PCP: Cole Weston    Diagnosis: Right Breast Cancer  Talya Zuleta is a 76 yo female with Diabetes, HTN and COPD who was found to have bilateral breast asymmetry on screening mammogram in 7/2020. Further diagnostic exam found 2.7 cm mass at 12:00, 5 cm from nipple with ductal extension +6 mm mass at 3 cm from nipple. Contrast enhanced mammogram showed the right breast mass, including extension, to measure 4.9 cm.  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  Estrogen receptor was moderate in 50% and progesterone receptor staining was negative.  HER-2 was negative by IHC; HER2 FISH showed approximately 10% of tumor cells to have 6.8 HER2 signals/nucleus and a HER2/CEN17 ratio of 1.6.  The HER2 positive cells were noted to be the large pleomorphic cells.  Multiple infiltrating neutrophils were noted through tumor stroma.  Treatment:   10/7/2020 began THP- 9/29/2020 echo and port placed    Interval History: Ms. Zuleta comes to clinic for review prior to continuation of breast cancer treatment-  Due for cycle 4 of THP today.Pt noticing neuropathy to the hands and mildly to the feet- now noting it is harder to hold items by 1 hand -fine motor is more difficult. Dose reduction with last week is not noted to have changed function.She is also using B Complex vitamins now. Diarrhea continues - starts day with imodium and using as needed in day- also using lomotil about every other day as well. She is taking many fluids she feels and is eating well. Shares continued mild ankle edema from prior to chemotherapy. Pt also denies pain, nausea, SOB chest pain, weakness or depression. States they are taking precautions against the pandemic and spent Thanksgiving alone.   Rest of comprehensive and complete ROS is reviewed and is negative.   Past Medical History:   Diagnosis Date      Breast cancer (H)      Sleep apnea      Current Outpatient Medications   Medication     amLODIPine (NORVASC) 5 MG tablet     atorvastatin (LIPITOR) 40 MG tablet     benazepril (LOTENSIN) 20 MG tablet     diphenoxylate-atropine (LOMOTIL) 2.5-0.025 MG tablet     furosemide (LASIX) 80 MG tablet     HYDROcodone-acetaminophen (NORCO) 5-325 MG tablet     LORazepam (ATIVAN) 0.5 MG tablet     ondansetron (ZOFRAN) 8 MG tablet     ondansetron (ZOFRAN-ODT) 8 MG ODT tab     potassium chloride ER (KLOR-CON M) 20 MEQ CR tablet     prochlorperazine (COMPAZINE) 10 MG tablet     triamcinolone (KENALOG) 0.1 % external cream     No current facility-administered medications for this visit.    - Long tobacco use history- current use of 1/2 ppd and daily alcohol use of 1 drink daily.   Allergies   Allergen Reactions     Bacitracin-Neomycin-Polymyxin      PN: LW Reaction: Unknown Reaction     Latex        Physical Exam:There were no vitals taken for this visit.   GENERAL: Healthy, alert and no distress  EYES: Eyes grossly normal to inspection.  No discharge or erythema, or obvious scleral/conjunctival abnormalities.  RESP: No audible wheeze, cough, or visible cyanosis.  No visible retractions or increased work of breathing.    SKIN: Visible skin clear. No significant rash, abnormal pigmentation or lesions.  MS: can make good fist and can  cup though using 2 hands when full.   NEURO: Cranial nerves grossly intact.  Mentation and speech appropriate for age.  PSYCH: Mentation appears normal, affect normal/bright, judgement and insight intact, normal speech and appearance well-groomed.with easy smile and good conversation.  The rest of a comprehensive physical examination is deferred due to PHE (public health emergency) video visit restrictions     Laboratory Results: To be completed prior to infusion.     Assessment and Plan:   Right Breast Cancer- Pt we will continue with cycle 4 of THP treatment today.  She continues with loose  stools from the Perjeta as well as some nose sores and has some progression peripheral neuropathy related to her Taxol.  We will continue her decrease in her Taxol from previous week as below.  If labs are acceptable we will continue with cycle 4 treatment.  We did have a discussion of expectations to continue the Herceptin Perjeta every 3 weeks to complete 1 year.  Patient is aware she will be meeting with Dr. Newsome on 12/18/2020 for review of her surgery and that radiation will be decided with results of surgery.  She will be meeting with Dr. Alvarez prior to final THP week on 12/28.  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  She had a dose reduction to her Taxol with last infusion decreased from 80 mg per metered square to 60 mg per metered square and now patient states she has not noticed a significant difference but has not progressed.  She feels she can continue with this cycle with the lower dose but this provider did warn if it progressed she needs to share with us and we may be able to hold the Taxol with the final weeks.  She is using B complex vitamins daily.  Loose stools-related to the Perjeta -patient is having very random and intermittent loose stools but is aware of use of Imodium as needed.  Also reminded to increase fluids to cover loss with the loose stools.  Nose sores- now using triamcinolone cream with better results.  Reminded that should heal after Taxol is completed.  Based upon CDC guidance and to observe social distancing in the setting of the COVID-19 pandemic, the patient was seen virtually via video visit.   Location of pt-Home  Location of provider-office in clinic  Start of visit -9:12 AM  End of visit-9:30 AM  Total Time 18 min visit  Jacquelyn Miller,[place]

## 2020-12-13 ENCOUNTER — HEALTH MAINTENANCE LETTER (OUTPATIENT)
Age: 75
End: 2020-12-13

## 2020-12-15 ENCOUNTER — ALLIED HEALTH/NURSE VISIT (OUTPATIENT)
Dept: ONCOLOGY | Facility: CLINIC | Age: 75
End: 2020-12-15
Payer: COMMERCIAL

## 2020-12-15 ENCOUNTER — INFUSION THERAPY VISIT (OUTPATIENT)
Dept: INFUSION THERAPY | Facility: CLINIC | Age: 75
End: 2020-12-15
Payer: COMMERCIAL

## 2020-12-15 VITALS
RESPIRATION RATE: 16 BRPM | DIASTOLIC BLOOD PRESSURE: 78 MMHG | HEART RATE: 77 BPM | BODY MASS INDEX: 27.16 KG/M2 | SYSTOLIC BLOOD PRESSURE: 123 MMHG | WEIGHT: 167 LBS | TEMPERATURE: 98.2 F | OXYGEN SATURATION: 99 %

## 2020-12-15 DIAGNOSIS — Z17.0 MALIGNANT NEOPLASM OF OVERLAPPING SITES OF RIGHT BREAST IN FEMALE, ESTROGEN RECEPTOR POSITIVE (H): Primary | ICD-10-CM

## 2020-12-15 DIAGNOSIS — C50.811 MALIGNANT NEOPLASM OF OVERLAPPING SITES OF RIGHT BREAST IN FEMALE, ESTROGEN RECEPTOR POSITIVE (H): Primary | ICD-10-CM

## 2020-12-15 LAB
BASOPHILS # BLD AUTO: 0.1 10E9/L (ref 0–0.2)
BASOPHILS NFR BLD AUTO: 1 %
DIFFERENTIAL METHOD BLD: ABNORMAL
EOSINOPHIL # BLD AUTO: 0 10E9/L (ref 0–0.7)
EOSINOPHIL NFR BLD AUTO: 0.6 %
ERYTHROCYTE [DISTWIDTH] IN BLOOD BY AUTOMATED COUNT: 17.9 % (ref 10–15)
HCT VFR BLD AUTO: 35.3 % (ref 35–47)
HGB BLD-MCNC: 11.7 G/DL (ref 11.7–15.7)
IMM GRANULOCYTES # BLD: 0 10E9/L (ref 0–0.4)
IMM GRANULOCYTES NFR BLD: 0.6 %
LYMPHOCYTES # BLD AUTO: 1.3 10E9/L (ref 0.8–5.3)
LYMPHOCYTES NFR BLD AUTO: 25.1 %
MCH RBC QN AUTO: 33.6 PG (ref 26.5–33)
MCHC RBC AUTO-ENTMCNC: 33.1 G/DL (ref 31.5–36.5)
MCV RBC AUTO: 101 FL (ref 78–100)
MONOCYTES # BLD AUTO: 0.4 10E9/L (ref 0–1.3)
MONOCYTES NFR BLD AUTO: 6.7 %
NEUTROPHILS # BLD AUTO: 3.5 10E9/L (ref 1.6–8.3)
NEUTROPHILS NFR BLD AUTO: 66 %
PLATELET # BLD AUTO: 266 10E9/L (ref 150–450)
RBC # BLD AUTO: 3.48 10E12/L (ref 3.8–5.2)
WBC # BLD AUTO: 5.3 10E9/L (ref 4–11)

## 2020-12-15 PROCEDURE — 96417 CHEMO IV INFUS EACH ADDL SEQ: CPT | Performed by: NURSE PRACTITIONER

## 2020-12-15 PROCEDURE — 99207 PR NO CHARGE NURSE ONLY: CPT

## 2020-12-15 PROCEDURE — 96413 CHEMO IV INFUSION 1 HR: CPT | Performed by: NURSE PRACTITIONER

## 2020-12-15 PROCEDURE — 99207 PR NO CHARGE LOS: CPT

## 2020-12-15 PROCEDURE — 85025 COMPLETE CBC W/AUTO DIFF WBC: CPT | Performed by: NURSE PRACTITIONER

## 2020-12-15 RX ORDER — HEPARIN SODIUM (PORCINE) LOCK FLUSH IV SOLN 100 UNIT/ML 100 UNIT/ML
5 SOLUTION INTRAVENOUS
Status: DISCONTINUED | OUTPATIENT
Start: 2020-12-15 | End: 2020-12-15 | Stop reason: HOSPADM

## 2020-12-15 RX ADMIN — HEPARIN SODIUM (PORCINE) LOCK FLUSH IV SOLN 100 UNIT/ML 5 ML: 100 SOLUTION at 17:01

## 2020-12-15 RX ADMIN — HEPARIN SODIUM (PORCINE) LOCK FLUSH IV SOLN 100 UNIT/ML 5 ML: 100 SOLUTION at 14:10

## 2020-12-15 RX ADMIN — Medication 1000 ML: at 15:10

## 2020-12-15 ASSESSMENT — PAIN SCALES - GENERAL: PAINLEVEL: NO PAIN (0)

## 2020-12-15 NOTE — PROGRESS NOTES
Infusion Nursing Note:  Talya Zuleta presents today for C4D8 Taxol/Kanjinti.    Patient seen by provider today: No   present during visit today: Not Applicable.    Note: N/A.    Intravenous Access:  Implanted Port.    Treatment Conditions:  Lab Results   Component Value Date    HGB 11.7 12/15/2020     Lab Results   Component Value Date    WBC 5.3 12/15/2020      Lab Results   Component Value Date    ANEU 3.5 12/15/2020     Lab Results   Component Value Date     12/15/2020      Results reviewed, labs MET treatment parameters, ok to proceed with treatment.    Post Infusion Assessment:  Patient tolerated infusion without incident.  Blood return noted pre and post infusion.  Site patent and intact, free from redness, edema or discomfort.  No evidence of extravasations.  Access discontinued per protocol.       Discharge Plan:   Patient will return 12/22/2020 for next appointment.   Patient discharged in stable condition accompanied by: self.  Departure Mode: Ambulatory.    Magaly Osorio RN-BSN, PHN, OCN  [facility]

## 2020-12-17 ASSESSMENT — ENCOUNTER SYMPTOMS
SKIN CHANGES: 0
SPEECH CHANGE: 0
SEIZURES: 0
NAIL CHANGES: 0
EYE WATERING: 0
RECTAL PAIN: 0
WEAKNESS: 0
POOR WOUND HEALING: 0
DIARRHEA: 1
SORE THROAT: 0
EYE IRRITATION: 1
PARALYSIS: 0
JAUNDICE: 0
NUMBNESS: 1
BOWEL INCONTINENCE: 1
NECK MASS: 0
TREMORS: 0
EYE REDNESS: 0
TASTE DISTURBANCE: 1
HEADACHES: 0
NAUSEA: 0
VOMITING: 0
DOUBLE VISION: 0
EYE PAIN: 1
DIZZINESS: 1
HEARTBURN: 0
SINUS PAIN: 0
TROUBLE SWALLOWING: 0
HOARSE VOICE: 0
CONSTIPATION: 0
BLOATING: 1
LOSS OF CONSCIOUSNESS: 1
DISTURBANCES IN COORDINATION: 1
MEMORY LOSS: 0
SMELL DISTURBANCE: 0
ABDOMINAL PAIN: 0
TINGLING: 0
SINUS CONGESTION: 0
BLOOD IN STOOL: 0

## 2020-12-18 ENCOUNTER — ONCOLOGY VISIT (OUTPATIENT)
Dept: ONCOLOGY | Facility: CLINIC | Age: 75
End: 2020-12-18
Attending: SURGERY
Payer: COMMERCIAL

## 2020-12-18 VITALS
BODY MASS INDEX: 26.84 KG/M2 | HEART RATE: 78 BPM | RESPIRATION RATE: 16 BRPM | DIASTOLIC BLOOD PRESSURE: 78 MMHG | SYSTOLIC BLOOD PRESSURE: 144 MMHG | WEIGHT: 165 LBS | OXYGEN SATURATION: 97 %

## 2020-12-18 DIAGNOSIS — C50.811 MALIGNANT NEOPLASM OF OVERLAPPING SITES OF RIGHT BREAST IN FEMALE, ESTROGEN RECEPTOR POSITIVE (H): Primary | ICD-10-CM

## 2020-12-18 DIAGNOSIS — Z17.0 MALIGNANT NEOPLASM OF OVERLAPPING SITES OF RIGHT BREAST IN FEMALE, ESTROGEN RECEPTOR POSITIVE (H): Primary | ICD-10-CM

## 2020-12-18 PROCEDURE — G0463 HOSPITAL OUTPT CLINIC VISIT: HCPCS

## 2020-12-18 PROCEDURE — 99214 OFFICE O/P EST MOD 30 MIN: CPT | Performed by: SURGERY

## 2020-12-18 ASSESSMENT — PAIN SCALES - GENERAL: PAINLEVEL: NO PAIN (0)

## 2020-12-18 NOTE — NURSING NOTE
"Oncology Rooming Note    December 18, 2020 11:39 AM   Talya Zuleta is a 75 year old female who presents for:    Chief Complaint   Patient presents with     Oncology Clinic Visit     BREAST CANCER      Initial Vitals: BP (!) 144/78   Pulse 78   Resp 16   Wt 74.8 kg (165 lb)   SpO2 97%   BMI 26.84 kg/m   Estimated body mass index is 26.84 kg/m  as calculated from the following:    Height as of 10/26/20: 1.67 m (5' 5.75\").    Weight as of this encounter: 74.8 kg (165 lb). Body surface area is 1.86 meters squared.  No Pain (0) Comment: Data Unavailable   No LMP recorded. Patient is postmenopausal.  Allergies reviewed: Yes  Medications reviewed: Yes    Medications: Medication refills not needed today.  Pharmacy name entered into fabrooms:    [id number] - [place], [place] - [address] OF [address] [place] [address] - [place], [place] - [address], [address]  [facility] - [facility] [address]    Clinical concerns: No new concerns today  Dr Newsome  was notified.      Kathryn Singleton            "

## 2020-12-18 NOTE — LETTER
12/18/2020         RE: Talya Zuleta  [address] [address]        Dear Colleague,    Thank you for referring your patient, Talya Zuleta, to the [facility]. Please see a copy of my visit note below.    HISTORY OF PRESENT ILLNESS:  Talya Zuleta is here for a followup visit.  I saw her in September.  At that time, she was diagnosed with a T2 N0 M0, grade 3, ER-positive, HER2-positive breast cancer.  She has been taken care of by Dr. Alvarez.  At the time of her diagnosis, we obtained a PET CT scan which demonstrated hypermetabolic mass in the right breast with no axillary lymph node metastasis.  Dr. Alvarez has been treating her with neoadjuvant THP.  She has had no imaging since starting her chemotherapy.  She is now here to talk about treatment options.  I did meet with her in person and talked to her daughter on the phone at the same time.       PHYSICAL EXAMINATION:     CHEST:  She still has a palpable mass in the right breast at about the 12 o'clock position.  Measures about 1.5 cm in size.  Before, it measured about 3 cm.  She has no axillary lymphadenopathy.      IMPRESSION:  Stage II, ER-positive, HER2-positive breast cancer.      PLAN:  I talked to her about mastectomy with no reconstruction versus a lumpectomy with radiation therapy.  I told her the survival rates were the same.  The advantage of having a lumpectomy would be a smaller surgery.  The advantages of a mastectomy would be probable avoidance of radiation therapy.  I think the patient and her daughter are leaning towards a mastectomy but they are not committed yet.  I do not know how much more chemotherapy Dr. Alvarez is going to give her, so we will hold off on making any definite surgical plans right now which would either be a lumpectomy without wire localization or a mastectomy with sentinel lymph node biopsy.      Total time 30 minutes, face time 20 minutes.         Again, thank you for allowing me to participate in the care of your patient.        Sincerely,        Laci Newsome MD      cc:   Perlita Alvarez MD    Dept of Hematology, Oncology & Transplantation   [address] [place] [id number]

## 2020-12-18 NOTE — PROGRESS NOTES
HISTORY OF PRESENT ILLNESS:  Talya Zuleta is here for a followup visit.  I saw her in September.  At that time, she was diagnosed with a T2 N0 M0, grade 3, ER-positive, HER2-positive breast cancer.  She has been taken care of by Dr. Alvarez.  At the time of her diagnosis, we obtained a PET CT scan which demonstrated hypermetabolic mass in the right breast with no axillary lymph node metastasis.  Dr. Alvarez has been treating her with neoadjuvant THP.  She has had no imaging since starting her chemotherapy.  She is now here to talk about treatment options.  I did meet with her in person and talked to her daughter on the phone at the same time.       PHYSICAL EXAMINATION:     CHEST:  She still has a palpable mass in the right breast at about the 12 o'clock position.  Measures about 1.5 cm in size.  Before, it measured about 3 cm.  She has no axillary lymphadenopathy.      IMPRESSION:  Stage II, ER-positive, HER2-positive breast cancer.      PLAN:  I talked to her about mastectomy with no reconstruction versus a lumpectomy with radiation therapy.  I told her the survival rates were the same.  The advantage of having a lumpectomy would be a smaller surgery.  The advantages of a mastectomy would be probable avoidance of radiation therapy.  I think the patient and her daughter are leaning towards a mastectomy but they are not committed yet.  I do not know how much more chemotherapy Dr. Alvarez is going to give her, so we will hold off on making any definite surgical plans right now which would either be a lumpectomy without wire localization or a mastectomy with sentinel lymph node biopsy.      Total time 30 minutes, face time 20 minutes.      cc:   Perlita Alvarez MD    Dept of Hematology, Oncology & Transplantation   [address] [place] [address]

## 2020-12-22 ENCOUNTER — ALLIED HEALTH/NURSE VISIT (OUTPATIENT)
Dept: ONCOLOGY | Facility: CLINIC | Age: 75
End: 2020-12-22
Payer: COMMERCIAL

## 2020-12-22 ENCOUNTER — INFUSION THERAPY VISIT (OUTPATIENT)
Dept: INFUSION THERAPY | Facility: CLINIC | Age: 75
End: 2020-12-22
Payer: COMMERCIAL

## 2020-12-22 VITALS
WEIGHT: 164.9 LBS | SYSTOLIC BLOOD PRESSURE: 123 MMHG | DIASTOLIC BLOOD PRESSURE: 71 MMHG | TEMPERATURE: 98 F | RESPIRATION RATE: 14 BRPM | BODY MASS INDEX: 26.82 KG/M2 | HEART RATE: 72 BPM | OXYGEN SATURATION: 99 %

## 2020-12-22 DIAGNOSIS — Z17.0 MALIGNANT NEOPLASM OF OVERLAPPING SITES OF RIGHT BREAST IN FEMALE, ESTROGEN RECEPTOR POSITIVE (H): Primary | ICD-10-CM

## 2020-12-22 DIAGNOSIS — C50.811 MALIGNANT NEOPLASM OF OVERLAPPING SITES OF RIGHT BREAST IN FEMALE, ESTROGEN RECEPTOR POSITIVE (H): Primary | ICD-10-CM

## 2020-12-22 LAB
BASOPHILS # BLD AUTO: 0 10E9/L (ref 0–0.2)
BASOPHILS NFR BLD AUTO: 0.8 %
DIFFERENTIAL METHOD BLD: ABNORMAL
EOSINOPHIL # BLD AUTO: 0 10E9/L (ref 0–0.7)
EOSINOPHIL NFR BLD AUTO: 0.6 %
ERYTHROCYTE [DISTWIDTH] IN BLOOD BY AUTOMATED COUNT: 18 % (ref 10–15)
HCT VFR BLD AUTO: 35 % (ref 35–47)
HGB BLD-MCNC: 11.5 G/DL (ref 11.7–15.7)
IMM GRANULOCYTES # BLD: 0 10E9/L (ref 0–0.4)
IMM GRANULOCYTES NFR BLD: 0.4 %
LYMPHOCYTES # BLD AUTO: 1.2 10E9/L (ref 0.8–5.3)
LYMPHOCYTES NFR BLD AUTO: 23.3 %
MCH RBC QN AUTO: 33.6 PG (ref 26.5–33)
MCHC RBC AUTO-ENTMCNC: 32.9 G/DL (ref 31.5–36.5)
MCV RBC AUTO: 102 FL (ref 78–100)
MONOCYTES # BLD AUTO: 0.4 10E9/L (ref 0–1.3)
MONOCYTES NFR BLD AUTO: 7.4 %
NEUTROPHILS # BLD AUTO: 3.4 10E9/L (ref 1.6–8.3)
NEUTROPHILS NFR BLD AUTO: 67.5 %
PLATELET # BLD AUTO: 253 10E9/L (ref 150–450)
RBC # BLD AUTO: 3.42 10E12/L (ref 3.8–5.2)
WBC # BLD AUTO: 5 10E9/L (ref 4–11)

## 2020-12-22 PROCEDURE — 96413 CHEMO IV INFUSION 1 HR: CPT | Performed by: NURSE PRACTITIONER

## 2020-12-22 PROCEDURE — 85025 COMPLETE CBC W/AUTO DIFF WBC: CPT | Performed by: INTERNAL MEDICINE

## 2020-12-22 PROCEDURE — 99207 PR NO CHARGE LOS: CPT

## 2020-12-22 PROCEDURE — 96417 CHEMO IV INFUS EACH ADDL SEQ: CPT | Performed by: NURSE PRACTITIONER

## 2020-12-22 RX ORDER — HEPARIN SODIUM (PORCINE) LOCK FLUSH IV SOLN 100 UNIT/ML 100 UNIT/ML
5 SOLUTION INTRAVENOUS
Status: DISCONTINUED | OUTPATIENT
Start: 2020-12-22 | End: 2020-12-22 | Stop reason: HOSPADM

## 2020-12-22 RX ADMIN — Medication 1000 ML: at 14:35

## 2020-12-22 RX ADMIN — HEPARIN SODIUM (PORCINE) LOCK FLUSH IV SOLN 100 UNIT/ML 5 ML: 100 SOLUTION at 16:42

## 2020-12-22 RX ADMIN — HEPARIN SODIUM (PORCINE) LOCK FLUSH IV SOLN 100 UNIT/ML 5 ML: 100 SOLUTION at 14:01

## 2020-12-22 NOTE — PROGRESS NOTES
Infusion Nursing Note:  Talya Zuleta presents today for C4D15 Fluids, Kanjinti, Taxol.    Patient seen by provider today: No   present during visit today: Not Applicable.    Note:   Offered screening Covid test per protocol.  Patient states nobody comes to her house and she only leaves to go to the clinic thus declined swab today.    Also states she has been told she should get the flu vaccine.  Yet patient states she is hesitant to get the flu vaccine as the last time she got one, she had several episodes of syncope, that caused her to be hospitalized.    Also patient talked about her surgery options, mastectomy vs. lumpectomy.  Answered patient's questions to her satisfaction (ex. What is a JNUE drain.)    Intravenous Access:  Implanted Port.    Treatment Conditions:  Lab Results   Component Value Date    HGB 11.5 12/22/2020     Lab Results   Component Value Date    WBC 5.0 12/22/2020      Lab Results   Component Value Date    ANEU 3.4 12/22/2020     Lab Results   Component Value Date     12/22/2020      Results reviewed, labs MET treatment parameters, ok to proceed with treatment.  ECHO/MUGA completed 9/29/20  EF 63%.      Post Infusion Assessment:  Patient tolerated infusion without incident.  Blood return noted pre and post infusion.  Site patent and intact, free from redness, edema or discomfort.  No evidence of extravasations.  Access discontinued per protocol.       Discharge Plan:   AVS to patient via MYCHART.  Patient will return 12/29/20 for next appointment.   Patient discharged in stable condition accompanied by: self.  Departure Mode: Ambulatory.    Michaela Anderson RN

## 2020-12-27 RX ORDER — MEPERIDINE HYDROCHLORIDE 25 MG/ML
25 INJECTION INTRAMUSCULAR; INTRAVENOUS; SUBCUTANEOUS EVERY 30 MIN PRN
Status: CANCELLED | OUTPATIENT
Start: 2020-12-29

## 2020-12-27 RX ORDER — HEPARIN SODIUM,PORCINE 10 UNIT/ML
5 VIAL (ML) INTRAVENOUS
Status: CANCELLED | OUTPATIENT
Start: 2020-12-29

## 2020-12-27 RX ORDER — DIPHENHYDRAMINE HYDROCHLORIDE 50 MG/ML
50 INJECTION INTRAMUSCULAR; INTRAVENOUS
Status: CANCELLED
Start: 2020-12-29

## 2020-12-27 RX ORDER — ALBUTEROL SULFATE 0.83 MG/ML
2.5 SOLUTION RESPIRATORY (INHALATION)
Status: CANCELLED | OUTPATIENT
Start: 2020-12-29

## 2020-12-27 RX ORDER — ALBUTEROL SULFATE 90 UG/1
1-2 AEROSOL, METERED RESPIRATORY (INHALATION)
Status: CANCELLED
Start: 2020-12-29

## 2020-12-27 RX ORDER — LORAZEPAM 2 MG/ML
0.5 INJECTION INTRAMUSCULAR EVERY 4 HOURS PRN
Status: CANCELLED
Start: 2020-12-29

## 2020-12-27 RX ORDER — METHYLPREDNISOLONE SODIUM SUCCINATE 125 MG/2ML
125 INJECTION, POWDER, LYOPHILIZED, FOR SOLUTION INTRAMUSCULAR; INTRAVENOUS
Status: CANCELLED
Start: 2020-12-29

## 2020-12-27 RX ORDER — EPINEPHRINE 1 MG/ML
0.3 INJECTION, SOLUTION INTRAMUSCULAR; SUBCUTANEOUS EVERY 5 MIN PRN
Status: CANCELLED | OUTPATIENT
Start: 2020-12-29

## 2020-12-27 RX ORDER — PALONOSETRON 0.05 MG/ML
0.25 INJECTION, SOLUTION INTRAVENOUS ONCE
Status: CANCELLED
Start: 2020-12-29

## 2020-12-27 RX ORDER — NALOXONE HYDROCHLORIDE 0.4 MG/ML
.1-.4 INJECTION, SOLUTION INTRAMUSCULAR; INTRAVENOUS; SUBCUTANEOUS
Status: CANCELLED | OUTPATIENT
Start: 2020-12-29

## 2020-12-27 RX ORDER — DOXORUBICIN HYDROCHLORIDE 2 MG/ML
60 INJECTION, SOLUTION INTRAVENOUS ONCE
Status: CANCELLED | OUTPATIENT
Start: 2020-12-29

## 2020-12-27 RX ORDER — SODIUM CHLORIDE 9 MG/ML
1000 INJECTION, SOLUTION INTRAVENOUS CONTINUOUS PRN
Status: CANCELLED
Start: 2020-12-29

## 2020-12-27 RX ORDER — HEPARIN SODIUM (PORCINE) LOCK FLUSH IV SOLN 100 UNIT/ML 100 UNIT/ML
5 SOLUTION INTRAVENOUS
Status: CANCELLED | OUTPATIENT
Start: 2020-12-29

## 2020-12-27 NOTE — PROGRESS NOTES
Oncology Follow Up:  Date on this visit: 12/28/2020    Diagnosis:  right breast cancer, clinical stage T2N0M0.    Primary Physician: Cole Weston     History Of Present Illness:  Ms. Zuleta is a 75 year old female with right breast cancer.  Routine screening mammogram in 07/2020 showed bilateral breast asymmetries.  Diagnostic mammograms and ultrasound showed a 2.7 cm mass in the right breast at 12:00, 5 cm from the nipple with ductal extension including a 6 mm mass at 3 cm from the nipple.  In the left breast were benign appearing cysts.  Ultrasound of the right axilla was without lymphadenopathy.  Contrast enhanced mammogram showed the right breast mass, including extension, to measure 4.9 cm.  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  Estrogen receptor was moderate in 50% and progesterone receptor staining was negative.  HER-2 was negative by IHC; HER2 FISH showed approximately 10% of tumor cells to have 6.8 HER2 signals/nucleus and a HER2/CEN17 ratio of 1.6.  The HER2 positive cells were noted to be the large pleomorphic cells.  Multiple neutrophils were noted to be infiltrating through tumor stroma.    She received 12 weeks of neoadjuvant Taxol, Herceptin, and pertuzumab from 10/7/2020 - 12/22/2020.    Interval History:  Ms. Zuleta was seen in clinic today for routine breast cancer follow-up.  Since last visit, she has completed the entirety of her planned 12 weeks of Taxol, Herceptin, and Pertuzumab.  The last couple of weeks she feels that her right breast mass has increased in size.  She denies overlying skin changes or tenderness associated with the mass.  She states in general she has been feeling well on chemotherapy.  She does report 1-2 episodes of diarrhea daily.  They are associated with stool urgency but no stool incontinence.  She denies nausea.  She is eating regular meals and has had no vomiting.  She reports ongoing sores  in her nose which improve with triamcinolone.  She notes worsening of skin changes under both arms.  She has begun to apply triamcinolone to these areas as well.  She occasionally will have a small amount of drainage.  She denies fevers, chills, or infectious complaints.  She has no cough, shortness of breath, or chest pain.  She continues to drink water during chemotherapy.  She notes chronic swelling of her lower extremities.  The remainder of a complete 12 point review of systems was reviewed with the patient was negative with the exception of that mentioned above.    Past Medical/Surgical History:  1.  Breast cancer as per HPI  2.  Hypertension  3.  Diabetes  4.  COPD  5.  Hypothyroidism  6.  Hypohidrosis    Allergies:  Allergies as of 12/28/2020 - Reviewed 12/22/2020   Allergen Reaction Noted     Bacitracin-neomycin-polymyxin  04/18/2003     Latex  09/30/2005     Current Medications:  Current Outpatient Medications   Medication Sig Dispense Refill     amLODIPine (NORVASC) 5 MG tablet Take 5 mg by mouth       atorvastatin (LIPITOR) 40 MG tablet TAKE 1/2 TABLET DAILY       benazepril (LOTENSIN) 20 MG tablet TAKE 1 TABLET BY MOUTH TWO TIMES A DAY.       diphenoxylate-atropine (LOMOTIL) 2.5-0.025 MG tablet Take 1 tablet by mouth 2 times daily 60 tablet 2     furosemide (LASIX) 80 MG tablet Take 80 mg by mouth 2 times daily       HYDROcodone-acetaminophen (NORCO) 5-325 MG tablet Take 1 tablet by mouth every 4 hours as needed for moderate to severe pain 5 tablet 0     LORazepam (ATIVAN) 0.5 MG tablet Take 1 tablet (0.5 mg) by mouth every 4 hours as needed (Anxiety, Nausea/Vomiting or Sleep) 30 tablet 2     ondansetron (ZOFRAN) 8 MG tablet Take 1 tablet (8 mg) by mouth every 8 hours as needed for nausea (Patient not taking: Reported on 12/8/2020) 30 tablet 3     ondansetron (ZOFRAN-ODT) 8 MG ODT tab Take 1 tablet (8 mg) by mouth every 8 hours as needed for nausea 90 tablet 1     potassium chloride ER (KLOR-CON M) 20  MEQ CR tablet Take 40 mEq by mouth       prochlorperazine (COMPAZINE) 10 MG tablet Take 0.5 tablets (5 mg) by mouth every 6 hours as needed (Nausea/Vomiting) (Patient not taking: Reported on 12/8/2020) 30 tablet 2     triamcinolone (KENALOG) 0.1 % external cream         Family and Social History:  Please see initial consultation dated 9/21/2020 for further details.  Breast Actionable Panel on 10/1/2020 was negative for mutation.    Physical Exam:  General:  Well appearing, well-nourished adult female in NAD.  HEENT:  Normocephalic.  Sclera anicteric.  MMM.  No lesions of the oropharynx. (+) alopecia, wearing a wig.  Lymph:  No palpable cervical, supraclavicular, or axillary LAD.  Chest:  CTA bilaterally.  No wheezes or crackles.  CV:  RRR.  Nl S1 and S2.  No m/r/g.  Breast:  Right breast mass at 12:00, 5 cm from the nipple is very firm and measures 3 x 3 cm.  There are no other discretely palpable masses in either breast.  Abd:  Soft/NT/ND.  BSs normoactive.  No hepatosplenomegaly.  Ext:  No pitting edema of the bilateral lower extremities.  Pulses 2+ and symmetric.  Musculo:  Full ROM of the bilateral extremities.  Neuro:  Cranial nerves grossly intact.  Psych:  Mood and affect appear normal.  Skin:  Fibrotic skin in the bilateral axillae.  Now with overlying plaque-like erythema.  No drainage.    Laboratory/Imaging Studies  12/22/2020 Labs:  WBC is wnl.  Hemoglobin is slightly low at 11.5 g/dL  Platelets are wnl.    ASSESSMENT/PLAN:  75 year old female with a history of HTN, dyslipidemia, COPD, diabetes, and OA with a newly diagnosed clinical stage, T2N0M0, right breast cancer, that is ER positive (50%), NH negative, and HER2 positive.       1.  Right breast cancer:  She has now completed 12 weeks of THP chemotherapy.  Unfortunately, on exam today, her breast tumor palpates larger than on last exam.  My recommendation to proceed with 4 cycles of adriamycin and cyclophosphamide.  I discussed with the Talya and her  daughter, Carolyne, that AC is given IV once every 2 weeks.  We reviewed the potential side effects include anticipated increase in fatigue, nausea, low blood counts with associated increased risk of infection, and cardiac toxicity.  She has an infusion appointment tomorrow.  Will need a repeat echocardiogram prior to the infusion.    Overall plan is to complete a total of 4 cycle of dose dense adriamycin and cyclophosphamide.  Following completion of chemotherapy, will proceed with surgery.  If she decides to have a  lumpectomy or she has a positive lymph node at the time of surgery, she will need breast radiation after surgery. She recently met with Dr. Newsome to discuss surgery options, she is leaning towards mastectomy but has not yet finalized her decision.  Finally, will treat with a minimum 5 years of endocrine therapy.     2. Diarrhea/orthostasis:  Secondary to Herceptin and Perjeta.  Has now completed the course of this.  Continue imodium prn until diarrhea is resolved.    3.  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  Taxol now complete.  She continues to take vitamin B6 100 mg PO daily.      4. Nose bleeds/sores: Using triamcinolone cream prn.  She is also using a saline nasal spray.    5.  Hypohidrosis ectodermal dysplasia:  Increased plaquelike erythema.  Continue triamcinolone as this is what she has treated with in the past.  If no improvement in symptoms, would recommend referral to dermatology.    6. Follow Up:    - Echocardiogram prior to chemotherapy infusion at [facility] tomorrow.  - Change tomorrow's infusion at [facility] from Taxol, Kanjinti, and perjeta to adriamycin and cyclophosphamide.  - Labs, RUPA visit and adriamycin and cyclophosphamide infusion at [facility] in 2 and 4 weeks.  - Labs, visit with me, and adriamycin and cyclophosphamide infusion in 6 weeks.    I spent a total of 45 minutes in face to face visit with this patient today, >75% of which was spent in counseling.

## 2020-12-28 ENCOUNTER — ONCOLOGY VISIT (OUTPATIENT)
Dept: ONCOLOGY | Facility: CLINIC | Age: 75
End: 2020-12-28
Attending: INTERNAL MEDICINE
Payer: COMMERCIAL

## 2020-12-28 VITALS
OXYGEN SATURATION: 99 % | BODY MASS INDEX: 26.84 KG/M2 | SYSTOLIC BLOOD PRESSURE: 127 MMHG | WEIGHT: 167 LBS | HEIGHT: 66 IN | RESPIRATION RATE: 16 BRPM | DIASTOLIC BLOOD PRESSURE: 74 MMHG | HEART RATE: 78 BPM

## 2020-12-28 DIAGNOSIS — C50.811 MALIGNANT NEOPLASM OF OVERLAPPING SITES OF RIGHT BREAST IN FEMALE, ESTROGEN RECEPTOR POSITIVE (H): Primary | ICD-10-CM

## 2020-12-28 DIAGNOSIS — T45.1X5A CHEMOTHERAPY-INDUCED NEUTROPENIA (H): ICD-10-CM

## 2020-12-28 DIAGNOSIS — Z17.0 MALIGNANT NEOPLASM OF OVERLAPPING SITES OF RIGHT BREAST IN FEMALE, ESTROGEN RECEPTOR POSITIVE (H): Primary | ICD-10-CM

## 2020-12-28 DIAGNOSIS — Z51.11 ENCOUNTER FOR ANTINEOPLASTIC CHEMOTHERAPY: ICD-10-CM

## 2020-12-28 DIAGNOSIS — D70.1 CHEMOTHERAPY-INDUCED NEUTROPENIA (H): ICD-10-CM

## 2020-12-28 PROCEDURE — 99215 OFFICE O/P EST HI 40 MIN: CPT | Performed by: INTERNAL MEDICINE

## 2020-12-28 PROCEDURE — G0463 HOSPITAL OUTPT CLINIC VISIT: HCPCS

## 2020-12-28 RX ORDER — DEXAMETHASONE 4 MG/1
8 TABLET ORAL
Qty: 6 TABLET | Refills: 3 | Status: SHIPPED | OUTPATIENT
Start: 2020-12-28 | End: 2021-01-12

## 2020-12-28 RX ORDER — LORATADINE 10 MG/1
CAPSULE, LIQUID FILLED ORAL
Qty: 30 CAPSULE | Refills: 1 | Status: SHIPPED | OUTPATIENT
Start: 2020-12-28 | End: 2021-03-28

## 2020-12-28 ASSESSMENT — MIFFLIN-ST. JEOR: SCORE: 1260.29

## 2020-12-28 ASSESSMENT — PAIN SCALES - GENERAL: PAINLEVEL: NO PAIN (0)

## 2020-12-28 NOTE — LETTER
12/28/2020         RE: Talya Zuleta  [address] [facility] [id number]        Dear Colleague,    Thank you for referring your patient, Talya Zuleta, to the [facility]. Please see a copy of my visit note below.      Oncology Follow Up:  Date on this visit: 12/28/2020    Diagnosis:  right breast cancer, clinical stage T2N0M0.    Primary Physician: Cole Weston     History Of Present Illness:  Ms. Zuleta is a 75 year old female with right breast cancer.  Routine screening mammogram in 07/2020 showed bilateral breast asymmetries.  Diagnostic mammograms and ultrasound showed a 2.7 cm mass in the right breast at 12:00, 5 cm from the nipple with ductal extension including a 6 mm mass at 3 cm from the nipple.  In the left breast were benign appearing cysts.  Ultrasound of the right axilla was without lymphadenopathy.  Contrast enhanced mammogram showed the right breast mass, including extension, to measure 4.9 cm.  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  Estrogen receptor was moderate in 50% and progesterone receptor staining was negative.  HER-2 was negative by IHC; HER2 FISH showed approximately 10% of tumor cells to have 6.8 HER2 signals/nucleus and a HER2/CEN17 ratio of 1.6.  The HER2 positive cells were noted to be the large pleomorphic cells.  Multiple neutrophils were noted to be infiltrating through tumor stroma.    She received 12 weeks of neoadjuvant Taxol, Herceptin, and pertuzumab from 10/7/2020 - 12/22/2020.    Interval History:  Ms. Zuleta was seen in clinic today for routine breast cancer follow-up.  Since last visit, she has completed the entirety of her planned 12 weeks of Taxol, Herceptin, and Pertuzumab.  The last couple of weeks she feels that her right breast mass has increased in size.  She denies overlying skin changes or tenderness associated with the mass.  She states in  general she has been feeling well on chemotherapy.  She does report 1-2 episodes of diarrhea daily.  They are associated with stool urgency but no stool incontinence.  She denies nausea.  She is eating regular meals and has had no vomiting.  She reports ongoing sores in her nose which improve with triamcinolone.  She notes worsening of skin changes under both arms.  She has begun to apply triamcinolone to these areas as well.  She occasionally will have a small amount of drainage.  She denies fevers, chills, or infectious complaints.  She has no cough, shortness of breath, or chest pain.  She continues to drink water during chemotherapy.  She notes chronic swelling of her lower extremities.  The remainder of a complete 12 point review of systems was reviewed with the patient was negative with the exception of that mentioned above.    Past Medical/Surgical History:  1.  Breast cancer as per HPI  2.  Hypertension  3.  Diabetes  4.  COPD  5.  Hypothyroidism  6.  Hypohidrosis    Allergies:  Allergies as of 12/28/2020 - Reviewed 12/22/2020   Allergen Reaction Noted     Bacitracin-neomycin-polymyxin  04/18/2003     Latex  09/30/2005     Current Medications:  Current Outpatient Medications   Medication Sig Dispense Refill     amLODIPine (NORVASC) 5 MG tablet Take 5 mg by mouth       atorvastatin (LIPITOR) 40 MG tablet TAKE 1/2 TABLET DAILY       benazepril (LOTENSIN) 20 MG tablet TAKE 1 TABLET BY MOUTH TWO TIMES A DAY.       diphenoxylate-atropine (LOMOTIL) 2.5-0.025 MG tablet Take 1 tablet by mouth 2 times daily 60 tablet 2     furosemide (LASIX) 80 MG tablet Take 80 mg by mouth 2 times daily       HYDROcodone-acetaminophen (NORCO) 5-325 MG tablet Take 1 tablet by mouth every 4 hours as needed for moderate to severe pain 5 tablet 0     LORazepam (ATIVAN) 0.5 MG tablet Take 1 tablet (0.5 mg) by mouth every 4 hours as needed (Anxiety, Nausea/Vomiting or Sleep) 30 tablet 2     ondansetron (ZOFRAN) 8 MG tablet Take 1 tablet  (8 mg) by mouth every 8 hours as needed for nausea (Patient not taking: Reported on 12/8/2020) 30 tablet 3     ondansetron (ZOFRAN-ODT) 8 MG ODT tab Take 1 tablet (8 mg) by mouth every 8 hours as needed for nausea 90 tablet 1     potassium chloride ER (KLOR-CON M) 20 MEQ CR tablet Take 40 mEq by mouth       prochlorperazine (COMPAZINE) 10 MG tablet Take 0.5 tablets (5 mg) by mouth every 6 hours as needed (Nausea/Vomiting) (Patient not taking: Reported on 12/8/2020) 30 tablet 2     triamcinolone (KENALOG) 0.1 % external cream         Family and Social History:  Please see initial consultation dated 9/21/2020 for further details.  Breast Actionable Panel on 10/1/2020 was negative for mutation.    Physical Exam:  General:  Well appearing, well-nourished adult female in NAD.  HEENT:  Normocephalic.  Sclera anicteric.  MMM.  No lesions of the oropharynx. (+) alopecia, wearing a wig.  Lymph:  No palpable cervical, supraclavicular, or axillary LAD.  Chest:  CTA bilaterally.  No wheezes or crackles.  CV:  RRR.  Nl S1 and S2.  No m/r/g.  Breast:  Right breast mass at 12:00, 5 cm from the nipple is very firm and measures 3 x 3 cm.  There are no other discretely palpable masses in either breast.  Abd:  Soft/NT/ND.  BSs normoactive.  No hepatosplenomegaly.  Ext:  No pitting edema of the bilateral lower extremities.  Pulses 2+ and symmetric.  Musculo:  Full ROM of the bilateral extremities.  Neuro:  Cranial nerves grossly intact.  Psych:  Mood and affect appear normal.  Skin:  Fibrotic skin in the bilateral axillae.  Now with overlying plaque-like erythema.  No drainage.    Laboratory/Imaging Studies  12/22/2020 Labs:  WBC is wnl.  Hemoglobin is slightly low at 11.5 g/dL  Platelets are wnl.    ASSESSMENT/PLAN:  75 year old female with a history of HTN, dyslipidemia, COPD, diabetes, and OA with a newly diagnosed clinical stage, T2N0M0, right breast cancer, that is ER positive (50%), OR negative, and HER2 positive.       1.  Right  breast cancer:  She has now completed 12 weeks of THP chemotherapy.  Unfortunately, on exam today, her breast tumor palpates larger than on last exam.  My recommendation to proceed with 4 cycles of adriamycin and cyclophosphamide.  I discussed with the Talya and her daughter, Carolyne, that AC is given IV once every 2 weeks.  We reviewed the potential side effects include anticipated increase in fatigue, nausea, low blood counts with associated increased risk of infection, and cardiac toxicity.  She has an infusion appointment tomorrow.  Will need a repeat echocardiogram prior to the infusion.    Overall plan is to complete a total of 4 cycle of dose dense adriamycin and cyclophosphamide.  Following completion of chemotherapy, will proceed with surgery.  If she decides to have a  lumpectomy or she has a positive lymph node at the time of surgery, she will need breast radiation after surgery. She recently met with Dr. Newsome to discuss surgery options, she is leaning towards mastectomy but has not yet finalized her decision.  Finally, will treat with a minimum 5 years of endocrine therapy.     2. Diarrhea/orthostasis:  Secondary to Herceptin and Perjeta.  Has now completed the course of this.  Continue imodium prn until diarrhea is resolved.    3.  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  Taxol now complete.  She continues to take vitamin B6 100 mg PO daily.      4. Nose bleeds/sores: Using triamcinolone cream prn.  She is also using a saline nasal spray.    5.  Hypohidrosis ectodermal dysplasia:  Increased plaquelike erythema.  Continue triamcinolone as this is what she has treated with in the past.  If no improvement in symptoms, would recommend referral to dermatology.    6. Follow Up:    - Echocardiogram prior to chemotherapy infusion at [facility] tomorrow.  - Change tomorrow's infusion at [facility] from Taxol, Kanjinti, and perjeta to adriamycin and cyclophosphamide.  - Labs, RUPA visit and adriamycin and cyclophosphamide infusion  at [facility] in 2 and 4 weeks.  - Labs, visit with me, and adriamycin and cyclophosphamide infusion in 6 weeks.    I spent a total of 45 minutes in face to face visit with this patient today, >75% of which was spent in counseling.      Again, thank you for allowing me to participate in the care of your patient.        Sincerely,        Perlita Alvarez MD

## 2020-12-28 NOTE — NURSING NOTE
"Oncology Rooming Note    December 28, 2020 12:06 PM   Talya Zuleta is a 75 year old female who presents for:    Chief Complaint   Patient presents with     Oncology Clinic Visit     BREAST CANCER     Initial Vitals: /74   Pulse 78   Resp 16   Ht 1.67 m (5' 5.75\")   Wt 75.8 kg (167 lb)   SpO2 99%   BMI 27.16 kg/m   Estimated body mass index is 27.16 kg/m  as calculated from the following:    Height as of this encounter: 1.67 m (5' 5.75\").    Weight as of this encounter: 75.8 kg (167 lb). Body surface area is 1.88 meters squared.  No Pain (0) Comment: Data Unavailable   No LMP recorded. Patient is postmenopausal.  Allergies reviewed: Yes  Medications reviewed: Yes    Medications: Medication refills not needed today.  Pharmacy name entered into [employer]:    [id number] - [place], [place] - [address] OF [address] [place] [address] - [place], [place] - [address], [address]  [facility] - [facility] [address]    Clinical concerns: No new concerns today  Dr Alvarez  was notified.      Kathryn Singleton            "

## 2020-12-29 ENCOUNTER — ALLIED HEALTH/NURSE VISIT (OUTPATIENT)
Dept: ONCOLOGY | Facility: CLINIC | Age: 75
End: 2020-12-29
Payer: COMMERCIAL

## 2020-12-29 ENCOUNTER — INFUSION THERAPY VISIT (OUTPATIENT)
Dept: INFUSION THERAPY | Facility: CLINIC | Age: 75
End: 2020-12-29
Payer: COMMERCIAL

## 2020-12-29 VITALS
OXYGEN SATURATION: 99 % | TEMPERATURE: 97.9 F | HEART RATE: 78 BPM | SYSTOLIC BLOOD PRESSURE: 132 MMHG | DIASTOLIC BLOOD PRESSURE: 79 MMHG | RESPIRATION RATE: 14 BRPM | BODY MASS INDEX: 27.36 KG/M2 | WEIGHT: 168.2 LBS

## 2020-12-29 DIAGNOSIS — C50.811 MALIGNANT NEOPLASM OF OVERLAPPING SITES OF RIGHT BREAST IN FEMALE, ESTROGEN RECEPTOR POSITIVE (H): Primary | ICD-10-CM

## 2020-12-29 DIAGNOSIS — Z17.0 MALIGNANT NEOPLASM OF OVERLAPPING SITES OF RIGHT BREAST IN FEMALE, ESTROGEN RECEPTOR POSITIVE (H): ICD-10-CM

## 2020-12-29 DIAGNOSIS — T45.1X5A CHEMOTHERAPY-INDUCED NEUTROPENIA (H): Primary | ICD-10-CM

## 2020-12-29 DIAGNOSIS — D70.1 CHEMOTHERAPY-INDUCED NEUTROPENIA (H): Primary | ICD-10-CM

## 2020-12-29 DIAGNOSIS — C50.811 MALIGNANT NEOPLASM OF OVERLAPPING SITES OF RIGHT BREAST IN FEMALE, ESTROGEN RECEPTOR POSITIVE (H): ICD-10-CM

## 2020-12-29 DIAGNOSIS — Z17.0 MALIGNANT NEOPLASM OF OVERLAPPING SITES OF RIGHT BREAST IN FEMALE, ESTROGEN RECEPTOR POSITIVE (H): Primary | ICD-10-CM

## 2020-12-29 LAB
ALBUMIN SERPL-MCNC: 3.4 G/DL (ref 3.4–5)
ALP SERPL-CCNC: 71 U/L (ref 40–150)
ALT SERPL W P-5'-P-CCNC: 20 U/L (ref 0–50)
ANION GAP SERPL CALCULATED.3IONS-SCNC: 1 MMOL/L (ref 3–14)
AST SERPL W P-5'-P-CCNC: 14 U/L (ref 0–45)
BASOPHILS # BLD AUTO: 0 10E9/L (ref 0–0.2)
BASOPHILS NFR BLD AUTO: 0.8 %
BILIRUB SERPL-MCNC: 0.2 MG/DL (ref 0.2–1.3)
BUN SERPL-MCNC: 19 MG/DL (ref 7–30)
CALCIUM SERPL-MCNC: 9 MG/DL (ref 8.5–10.1)
CHLORIDE SERPL-SCNC: 108 MMOL/L (ref 94–109)
CO2 SERPL-SCNC: 30 MMOL/L (ref 20–32)
CREAT SERPL-MCNC: 0.94 MG/DL (ref 0.52–1.04)
DIFFERENTIAL METHOD BLD: ABNORMAL
EOSINOPHIL # BLD AUTO: 0 10E9/L (ref 0–0.7)
EOSINOPHIL NFR BLD AUTO: 0.6 %
ERYTHROCYTE [DISTWIDTH] IN BLOOD BY AUTOMATED COUNT: 18 % (ref 10–15)
GFR SERPL CREATININE-BSD FRML MDRD: 59 ML/MIN/{1.73_M2}
GLUCOSE SERPL-MCNC: 92 MG/DL (ref 70–99)
HCT VFR BLD AUTO: 33.7 % (ref 35–47)
HGB BLD-MCNC: 11.2 G/DL (ref 11.7–15.7)
IMM GRANULOCYTES # BLD: 0 10E9/L (ref 0–0.4)
IMM GRANULOCYTES NFR BLD: 0.4 %
LYMPHOCYTES # BLD AUTO: 1.3 10E9/L (ref 0.8–5.3)
LYMPHOCYTES NFR BLD AUTO: 24.9 %
MCH RBC QN AUTO: 34.1 PG (ref 26.5–33)
MCHC RBC AUTO-ENTMCNC: 33.2 G/DL (ref 31.5–36.5)
MCV RBC AUTO: 103 FL (ref 78–100)
MONOCYTES # BLD AUTO: 0.4 10E9/L (ref 0–1.3)
MONOCYTES NFR BLD AUTO: 7.2 %
NEUTROPHILS # BLD AUTO: 3.5 10E9/L (ref 1.6–8.3)
NEUTROPHILS NFR BLD AUTO: 66.1 %
PLATELET # BLD AUTO: 248 10E9/L (ref 150–450)
POTASSIUM SERPL-SCNC: 4.1 MMOL/L (ref 3.4–5.3)
PROT SERPL-MCNC: 6.4 G/DL (ref 6.8–8.8)
RBC # BLD AUTO: 3.28 10E12/L (ref 3.8–5.2)
SODIUM SERPL-SCNC: 139 MMOL/L (ref 133–144)
WBC # BLD AUTO: 5.3 10E9/L (ref 4–11)

## 2020-12-29 PROCEDURE — 99207 PR NO CHARGE LOS: CPT

## 2020-12-29 PROCEDURE — 36591 DRAW BLOOD OFF VENOUS DEVICE: CPT

## 2020-12-29 PROCEDURE — 80053 COMPREHEN METABOLIC PANEL: CPT | Performed by: INTERNAL MEDICINE

## 2020-12-29 PROCEDURE — 85025 COMPLETE CBC W/AUTO DIFF WBC: CPT | Performed by: INTERNAL MEDICINE

## 2020-12-29 RX ORDER — HEPARIN SODIUM (PORCINE) LOCK FLUSH IV SOLN 100 UNIT/ML 100 UNIT/ML
5 SOLUTION INTRAVENOUS
Status: DISCONTINUED | OUTPATIENT
Start: 2020-12-29 | End: 2020-12-29 | Stop reason: HOSPADM

## 2020-12-29 RX ADMIN — HEPARIN SODIUM (PORCINE) LOCK FLUSH IV SOLN 100 UNIT/ML 5 ML: 100 SOLUTION at 14:02

## 2020-12-29 ASSESSMENT — PAIN SCALES - GENERAL: PAINLEVEL: NO PAIN (0)

## 2020-12-29 NOTE — PROGRESS NOTES
Infusion Nursing Note:  Talya Zuleta presents today for C1D1 Adriamycin/Cytoxan/Neulasta Onpro. This is being deferred until tomorrow as patient did not have repeat echo performed yet.    Patient seen by provider today: No   present during visit today: Not Applicable.    Note:  Patient has several questions about what to expect with this chemo, side effects and how to manage those side effects.  Pharmacist Skip visited with patient regarding management of side effects.    Last echo was 9/29/20 with EF 60-65%. Per Dr Alvarez's notes yesterday, patient should have a repeat echo prior to starting this AC.  Paged Dr Alvarez as repeat echo was not scheduled.  States ideally patient would have echo, then start AC THIS WEEK.  However, if patient would like, could do AC today and echo later this week.  Informed patient of the situation. Patient uncertain what to do.  Encouraged patient to call her daughter in law, Carolyne, who is her healthcare agent, and a nurse.  Patient talked with daughter in law.  Decided to hold chemo today and do echo and chemo tomorrow.  Charge nurse scheduling appointments for tomorrow.    Patient also states she was charged $4821-4340 each for the past two chemo.  States she asked Dr Alvarez about it, but hasn't heard back.  Asked Dr Alvarez about this.  Dr Alvarez states this has been brought to the finance department's attention and the finance department is checking into it.  Patient informed.    Intravenous Access:  Implanted Port.    Treatment Conditions:  Lab Results   Component Value Date    HGB 11.2 12/29/2020     Lab Results   Component Value Date    WBC 5.3 12/29/2020      Lab Results   Component Value Date    ANEU 3.5 12/29/2020     Lab Results   Component Value Date     12/29/2020      Lab Results   Component Value Date     12/29/2020                   Lab Results   Component Value Date    POTASSIUM 4.1 12/29/2020           No results found for: MAG          Lab Results   Component Value Date    CR 0.94 12/29/2020                   Lab Results   Component Value Date    JIMBO 9.0 12/29/2020                Lab Results   Component Value Date    BILITOTAL 0.2 12/29/2020           Lab Results   Component Value Date    ALBUMIN 3.4 12/29/2020                    Lab Results   Component Value Date    ALT 20 12/29/2020           Lab Results   Component Value Date    AST 14 12/29/2020       Lab results reviewed, labs MET treatment parameters, ok to proceed with treatment.  ECHO/MUGA not completed and needs to be repeated prior to starting.      Post Infusion Assessment:  Port left accessed for tomorrow's infusion.      Discharge Plan:   AVS to patient via [employer].  Patient will return tomorrow for next appointment.   Patient discharged in stable condition accompanied by: self.  is her .  Departure Mode: Ambulatory.    Michaela Anderson RN

## 2020-12-30 ENCOUNTER — ANCILLARY PROCEDURE (OUTPATIENT)
Dept: CARDIOLOGY | Facility: CLINIC | Age: 75
End: 2020-12-30
Attending: INTERNAL MEDICINE
Payer: COMMERCIAL

## 2020-12-30 ENCOUNTER — INFUSION THERAPY VISIT (OUTPATIENT)
Dept: INFUSION THERAPY | Facility: CLINIC | Age: 75
End: 2020-12-30
Payer: COMMERCIAL

## 2020-12-30 VITALS
HEART RATE: 74 BPM | SYSTOLIC BLOOD PRESSURE: 126 MMHG | TEMPERATURE: 98 F | OXYGEN SATURATION: 98 % | DIASTOLIC BLOOD PRESSURE: 73 MMHG | RESPIRATION RATE: 18 BRPM

## 2020-12-30 DIAGNOSIS — Z51.11 ENCOUNTER FOR ANTINEOPLASTIC CHEMOTHERAPY: Primary | ICD-10-CM

## 2020-12-30 DIAGNOSIS — Z17.0 MALIGNANT NEOPLASM OF OVERLAPPING SITES OF RIGHT BREAST IN FEMALE, ESTROGEN RECEPTOR POSITIVE (H): ICD-10-CM

## 2020-12-30 DIAGNOSIS — D70.1 CHEMOTHERAPY-INDUCED NEUTROPENIA (H): Primary | ICD-10-CM

## 2020-12-30 DIAGNOSIS — T45.1X5A CHEMOTHERAPY-INDUCED NEUTROPENIA (H): Primary | ICD-10-CM

## 2020-12-30 DIAGNOSIS — C50.811 MALIGNANT NEOPLASM OF OVERLAPPING SITES OF RIGHT BREAST IN FEMALE, ESTROGEN RECEPTOR POSITIVE (H): ICD-10-CM

## 2020-12-30 PROCEDURE — 96411 CHEMO IV PUSH ADDL DRUG: CPT | Performed by: NURSE PRACTITIONER

## 2020-12-30 PROCEDURE — 93325 DOPPLER ECHO COLOR FLOW MAPG: CPT | Performed by: INTERNAL MEDICINE

## 2020-12-30 PROCEDURE — 96413 CHEMO IV INFUSION 1 HR: CPT | Performed by: NURSE PRACTITIONER

## 2020-12-30 PROCEDURE — 93321 DOPPLER ECHO F-UP/LMTD STD: CPT | Performed by: INTERNAL MEDICINE

## 2020-12-30 PROCEDURE — 99207 PR NO CHARGE LOS: CPT

## 2020-12-30 PROCEDURE — 96367 TX/PROPH/DG ADDL SEQ IV INF: CPT | Performed by: NURSE PRACTITIONER

## 2020-12-30 PROCEDURE — 96375 TX/PRO/DX INJ NEW DRUG ADDON: CPT | Performed by: NURSE PRACTITIONER

## 2020-12-30 PROCEDURE — 93308 TTE F-UP OR LMTD: CPT | Performed by: INTERNAL MEDICINE

## 2020-12-30 PROCEDURE — 96377 APPLICATON ON-BODY INJECTOR: CPT | Mod: 59 | Performed by: NURSE PRACTITIONER

## 2020-12-30 RX ORDER — HEPARIN SODIUM (PORCINE) LOCK FLUSH IV SOLN 100 UNIT/ML 100 UNIT/ML
5 SOLUTION INTRAVENOUS
Status: DISCONTINUED | OUTPATIENT
Start: 2020-12-30 | End: 2020-12-30 | Stop reason: HOSPADM

## 2020-12-30 RX ORDER — PALONOSETRON 0.05 MG/ML
0.25 INJECTION, SOLUTION INTRAVENOUS ONCE
Status: COMPLETED | OUTPATIENT
Start: 2020-12-30 | End: 2020-12-30

## 2020-12-30 RX ORDER — DOXORUBICIN HYDROCHLORIDE 2 MG/ML
60 INJECTION, SOLUTION INTRAVENOUS ONCE
Status: COMPLETED | OUTPATIENT
Start: 2020-12-30 | End: 2020-12-30

## 2020-12-30 RX ADMIN — DOXORUBICIN HYDROCHLORIDE 110 MG: 2 INJECTION, SOLUTION INTRAVENOUS at 10:28

## 2020-12-30 RX ADMIN — Medication 250 ML: at 09:52

## 2020-12-30 RX ADMIN — PALONOSETRON 0.25 MG: 0.05 INJECTION, SOLUTION INTRAVENOUS at 10:00

## 2020-12-30 RX ADMIN — Medication 2.4 ML: at 09:30

## 2020-12-30 RX ADMIN — HEPARIN SODIUM (PORCINE) LOCK FLUSH IV SOLN 100 UNIT/ML 5 ML: 100 SOLUTION at 11:25

## 2020-12-30 NOTE — PROGRESS NOTES
Infusion Nursing Note:  Talya E Song presents today for C1D1 Adriamycin/Cytoxan/ONPRO Neulasta.    Patient seen by provider today: No, Saw Dr Alvarez 12/29/2020 at the    present during visit today: Not Applicable.    Note: Discussed and sent written onfo on new chemo regimen, ONPRO Neulasta, use of dexamethasone, loratidine, explained Aloxi and when she can restart Zofran after treatment, monitoring red/pink urine after Adriamycin and cytoxan and drinking lots of fluids/emptying bladder.   Talya was not able to retain all of this large amount of information.  This RN called patients  Leo and reviewed all of the above stated information.  All questions were answered and Leo verbalized understanding.  He also knows where to call for questions.      Intravenous Access:  Implanted Port.    Treatment Conditions:  Lab Results   Component Value Date    HGB 11.2 12/29/2020     Lab Results   Component Value Date    WBC 5.3 12/29/2020      Lab Results   Component Value Date    ANEU 3.5 12/29/2020     Lab Results   Component Value Date     12/29/2020      Lab Results   Component Value Date     12/29/2020                   Lab Results   Component Value Date    POTASSIUM 4.1 12/29/2020           No results found for: MAG         Lab Results   Component Value Date    CR 0.94 12/29/2020                   Lab Results   Component Value Date    JIMBO 9.0 12/29/2020                Lab Results   Component Value Date    BILITOTAL 0.2 12/29/2020           Lab Results   Component Value Date    ALBUMIN 3.4 12/29/2020                    Lab Results   Component Value Date    ALT 20 12/29/2020           Lab Results   Component Value Date    AST 14 12/29/2020       Results reviewed, labs MET treatment parameters, ok to proceed with treatment.      Post Infusion Assessment:  Patient tolerated infusion without incident.       Discharge Plan:   Patient discharged in stable condition accompanied by:  self.    Sydnee Luu RN

## 2021-01-07 DIAGNOSIS — D70.1 CHEMOTHERAPY-INDUCED NEUTROPENIA (H): Primary | ICD-10-CM

## 2021-01-07 DIAGNOSIS — C50.811 MALIGNANT NEOPLASM OF OVERLAPPING SITES OF RIGHT BREAST IN FEMALE, ESTROGEN RECEPTOR POSITIVE (H): ICD-10-CM

## 2021-01-07 DIAGNOSIS — T45.1X5A CHEMOTHERAPY-INDUCED NEUTROPENIA (H): Primary | ICD-10-CM

## 2021-01-07 DIAGNOSIS — Z17.0 MALIGNANT NEOPLASM OF OVERLAPPING SITES OF RIGHT BREAST IN FEMALE, ESTROGEN RECEPTOR POSITIVE (H): ICD-10-CM

## 2021-01-07 RX ORDER — HEPARIN SODIUM,PORCINE 10 UNIT/ML
5 VIAL (ML) INTRAVENOUS
Status: CANCELLED | OUTPATIENT
Start: 2021-01-12

## 2021-01-07 RX ORDER — MEPERIDINE HYDROCHLORIDE 25 MG/ML
25 INJECTION INTRAMUSCULAR; INTRAVENOUS; SUBCUTANEOUS EVERY 30 MIN PRN
Status: CANCELLED | OUTPATIENT
Start: 2021-01-12

## 2021-01-07 RX ORDER — HEPARIN SODIUM (PORCINE) LOCK FLUSH IV SOLN 100 UNIT/ML 100 UNIT/ML
5 SOLUTION INTRAVENOUS
Status: CANCELLED | OUTPATIENT
Start: 2021-01-12

## 2021-01-07 RX ORDER — NALOXONE HYDROCHLORIDE 0.4 MG/ML
.1-.4 INJECTION, SOLUTION INTRAMUSCULAR; INTRAVENOUS; SUBCUTANEOUS
Status: CANCELLED | OUTPATIENT
Start: 2021-01-12

## 2021-01-07 RX ORDER — DIPHENHYDRAMINE HYDROCHLORIDE 50 MG/ML
50 INJECTION INTRAMUSCULAR; INTRAVENOUS
Status: CANCELLED
Start: 2021-01-12

## 2021-01-07 RX ORDER — ALBUTEROL SULFATE 90 UG/1
1-2 AEROSOL, METERED RESPIRATORY (INHALATION)
Status: CANCELLED
Start: 2021-01-12

## 2021-01-07 RX ORDER — LORAZEPAM 2 MG/ML
0.5 INJECTION INTRAMUSCULAR EVERY 4 HOURS PRN
Status: CANCELLED
Start: 2021-01-12

## 2021-01-07 RX ORDER — EPINEPHRINE 1 MG/ML
0.3 INJECTION, SOLUTION INTRAMUSCULAR; SUBCUTANEOUS EVERY 5 MIN PRN
Status: CANCELLED | OUTPATIENT
Start: 2021-01-12

## 2021-01-07 RX ORDER — METHYLPREDNISOLONE SODIUM SUCCINATE 125 MG/2ML
125 INJECTION, POWDER, LYOPHILIZED, FOR SOLUTION INTRAMUSCULAR; INTRAVENOUS
Status: CANCELLED
Start: 2021-01-12

## 2021-01-07 RX ORDER — ALBUTEROL SULFATE 0.83 MG/ML
2.5 SOLUTION RESPIRATORY (INHALATION)
Status: CANCELLED | OUTPATIENT
Start: 2021-01-12

## 2021-01-07 RX ORDER — SODIUM CHLORIDE 9 MG/ML
1000 INJECTION, SOLUTION INTRAVENOUS CONTINUOUS PRN
Status: CANCELLED
Start: 2021-01-12

## 2021-01-07 RX ORDER — PALONOSETRON 0.05 MG/ML
0.25 INJECTION, SOLUTION INTRAVENOUS ONCE
Status: CANCELLED
Start: 2021-01-12

## 2021-01-07 RX ORDER — DOXORUBICIN HYDROCHLORIDE 2 MG/ML
60 INJECTION, SOLUTION INTRAVENOUS ONCE
Status: CANCELLED | OUTPATIENT
Start: 2021-01-12

## 2021-01-12 ENCOUNTER — INFUSION THERAPY VISIT (OUTPATIENT)
Dept: INFUSION THERAPY | Facility: CLINIC | Age: 76
End: 2021-01-12
Payer: COMMERCIAL

## 2021-01-12 ENCOUNTER — ONCOLOGY VISIT (OUTPATIENT)
Dept: ONCOLOGY | Facility: CLINIC | Age: 76
End: 2021-01-12
Payer: COMMERCIAL

## 2021-01-12 ENCOUNTER — OFFICE VISIT (OUTPATIENT)
Dept: ONCOLOGY | Facility: CLINIC | Age: 76
End: 2021-01-12
Payer: COMMERCIAL

## 2021-01-12 VITALS
WEIGHT: 166 LBS | TEMPERATURE: 98.4 F | DIASTOLIC BLOOD PRESSURE: 89 MMHG | BODY MASS INDEX: 27 KG/M2 | OXYGEN SATURATION: 98 % | RESPIRATION RATE: 16 BRPM | HEART RATE: 81 BPM | SYSTOLIC BLOOD PRESSURE: 127 MMHG

## 2021-01-12 DIAGNOSIS — Z17.0 MALIGNANT NEOPLASM OF OVERLAPPING SITES OF RIGHT BREAST IN FEMALE, ESTROGEN RECEPTOR POSITIVE (H): Primary | ICD-10-CM

## 2021-01-12 DIAGNOSIS — C50.811 MALIGNANT NEOPLASM OF OVERLAPPING SITES OF RIGHT BREAST IN FEMALE, ESTROGEN RECEPTOR POSITIVE (H): ICD-10-CM

## 2021-01-12 DIAGNOSIS — C50.811 MALIGNANT NEOPLASM OF OVERLAPPING SITES OF RIGHT BREAST IN FEMALE, ESTROGEN RECEPTOR POSITIVE (H): Primary | ICD-10-CM

## 2021-01-12 DIAGNOSIS — G62.0 DRUG-INDUCED POLYNEUROPATHY (H): ICD-10-CM

## 2021-01-12 DIAGNOSIS — D70.1 CHEMOTHERAPY-INDUCED NEUTROPENIA (H): Primary | ICD-10-CM

## 2021-01-12 DIAGNOSIS — R19.7 DIARRHEA, UNSPECIFIED TYPE: ICD-10-CM

## 2021-01-12 DIAGNOSIS — Z17.0 MALIGNANT NEOPLASM OF OVERLAPPING SITES OF RIGHT BREAST IN FEMALE, ESTROGEN RECEPTOR POSITIVE (H): ICD-10-CM

## 2021-01-12 DIAGNOSIS — T45.1X5A CHEMOTHERAPY-INDUCED NEUTROPENIA (H): Primary | ICD-10-CM

## 2021-01-12 LAB
ALBUMIN SERPL-MCNC: 3.6 G/DL (ref 3.4–5)
ALP SERPL-CCNC: 123 U/L (ref 40–150)
ALT SERPL W P-5'-P-CCNC: 20 U/L (ref 0–50)
ANION GAP SERPL CALCULATED.3IONS-SCNC: 4 MMOL/L (ref 3–14)
AST SERPL W P-5'-P-CCNC: 17 U/L (ref 0–45)
BASOPHILS # BLD AUTO: 0.1 10E9/L (ref 0–0.2)
BASOPHILS NFR BLD AUTO: 1 %
BILIRUB SERPL-MCNC: 0.3 MG/DL (ref 0.2–1.3)
BUN SERPL-MCNC: 13 MG/DL (ref 7–30)
CALCIUM SERPL-MCNC: 9.1 MG/DL (ref 8.5–10.1)
CHLORIDE SERPL-SCNC: 108 MMOL/L (ref 94–109)
CO2 SERPL-SCNC: 25 MMOL/L (ref 20–32)
CREAT SERPL-MCNC: 0.79 MG/DL (ref 0.52–1.04)
DIFFERENTIAL METHOD BLD: ABNORMAL
ERYTHROCYTE [DISTWIDTH] IN BLOOD BY AUTOMATED COUNT: 17.7 % (ref 10–15)
GFR SERPL CREATININE-BSD FRML MDRD: 72 ML/MIN/{1.73_M2}
GLUCOSE SERPL-MCNC: 94 MG/DL (ref 70–99)
HCT VFR BLD AUTO: 33.4 % (ref 35–47)
HGB BLD-MCNC: 11.3 G/DL (ref 11.7–15.7)
LYMPHOCYTES # BLD AUTO: 1.8 10E9/L (ref 0.8–5.3)
LYMPHOCYTES NFR BLD AUTO: 16 %
MAGNESIUM SERPL-MCNC: 2.4 MG/DL (ref 1.6–2.3)
MCH RBC QN AUTO: 34.3 PG (ref 26.5–33)
MCHC RBC AUTO-ENTMCNC: 33.8 G/DL (ref 31.5–36.5)
MCV RBC AUTO: 102 FL (ref 78–100)
MONOCYTES # BLD AUTO: 1.1 10E9/L (ref 0–1.3)
MONOCYTES NFR BLD AUTO: 10 %
NEUTROPHILS # BLD AUTO: 8.1 10E9/L (ref 1.6–8.3)
NEUTROPHILS NFR BLD AUTO: 73 %
PLATELET # BLD AUTO: 180 10E9/L (ref 150–450)
POTASSIUM SERPL-SCNC: 4.2 MMOL/L (ref 3.4–5.3)
PROT SERPL-MCNC: 6.9 G/DL (ref 6.8–8.8)
RBC # BLD AUTO: 3.29 10E12/L (ref 3.8–5.2)
SODIUM SERPL-SCNC: 137 MMOL/L (ref 133–144)
WBC # BLD AUTO: 11.1 10E9/L (ref 4–11)

## 2021-01-12 PROCEDURE — 99207 PR NO CHARGE LOS: CPT

## 2021-01-12 PROCEDURE — 85025 COMPLETE CBC W/AUTO DIFF WBC: CPT | Performed by: INTERNAL MEDICINE

## 2021-01-12 PROCEDURE — 83735 ASSAY OF MAGNESIUM: CPT | Performed by: NURSE PRACTITIONER

## 2021-01-12 PROCEDURE — 96411 CHEMO IV PUSH ADDL DRUG: CPT | Performed by: NURSE PRACTITIONER

## 2021-01-12 PROCEDURE — 80053 COMPREHEN METABOLIC PANEL: CPT | Performed by: INTERNAL MEDICINE

## 2021-01-12 PROCEDURE — 96377 APPLICATON ON-BODY INJECTOR: CPT | Mod: 59 | Performed by: NURSE PRACTITIONER

## 2021-01-12 PROCEDURE — 99207 PR NO CHARGE NURSE ONLY: CPT

## 2021-01-12 PROCEDURE — 96367 TX/PROPH/DG ADDL SEQ IV INF: CPT | Performed by: NURSE PRACTITIONER

## 2021-01-12 PROCEDURE — 99214 OFFICE O/P EST MOD 30 MIN: CPT | Mod: 25 | Performed by: NURSE PRACTITIONER

## 2021-01-12 PROCEDURE — 96375 TX/PRO/DX INJ NEW DRUG ADDON: CPT | Performed by: NURSE PRACTITIONER

## 2021-01-12 PROCEDURE — 96413 CHEMO IV INFUSION 1 HR: CPT | Performed by: NURSE PRACTITIONER

## 2021-01-12 RX ORDER — HEPARIN SODIUM (PORCINE) LOCK FLUSH IV SOLN 100 UNIT/ML 100 UNIT/ML
5 SOLUTION INTRAVENOUS
Status: DISCONTINUED | OUTPATIENT
Start: 2021-01-12 | End: 2021-01-12 | Stop reason: HOSPADM

## 2021-01-12 RX ORDER — DOXORUBICIN HYDROCHLORIDE 2 MG/ML
60 INJECTION, SOLUTION INTRAVENOUS ONCE
Status: COMPLETED | OUTPATIENT
Start: 2021-01-12 | End: 2021-01-12

## 2021-01-12 RX ORDER — PALONOSETRON 0.05 MG/ML
0.25 INJECTION, SOLUTION INTRAVENOUS ONCE
Status: COMPLETED | OUTPATIENT
Start: 2021-01-12 | End: 2021-01-12

## 2021-01-12 RX ADMIN — PALONOSETRON 0.25 MG: 0.05 INJECTION, SOLUTION INTRAVENOUS at 16:10

## 2021-01-12 RX ADMIN — HEPARIN SODIUM (PORCINE) LOCK FLUSH IV SOLN 100 UNIT/ML 5 ML: 100 SOLUTION at 14:29

## 2021-01-12 RX ADMIN — Medication 250 ML: at 15:30

## 2021-01-12 RX ADMIN — HEPARIN SODIUM (PORCINE) LOCK FLUSH IV SOLN 100 UNIT/ML 5 ML: 100 SOLUTION at 17:10

## 2021-01-12 RX ADMIN — DOXORUBICIN HYDROCHLORIDE 110 MG: 2 INJECTION, SOLUTION INTRAVENOUS at 16:14

## 2021-01-12 ASSESSMENT — PAIN SCALES - GENERAL: PAINLEVEL: NO PAIN (0)

## 2021-01-12 NOTE — PROGRESS NOTES
Oncology Follow Up Visit: January 12, 2021    Oncologist: Dr Perlita Alvarez  PCP: Cole Weston    Diagnosis: Right Breast Cancer  Talya Zuleta is a 76 yo female with Diabetes, HTN and COPD who was found to have bilateral breast asymmetry on screening mammogram in 7/2020. Further diagnostic exam found 2.7 cm mass at 12:00, 5 cm from nipple with ductal extension +6 mm mass at 3 cm from nipple. Contrast enhanced mammogram showed the right breast mass, including extension, to measure 4.9 cm.  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  Estrogen receptor was moderate in 50% and progesterone receptor staining was negative.  HER-2 was negative by IHC; HER2 FISH showed approximately 10% of tumor cells to have 6.8 HER2 signals/nucleus and a HER2/CEN17 ratio of 1.6.  The HER2 positive cells were noted to be the large pleomorphic cells.  Multiple infiltrating neutrophils were noted through tumor stroma.  Treatment:   10/7-12/22/2020 received Taxol, Herceptin,Perjeta plan x 12 weeks  12/29/2020 began plan of AC x 4 cycles    Interval History: Ms. Zuleta comes to clinic for next AC. Pt is still complaining of diarrhea-having 3-5 loose stools daily but may not be as urgent as previous. She continues to eat regular meals and push fluids- using 1 pedialyte daily as well- using imodium up to 2 x daily to control stools. She is also seeing continuation of peripheral neuropathy with some restriction of feeling to hands and feet- still walking well but limiting some fine motor control with the hands- needing to  glasses with 2 hands at times. She denies pain, nausea, mouth sores, or change in urination.No falls. Mentions ears feel like dry skin is coming out but no hearing changes. Also noted 2 instances of sudden fullness of the right side of neck just under jaw an then slowly went away- tried warmth to area to help with this.  Pt admits to narcolepsy  over lifetime.    Rest of comprehensive and complete ROS is reviewed and is negative.   Past Medical History:   Diagnosis Date     Breast cancer (H)      Sleep apnea      Current Outpatient Medications   Medication     amLODIPine (NORVASC) 5 MG tablet     atorvastatin (LIPITOR) 40 MG tablet     benazepril (LOTENSIN) 20 MG tablet     diphenoxylate-atropine (LOMOTIL) 2.5-0.025 MG tablet     furosemide (LASIX) 80 MG tablet     HYDROcodone-acetaminophen (NORCO) 5-325 MG tablet     loratadine (CLARITIN) 10 MG capsule     ondansetron (ZOFRAN-ODT) 8 MG ODT tab     potassium chloride ER (KLOR-CON M) 20 MEQ CR tablet     triamcinolone (KENALOG) 0.1 % external cream     No current facility-administered medications for this visit.      Facility-Administered Medications Ordered in Other Visits   Medication     cyclophosphamide (CYTOXAN) 1,115 mg in sodium chloride 0.9 % 331 mL infusion     DOXOrubicin (ADRIAMYCIN) injection 110 mg     fosaprepitant (EMEND) 150 mg, dexamethasone (DECADRON) 12 mg in sodium chloride 0.9 % 250 mL intermittent infusion     heparin 100 UNIT/ML injection 5 mL     palonosetron (ALOXI) injection 0.25 mg     pegfilgrastim (NEULASTA Onpro Kit) On-body injector 6 mg     sodium chloride (PF) 0.9% PF flush 3-20 mL     sodium chloride (PF) 0.9% PF flush 30 mL   - Long tobacco use history- current use of 1/2 ppd and daily alcohol use of 1 drink daily.   Allergies   Allergen Reactions     Bacitracin-Neomycin-Polymyxin      PN: LW Reaction: Unknown Reaction     Latex        Physical Exam:/89   Pulse 81   Temp 98.4  F (36.9  C) (Oral)   Resp 16   Wt 75.3 kg (166 lb)   SpO2 98%   BMI 27.00 kg/m     Constitutional: Alert, talkative, and in no distress.   ENT: Eyes bright, No mouth sores, ears with mild cerumen build up R>L.   Neck: Supple, No adenopathy- No neck swelling today.Thyroid symmetric  Cardiac: Heart rate and rhythm is regular and strong without murmur  Respiratory: Breathing easy. Lung sounds  clear to auscultation- no cough  Abdomen: Soft, non-tender, BS normal- not active. No masses or organomegaly  MS: Muscle tone normal, extremities normal with no edema. Can see some weakness of the  of the hands  Skin: No suspicious lesions or rashes  Neuro: Sensory grossly WNL, gait normal.   Lymph: Normal ant/post cervical, axillary, supraclavicular nodes  Psych: Mentation appears normal and affect normal/bright with good conversation.    Laboratory Results:   Results for orders placed or performed in visit on 01/12/21   Magnesium     Status: Abnormal   Result Value Ref Range    Magnesium 2.4 (H) 1.6 - 2.3 mg/dL   Results for orders placed or performed in visit on 01/12/21   CBC with platelets differential     Status: Abnormal   Result Value Ref Range    WBC 11.1 (H) 4.0 - 11.0 10e9/L    RBC Count 3.29 (L) 3.8 - 5.2 10e12/L    Hemoglobin 11.3 (L) 11.7 - 15.7 g/dL    Hematocrit 33.4 (L) 35.0 - 47.0 %     (H) 78 - 100 fl    MCH 34.3 (H) 26.5 - 33.0 pg    MCHC 33.8 31.5 - 36.5 g/dL    RDW 17.7 (H) 10.0 - 15.0 %    Platelet Count 180 150 - 450 10e9/L    % Neutrophils 73.0 %    % Lymphocytes 16.0 %    % Monocytes 10.0 %    % Basophils 1.0 %    Absolute Neutrophil 8.1 1.6 - 8.3 10e9/L    Absolute Lymphocytes 1.8 0.8 - 5.3 10e9/L    Absolute Monocytes 1.1 0.0 - 1.3 10e9/L    Absolute Basophils 0.1 0.0 - 0.2 10e9/L    Diff Method Automated Method    Comprehensive metabolic panel     Status: None   Result Value Ref Range    Sodium 137 133 - 144 mmol/L    Potassium 4.2 3.4 - 5.3 mmol/L    Chloride 108 94 - 109 mmol/L    Carbon Dioxide 25 20 - 32 mmol/L    Anion Gap 4 3 - 14 mmol/L    Glucose 94 70 - 99 mg/dL    Urea Nitrogen 13 7 - 30 mg/dL    Creatinine 0.79 0.52 - 1.04 mg/dL    GFR Estimate 72 >60 mL/min/[1.73_m2]    GFR Estimate If Black 84 >60 mL/min/[1.73_m2]    Calcium 9.1 8.5 - 10.1 mg/dL    Bilirubin Total 0.3 0.2 - 1.3 mg/dL    Albumin 3.6 3.4 - 5.0 g/dL    Protein Total 6.9 6.8 - 8.8 g/dL    Alkaline  Phosphatase 123 40 - 150 U/L    ALT 20 0 - 50 U/L    AST 17 0 - 45 U/L     Assessment and Plan:   Right Breast Cancer- Pt continues plan of Adriamyucin/Cytoxan- due for cycle 2 today. She tolerated first cycle well with no nausea but complains of concerns from previous treatment that continue on.   No changes needed for cycle 2 of AC.   She will return in 2 weeks prior to next infusion and we will recheck all symptoms prior to next infusion.   She did see surgeon Dr Newsome in December and is still thinking about decision for surgery.  Peripheral neuropathy- related to the Taxol -noted mostly in the fingers and is affecting her fine motor skills.  We hope to see some improvement of the neuropathy since pt is off Taxol treatment.Husing Vitamin B 6 daily now.  Loose stools-related to the Perjeta -She is not longer on the Perjeta plan but loose stools continue. She is using imodium 2 x daily and is meeting fluid and electrolyte goals. Admits urgency may be improving. Suggested possible use of fiber product to bulk stools.   Recurrent sudden neck swelling- not noted today. Suggested this may be blocked salivary gland and may use the warm cloths or try sour candy like lemonheads/ sour patch kids.   Cerumen- not occluding- may use debrox to loosen wax or have PCP rinse ears if irritating.  Covid 19 precautions- Reviewed precautions for prevention of transmission with washing hands often for 20 seconds with soap and water or hand , avoiding anyone appearing ill,  crowds or common public places, prevent touching face and help with prevention of transmission.   If developing symptoms of concern they should call to clinic before coming to clinic for directions for evaluation and treatment. Discussed vaccination and timing of the shot.   This was a 25 min face to face visit + Prep  Jacquelyn Miller,Giana

## 2021-01-12 NOTE — LETTER
1/12/2021         RE: Talya Zuleta  [address] [facility] [address]        Dear Colleague,    Thank you for referring your patient, Talya Zuleta, to the [facility]. Please see a copy of my visit note below.    Oncology Follow Up Visit: January 12, 2021    Oncologist: Dr Perlita Alvarez  PCP: Cole Weston    Diagnosis: Right Breast Cancer  Talya Zuleta is a 76 yo female with Diabetes, HTN and COPD who was found to have bilateral breast asymmetry on screening mammogram in 7/2020. Further diagnostic exam found 2.7 cm mass at 12:00, 5 cm from nipple with ductal extension +6 mm mass at 3 cm from nipple. Contrast enhanced mammogram showed the right breast mass, including extension, to measure 4.9 cm.  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  Estrogen receptor was moderate in 50% and progesterone receptor staining was negative.  HER-2 was negative by IHC; HER2 FISH showed approximately 10% of tumor cells to have 6.8 HER2 signals/nucleus and a HER2/CEN17 ratio of 1.6.  The HER2 positive cells were noted to be the large pleomorphic cells.  Multiple infiltrating neutrophils were noted through tumor stroma.  Treatment:   10/7-12/22/2020 received Taxol, Herceptin,Perjeta plan x 12 weeks  12/29/2020 began plan of AC x 4 cycles    Interval History: Ms. Zuleta comes to clinic for next AC. Pt is still complaining of diarrhea-having 3-5 loose stools daily but may not be as urgent as previous. She continues to eat regular meals and push fluids- using 1 pedialyte daily as well- using imodium up to 2 x daily to control stools. She is also seeing continuation of peripheral neuropathy with some restriction of feeling to hands and feet- still walking well but limiting some fine motor control with the hands- needing to  glasses with 2 hands at times. She denies pain, nausea, mouth sores, or change  in urination.No falls. Mentions ears feel like dry skin is coming out but no hearing changes. Also noted 2 instances of sudden fullness of the right side of neck just under jaw an then slowly went away- tried warmth to area to help with this.  Pt admits to narcolepsy over lifetime.    Rest of comprehensive and complete ROS is reviewed and is negative.   Past Medical History:   Diagnosis Date     Breast cancer (H)      Sleep apnea      Current Outpatient Medications   Medication     amLODIPine (NORVASC) 5 MG tablet     atorvastatin (LIPITOR) 40 MG tablet     benazepril (LOTENSIN) 20 MG tablet     diphenoxylate-atropine (LOMOTIL) 2.5-0.025 MG tablet     furosemide (LASIX) 80 MG tablet     HYDROcodone-acetaminophen (NORCO) 5-325 MG tablet     loratadine (CLARITIN) 10 MG capsule     ondansetron (ZOFRAN-ODT) 8 MG ODT tab     potassium chloride ER (KLOR-CON M) 20 MEQ CR tablet     triamcinolone (KENALOG) 0.1 % external cream     No current facility-administered medications for this visit.      Facility-Administered Medications Ordered in Other Visits   Medication     cyclophosphamide (CYTOXAN) 1,115 mg in sodium chloride 0.9 % 331 mL infusion     DOXOrubicin (ADRIAMYCIN) injection 110 mg     fosaprepitant (EMEND) 150 mg, dexamethasone (DECADRON) 12 mg in sodium chloride 0.9 % 250 mL intermittent infusion     heparin 100 UNIT/ML injection 5 mL     palonosetron (ALOXI) injection 0.25 mg     pegfilgrastim (NEULASTA Onpro Kit) On-body injector 6 mg     sodium chloride (PF) 0.9% PF flush 3-20 mL     sodium chloride (PF) 0.9% PF flush 30 mL   - Long tobacco use history- current use of 1/2 ppd and daily alcohol use of 1 drink daily.   Allergies   Allergen Reactions     Bacitracin-Neomycin-Polymyxin      PN: LW Reaction: Unknown Reaction     Latex        Physical Exam:/89   Pulse 81   Temp 98.4  F (36.9  C) (Oral)   Resp 16   Wt 75.3 kg (166 lb)   SpO2 98%   BMI 27.00 kg/m     Constitutional: Alert, talkative, and in  no distress.   ENT: Eyes bright, No mouth sores, ears with mild cerumen build up R>L.   Neck: Supple, No adenopathy- No neck swelling today.Thyroid symmetric  Cardiac: Heart rate and rhythm is regular and strong without murmur  Respiratory: Breathing easy. Lung sounds clear to auscultation- no cough  Abdomen: Soft, non-tender, BS normal- not active. No masses or organomegaly  MS: Muscle tone normal, extremities normal with no edema. Can see some weakness of the  of the hands  Skin: No suspicious lesions or rashes  Neuro: Sensory grossly WNL, gait normal.   Lymph: Normal ant/post cervical, axillary, supraclavicular nodes  Psych: Mentation appears normal and affect normal/bright with good conversation.    Laboratory Results:   Results for orders placed or performed in visit on 01/12/21   Magnesium     Status: Abnormal   Result Value Ref Range    Magnesium 2.4 (H) 1.6 - 2.3 mg/dL   Results for orders placed or performed in visit on 01/12/21   CBC with platelets differential     Status: Abnormal   Result Value Ref Range    WBC 11.1 (H) 4.0 - 11.0 10e9/L    RBC Count 3.29 (L) 3.8 - 5.2 10e12/L    Hemoglobin 11.3 (L) 11.7 - 15.7 g/dL    Hematocrit 33.4 (L) 35.0 - 47.0 %     (H) 78 - 100 fl    MCH 34.3 (H) 26.5 - 33.0 pg    MCHC 33.8 31.5 - 36.5 g/dL    RDW 17.7 (H) 10.0 - 15.0 %    Platelet Count 180 150 - 450 10e9/L    % Neutrophils 73.0 %    % Lymphocytes 16.0 %    % Monocytes 10.0 %    % Basophils 1.0 %    Absolute Neutrophil 8.1 1.6 - 8.3 10e9/L    Absolute Lymphocytes 1.8 0.8 - 5.3 10e9/L    Absolute Monocytes 1.1 0.0 - 1.3 10e9/L    Absolute Basophils 0.1 0.0 - 0.2 10e9/L    Diff Method Automated Method    Comprehensive metabolic panel     Status: None   Result Value Ref Range    Sodium 137 133 - 144 mmol/L    Potassium 4.2 3.4 - 5.3 mmol/L    Chloride 108 94 - 109 mmol/L    Carbon Dioxide 25 20 - 32 mmol/L    Anion Gap 4 3 - 14 mmol/L    Glucose 94 70 - 99 mg/dL    Urea Nitrogen 13 7 - 30 mg/dL     Creatinine 0.79 0.52 - 1.04 mg/dL    GFR Estimate 72 >60 mL/min/[1.73_m2]    GFR Estimate If Black 84 >60 mL/min/[1.73_m2]    Calcium 9.1 8.5 - 10.1 mg/dL    Bilirubin Total 0.3 0.2 - 1.3 mg/dL    Albumin 3.6 3.4 - 5.0 g/dL    Protein Total 6.9 6.8 - 8.8 g/dL    Alkaline Phosphatase 123 40 - 150 U/L    ALT 20 0 - 50 U/L    AST 17 0 - 45 U/L     Assessment and Plan:   Right Breast Cancer- Pt continues plan of Adriamyucin/Cytoxan- due for cycle 2 today. She tolerated first cycle well with no nausea but complains of concerns from previous treatment that continue on.   No changes needed for cycle 2 of AC.   She will return in 2 weeks prior to next infusion and we will recheck all symptoms prior to next infusion.   She did see surgeon Dr Newsome in December and is still thinking about decision for surgery.  Peripheral neuropathy- related to the Taxol -noted mostly in the fingers and is affecting her fine motor skills.  We hope to see some improvement of the neuropathy since pt is off Taxol treatment.Husing Vitamin B 6 daily now.  Loose stools-related to the Perjeta -She is not longer on the Perjeta plan but loose stools continue. She is using imodium 2 x daily and is meeting fluid and electrolyte goals. Admits urgency may be improving. Suggested possible use of fiber product to bulk stools.   Recurrent sudden neck swelling- not noted today. Suggested this may be blocked salivary gland and may use the warm cloths or try sour candy like lemonheads/ sour patch kids.   Cerumen- not occluding- may use debrox to loosen wax or have PCP rinse ears if irritating.  Covid 19 precautions- Reviewed precautions for prevention of transmission with washing hands often for 20 seconds with soap and water or hand , avoiding anyone appearing ill,  crowds or common public places, prevent touching face and help with prevention of transmission.   If developing symptoms of concern they should call to clinic before coming to clinic for  directions for evaluation and treatment. Discussed vaccination and timing of the shot.   This was a 25 min face to face visit + Prep  Jacquelyn Miller CNp                  Again, thank you for allowing me to participate in the care of your patient.        Sincerely,        Jacquelyn Miller, NP, APRN CNP

## 2021-01-12 NOTE — NURSING NOTE
"Oncology Rooming Note    January 12, 2021 3:07 PM   Talya Zuleta is a 75 year old female who presents for:    Chief Complaint   Patient presents with     Oncology Clinic Visit     Prior to treatment     Initial Vitals: /89   Pulse 81   Temp 98.4  F (36.9  C) (Oral)   Resp 16   Wt 75.3 kg (166 lb)   SpO2 98%   BMI 27.00 kg/m   Estimated body mass index is 27 kg/m  as calculated from the following:    Height as of 12/28/20: 1.67 m (5' 5.75\").    Weight as of this encounter: 75.3 kg (166 lb). Body surface area is 1.87 meters squared.  No Pain (0) Comment: Data Unavailable   No LMP recorded. Patient is postmenopausal.  Allergies reviewed: No  Medications reviewed: No    Medications: Medication refills not needed today.  Pharmacy name entered into [employer]:    [id number] - [place], [place] - [address] OF [address] [place] [address] - [place], [place] - [address]  [facility] - [place] - [id number], [place]    "

## 2021-01-12 NOTE — PROGRESS NOTES
Infusion Nursing Note:  Talya Zuleta presents today for C2D1 AC/Onpro.    Patient seen by provider today: Yes: Jacquelyn Miller NP   present during visit today: Not Applicable.    Note: N/A.    Intravenous Access:  Implanted Port.    Treatment Conditions:  Lab Results   Component Value Date    HGB 11.3 01/12/2021     Lab Results   Component Value Date    WBC 11.1 01/12/2021      Lab Results   Component Value Date    ANEU 8.1 01/12/2021     Lab Results   Component Value Date     01/12/2021      Lab Results   Component Value Date     01/12/2021                   Lab Results   Component Value Date    POTASSIUM 4.2 01/12/2021           No results found for: MAG         Lab Results   Component Value Date    CR 0.79 01/12/2021                   Lab Results   Component Value Date    JIMBO 9.1 01/12/2021                Lab Results   Component Value Date    BILITOTAL 0.3 01/12/2021           Lab Results   Component Value Date    ALBUMIN 3.6 01/12/2021                    Lab Results   Component Value Date    ALT 20 01/12/2021           Lab Results   Component Value Date    AST 17 01/12/2021       Results reviewed, labs MET treatment parameters, ok to proceed with treatment.    Post Infusion Assessment:  Patient tolerated infusion without incident.  Blood return noted pre and post infusion.  Blood return noted during administration every 5 cc.  Site patent and intact, free from redness, edema or discomfort.  No evidence of extravasations.  Access discontinued per protocol.     ONPRO  Was placed on patient's: left side of abdomen.    Was placed at 4:40 PM    ONPRO injector device Lot number: [id number]    Patient education included: what patient can expect after application, what colored lights mean on the device, when to remove device, when and where to call with questions or issues, all patients questions answered and that Neulasta administration will occur at 7:40 pm on 1/13/2021.    Patient tolerated  administration well.      Discharge Plan:   Patient will return 1/26/2021 for next appointment.   Patient discharged in stable condition accompanied by: self.  Departure Mode: Ambulatory.    Magaly Osorio RN-BSN, PHN, OCN  [facility]

## 2021-01-12 NOTE — PATIENT INSTRUCTIONS
May try a fiber source daily like fiber product daily like FiberCon or metamucil.     May try Lemonheads to use to help with the neck/throat swelling since it may be a blocked saliva gland.     Debrox can be used for ear wax removal.

## 2021-01-13 ENCOUNTER — TELEPHONE (OUTPATIENT)
Dept: ONCOLOGY | Facility: CLINIC | Age: 76
End: 2021-01-13

## 2021-01-26 ENCOUNTER — INFUSION THERAPY VISIT (OUTPATIENT)
Dept: INFUSION THERAPY | Facility: CLINIC | Age: 76
End: 2021-01-26
Payer: COMMERCIAL

## 2021-01-26 ENCOUNTER — OFFICE VISIT (OUTPATIENT)
Dept: ONCOLOGY | Facility: CLINIC | Age: 76
End: 2021-01-26
Payer: COMMERCIAL

## 2021-01-26 ENCOUNTER — ONCOLOGY VISIT (OUTPATIENT)
Dept: ONCOLOGY | Facility: CLINIC | Age: 76
End: 2021-01-26
Payer: COMMERCIAL

## 2021-01-26 VITALS
TEMPERATURE: 98.5 F | WEIGHT: 158.5 LBS | HEART RATE: 73 BPM | BODY MASS INDEX: 25.78 KG/M2 | DIASTOLIC BLOOD PRESSURE: 71 MMHG | OXYGEN SATURATION: 100 % | SYSTOLIC BLOOD PRESSURE: 113 MMHG

## 2021-01-26 DIAGNOSIS — J34.89 NASAL SORE: ICD-10-CM

## 2021-01-26 DIAGNOSIS — Z17.0 MALIGNANT NEOPLASM OF OVERLAPPING SITES OF RIGHT BREAST IN FEMALE, ESTROGEN RECEPTOR POSITIVE (H): Primary | ICD-10-CM

## 2021-01-26 DIAGNOSIS — C50.811 MALIGNANT NEOPLASM OF OVERLAPPING SITES OF RIGHT BREAST IN FEMALE, ESTROGEN RECEPTOR POSITIVE (H): ICD-10-CM

## 2021-01-26 DIAGNOSIS — D70.1 CHEMOTHERAPY-INDUCED NEUTROPENIA (H): ICD-10-CM

## 2021-01-26 DIAGNOSIS — T45.1X5A CHEMOTHERAPY-INDUCED NEUTROPENIA (H): Primary | ICD-10-CM

## 2021-01-26 DIAGNOSIS — R19.5 LOOSE STOOLS: ICD-10-CM

## 2021-01-26 DIAGNOSIS — K13.79 MOUTH SORES: ICD-10-CM

## 2021-01-26 DIAGNOSIS — R19.7 DIARRHEA, UNSPECIFIED TYPE: ICD-10-CM

## 2021-01-26 DIAGNOSIS — D70.1 CHEMOTHERAPY-INDUCED NEUTROPENIA (H): Primary | ICD-10-CM

## 2021-01-26 DIAGNOSIS — G62.0 DRUG-INDUCED POLYNEUROPATHY (H): ICD-10-CM

## 2021-01-26 DIAGNOSIS — C50.811 MALIGNANT NEOPLASM OF OVERLAPPING SITES OF RIGHT BREAST IN FEMALE, ESTROGEN RECEPTOR POSITIVE (H): Primary | ICD-10-CM

## 2021-01-26 DIAGNOSIS — T45.1X5A CHEMOTHERAPY-INDUCED NEUTROPENIA (H): ICD-10-CM

## 2021-01-26 DIAGNOSIS — Z17.0 MALIGNANT NEOPLASM OF OVERLAPPING SITES OF RIGHT BREAST IN FEMALE, ESTROGEN RECEPTOR POSITIVE (H): ICD-10-CM

## 2021-01-26 LAB
ALBUMIN SERPL-MCNC: 3.3 G/DL (ref 3.4–5)
ALP SERPL-CCNC: 94 U/L (ref 40–150)
ALT SERPL W P-5'-P-CCNC: 19 U/L (ref 0–50)
ANION GAP SERPL CALCULATED.3IONS-SCNC: 7 MMOL/L (ref 3–14)
AST SERPL W P-5'-P-CCNC: 12 U/L (ref 0–45)
BASOPHILS # BLD AUTO: 0.1 10E9/L (ref 0–0.2)
BASOPHILS NFR BLD AUTO: 0.8 %
BILIRUB SERPL-MCNC: 0.2 MG/DL (ref 0.2–1.3)
BUN SERPL-MCNC: 12 MG/DL (ref 7–30)
CALCIUM SERPL-MCNC: 8.9 MG/DL (ref 8.5–10.1)
CHLORIDE SERPL-SCNC: 106 MMOL/L (ref 94–109)
CO2 SERPL-SCNC: 29 MMOL/L (ref 20–32)
CREAT SERPL-MCNC: 0.83 MG/DL (ref 0.52–1.04)
DIFFERENTIAL METHOD BLD: ABNORMAL
EOSINOPHIL # BLD AUTO: 0 10E9/L (ref 0–0.7)
EOSINOPHIL NFR BLD AUTO: 0 %
ERYTHROCYTE [DISTWIDTH] IN BLOOD BY AUTOMATED COUNT: 17.2 % (ref 10–15)
GFR SERPL CREATININE-BSD FRML MDRD: 68 ML/MIN/{1.73_M2}
GLUCOSE SERPL-MCNC: 109 MG/DL (ref 70–99)
HCT VFR BLD AUTO: 32 % (ref 35–47)
HGB BLD-MCNC: 10.7 G/DL (ref 11.7–15.7)
IMM GRANULOCYTES # BLD: 0.4 10E9/L (ref 0–0.4)
IMM GRANULOCYTES NFR BLD: 4.8 %
LYMPHOCYTES # BLD AUTO: 1.1 10E9/L (ref 0.8–5.3)
LYMPHOCYTES NFR BLD AUTO: 12.1 %
MCH RBC QN AUTO: 34.4 PG (ref 26.5–33)
MCHC RBC AUTO-ENTMCNC: 33.4 G/DL (ref 31.5–36.5)
MCV RBC AUTO: 103 FL (ref 78–100)
MONOCYTES # BLD AUTO: 0.9 10E9/L (ref 0–1.3)
MONOCYTES NFR BLD AUTO: 10.4 %
NEUTROPHILS # BLD AUTO: 6.2 10E9/L (ref 1.6–8.3)
NEUTROPHILS NFR BLD AUTO: 71.9 %
PLATELET # BLD AUTO: 255 10E9/L (ref 150–450)
POTASSIUM SERPL-SCNC: 3.5 MMOL/L (ref 3.4–5.3)
PROT SERPL-MCNC: 6.8 G/DL (ref 6.8–8.8)
RBC # BLD AUTO: 3.11 10E12/L (ref 3.8–5.2)
SODIUM SERPL-SCNC: 142 MMOL/L (ref 133–144)
WBC # BLD AUTO: 8.7 10E9/L (ref 4–11)

## 2021-01-26 PROCEDURE — 99214 OFFICE O/P EST MOD 30 MIN: CPT | Mod: 25 | Performed by: NURSE PRACTITIONER

## 2021-01-26 PROCEDURE — 96413 CHEMO IV INFUSION 1 HR: CPT | Performed by: NURSE PRACTITIONER

## 2021-01-26 PROCEDURE — 96377 APPLICATON ON-BODY INJECTOR: CPT | Mod: 59 | Performed by: NURSE PRACTITIONER

## 2021-01-26 PROCEDURE — 80053 COMPREHEN METABOLIC PANEL: CPT | Performed by: NURSE PRACTITIONER

## 2021-01-26 PROCEDURE — 99207 PR NO CHARGE LOS: CPT

## 2021-01-26 PROCEDURE — 96375 TX/PRO/DX INJ NEW DRUG ADDON: CPT | Performed by: NURSE PRACTITIONER

## 2021-01-26 PROCEDURE — 85025 COMPLETE CBC W/AUTO DIFF WBC: CPT | Performed by: NURSE PRACTITIONER

## 2021-01-26 PROCEDURE — 96411 CHEMO IV PUSH ADDL DRUG: CPT | Performed by: NURSE PRACTITIONER

## 2021-01-26 PROCEDURE — 99207 PR NO CHARGE NURSE ONLY: CPT

## 2021-01-26 PROCEDURE — 96367 TX/PROPH/DG ADDL SEQ IV INF: CPT | Performed by: NURSE PRACTITIONER

## 2021-01-26 RX ORDER — DOXORUBICIN HYDROCHLORIDE 2 MG/ML
60 INJECTION, SOLUTION INTRAVENOUS ONCE
Status: CANCELLED | OUTPATIENT
Start: 2021-01-26

## 2021-01-26 RX ORDER — ALBUTEROL SULFATE 90 UG/1
1-2 AEROSOL, METERED RESPIRATORY (INHALATION)
Status: CANCELLED
Start: 2021-01-26

## 2021-01-26 RX ORDER — PALONOSETRON 0.05 MG/ML
0.25 INJECTION, SOLUTION INTRAVENOUS ONCE
Status: CANCELLED
Start: 2021-01-26

## 2021-01-26 RX ORDER — HEPARIN SODIUM (PORCINE) LOCK FLUSH IV SOLN 100 UNIT/ML 100 UNIT/ML
5 SOLUTION INTRAVENOUS
Status: CANCELLED | OUTPATIENT
Start: 2021-01-26

## 2021-01-26 RX ORDER — METHYLPREDNISOLONE SODIUM SUCCINATE 125 MG/2ML
125 INJECTION, POWDER, LYOPHILIZED, FOR SOLUTION INTRAMUSCULAR; INTRAVENOUS
Status: CANCELLED
Start: 2021-01-26

## 2021-01-26 RX ORDER — ALBUTEROL SULFATE 0.83 MG/ML
2.5 SOLUTION RESPIRATORY (INHALATION)
Status: CANCELLED | OUTPATIENT
Start: 2021-01-26

## 2021-01-26 RX ORDER — LORAZEPAM 2 MG/ML
0.5 INJECTION INTRAMUSCULAR EVERY 4 HOURS PRN
Status: CANCELLED
Start: 2021-01-26

## 2021-01-26 RX ORDER — PALONOSETRON 0.05 MG/ML
0.25 INJECTION, SOLUTION INTRAVENOUS ONCE
Status: COMPLETED | OUTPATIENT
Start: 2021-01-26 | End: 2021-01-26

## 2021-01-26 RX ORDER — MEPERIDINE HYDROCHLORIDE 25 MG/ML
25 INJECTION INTRAMUSCULAR; INTRAVENOUS; SUBCUTANEOUS EVERY 30 MIN PRN
Status: CANCELLED | OUTPATIENT
Start: 2021-01-26

## 2021-01-26 RX ORDER — EPINEPHRINE 1 MG/ML
0.3 INJECTION, SOLUTION INTRAMUSCULAR; SUBCUTANEOUS EVERY 5 MIN PRN
Status: CANCELLED | OUTPATIENT
Start: 2021-01-26

## 2021-01-26 RX ORDER — DEXAMETHASONE 4 MG/1
8 TABLET ORAL
Qty: 6 TABLET | Refills: 3 | COMMUNITY
Start: 2021-01-26 | End: 2021-03-28

## 2021-01-26 RX ORDER — DIPHENHYDRAMINE HYDROCHLORIDE 50 MG/ML
50 INJECTION INTRAMUSCULAR; INTRAVENOUS
Status: CANCELLED
Start: 2021-01-26

## 2021-01-26 RX ORDER — NALOXONE HYDROCHLORIDE 0.4 MG/ML
.1-.4 INJECTION, SOLUTION INTRAMUSCULAR; INTRAVENOUS; SUBCUTANEOUS
Status: CANCELLED | OUTPATIENT
Start: 2021-01-26

## 2021-01-26 RX ORDER — DOXORUBICIN HYDROCHLORIDE 2 MG/ML
60 INJECTION, SOLUTION INTRAVENOUS ONCE
Status: COMPLETED | OUTPATIENT
Start: 2021-01-26 | End: 2021-01-26

## 2021-01-26 RX ORDER — HEPARIN SODIUM,PORCINE 10 UNIT/ML
5 VIAL (ML) INTRAVENOUS
Status: CANCELLED | OUTPATIENT
Start: 2021-01-26

## 2021-01-26 RX ORDER — HEPARIN SODIUM (PORCINE) LOCK FLUSH IV SOLN 100 UNIT/ML 100 UNIT/ML
5 SOLUTION INTRAVENOUS
Status: DISCONTINUED | OUTPATIENT
Start: 2021-01-26 | End: 2021-01-26 | Stop reason: HOSPADM

## 2021-01-26 RX ORDER — HEPARIN SODIUM (PORCINE) LOCK FLUSH IV SOLN 100 UNIT/ML 100 UNIT/ML
5 SOLUTION INTRAVENOUS EVERY 8 HOURS
Status: DISCONTINUED | OUTPATIENT
Start: 2021-01-26 | End: 2021-01-26 | Stop reason: HOSPADM

## 2021-01-26 RX ORDER — SODIUM CHLORIDE 9 MG/ML
1000 INJECTION, SOLUTION INTRAVENOUS CONTINUOUS PRN
Status: CANCELLED
Start: 2021-01-26

## 2021-01-26 RX ADMIN — DOXORUBICIN HYDROCHLORIDE 110 MG: 2 INJECTION, SOLUTION INTRAVENOUS at 09:45

## 2021-01-26 RX ADMIN — Medication 250 ML: at 08:59

## 2021-01-26 RX ADMIN — PALONOSETRON 0.25 MG: 0.05 INJECTION, SOLUTION INTRAVENOUS at 09:41

## 2021-01-26 RX ADMIN — HEPARIN SODIUM (PORCINE) LOCK FLUSH IV SOLN 100 UNIT/ML 5 ML: 100 SOLUTION at 07:45

## 2021-01-26 RX ADMIN — HEPARIN SODIUM (PORCINE) LOCK FLUSH IV SOLN 100 UNIT/ML 5 ML: 100 SOLUTION at 10:32

## 2021-01-26 ASSESSMENT — PAIN SCALES - GENERAL: PAINLEVEL: MODERATE PAIN (4)

## 2021-01-26 NOTE — NURSING NOTE
"Oncology Rooming Note    January 26, 2021 8:10 AM   Talya Zuleta is a 75 year old female who presents for:    Chief Complaint   Patient presents with     Oncology Clinic Visit     prior to tx     Initial Vitals: /71 (BP Location: Right arm, Patient Position: Chair, Cuff Size: Adult Regular)   Pulse 73   Temp 98.5  F (36.9  C) (Oral)   Wt 71.9 kg (158 lb 8 oz)   SpO2 100%   BMI 25.78 kg/m   Estimated body mass index is 25.78 kg/m  as calculated from the following:    Height as of 12/28/20: 1.67 m (5' 5.75\").    Weight as of this encounter: 71.9 kg (158 lb 8 oz). Body surface area is 1.83 meters squared.  Moderate Pain (4) Comment: Data Unavailable   No LMP recorded. Patient is postmenopausal.  Allergies reviewed: Yes  Medications reviewed: Yes    Medications: Medication refills not needed today.  Pharmacy name entered into [employer]:    [id number] - [place], [place] - [address] OF [address] [place] [address] - [place], [place] - [address], [address]  [facility] - [facility] [id number]    Clinical concerns: Yes Jacquelyn was notified.      Pam Galicia CMA              "

## 2021-01-26 NOTE — PROGRESS NOTES
Oncology Follow Up Visit: January 26, 2021     Oncologist: Dr Perlita Alvarez  PCP: Cole Weston    Diagnosis: Right Breast Cancer  Talya Zuleta is a 74 yo female with Diabetes, HTN and COPD who was found to have bilateral breast asymmetry on screening mammogram in 7/2020. Further diagnostic exam found 2.7 cm mass at 12:00, 5 cm from nipple with ductal extension +6 mm mass at 3 cm from nipple. Contrast enhanced mammogram showed the right breast mass, including extension, to measure 4.9 cm.  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  Estrogen receptor was moderate in 50% and progesterone receptor staining was negative.  HER-2 was negative by IHC; HER2 FISH showed approximately 10% of tumor cells to have 6.8 HER2 signals/nucleus and a HER2/CEN17 ratio of 1.6.  The HER2 positive cells were noted to be the large pleomorphic cells.  Multiple infiltrating neutrophils were noted through tumor stroma.  Treatment:   10/7-12/22/2020 received Taxol, Herceptin,Perjeta plan x 12 weeks  12/29/2020 began plan of AC x 4 cycles    Interval History: Ms. Zuleta comes to clinic for cycle 3 of AC.  Patient shares she started out well with this cycle 5-day 3 she started noting the loose stools again has been needing to take Imodium intermittently throughout the cycle-did trial use of fiber felt it was not helpful.  She is also got continue nose sores and had a sore at the back of the tongue salt water rinses-treating nose with Aquaphor.  Also has gum soreness and wondering if she should go to the dentist.  Feels she has been eating well but still losing some weight.  She admits to using up to 2 gallons of water daily now thinks that may have been too much.  Referral neuropathy with numbness of the fingertips and the toes still noted range 4/10 but also feels this is slowly getting better-unable to do puzzles related to poor fine motor with neuropathy. Rash to  groin much improved with use of triamcinolone. No problems with nausea fevers depression or trouble sleeping.  Rest of comprehensive and complete ROS is reviewed and is negative.   Past Medical History:   Diagnosis Date     Breast cancer (H)      Sleep apnea      Current Outpatient Medications   Medication     amLODIPine (NORVASC) 5 MG tablet     atorvastatin (LIPITOR) 40 MG tablet     benazepril (LOTENSIN) 20 MG tablet     diphenoxylate-atropine (LOMOTIL) 2.5-0.025 MG tablet     furosemide (LASIX) 80 MG tablet     HYDROcodone-acetaminophen (NORCO) 5-325 MG tablet     loratadine (CLARITIN) 10 MG capsule     ondansetron (ZOFRAN-ODT) 8 MG ODT tab     potassium chloride ER (KLOR-CON M) 20 MEQ CR tablet     triamcinolone (KENALOG) 0.1 % external cream     No current facility-administered medications for this visit.      Facility-Administered Medications Ordered in Other Visits   Medication     sodium chloride (PF) 0.9% PF flush 30 mL   - Long tobacco use history- current use of 1/2 ppd and daily alcohol use of 1 drink daily.   Allergies   Allergen Reactions     Bacitracin-Neomycin-Polymyxin      PN: LW Reaction: Unknown Reaction     Latex        Physical Exam:/71 (BP Location: Right arm, Patient Position: Chair, Cuff Size: Adult Regular)   Pulse 73   Temp 98.5  F (36.9  C) (Oral)   Wt 71.9 kg (158 lb 8 oz)   SpO2 100%   BMI 25.78 kg/m     Constitutional: Alert, talkative, and in no distress.   ENT: Eyes bright, noted to have sore to right side of tongue - cannot see nose sores today.  Neck: Supple, No adenopathy- No neck swelling today- felt to be blocked saliva duct since was intermittently- improved with use of ninfa.  Cardiac: Heart rate and rhythm is regular and strong without murmur  Respiratory: Breathing easy. Lung sounds clear to auscultation- no cough  Abdomen: Soft, non-tender, BS normal- not active. No masses or organomegaly  MS: Muscle tone normal, extremities normal with no edema. Can see some  weakness of the  of the hands  Skin: No suspicious lesions or rashes today  Neuro: Sensory grossly WNL, gait normal.   Lymph: Normal ant/post cervical, axillary, supraclavicular nodes  Psych: Mentation appears normal and affect normal/bright with good conversation.    Laboratory Results:   Results for orders placed or performed in visit on 01/26/21   CBC with platelets differential     Status: Abnormal   Result Value Ref Range    WBC 8.7 4.0 - 11.0 10e9/L    RBC Count 3.11 (L) 3.8 - 5.2 10e12/L    Hemoglobin 10.7 (L) 11.7 - 15.7 g/dL    Hematocrit 32.0 (L) 35.0 - 47.0 %     (H) 78 - 100 fl    MCH 34.4 (H) 26.5 - 33.0 pg    MCHC 33.4 31.5 - 36.5 g/dL    RDW 17.2 (H) 10.0 - 15.0 %    Platelet Count 255 150 - 450 10e9/L    Diff Method Automated Method     % Neutrophils 71.9 %    % Lymphocytes 12.1 %    % Monocytes 10.4 %    % Eosinophils 0.0 %    % Basophils 0.8 %    % Immature Granulocytes 4.8 %    Absolute Neutrophil 6.2 1.6 - 8.3 10e9/L    Absolute Lymphocytes 1.1 0.8 - 5.3 10e9/L    Absolute Monocytes 0.9 0.0 - 1.3 10e9/L    Absolute Eosinophils 0.0 0.0 - 0.7 10e9/L    Absolute Basophils 0.1 0.0 - 0.2 10e9/L    Abs Immature Granulocytes 0.4 0 - 0.4 10e9/L   Comprehensive metabolic panel     Status: Abnormal   Result Value Ref Range    Sodium 142 133 - 144 mmol/L    Potassium 3.5 3.4 - 5.3 mmol/L    Chloride 106 94 - 109 mmol/L    Carbon Dioxide 29 20 - 32 mmol/L    Anion Gap 7 3 - 14 mmol/L    Glucose 109 (H) 70 - 99 mg/dL    Urea Nitrogen 12 7 - 30 mg/dL    Creatinine 0.83 0.52 - 1.04 mg/dL    GFR Estimate 68 >60 mL/min/[1.73_m2]    GFR Estimate If Black 79 >60 mL/min/[1.73_m2]    Calcium 8.9 8.5 - 10.1 mg/dL    Bilirubin Total 0.2 0.2 - 1.3 mg/dL    Albumin 3.3 (L) 3.4 - 5.0 g/dL    Protein Total 6.8 6.8 - 8.8 g/dL    Alkaline Phosphatase 94 40 - 150 U/L    ALT 19 0 - 50 U/L    AST 12 0 - 45 U/L     Assessment and Plan:  Right Breast Cancer- Pt is due to to continue with AC plan cycle 3. She has  experienced several symptoms including mouth and nose sores and loose stools.We will continue to work on symptoms and she is ready to continue with treatment today.   Pt will return  In 2 weeks for final AC and will meet with provider prior to treatment.  Loose stools-related to the Perjeta -She is not longer on the Perjeta plan but loose stools continue. She is using imodium 2 x daily and is meeting fluid and electrolyte goals. Admits urgency may be improving. Suggested possible use of fiber product to bulk stools.   Mouth and nose sores- using salt water rinses for mouth often through cycle and using aquafor to nose- noting nose bleeds intermittently.  Loose stools- tried fiber daily and admits she was using up to 2 gallons water daily will decrease water use. Using imodium as needed.   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 Using Vitamin B 6 daily  Covid 19 precautions- Reviewed precautions for prevention of transmission. Took much time to review vaccine eligibility and procuring vaccine.    The total time of this encounter amounted to 35 minutes. This time included 25min face to face time spent with the patient, prep work, ordering tests, and performing post visit documentation.  Jacquelyn Miller,Cnp

## 2021-01-26 NOTE — LETTER
1/26/2021         RE: Talya Zuleta  [address] [facility] [address]        Dear Colleague,    Thank you for referring your patient, Talya Zuleta, to the [facility]. Please see a copy of my visit note below.    Oncology Follow Up Visit: January 26, 2021     Oncologist: Dr Perlita Alvarez  PCP: Cole Weston    Diagnosis: Right Breast Cancer  Talya Zuleta is a 74 yo female with Diabetes, HTN and COPD who was found to have bilateral breast asymmetry on screening mammogram in 7/2020. Further diagnostic exam found 2.7 cm mass at 12:00, 5 cm from nipple with ductal extension +6 mm mass at 3 cm from nipple. Contrast enhanced mammogram showed the right breast mass, including extension, to measure 4.9 cm.  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  Estrogen receptor was moderate in 50% and progesterone receptor staining was negative.  HER-2 was negative by IHC; HER2 FISH showed approximately 10% of tumor cells to have 6.8 HER2 signals/nucleus and a HER2/CEN17 ratio of 1.6.  The HER2 positive cells were noted to be the large pleomorphic cells.  Multiple infiltrating neutrophils were noted through tumor stroma.  Treatment:   10/7-12/22/2020 received Taxol, Herceptin,Perjeta plan x 12 weeks  12/29/2020 began plan of AC x 4 cycles    Interval History: Ms. Zuleta comes to clinic for cycle 3 of AC.  Patient shares she started out well with this cycle 5-day 3 she started noting the loose stools again has been needing to take Imodium intermittently throughout the cycle-did trial use of fiber felt it was not helpful.  She is also got continue nose sores and had a sore at the back of the tongue salt water rinses-treating nose with Aquaphor.  Also has gum soreness and wondering if she should go to the dentist.  Feels she has been eating well but still losing some weight.  She admits to using up to 2  gallons of water daily now thinks that may have been too much.  Referral neuropathy with numbness of the fingertips and the toes still noted range 4/10 but also feels this is slowly getting better-unable to do puzzles related to poor fine motor with neuropathy. Rash to groin much improved with use of triamcinolone. No problems with nausea fevers depression or trouble sleeping.  Rest of comprehensive and complete ROS is reviewed and is negative.   Past Medical History:   Diagnosis Date     Breast cancer (H)      Sleep apnea      Current Outpatient Medications   Medication     amLODIPine (NORVASC) 5 MG tablet     atorvastatin (LIPITOR) 40 MG tablet     benazepril (LOTENSIN) 20 MG tablet     diphenoxylate-atropine (LOMOTIL) 2.5-0.025 MG tablet     furosemide (LASIX) 80 MG tablet     HYDROcodone-acetaminophen (NORCO) 5-325 MG tablet     loratadine (CLARITIN) 10 MG capsule     ondansetron (ZOFRAN-ODT) 8 MG ODT tab     potassium chloride ER (KLOR-CON M) 20 MEQ CR tablet     triamcinolone (KENALOG) 0.1 % external cream     No current facility-administered medications for this visit.      Facility-Administered Medications Ordered in Other Visits   Medication     sodium chloride (PF) 0.9% PF flush 30 mL   - Long tobacco use history- current use of 1/2 ppd and daily alcohol use of 1 drink daily.   Allergies   Allergen Reactions     Bacitracin-Neomycin-Polymyxin      PN: LW Reaction: Unknown Reaction     Latex        Physical Exam:/71 (BP Location: Right arm, Patient Position: Chair, Cuff Size: Adult Regular)   Pulse 73   Temp 98.5  F (36.9  C) (Oral)   Wt 71.9 kg (158 lb 8 oz)   SpO2 100%   BMI 25.78 kg/m     Constitutional: Alert, talkative, and in no distress.   ENT: Eyes bright, noted to have sore to right side of tongue - cannot see nose sores today.  Neck: Supple, No adenopathy- No neck swelling today- felt to be blocked saliva duct since was intermittently- improved with use of ninfa.  Cardiac: Heart rate  and rhythm is regular and strong without murmur  Respiratory: Breathing easy. Lung sounds clear to auscultation- no cough  Abdomen: Soft, non-tender, BS normal- not active. No masses or organomegaly  MS: Muscle tone normal, extremities normal with no edema. Can see some weakness of the  of the hands  Skin: No suspicious lesions or rashes today  Neuro: Sensory grossly WNL, gait normal.   Lymph: Normal ant/post cervical, axillary, supraclavicular nodes  Psych: Mentation appears normal and affect normal/bright with good conversation.    Laboratory Results:   Results for orders placed or performed in visit on 01/26/21   CBC with platelets differential     Status: Abnormal   Result Value Ref Range    WBC 8.7 4.0 - 11.0 10e9/L    RBC Count 3.11 (L) 3.8 - 5.2 10e12/L    Hemoglobin 10.7 (L) 11.7 - 15.7 g/dL    Hematocrit 32.0 (L) 35.0 - 47.0 %     (H) 78 - 100 fl    MCH 34.4 (H) 26.5 - 33.0 pg    MCHC 33.4 31.5 - 36.5 g/dL    RDW 17.2 (H) 10.0 - 15.0 %    Platelet Count 255 150 - 450 10e9/L    Diff Method Automated Method     % Neutrophils 71.9 %    % Lymphocytes 12.1 %    % Monocytes 10.4 %    % Eosinophils 0.0 %    % Basophils 0.8 %    % Immature Granulocytes 4.8 %    Absolute Neutrophil 6.2 1.6 - 8.3 10e9/L    Absolute Lymphocytes 1.1 0.8 - 5.3 10e9/L    Absolute Monocytes 0.9 0.0 - 1.3 10e9/L    Absolute Eosinophils 0.0 0.0 - 0.7 10e9/L    Absolute Basophils 0.1 0.0 - 0.2 10e9/L    Abs Immature Granulocytes 0.4 0 - 0.4 10e9/L   Comprehensive metabolic panel     Status: Abnormal   Result Value Ref Range    Sodium 142 133 - 144 mmol/L    Potassium 3.5 3.4 - 5.3 mmol/L    Chloride 106 94 - 109 mmol/L    Carbon Dioxide 29 20 - 32 mmol/L    Anion Gap 7 3 - 14 mmol/L    Glucose 109 (H) 70 - 99 mg/dL    Urea Nitrogen 12 7 - 30 mg/dL    Creatinine 0.83 0.52 - 1.04 mg/dL    GFR Estimate 68 >60 mL/min/[1.73_m2]    GFR Estimate If Black 79 >60 mL/min/[1.73_m2]    Calcium 8.9 8.5 - 10.1 mg/dL    Bilirubin Total 0.2 0.2  - 1.3 mg/dL    Albumin 3.3 (L) 3.4 - 5.0 g/dL    Protein Total 6.8 6.8 - 8.8 g/dL    Alkaline Phosphatase 94 40 - 150 U/L    ALT 19 0 - 50 U/L    AST 12 0 - 45 U/L     Assessment and Plan:  Right Breast Cancer- Pt is due to to continue with AC plan cycle 3. She has experienced several symptoms including mouth and nose sores and loose stools.We will continue to work on symptoms and she is ready to continue with treatment today.   Pt will return  In 2 weeks for final AC and will meet with provider prior to treatment.  Loose stools-related to the Perjeta -She is not longer on the Perjeta plan but loose stools continue. She is using imodium 2 x daily and is meeting fluid and electrolyte goals. Admits urgency may be improving. Suggested possible use of fiber product to bulk stools.   Mouth and nose sores- using salt water rinses for mouth often through cycle and using aquafor to nose- noting nose bleeds intermittently.  Loose stools- tried fiber daily and admits she was using up to 2 gallons water daily will decrease water use. Using imodium as needed.   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 Using Vitamin B 6 daily  Covid 19 precautions- Reviewed precautions for prevention of transmission. Took much time to review vaccine eligibility and procuring vaccine.    The total time of this encounter amounted to 35 minutes. This time included 25min face to face time spent with the patient, prep work, ordering tests, and performing post visit documentation.  Jacquelyn Miller Cnp                    Again, thank you for allowing me to participate in the care of your patient.        Sincerely,        Jacquelyn Miller, JT, APRN CNP

## 2021-01-26 NOTE — PROGRESS NOTES
Infusion Nursing Note:  Talya Zuleta presents today for C3D1 AC/OnPro.    Patient seen by provider today: Yes: Jacquelyn Miller NP   present during visit today: Not Applicable.    Note: N/A.    Intravenous Access:  Implanted Port.    Treatment Conditions:  Lab Results   Component Value Date    HGB 10.7 01/26/2021     Lab Results   Component Value Date    WBC 8.7 01/26/2021      Lab Results   Component Value Date    ANEU 6.2 01/26/2021     Lab Results   Component Value Date     01/26/2021      Lab Results   Component Value Date     01/26/2021                   Lab Results   Component Value Date    POTASSIUM 3.5 01/26/2021           Lab Results   Component Value Date    MAG 2.4 01/12/2021            Lab Results   Component Value Date    CR 0.83 01/26/2021                   Lab Results   Component Value Date    JIMBO 8.9 01/26/2021                Lab Results   Component Value Date    BILITOTAL 0.2 01/26/2021           Lab Results   Component Value Date    ALBUMIN 3.3 01/26/2021                    Lab Results   Component Value Date    ALT 19 01/26/2021           Lab Results   Component Value Date    AST 12 01/26/2021       Results reviewed, labs MET treatment parameters, ok to proceed with treatment.    Post Infusion Assessment:  Patient tolerated infusion without incident.  Blood return noted pre and post infusion.  Blood return noted during administration every 5 cc.  Site patent and intact, free from redness, edema or discomfort.  No evidence of extravasations.  Access discontinued per protocol.       Discharge Plan:   Patient will return 2/9/2021 for next appointment.   Patient discharged in stable condition accompanied by: self.  Departure Mode: Ambulatory.    Magaly Osorio RN-BSN, PHN, OCN  [facility]

## 2021-01-26 NOTE — PROGRESS NOTES
Port needle left for access for treatment, Sterile technique performed and maintained. Patient tolerated procedure well. Tubes drawn in rainbow order: red, green, purple. Transparent dressing placed with use of tegaderm.    Nisha Encarnacion RN  [facility] Oncology/Infusion [place]

## 2021-01-28 ENCOUNTER — PATIENT OUTREACH (OUTPATIENT)
Dept: SURGERY | Facility: CLINIC | Age: 76
End: 2021-01-28

## 2021-01-28 NOTE — TELEPHONE ENCOUNTER
Surgical Oncology RN Care Coordination Note:     Called to touch base with patient regarding surgical planning as she is coming the end of her neoadjuvant chemotherapy.     Message left for patient to call back and confirm surgical plan and discuss time of procedure.     Left my direct number for patient to call back.     Christina Martin RN, BSN  Care Coordinator

## 2021-01-31 RX ORDER — SODIUM CHLORIDE 9 MG/ML
1000 INJECTION, SOLUTION INTRAVENOUS CONTINUOUS PRN
Status: CANCELLED
Start: 2021-02-09

## 2021-01-31 RX ORDER — EPINEPHRINE 1 MG/ML
0.3 INJECTION, SOLUTION INTRAMUSCULAR; SUBCUTANEOUS EVERY 5 MIN PRN
Status: CANCELLED | OUTPATIENT
Start: 2021-02-09

## 2021-01-31 RX ORDER — HEPARIN SODIUM,PORCINE 10 UNIT/ML
5 VIAL (ML) INTRAVENOUS
Status: CANCELLED | OUTPATIENT
Start: 2021-02-09

## 2021-01-31 RX ORDER — NALOXONE HYDROCHLORIDE 0.4 MG/ML
.1-.4 INJECTION, SOLUTION INTRAMUSCULAR; INTRAVENOUS; SUBCUTANEOUS
Status: CANCELLED | OUTPATIENT
Start: 2021-02-09

## 2021-01-31 RX ORDER — ALBUTEROL SULFATE 0.83 MG/ML
2.5 SOLUTION RESPIRATORY (INHALATION)
Status: CANCELLED | OUTPATIENT
Start: 2021-02-09

## 2021-01-31 RX ORDER — LORAZEPAM 2 MG/ML
0.5 INJECTION INTRAMUSCULAR EVERY 4 HOURS PRN
Status: CANCELLED
Start: 2021-02-09

## 2021-01-31 RX ORDER — MEPERIDINE HYDROCHLORIDE 25 MG/ML
25 INJECTION INTRAMUSCULAR; INTRAVENOUS; SUBCUTANEOUS EVERY 30 MIN PRN
Status: CANCELLED | OUTPATIENT
Start: 2021-02-09

## 2021-01-31 RX ORDER — METHYLPREDNISOLONE SODIUM SUCCINATE 125 MG/2ML
125 INJECTION, POWDER, LYOPHILIZED, FOR SOLUTION INTRAMUSCULAR; INTRAVENOUS
Status: CANCELLED
Start: 2021-02-09

## 2021-01-31 RX ORDER — ALBUTEROL SULFATE 90 UG/1
1-2 AEROSOL, METERED RESPIRATORY (INHALATION)
Status: CANCELLED
Start: 2021-02-09

## 2021-01-31 RX ORDER — DIPHENHYDRAMINE HYDROCHLORIDE 50 MG/ML
50 INJECTION INTRAMUSCULAR; INTRAVENOUS
Status: CANCELLED
Start: 2021-02-09

## 2021-01-31 RX ORDER — PALONOSETRON 0.05 MG/ML
0.25 INJECTION, SOLUTION INTRAVENOUS ONCE
Status: CANCELLED
Start: 2021-02-09

## 2021-01-31 RX ORDER — HEPARIN SODIUM (PORCINE) LOCK FLUSH IV SOLN 100 UNIT/ML 100 UNIT/ML
5 SOLUTION INTRAVENOUS
Status: CANCELLED | OUTPATIENT
Start: 2021-02-09

## 2021-01-31 RX ORDER — DOXORUBICIN HYDROCHLORIDE 2 MG/ML
60 INJECTION, SOLUTION INTRAVENOUS ONCE
Status: CANCELLED | OUTPATIENT
Start: 2021-02-09

## 2021-02-01 ENCOUNTER — VIRTUAL VISIT (OUTPATIENT)
Dept: ONCOLOGY | Facility: CLINIC | Age: 76
End: 2021-02-01
Attending: INTERNAL MEDICINE
Payer: COMMERCIAL

## 2021-02-01 DIAGNOSIS — G62.0 DRUG-INDUCED POLYNEUROPATHY (H): ICD-10-CM

## 2021-02-01 DIAGNOSIS — Z17.0 MALIGNANT NEOPLASM OF OVERLAPPING SITES OF RIGHT BREAST IN FEMALE, ESTROGEN RECEPTOR POSITIVE (H): Primary | ICD-10-CM

## 2021-02-01 DIAGNOSIS — R19.5 LOOSE STOOLS: ICD-10-CM

## 2021-02-01 DIAGNOSIS — K13.79 MOUTH SORES: ICD-10-CM

## 2021-02-01 DIAGNOSIS — D70.1 CHEMOTHERAPY-INDUCED NEUTROPENIA (H): ICD-10-CM

## 2021-02-01 DIAGNOSIS — C50.811 MALIGNANT NEOPLASM OF OVERLAPPING SITES OF RIGHT BREAST IN FEMALE, ESTROGEN RECEPTOR POSITIVE (H): Primary | ICD-10-CM

## 2021-02-01 DIAGNOSIS — J34.89 NASAL SORE: ICD-10-CM

## 2021-02-01 DIAGNOSIS — T45.1X5A CHEMOTHERAPY-INDUCED NEUTROPENIA (H): ICD-10-CM

## 2021-02-01 PROCEDURE — 99214 OFFICE O/P EST MOD 30 MIN: CPT | Mod: 95 | Performed by: INTERNAL MEDICINE

## 2021-02-01 PROCEDURE — 999N001193 HC VIDEO/TELEPHONE VISIT; NO CHARGE

## 2021-02-01 NOTE — LETTER
"    2/1/2021         RE: Talya Zuleta  [address] [facility] [address]        Dear Colleague,    Thank you for referring your patient, Talya Zuleta, to the [facility]. Please see a copy of my visit note below.    Talya is a 75 year old who is being evaluated via a billable video visit.      How would you like to obtain your AVS? MyChart  If the video visit is dropped, the invitation should be resent by: Text to cell phone: [phone]  Will anyone else be joining your video visit? No    I have reviewed and updated the patient's allergies and medication list.    Concerns: No new concerns.   Refills: None needed.        Vitals - Patient Reported  Weight (Patient Reported): 71.9 kg (158 lb 8.2 oz)  Height (Patient Reported): 167 cm (5' 5.75\")  BMI (Based on Pt Reported Ht/Wt): 25.78  Pain Score: Moderate Pain (4)  Pain Loc: (feet)      Erin Mata CMA      Video-Visit Details    Type of service:  Video Visit    Total time of video visit:  25 minutes    Originating Location (pt. Location): Home    Distant Location (provider location):  [facility]     Platform used for Video Visit: ePub Direct    Oncology Follow Up:  Date on this visit: 2/1/2021    Diagnosis:  right breast cancer, clinical stage T2N0M0.    Primary Physician: Cole Weston     History Of Present Illness:  Ms. Zuleta is a 75 year old female with right breast cancer.  Routine screening mammogram in 07/2020 showed bilateral breast asymmetries.  Diagnostic mammograms and ultrasound showed a 2.7 cm mass in the right breast at 12:00, 5 cm from the nipple with ductal extension including a 6 mm mass at 3 cm from the nipple.  In the left breast were benign appearing cysts.  Ultrasound of the right axilla was without lymphadenopathy.  Contrast enhanced mammogram showed the right breast mass, including extension, to measure 4.9 cm.  Biopsy of the larger right breast mass showed grade " 3 invasive carcinoma with anaplastic tumor giant cells.  Estrogen receptor was moderate in 50% and progesterone receptor staining was negative.  HER-2 was negative by IHC; HER2 FISH showed approximately 10% of tumor cells to have 6.8 HER2 signals/nucleus and a HER2/CEN17 ratio of 1.6.  The HER2 positive cells were noted to be the large pleomorphic cells.  Multiple neutrophils were noted to be infiltrating through tumor stroma.    She received 12 weeks of neoadjuvant Taxol, Herceptin, and pertuzumab from 10/7/2020 - 12/22/2020.  She began treatment with dose dense adriamycin and cyclophosphamide on 12/29/2020.    Interval History:  Ms. Zuleta was seen via video visit today for evaluation prior to cycle #4 of Adriamycin and cyclophosphamide chemotherapy.  Of note, she has tolerated chemotherapy remarkably well.  With prior cycles of AC, she had ongoing diarrheal episodes.  She states with this last cycle, she cut down on the amount of water that she was drinking daily.  With this change, she has noted significant improvement in diarrhea.  She reports poor taste.  At times, foods taste very bland to her.  She also reports having mouth sores with each cycle.  She states that she has none currently.  She has been brushing her teeth with baking soda toothpaste as she is unable to tolerate the mint toothpaste.  She has not otherwise been doing anything for the mouth sores.  She denies fevers, chills or infectious complaints.  She has nausea at times before she goes to bed.  She puts a Zofran under the tongue with relief of her symptoms.  She denies vomiting.  She has no abdominal pain or bloating.  She denies current or new skin rashes or lesions.  She has questions regarding her surgery for her breast cancer.  The remainder of a complete 12-point review of systems was reviewed with the patient and was negative with the exception of that mentioned above.     Past Medical/Surgical History:  1.  Breast cancer as per  HPI  2.  Hypertension  3.  Diabetes  4.  COPD  5.  Hypothyroidism  6.  Hypohidrosis    Allergies:  Allergies as of 02/01/2021 - Reviewed 01/26/2021   Allergen Reaction Noted     Bacitracin-neomycin-polymyxin  04/18/2003     Latex  09/30/2005     Current Medications:  Current Outpatient Medications   Medication Sig Dispense Refill     amLODIPine (NORVASC) 5 MG tablet Take 5 mg by mouth       atorvastatin (LIPITOR) 40 MG tablet TAKE 1/2 TABLET DAILY       benazepril (LOTENSIN) 20 MG tablet TAKE 1 TABLET BY MOUTH TWO TIMES A DAY.       dexamethasone (DECADRON) 4 MG tablet Take 2 tablets (8 mg) by mouth daily (with breakfast) Start on Day 2. 6 tablet 3     diphenoxylate-atropine (LOMOTIL) 2.5-0.025 MG tablet Take 1 tablet by mouth 2 times daily 60 tablet 2     furosemide (LASIX) 80 MG tablet Take 80 mg by mouth 2 times daily       HYDROcodone-acetaminophen (NORCO) 5-325 MG tablet Take 1 tablet by mouth every 4 hours as needed for moderate to severe pain 5 tablet 0     loratadine (CLARITIN) 10 MG capsule Take 1 tab the day before, the day of, and the day after Neulasta. 30 capsule 1     ondansetron (ZOFRAN-ODT) 8 MG ODT tab Take 1 tablet (8 mg) by mouth every 8 hours as needed for nausea 90 tablet 1     potassium chloride ER (KLOR-CON M) 20 MEQ CR tablet Take 40 mEq by mouth       triamcinolone (KENALOG) 0.1 % external cream         Family and Social History:  Please see initial consultation dated 9/21/2020 for further details.  Breast Actionable Panel on 10/1/2020 was negative for mutation.    Physical Exam:  General:  Well appearing adult female in NAD.  (+) alopecia, wearing a wig.  Eyes:  No erythema or discharge  Respiratory:  Breathing comfortably on room air.  No wheezing or distress.  Musculoskeletal:  Full ROM of the bilateral upper extremities.  Skin:  No visible concerning skin rashes or lesions  Neuro:  No notable tremor and dyskinetic movements.  Psych:  Mood and affect appear normal.    The rest of a  comprehensive physical examination is deferred due to PHE (public health emergency) video visit restrictions.    Laboratory/Imaging Studies  12/22/2020 Labs:  WBC is wnl.  Hemoglobin is slightly low at 11.5 g/dL  Platelets are wnl.    ASSESSMENT/PLAN:  75 year old female with a history of HTN, dyslipidemia, COPD, diabetes, and OA with a newly diagnosed clinical stage, T2N0M0, right breast cancer, that is ER positive (50%), WI negative, and HER2 positive.  She is s/p treatment with 12 weeks THP and 3 cycles ddAC.      1.  Right breast cancer:  Presents for evaluation prior to her 4th and final cycle of AC chemotherapy.  Per patient right breast mass has been decreasing in size since starting this chemotherapy.  She is tolerating treatment well with the exception of mouth sores and dysgeusia.  Despite these side effects, she is willing to continue treatment.  As long as laboratories are within parameters on 2/9, okay to proceed with cycle #4 AC chemotherapy.      Upon completion of chemotherapy, plan is to proceed with surgery. She has elected to proceed with a mastectomy as she is hopeful she will not require radiation.  I will communicate this to her surgery team in order to facilitate surgery scheduling.  We discussed she will need an H&P with her PCP within 30 days of surgery and a COVID test within 3 days of surgery.  If she has a positive lymph node at the time of surgery, she will still need radiation after surgery.  Finally, will treat with a minimum 5 years of endocrine therapy.     2. Diarrhea:  Resolved with decreasing water intake.  She will continue imodium only if diarrhea returns.    3. Mouth sores:  Advised to use baking soda and saltwater swishes twice daily every day.  Contact the clinic if mouth sores, despite this.    4.  COVID vaccination:  She should avoid COVID vaccination on days 7-10 following chemotherapy as this is when WBC nadirs.  Advised ideally would receive vaccine 10+ days out from the  last cycle.    5.  Dysgeusia:  Advised can try either zinc supplement of Miracle Berry.    6. Follow Up:  Return visit with me 2 weeks after surgery.        Again, thank you for allowing me to participate in the care of your patient.        Sincerely,        Perlita Alvarez MD

## 2021-02-02 ENCOUNTER — PREP FOR PROCEDURE (OUTPATIENT)
Dept: SURGERY | Facility: CLINIC | Age: 76
End: 2021-02-02

## 2021-02-02 ENCOUNTER — TELEPHONE (OUTPATIENT)
Dept: ONCOLOGY | Facility: CLINIC | Age: 76
End: 2021-02-02

## 2021-02-02 DIAGNOSIS — C50.811 MALIGNANT NEOPLASM OF OVERLAPPING SITES OF RIGHT BREAST IN FEMALE, ESTROGEN RECEPTOR POSITIVE (H): Primary | ICD-10-CM

## 2021-02-02 DIAGNOSIS — Z17.0 MALIGNANT NEOPLASM OF OVERLAPPING SITES OF RIGHT BREAST IN FEMALE, ESTROGEN RECEPTOR POSITIVE (H): Primary | ICD-10-CM

## 2021-02-02 NOTE — TELEPHONE ENCOUNTER
Nuc med injection will be delivered to the [facility] OR at 7:00 AM. Surgeon will inject at the time of procedure. Patient does NOT need to go to nuc med. Patient will check in on the 5th floor of the [facility] at 5:45 AM.     I contacted the patient via phone and left a voicemail  to confirm the scheduled dates and provide the following information:     Surgery is scheduled with Dr. Newsome on 3/8 at [facility].  Scheduled per surgeon.    COVID-19 test scheduled for 3/5    H&P: to be completed by PAC.  POST-OP: 3/26    They are aware to arrive at 5:45 AM but will receive a call 1-2 days prior to surgery from a pre-op nurse with exact arrival and start time. Patient is aware that surgery times often change and their arrival time may be different than what is currently scheduled.    The surgery packet was provided via [employer].

## 2021-02-02 NOTE — TELEPHONE ENCOUNTER
FUTURE VISIT INFORMATION      SURGERY INFORMATION:    Date: 3.8.21    Location: [facility] OR    Surgeon:  Dr. Newsome    Anesthesia Type:  General    Procedure: right simple mastectomy    Consult: 2.1.21    RECORDS REQUESTED FROM:       Primary Care Provider: Dr. Weston    Most recent ECHO:12.30.20    Action 2.2.21 MJ   Action Taken Requested EKG strips from       Action 2.12.21 MJ   Action Taken Requested EKGs strips from      Action 2.12.21 MJ   Action Taken Received fax from  stating no EKG.

## 2021-02-04 ENCOUNTER — MYC MEDICAL ADVICE (OUTPATIENT)
Dept: ONCOLOGY | Facility: CLINIC | Age: 76
End: 2021-02-04

## 2021-02-05 ENCOUNTER — OFFICE VISIT (OUTPATIENT)
Dept: URGENT CARE | Facility: URGENT CARE | Age: 76
End: 2021-02-05
Payer: COMMERCIAL

## 2021-02-05 ENCOUNTER — TELEPHONE (OUTPATIENT)
Dept: FAMILY MEDICINE | Facility: CLINIC | Age: 76
End: 2021-02-05

## 2021-02-05 VITALS
TEMPERATURE: 98.8 F | RESPIRATION RATE: 20 BRPM | WEIGHT: 159 LBS | OXYGEN SATURATION: 98 % | DIASTOLIC BLOOD PRESSURE: 73 MMHG | HEART RATE: 89 BPM | BODY MASS INDEX: 25.86 KG/M2 | SYSTOLIC BLOOD PRESSURE: 144 MMHG

## 2021-02-05 DIAGNOSIS — M79.89 BILATERAL SWELLING OF FEET: Primary | ICD-10-CM

## 2021-02-05 DIAGNOSIS — L03.115 CELLULITIS OF BOTH FEET: ICD-10-CM

## 2021-02-05 DIAGNOSIS — L03.116 CELLULITIS OF BOTH FEET: ICD-10-CM

## 2021-02-05 LAB — ERYTHROCYTE [SEDIMENTATION RATE] IN BLOOD BY WESTERGREN METHOD: 74 MM/H (ref 0–30)

## 2021-02-05 PROCEDURE — 36415 COLL VENOUS BLD VENIPUNCTURE: CPT | Performed by: PHYSICIAN ASSISTANT

## 2021-02-05 PROCEDURE — 99214 OFFICE O/P EST MOD 30 MIN: CPT | Performed by: PHYSICIAN ASSISTANT

## 2021-02-05 PROCEDURE — 85025 COMPLETE CBC W/AUTO DIFF WBC: CPT | Performed by: PHYSICIAN ASSISTANT

## 2021-02-05 PROCEDURE — 85652 RBC SED RATE AUTOMATED: CPT | Performed by: PHYSICIAN ASSISTANT

## 2021-02-05 RX ORDER — CEPHALEXIN 500 MG/1
500 CAPSULE ORAL 3 TIMES DAILY
Qty: 30 CAPSULE | Refills: 0 | Status: SHIPPED | OUTPATIENT
Start: 2021-02-05 | End: 2021-02-15

## 2021-02-05 ASSESSMENT — ENCOUNTER SYMPTOMS
HEMATOLOGIC/LYMPHATIC NEGATIVE: 1
PSYCHIATRIC NEGATIVE: 1
CARDIOVASCULAR NEGATIVE: 1
CONSTITUTIONAL NEGATIVE: 1
NEUROLOGICAL NEGATIVE: 1
ENDOCRINE NEGATIVE: 1
GASTROINTESTINAL NEGATIVE: 1
RESPIRATORY NEGATIVE: 1
EYES NEGATIVE: 1

## 2021-02-05 NOTE — TELEPHONE ENCOUNTER
"Called Carolyne/pt to discuss symptomatic mychart.    Last chemo 1/26/21 Doxorubicin/Cyclophosphamide    Reached pt, she stated bilateral feet \"feel like I stepped into a camp fire\". Started 1 week ago ago about 6 days after chemo. Red on bottom and sides of feet and dry. Denied swelling in feet but knees to ankles seem more swollen than usual, used to wear compression socks but unable to get on for past 3-4 days. Denied rash, peeling, blisters, itching, muscle cramps, n/t of lower extremities. State BLE equally swollen, denied weight gain, urinary retention, sob or swelling elsewhere. Hands numb state as chronic \"quite a while\", does not take anything for hands. Denied other symptoms of hands.    Pt has been using Aquaphor ointment 3-4 times a day, helps a little with dryness. Advised her to try ice packs/frozen vegetables 10-15 minutes at a time to feet for comfort, use only warm temp water, wear shoes at all times and monitor for peeling, cuts, new rashes, redness or increase in swelling of one leg/foot compared to the other. Ok to take tylenol for pain. Asked her to call back if symptoms do not get better or actually gets worse by Monday as she is scheduled for next infusion Tuesday at . Advised to have family get her socks that fit, swelling may be getting worse since she stopped wearing them, if unable to find any may need to write a RX to get from medical supply store, elevate legs when resting.    She asked if she could use Epsom salt bath soaks, paged Dr. Alvarez, as Jacquelyn Miller RUPA at  is out today.  "

## 2021-02-05 NOTE — PROGRESS NOTES
Bilateral swelling of feet  - CBC with platelets and differential  - ESR: Erythrocyte sedimentation rate  - cephALEXin (KEFLEX) 500 MG capsule; Take 1 capsule (500 mg) by mouth 3 times daily for 10 days    Cellulitis of both feet  - cephALEXin (KEFLEX) 500 MG capsule; Take 1 capsule (500 mg) by mouth 3 times daily for 10 days    Labs suggestive of potential infection. Patient was treated for cellulitis. It's possible that her feet swelling may be a reaction to the chemo therapy but I am unable to rule this out.     Patient was advised to return to clinic if symptoms do not improve in the amount of time specified in the AVS or if symptoms worsen. Patient educated on red flag symptoms and asked to go directly to the ED if symptoms present themselves.     Patient was advised to follow up with oncology about postponing her surgery and chemo therapy until after her infection is under control.     45 minutes spent on the date of the encounter doing chart review, history and exam, documentation and further activities as noted [id number]     MEDICATIONS:  Continue current medications without change  See Patient Instructions  Patient Instructions     Patient Education     Discharge Instructions for Cellulitis   You have been diagnosed with cellulitis. This is an infection in the deepest layer of the skin and tissue beneath the skin. In some cases, the infection also affects the muscle. Cellulitis is caused by bacteria. The bacteria can enter the body through broken skin. This can happen with a cut, scratch, animal bite, or an insect bite that has been scratched. You may have been treated in the hospital with antibiotics and fluids. You will likely be given a prescription for antibiotics to take at home. This sheet will help you take care of yourself at home.  Home care  When you are home:    Take the prescribed antibiotic medicine you are given as directed until it is gone. Take it even if you feel better. It treats the  infection and stops it from returning. Not taking all the medicine can make future infections hard to treat.    Keep the infected area clean.    When possible, raise the infected area above the level of your heart. This helps keep swelling down.    Talk with your healthcare provider if you are in pain. Ask what kind of over-the-counter medicine you can take for pain.    Apply clean bandages as advised.    Take your temperature once a day for a week.    Wash your hands often to prevent spreading the infection.  In the future, wash your hands before and after you touch cuts, scratches, or bandages. This will help prevent infection.   When to call your healthcare provider  Call your healthcare provider right away if you have any of the following:    Trouble or pain when moving the joints above or below the infected area    Discharge or pus draining from the area    Fever of 100.4 F (38 C) or higher, or as directed by your healthcare provider    Pain that gets worse in or around the infected     Redness that gets worse in or around the infected area, particularly if the area of redness expands to a wider area    Shaking chills    Swelling of the infected area    Vomiting  [employer] last reviewed this educational content on 11/1/2019 2000-2020 [employer]. [address] [place] [id number]. All rights reserved. This information is not intended as a substitute for professional medical care. Always follow your healthcare professional's instructions.               Dontae Berger PA-C  [facility]    Subjective   75 year old year old who presents to clinic today for the following health issues:    Infection     HPI     Patient visits today for bilateral feet swelling, redness, and warmth. 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 She does not have a history of anything like this in  the past. Patient is concerned about infection as this would mean postponement of her last course of chemo as well as her surgery.     Patient is currently undergoing chemo therapy and has a right sided mastectomy scheduled for 3/8/21    Review of Systems   Review of Systems   Constitutional: Negative.    HENT: Negative.    Eyes: Negative.    Respiratory: Negative.    Cardiovascular: Negative.    Gastrointestinal: Negative.    Endocrine: Negative.    Genitourinary: Negative.    Neurological: Negative.    Hematological: Negative.    Psychiatric/Behavioral: Negative.         Objective    Temp: 98.8  F (37.1  C) Temp src: Tympanic BP: (!) 144/73 Pulse: 89   Resp: 20 SpO2: 98 %       Physical Exam   Physical Exam  Constitutional:       General: She is not in acute distress.     Appearance: Normal appearance. She is normal weight. She is not ill-appearing, toxic-appearing or diaphoretic.   HENT:      Head: Normocephalic and atraumatic.   Cardiovascular:      Rate and Rhythm: Normal rate and regular rhythm.      Pulses: Normal pulses.      Heart sounds: Normal heart sounds.   Pulmonary:      Effort: Pulmonary effort is normal. No respiratory distress.      Breath sounds: Normal breath sounds.   Musculoskeletal:      Right foot: Normal range of motion.      Left foot: Normal range of motion.   Feet:      Right foot:      Skin integrity: Skin breakdown, erythema, warmth, callus, dry skin and fissure present. No ulcer or blister.      Left foot:      Skin integrity: Skin breakdown, erythema, warmth, callus, dry skin and fissure present. No ulcer or blister.      Comments: Erythema, warmth, and swelling is located on the soles of both feet as well as each of the toes. Dorsal aspect of the foot appears fairly normal. Feet are non-tender.   Neurological:      General: No focal deficit present.      Mental Status: She is alert and oriented to person, place, and time. Mental status is at baseline.      Sensory: No sensory  deficit.      Motor: No weakness.      Coordination: Coordination normal.      Gait: Gait normal.   Psychiatric:         Mood and Affect: Mood normal.         Behavior: Behavior normal.         Thought Content: Thought content normal.         Judgment: Judgment normal.        Results for orders placed or performed in visit on 02/05/21 (from the past 24 hour(s))   CBC with platelets and differential   Result Value Ref Range    WBC 6.7 4.0 - 11.0 10e9/L    RBC Count 2.86 (L) 3.8 - 5.2 10e12/L    Hemoglobin 10.0 (L) 11.7 - 15.7 g/dL    Hematocrit 30.9 (L) 35.0 - 47.0 %     (H) 78 - 100 fl    MCH 35.0 (H) 26.5 - 33.0 pg    MCHC 32.4 31.5 - 36.5 g/dL    RDW 17.3 (H) 10.0 - 15.0 %    Platelet Count 195 150 - 450 10e9/L    Diff Method PENDING    ESR: Erythrocyte sedimentation rate   Result Value Ref Range    Sed Rate 74 (H) 0 - 30 mm/h

## 2021-02-05 NOTE — TELEPHONE ENCOUNTER
1253: Per Dr. Alvarez-   Concern for cellulitus, Should be seen be provider in person, or local urgent care or ER patient preference.

## 2021-02-05 NOTE — TELEPHONE ENCOUNTER
Called pt/Carolyne back with Dr. Alvarez's message, reached Carolyne's vm and lmcb, called pt and relayed message she stated she used to have a pcp at [facility] but that provider left, then she saw a physician at [facility] but that clinic closed down and she thinks he transferred to [facility] though she's never been there.    Advised her to call [facility] for same day apt, if unable to see her today she should proceed to UC or ED, she verbalized understanding and stated she would call.

## 2021-02-05 NOTE — PATIENT INSTRUCTIONS
Patient Education     Discharge Instructions for Cellulitis   You have been diagnosed with cellulitis. This is an infection in the deepest layer of the skin and tissue beneath the skin. In some cases, the infection also affects the muscle. Cellulitis is caused by bacteria. The bacteria can enter the body through broken skin. This can happen with a cut, scratch, animal bite, or an insect bite that has been scratched. You may have been treated in the hospital with antibiotics and fluids. You will likely be given a prescription for antibiotics to take at home. This sheet will help you take care of yourself at home.  Home care  When you are home:    Take the prescribed antibiotic medicine you are given as directed until it is gone. Take it even if you feel better. It treats the infection and stops it from returning. Not taking all the medicine can make future infections hard to treat.    Keep the infected area clean.    When possible, raise the infected area above the level of your heart. This helps keep swelling down.    Talk with your healthcare provider if you are in pain. Ask what kind of over-the-counter medicine you can take for pain.    Apply clean bandages as advised.    Take your temperature once a day for a week.    Wash your hands often to prevent spreading the infection.  In the future, wash your hands before and after you touch cuts, scratches, or bandages. This will help prevent infection.   When to call your healthcare provider  Call your healthcare provider right away if you have any of the following:    Trouble or pain when moving the joints above or below the infected area    Discharge or pus draining from the area    Fever of 100.4 F (38 C) or higher, or as directed by your healthcare provider    Pain that gets worse in or around the infected     Redness that gets worse in or around the infected area, particularly if the area of redness expands to a wider area    Shaking chills    Swelling of the  infected area    Vomiting  [employer] last reviewed this educational content on 11/1/2019 2000-2020 [employer]. [address] [place] [address], [place] [address]. All rights reserved. This information is not intended as a substitute for professional medical care. Always follow your healthcare professional's instructions.

## 2021-02-05 NOTE — TELEPHONE ENCOUNTER
Patient called and she stated that she would like us to put in her chart that Dr. Douglas is her primary provider now.  Writer did this.    Her oncologist would like her to see PCP today as she is in chemo, and she has developed very swollen lower legs that are red, and they feel like they are on fire.  They are a little less swollen and painful today.  Oncology wants to made sure that she does not have an     She denies any other symptoms.   Denies shortness of breath, chest pain, fevers, chills.    Advised patient that we do not have any openings today, and to go to ER/UC to be evaluated now.    Patient stated understanding and agreeable with the plan of care.   Lima BABBN-RN.  [facility]

## 2021-02-07 LAB
ANISOCYTOSIS BLD QL SMEAR: SLIGHT
DIFFERENTIAL METHOD BLD: ABNORMAL
ERYTHROCYTE [DISTWIDTH] IN BLOOD BY AUTOMATED COUNT: 17.3 % (ref 10–15)
HCT VFR BLD AUTO: 30.9 % (ref 35–47)
HGB BLD-MCNC: 10 G/DL (ref 11.7–15.7)
LYMPHOCYTES # BLD AUTO: 0.9 10E9/L (ref 0.8–5.3)
LYMPHOCYTES NFR BLD AUTO: 13 %
MCH RBC QN AUTO: 35 PG (ref 26.5–33)
MCHC RBC AUTO-ENTMCNC: 32.4 G/DL (ref 31.5–36.5)
MCV RBC AUTO: 108 FL (ref 78–100)
METAMYELOCYTES # BLD: 0.1 10E9/L
METAMYELOCYTES NFR BLD MANUAL: 2 %
MONOCYTES # BLD AUTO: 0.9 10E9/L (ref 0–1.3)
MONOCYTES NFR BLD AUTO: 13 %
MYELOCYTES # BLD: 0.2 10E9/L
MYELOCYTES NFR BLD MANUAL: 3 %
NEUTROPHILS # BLD AUTO: 4.6 10E9/L (ref 1.6–8.3)
NEUTROPHILS NFR BLD AUTO: 69 %
NRBC # BLD AUTO: 0.1 10*3/UL
NRBC BLD AUTO-RTO: 1 /100
OVALOCYTES BLD QL SMEAR: SLIGHT
PLATELET # BLD AUTO: 195 10E9/L (ref 150–450)
PLATELET # BLD EST: ABNORMAL 10*3/UL
RBC # BLD AUTO: 2.86 10E12/L (ref 3.8–5.2)
WBC # BLD AUTO: 6.7 10E9/L (ref 4–11)

## 2021-02-09 ENCOUNTER — INFUSION THERAPY VISIT (OUTPATIENT)
Dept: INFUSION THERAPY | Facility: CLINIC | Age: 76
End: 2021-02-09
Payer: COMMERCIAL

## 2021-02-09 ENCOUNTER — ONCOLOGY VISIT (OUTPATIENT)
Dept: ONCOLOGY | Facility: CLINIC | Age: 76
End: 2021-02-09
Payer: COMMERCIAL

## 2021-02-09 ENCOUNTER — OFFICE VISIT (OUTPATIENT)
Dept: ONCOLOGY | Facility: CLINIC | Age: 76
End: 2021-02-09
Payer: COMMERCIAL

## 2021-02-09 VITALS
TEMPERATURE: 97.1 F | DIASTOLIC BLOOD PRESSURE: 63 MMHG | BODY MASS INDEX: 25.26 KG/M2 | SYSTOLIC BLOOD PRESSURE: 105 MMHG | HEART RATE: 74 BPM | OXYGEN SATURATION: 99 % | WEIGHT: 157.2 LBS | HEIGHT: 66 IN

## 2021-02-09 DIAGNOSIS — C50.811 MALIGNANT NEOPLASM OF OVERLAPPING SITES OF RIGHT BREAST IN FEMALE, ESTROGEN RECEPTOR POSITIVE (H): Primary | ICD-10-CM

## 2021-02-09 DIAGNOSIS — Z17.0 MALIGNANT NEOPLASM OF OVERLAPPING SITES OF RIGHT BREAST IN FEMALE, ESTROGEN RECEPTOR POSITIVE (H): ICD-10-CM

## 2021-02-09 DIAGNOSIS — J34.89 NASAL SORE: ICD-10-CM

## 2021-02-09 DIAGNOSIS — D70.1 CHEMOTHERAPY-INDUCED NEUTROPENIA (H): ICD-10-CM

## 2021-02-09 DIAGNOSIS — T45.1X5A CHEMOTHERAPY-INDUCED NEUTROPENIA (H): Primary | ICD-10-CM

## 2021-02-09 DIAGNOSIS — L03.818 CELLULITIS OF OTHER SPECIFIED SITE: ICD-10-CM

## 2021-02-09 DIAGNOSIS — C50.811 MALIGNANT NEOPLASM OF OVERLAPPING SITES OF RIGHT BREAST IN FEMALE, ESTROGEN RECEPTOR POSITIVE (H): ICD-10-CM

## 2021-02-09 DIAGNOSIS — Z17.0 MALIGNANT NEOPLASM OF OVERLAPPING SITES OF RIGHT BREAST IN FEMALE, ESTROGEN RECEPTOR POSITIVE (H): Primary | ICD-10-CM

## 2021-02-09 DIAGNOSIS — R19.5 LOOSE STOOLS: ICD-10-CM

## 2021-02-09 DIAGNOSIS — D70.1 CHEMOTHERAPY-INDUCED NEUTROPENIA (H): Primary | ICD-10-CM

## 2021-02-09 DIAGNOSIS — G62.0 DRUG-INDUCED POLYNEUROPATHY (H): ICD-10-CM

## 2021-02-09 DIAGNOSIS — T45.1X5A CHEMOTHERAPY-INDUCED NEUTROPENIA (H): ICD-10-CM

## 2021-02-09 LAB
ALBUMIN SERPL-MCNC: 3.4 G/DL (ref 3.4–5)
ALP SERPL-CCNC: 90 U/L (ref 40–150)
ALT SERPL W P-5'-P-CCNC: 16 U/L (ref 0–50)
ANION GAP SERPL CALCULATED.3IONS-SCNC: 4 MMOL/L (ref 3–14)
ANISOCYTOSIS BLD QL SMEAR: ABNORMAL
AST SERPL W P-5'-P-CCNC: 13 U/L (ref 0–45)
BILIRUB SERPL-MCNC: 0.2 MG/DL (ref 0.2–1.3)
BUN SERPL-MCNC: 13 MG/DL (ref 7–30)
CALCIUM SERPL-MCNC: 8.8 MG/DL (ref 8.5–10.1)
CHLORIDE SERPL-SCNC: 108 MMOL/L (ref 94–109)
CO2 SERPL-SCNC: 28 MMOL/L (ref 20–32)
CREAT SERPL-MCNC: 0.74 MG/DL (ref 0.52–1.04)
DIFFERENTIAL METHOD BLD: ABNORMAL
ELLIPTOCYTES BLD QL SMEAR: SLIGHT
ERYTHROCYTE [DISTWIDTH] IN BLOOD BY AUTOMATED COUNT: 17.9 % (ref 10–15)
GFR SERPL CREATININE-BSD FRML MDRD: 79 ML/MIN/{1.73_M2}
GLUCOSE SERPL-MCNC: 116 MG/DL (ref 70–99)
HCT VFR BLD AUTO: 30.9 % (ref 35–47)
HGB BLD-MCNC: 10.3 G/DL (ref 11.7–15.7)
LYMPHOCYTES # BLD AUTO: 1.9 10E9/L (ref 0.8–5.3)
LYMPHOCYTES NFR BLD AUTO: 15 %
MCH RBC QN AUTO: 34.6 PG (ref 26.5–33)
MCHC RBC AUTO-ENTMCNC: 33.3 G/DL (ref 31.5–36.5)
MCV RBC AUTO: 104 FL (ref 78–100)
MONOCYTES # BLD AUTO: 1.2 10E9/L (ref 0–1.3)
MONOCYTES NFR BLD AUTO: 9 %
MYELOCYTES # BLD: 0.3 10E9/L
MYELOCYTES NFR BLD MANUAL: 2 %
NEUTROPHILS # BLD AUTO: 9.4 10E9/L (ref 1.6–8.3)
NEUTROPHILS NFR BLD AUTO: 74 %
PLATELET # BLD AUTO: 257 10E9/L (ref 150–450)
PLATELET # BLD EST: ABNORMAL 10*3/UL
POTASSIUM SERPL-SCNC: 3.8 MMOL/L (ref 3.4–5.3)
PROT SERPL-MCNC: 7 G/DL (ref 6.8–8.8)
RBC # BLD AUTO: 2.98 10E12/L (ref 3.8–5.2)
SODIUM SERPL-SCNC: 140 MMOL/L (ref 133–144)
WBC # BLD AUTO: 12.8 10E9/L (ref 4–11)

## 2021-02-09 PROCEDURE — 99207 PR NO CHARGE LOS: CPT

## 2021-02-09 PROCEDURE — 96377 APPLICATON ON-BODY INJECTOR: CPT | Mod: 59 | Performed by: NURSE PRACTITIONER

## 2021-02-09 PROCEDURE — 85025 COMPLETE CBC W/AUTO DIFF WBC: CPT | Performed by: INTERNAL MEDICINE

## 2021-02-09 PROCEDURE — 96413 CHEMO IV INFUSION 1 HR: CPT | Performed by: NURSE PRACTITIONER

## 2021-02-09 PROCEDURE — 96411 CHEMO IV PUSH ADDL DRUG: CPT | Performed by: NURSE PRACTITIONER

## 2021-02-09 PROCEDURE — 80053 COMPREHEN METABOLIC PANEL: CPT | Performed by: INTERNAL MEDICINE

## 2021-02-09 PROCEDURE — 96367 TX/PROPH/DG ADDL SEQ IV INF: CPT | Performed by: NURSE PRACTITIONER

## 2021-02-09 PROCEDURE — 99214 OFFICE O/P EST MOD 30 MIN: CPT | Mod: 25 | Performed by: NURSE PRACTITIONER

## 2021-02-09 PROCEDURE — 96375 TX/PRO/DX INJ NEW DRUG ADDON: CPT | Performed by: NURSE PRACTITIONER

## 2021-02-09 RX ORDER — PALONOSETRON 0.05 MG/ML
0.25 INJECTION, SOLUTION INTRAVENOUS ONCE
Status: COMPLETED | OUTPATIENT
Start: 2021-02-09 | End: 2021-02-09

## 2021-02-09 RX ORDER — DOXORUBICIN HYDROCHLORIDE 2 MG/ML
60 INJECTION, SOLUTION INTRAVENOUS ONCE
Status: COMPLETED | OUTPATIENT
Start: 2021-02-09 | End: 2021-02-09

## 2021-02-09 RX ORDER — HEPARIN SODIUM (PORCINE) LOCK FLUSH IV SOLN 100 UNIT/ML 100 UNIT/ML
5 SOLUTION INTRAVENOUS
Status: DISCONTINUED | OUTPATIENT
Start: 2021-02-09 | End: 2021-02-09 | Stop reason: HOSPADM

## 2021-02-09 RX ORDER — HEPARIN SODIUM (PORCINE) LOCK FLUSH IV SOLN 100 UNIT/ML 100 UNIT/ML
5 SOLUTION INTRAVENOUS EVERY 8 HOURS
Status: DISCONTINUED | OUTPATIENT
Start: 2021-02-09 | End: 2021-02-09 | Stop reason: HOSPADM

## 2021-02-09 RX ADMIN — Medication 250 ML: at 10:16

## 2021-02-09 RX ADMIN — HEPARIN SODIUM (PORCINE) LOCK FLUSH IV SOLN 100 UNIT/ML 5 ML: 100 SOLUTION at 11:40

## 2021-02-09 RX ADMIN — PALONOSETRON 0.25 MG: 0.05 INJECTION, SOLUTION INTRAVENOUS at 10:44

## 2021-02-09 RX ADMIN — DOXORUBICIN HYDROCHLORIDE 110 MG: 2 INJECTION, SOLUTION INTRAVENOUS at 10:45

## 2021-02-09 RX ADMIN — HEPARIN SODIUM (PORCINE) LOCK FLUSH IV SOLN 100 UNIT/ML 5 ML: 100 SOLUTION at 09:03

## 2021-02-09 ASSESSMENT — PAIN SCALES - GENERAL: PAINLEVEL: MODERATE PAIN (4)

## 2021-02-09 ASSESSMENT — MIFFLIN-ST. JEOR: SCORE: 1211.86

## 2021-02-09 NOTE — NURSING NOTE
"Oncology Rooming Note    February 9, 2021 9:25 AM   Talya Zuleta is a 75 year old female who presents for:    Chief Complaint   Patient presents with     Oncology Clinic Visit     treatment?     Initial Vitals: /63 (BP Location: Right arm, Patient Position: Chair, Cuff Size: Adult Regular)   Pulse 74   Temp 97.1  F (36.2  C) (Oral)   Ht 1.664 m (5' 5.5\")   Wt 71.3 kg (157 lb 3.2 oz)   SpO2 99%   BMI 25.76 kg/m   Estimated body mass index is 25.76 kg/m  as calculated from the following:    Height as of this encounter: 1.664 m (5' 5.5\").    Weight as of this encounter: 71.3 kg (157 lb 3.2 oz). Body surface area is 1.82 meters squared.  Moderate Pain (4) Comment: Data Unavailable   No LMP recorded. Patient is postmenopausal.  Allergies reviewed: Yes  Medications reviewed: Yes    Medications: Medication refills not needed today.  Pharmacy name entered into [employer]:    [id number] - [place], [place] - [address] OF [address] [place] [address] - [id number]  [facility] - [place] - [id number]    Clinical concerns: NO Jacquelyn was notified.      Pam Galicia CMA              "

## 2021-02-09 NOTE — PROGRESS NOTES
Oncology Follow Up Visit: February 9, 2021     Oncologist: Dr Perlita Alvarez  PCP: Cole Weston    Diagnosis: Right Breast Cancer  Talya Zuleta is a 76 yo female with Diabetes, HTN and COPD who was found to have bilateral breast asymmetry on screening mammogram in 7/2020. Further diagnostic exam found 2.7 cm mass at 12:00, 5 cm from nipple with ductal extension +6 mm mass at 3 cm from nipple. Contrast enhanced mammogram showed the right breast mass, including extension, to measure 4.9 cm.  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  Estrogen receptor was moderate in 50% and progesterone receptor staining was negative.  HER-2 was negative by IHC; HER2 FISH showed approximately 10% of tumor cells to have 6.8 HER2 signals/nucleus and a HER2/CEN17 ratio of 1.6.  The HER2 positive cells were noted to be the large pleomorphic cells.  Multiple infiltrating neutrophils were noted through tumor stroma.  Treatment:   10/7-12/22/2020 received Taxol, Herceptin,Perjeta plan x 12 weeks  12/29/2020 began plan of AC x 4 cycles    Interval History: Ms. Zuleta comes to clinic for cycle 4 of AC.  Patient shares she was diagnosed with cellulitis of the feet on 2/5/2021 after developing redness and blisters to the bottoms of both feet with pain and difficulty walking. She was placed on cephalexin and today- 5 days later states she is improving with less redness and pain. She continues with blisters to sides of feet but several have popped- no crusting- not using any topical treatments- wearing socks and light clogs for safety. Ranks pain 4/10. Admits she continues to eat well and bowel and bladder are normal - no nausea. She does feel fatigue and some weakness with this plan. She is very excited to continue with plan as this is her final cycle.   Rest of comprehensive and complete ROS is reviewed and is negative.   Past Medical History:   Diagnosis Date      "Breast cancer (H)      Sleep apnea      Current Outpatient Medications   Medication     amLODIPine (NORVASC) 5 MG tablet     atorvastatin (LIPITOR) 40 MG tablet     benazepril (LOTENSIN) 20 MG tablet     cephALEXin (KEFLEX) 500 MG capsule     dexamethasone (DECADRON) 4 MG tablet     diphenoxylate-atropine (LOMOTIL) 2.5-0.025 MG tablet     furosemide (LASIX) 80 MG tablet     HYDROcodone-acetaminophen (NORCO) 5-325 MG tablet     loperamide (IMODIUM A-D) 1 MG/7.5ML     loratadine (CLARITIN) 10 MG capsule     ondansetron (ZOFRAN-ODT) 8 MG ODT tab     potassium chloride ER (KLOR-CON M) 20 MEQ CR tablet     triamcinolone (KENALOG) 0.1 % external cream     No current facility-administered medications for this visit.      Facility-Administered Medications Ordered in Other Visits   Medication     sodium chloride (PF) 0.9% PF flush 30 mL   - Long tobacco use history- current use of 1/2 ppd and daily alcohol use of 1 drink daily.   Allergies   Allergen Reactions     Bacitracin-Neomycin-Polymyxin      PN: LW Reaction: Unknown Reaction     Latex        Physical Exam:/63 (BP Location: Right arm, Patient Position: Chair, Cuff Size: Adult Regular)   Pulse 74   Temp 97.1  F (36.2  C) (Oral)   Ht 1.664 m (5' 5.5\")   Wt 71.3 kg (157 lb 3.2 oz)   SpO2 99%   BMI 25.76 kg/m     Constitutional: Alert, talkative, and in no distress as seated.   ENT: Eyes bright, no nose or mouth sores at present.  Neck: Supple, No adenopathy- No neck swelling today- felt to be blocked saliva duct since was intermittently- improved with use of ninfa.  Cardiac: Heart rate and rhythm is regular and strong without murmur  Respiratory: Breathing easy. Lung sounds clear to auscultation- no cough  Abdomen: Soft, non-tender, BS normal- not active. No masses or organomegaly  MS: Muscle tone normal, extremities normal with no edema. Can see some weakness of the  of the hands  Skin:  Pt has redness of the bottoms of the feet bilaterally with up to 4 " cm large more tense blisters surrounding edges of feet - bullous type blisters- some have popped or patient has popped them and states they feel better but are not crusting- no blisters to bottoms of the feet. Does have wound to right great toe that appears as just over pinpoint and pt reports it was a wound last year and is not painful now. 4th and 5th toes of left foot with pain and redness but no swelling or blisters at this time.   Neuro: Sensory grossly WNL, gait stilted due to pain of the feet.    Lymph: Normal ant/post cervical, axillary, supraclavicular nodes  Psych: Mentation appears normal and affect normal/bright with good conversation.    Laboratory Results:   Results for orders placed or performed in visit on 02/09/21   CBC with platelets differential     Status: Abnormal (In process)   Result Value Ref Range    WBC 12.8 (H) 4.0 - 11.0 10e9/L    RBC Count 2.98 (L) 3.8 - 5.2 10e12/L    Hemoglobin 10.3 (L) 11.7 - 15.7 g/dL    Hematocrit 30.9 (L) 35.0 - 47.0 %     (H) 78 - 100 fl    MCH 34.6 (H) 26.5 - 33.0 pg    MCHC 33.3 31.5 - 36.5 g/dL    RDW 17.9 (H) 10.0 - 15.0 %    Platelet Count 257 150 - 450 10e9/L    Diff Method PENDING    Comprehensive metabolic panel     Status: Abnormal   Result Value Ref Range    Sodium 140 133 - 144 mmol/L    Potassium 3.8 3.4 - 5.3 mmol/L    Chloride 108 94 - 109 mmol/L    Carbon Dioxide 28 20 - 32 mmol/L    Anion Gap 4 3 - 14 mmol/L    Glucose 116 (H) 70 - 99 mg/dL    Urea Nitrogen 13 7 - 30 mg/dL    Creatinine 0.74 0.52 - 1.04 mg/dL    GFR Estimate 79 >60 mL/min/[1.73_m2]    GFR Estimate If Black >90 >60 mL/min/[1.73_m2]    Calcium 8.8 8.5 - 10.1 mg/dL    Bilirubin Total 0.2 0.2 - 1.3 mg/dL    Albumin 3.4 3.4 - 5.0 g/dL    Protein Total 7.0 6.8 - 8.8 g/dL    Alkaline Phosphatase 90 40 - 150 U/L    ALT 16 0 - 50 U/L    AST 13 0 - 45 U/L       Assessment and Plan:  Right Breast Cancer- Pt is due to to continue with AC plan - this will be 4th and final cycle and does  meet goals to continue though has the new redness and blistering of the feet. She remains on antibiotics but shares improvement.   Pt will finish plan today and has breast surgery set for 3/8 with Dr Newsome. She will see Dr Alvarez post surgery for review of results.  Cellulitis of the feet with bullous blisters- she was diagnosed and treated for cellulitis of the bilateral feet with improvement noted by pt with less redness and no new blisters since diagnosis. However pts symptoms present as bullous blisters may have a secondary cause.   Since she is improving she will continue with the cephalexin and asked to not break blisters for now. If resolves on own - no further treatment necessary. However, if not healing, feel she should have biopsy of area for review for secondary cause for concern.    Mouth and nose sores- resolved at present.  Loose stools- much improved since off perjeta and is tolerable.   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 Using Vitamin B 6 daily and showing some improvement.  Covid 19 precautions- Reviewed precautions for prevention of transmission. Reviewed vaccine eligibility and scheduling vaccine.    The total time of this encounter amounted to 30 minutes. This time included 20+min face to face time spent with the patient, prep work, ordering tests, and performing post visit documentation.  Jacquelyn Milelr,Cnp

## 2021-02-09 NOTE — LETTER
2/9/2021         RE: Talya Zuleta  [address] [facility] [id number]        Dear Colleague,    Thank you for referring your patient, Talya Zuleta, to the [facility]. Please see a copy of my visit note below.    Oncology Follow Up Visit: February 9, 2021     Oncologist: Dr Perlita Alvarez  PCP: Cole Weston    Diagnosis: Right Breast Cancer  Talya Zuleta is a 74 yo female with Diabetes, HTN and COPD who was found to have bilateral breast asymmetry on screening mammogram in 7/2020. Further diagnostic exam found 2.7 cm mass at 12:00, 5 cm from nipple with ductal extension +6 mm mass at 3 cm from nipple. Contrast enhanced mammogram showed the right breast mass, including extension, to measure 4.9 cm.  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  Estrogen receptor was moderate in 50% and progesterone receptor staining was negative.  HER-2 was negative by IHC; HER2 FISH showed approximately 10% of tumor cells to have 6.8 HER2 signals/nucleus and a HER2/CEN17 ratio of 1.6.  The HER2 positive cells were noted to be the large pleomorphic cells.  Multiple infiltrating neutrophils were noted through tumor stroma.  Treatment:   10/7-12/22/2020 received Taxol, Herceptin,Perjeta plan x 12 weeks  12/29/2020 began plan of AC x 4 cycles    Interval History: Ms. Zuleta comes to clinic for cycle 4 of AC.  Patient shares she was diagnosed with cellulitis of the feet on 2/5/2021 after developing redness and blisters to the bottoms of both feet with pain and difficulty walking. She was placed on cephalexin and today- 5 days later states she is improving with less redness and pain. She continues with blisters to sides of feet but several have popped- no crusting- not using any topical treatments- wearing socks and light clogs for safety. Ranks pain 4/10. Admits she continues to eat well and bowel and bladder  "are normal - no nausea. She does feel fatigue and some weakness with this plan. She is very excited to continue with plan as this is her final cycle.   Rest of comprehensive and complete ROS is reviewed and is negative.   Past Medical History:   Diagnosis Date     Breast cancer (H)      Sleep apnea      Current Outpatient Medications   Medication     amLODIPine (NORVASC) 5 MG tablet     atorvastatin (LIPITOR) 40 MG tablet     benazepril (LOTENSIN) 20 MG tablet     cephALEXin (KEFLEX) 500 MG capsule     dexamethasone (DECADRON) 4 MG tablet     diphenoxylate-atropine (LOMOTIL) 2.5-0.025 MG tablet     furosemide (LASIX) 80 MG tablet     HYDROcodone-acetaminophen (NORCO) 5-325 MG tablet     loperamide (IMODIUM A-D) 1 MG/7.5ML     loratadine (CLARITIN) 10 MG capsule     ondansetron (ZOFRAN-ODT) 8 MG ODT tab     potassium chloride ER (KLOR-CON M) 20 MEQ CR tablet     triamcinolone (KENALOG) 0.1 % external cream     No current facility-administered medications for this visit.      Facility-Administered Medications Ordered in Other Visits   Medication     sodium chloride (PF) 0.9% PF flush 30 mL   - Long tobacco use history- current use of 1/2 ppd and daily alcohol use of 1 drink daily.   Allergies   Allergen Reactions     Bacitracin-Neomycin-Polymyxin      PN: LW Reaction: Unknown Reaction     Latex        Physical Exam:/63 (BP Location: Right arm, Patient Position: Chair, Cuff Size: Adult Regular)   Pulse 74   Temp 97.1  F (36.2  C) (Oral)   Ht 1.664 m (5' 5.5\")   Wt 71.3 kg (157 lb 3.2 oz)   SpO2 99%   BMI 25.76 kg/m     Constitutional: Alert, talkative, and in no distress as seated.   ENT: Eyes bright, no nose or mouth sores at present.  Neck: Supple, No adenopathy- No neck swelling today- felt to be blocked saliva duct since was intermittently- improved with use of ninfa.  Cardiac: Heart rate and rhythm is regular and strong without murmur  Respiratory: Breathing easy. Lung sounds clear to auscultation- no " cough  Abdomen: Soft, non-tender, BS normal- not active. No masses or organomegaly  MS: Muscle tone normal, extremities normal with no edema. Can see some weakness of the  of the hands  Skin:  Pt has redness of the bottoms of the feet bilaterally with up to 4 cm large more tense blisters surrounding edges of feet - bullous type blisters- some have popped or patient has popped them and states they feel better but are not crusting- no blisters to bottoms of the feet. Does have wound to right great toe that appears as just over pinpoint and pt reports it was a wound last year and is not painful now. 4th and 5th toes of left foot with pain and redness but no swelling or blisters at this time.   Neuro: Sensory grossly WNL, gait stilted due to pain of the feet.    Lymph: Normal ant/post cervical, axillary, supraclavicular nodes  Psych: Mentation appears normal and affect normal/bright with good conversation.    Laboratory Results:   Results for orders placed or performed in visit on 02/09/21   CBC with platelets differential     Status: Abnormal (In process)   Result Value Ref Range    WBC 12.8 (H) 4.0 - 11.0 10e9/L    RBC Count 2.98 (L) 3.8 - 5.2 10e12/L    Hemoglobin 10.3 (L) 11.7 - 15.7 g/dL    Hematocrit 30.9 (L) 35.0 - 47.0 %     (H) 78 - 100 fl    MCH 34.6 (H) 26.5 - 33.0 pg    MCHC 33.3 31.5 - 36.5 g/dL    RDW 17.9 (H) 10.0 - 15.0 %    Platelet Count 257 150 - 450 10e9/L    Diff Method PENDING    Comprehensive metabolic panel     Status: Abnormal   Result Value Ref Range    Sodium 140 133 - 144 mmol/L    Potassium 3.8 3.4 - 5.3 mmol/L    Chloride 108 94 - 109 mmol/L    Carbon Dioxide 28 20 - 32 mmol/L    Anion Gap 4 3 - 14 mmol/L    Glucose 116 (H) 70 - 99 mg/dL    Urea Nitrogen 13 7 - 30 mg/dL    Creatinine 0.74 0.52 - 1.04 mg/dL    GFR Estimate 79 >60 mL/min/[1.73_m2]    GFR Estimate If Black >90 >60 mL/min/[1.73_m2]    Calcium 8.8 8.5 - 10.1 mg/dL    Bilirubin Total 0.2 0.2 - 1.3 mg/dL    Albumin 3.4  3.4 - 5.0 g/dL    Protein Total 7.0 6.8 - 8.8 g/dL    Alkaline Phosphatase 90 40 - 150 U/L    ALT 16 0 - 50 U/L    AST 13 0 - 45 U/L       Assessment and Plan:  Right Breast Cancer- Pt is due to to continue with AC plan - this will be 4th and final cycle and does meet goals to continue though has the new redness and blistering of the feet. She remains on antibiotics but shares improvement.   Pt will finish plan today and has breast surgery set for 3/8 with Dr Newsome. She will see Dr Alvarez post surgery for review of results.  Cellulitis of the feet with bullous blisters- she was diagnosed and treated for cellulitis of the bilateral feet with improvement noted by pt with less redness and no new blisters since diagnosis. However pts symptoms present as bullous blisters may have a secondary cause.   Since she is improving she will continue with the cephalexin and asked to not break blisters for now. If resolves on own - no further treatment necessary. However, if not healing, feel she should have biopsy of area for review for secondary cause for concern.    Mouth and nose sores- resolved at present.  Loose stools- much improved since off perjeta and is tolerable.   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 Using Vitamin B 6 daily and showing some improvement.  Covid 19 precautions- Reviewed precautions for prevention of transmission. Reviewed vaccine eligibility and scheduling vaccine.    The total time of this encounter amounted to 30 minutes. This time included 20+min face to face time spent with the patient, prep work, ordering tests, and performing post visit documentation.  Jacquelyn Miller Cnp                    Again, thank you for allowing me to participate in the care of your patient.        Sincerely,        Jacquelyn Miller, NP, APRN CNP

## 2021-02-09 NOTE — PROGRESS NOTES
Port needle left for access for treatment, Sterile technique performed and maintained. Patient tolerated procedure well. Tubes drawn in rainbow order: red, green, purple. Transparent dressing placed with use of tegaderm.    Nisha Encarnacion RN  [facility]Infusion [place]

## 2021-02-09 NOTE — PROGRESS NOTES
Infusion Nursing Note:  Talya Zuleta presents today for C4D1 AC.    Patient seen by provider today: Yes: Jacquelyn Miller, JT   present during visit today: Not Applicable.    Note: Patient saw Jacquelyn due to recently being placed on antibiotics for cellulitis of the lower extremity.    Intravenous Access:  Implanted Port.    Treatment Conditions:  Lab Results   Component Value Date    HGB 10.3 02/09/2021     Lab Results   Component Value Date    WBC 12.8 02/09/2021      Lab Results   Component Value Date    ANEU 9.4 02/09/2021     Lab Results   Component Value Date     02/09/2021      Lab Results   Component Value Date     02/09/2021                   Lab Results   Component Value Date    POTASSIUM 3.8 02/09/2021           Lab Results   Component Value Date    MAG 2.4 01/12/2021            Lab Results   Component Value Date    CR 0.74 02/09/2021                   Lab Results   Component Value Date    JIMBO 8.8 02/09/2021                Lab Results   Component Value Date    BILITOTAL 0.2 02/09/2021           Lab Results   Component Value Date    ALBUMIN 3.4 02/09/2021                    Lab Results   Component Value Date    ALT 16 02/09/2021           Lab Results   Component Value Date    AST 13 02/09/2021       Results reviewed, labs MET treatment parameters, ok to proceed with treatment.    Post Infusion Assessment:  Patient tolerated infusion without incident.  Blood return noted pre and post infusion.  Blood return noted during administration every 5 cc.  Site patent and intact, free from redness, edema or discomfort.  No evidence of extravasations.  Access discontinued per protocol.     ONPRO  Was placed on patient's: left side of abdomen.    Was placed at 1130 AM    ONPRO injector device Lot number: [id number]    Patient education included: what patient can expect after application, what colored lights mean on the device, when to remove device, when and where to call with questions or  issues, all patients questions answered and that Neulasta administration will occur at 2:30 pm on 2/10/2021.    Patient tolerated administration well.      Discharge Plan:   Patient will return as directed for next appointment.   Patient discharged in stable condition accompanied by: self.  Departure Mode: Ambulatory.    Magaly Osorio RN-BSN, PHN, OCN  [facility]

## 2021-02-15 ENCOUNTER — VIRTUAL VISIT (OUTPATIENT)
Dept: SURGERY | Facility: CLINIC | Age: 76
End: 2021-02-15
Payer: COMMERCIAL

## 2021-02-15 ENCOUNTER — ANESTHESIA EVENT (OUTPATIENT)
Dept: SURGERY | Facility: CLINIC | Age: 76
End: 2021-02-15

## 2021-02-15 ENCOUNTER — TELEPHONE (OUTPATIENT)
Dept: SURGERY | Facility: CLINIC | Age: 76
End: 2021-02-15

## 2021-02-15 ENCOUNTER — PRE VISIT (OUTPATIENT)
Dept: SURGERY | Facility: CLINIC | Age: 76
End: 2021-02-15

## 2021-02-15 VITALS — HEIGHT: 66 IN | BODY MASS INDEX: 25.55 KG/M2 | WEIGHT: 159 LBS

## 2021-02-15 DIAGNOSIS — L03.116 CELLULITIS OF BOTH FEET: ICD-10-CM

## 2021-02-15 DIAGNOSIS — Z17.0 MALIGNANT NEOPLASM OF OVERLAPPING SITES OF RIGHT BREAST IN FEMALE, ESTROGEN RECEPTOR POSITIVE (H): ICD-10-CM

## 2021-02-15 DIAGNOSIS — L03.115 CELLULITIS OF BOTH FEET: ICD-10-CM

## 2021-02-15 DIAGNOSIS — C50.811 MALIGNANT NEOPLASM OF OVERLAPPING SITES OF RIGHT BREAST IN FEMALE, ESTROGEN RECEPTOR POSITIVE (H): ICD-10-CM

## 2021-02-15 DIAGNOSIS — D70.1 CHEMOTHERAPY-INDUCED NEUTROPENIA (H): ICD-10-CM

## 2021-02-15 DIAGNOSIS — T45.1X5A CHEMOTHERAPY-INDUCED NEUTROPENIA (H): ICD-10-CM

## 2021-02-15 DIAGNOSIS — Z01.818 PRE-OP EXAMINATION: Primary | ICD-10-CM

## 2021-02-15 PROCEDURE — 99203 OFFICE O/P NEW LOW 30 MIN: CPT | Mod: 95 | Performed by: PHYSICIAN ASSISTANT

## 2021-02-15 ASSESSMENT — MIFFLIN-ST. JEOR: SCORE: 1220.03

## 2021-02-15 ASSESSMENT — PAIN SCALES - GENERAL: PAINLEVEL: MODERATE PAIN (4)

## 2021-02-15 ASSESSMENT — ENCOUNTER SYMPTOMS: SEIZURES: 0

## 2021-02-15 ASSESSMENT — LIFESTYLE VARIABLES: TOBACCO_USE: 1

## 2021-02-15 NOTE — PROGRESS NOTES
Talya is a 75 year old who is being evaluated via a billable video visit.      How would you like to obtain your AVS? MyChart  If the video visit is dropped, the invitation should be resent by: Other e-mail: MyChart  Will anyone else be joining your video visit? No          HPI   Review of Systems       Physical Exam

## 2021-02-15 NOTE — H&P
Pre-Operative H & P     ADDENDUM: 3/5/21  The patient had her follow up CBC and her WBC normalized. Her hgb improved as well but continues to have some anemia. Platelets are within normal limits. The patient is optimized for surgery.     Results for OMER POTTER (MRN [id number]) as of 3/5/2021 09:53   Ref. Range 3/5/2021 09:08   Sodium Latest Ref Range: 133 - 144 mmol/L 142   Potassium Latest Ref Range: 3.4 - 5.3 mmol/L 3.9   Chloride Latest Ref Range: 94 - 109 mmol/L 109   Carbon Dioxide Latest Ref Range: 20 - 32 mmol/L 29   Urea Nitrogen Latest Ref Range: 7 - 30 mg/dL 11   Creatinine Latest Ref Range: 0.52 - 1.04 mg/dL 0.78   GFR Estimate Latest Ref Range: >60 mL/min/1.73_m2 73   GFR Estimate If Black Latest Ref Range: >60 mL/min/1.73_m2 85   Calcium Latest Ref Range: 8.5 - 10.1 mg/dL 9.2   Anion Gap Latest Ref Range: 3 - 14 mmol/L 4   Albumin Latest Ref Range: 3.4 - 5.0 g/dL 3.5   Protein Total Latest Ref Range: 6.8 - 8.8 g/dL 6.8   Bilirubin Total Latest Ref Range: 0.2 - 1.3 mg/dL 0.3   Alkaline Phosphatase Latest Ref Range: 40 - 150 U/L 74   ALT Latest Ref Range: 0 - 50 U/L 17   AST Latest Ref Range: 0 - 45 U/L 13   Glucose Latest Ref Range: 70 - 99 mg/dL 102 (H)   WBC Latest Ref Range: 4.0 - 11.0 10e9/L 6.3   Hemoglobin Latest Ref Range: 11.7 - 15.7 g/dL 11.3 (L)   Hematocrit Latest Ref Range: 35.0 - 47.0 % 34.7 (L)   Platelet Count Latest Ref Range: 150 - 450 10e9/L 309   RBC Count Latest Ref Range: 3.8 - 5.2 10e12/L 3.19 (L)   MCV Latest Ref Range: 78 - 100 fl 109 (H)   MCH Latest Ref Range: 26.5 - 33.0 pg 35.4 (H)   MCHC Latest Ref Range: 31.5 - 36.5 g/dL 32.6   RDW Latest Ref Range: 10.0 - 15.0 % 19.2 (H)       CC:  Preoperative exam to assess for increased cardiopulmonary risk while undergoing surgery and anesthesia.    Date of Encounter: 2/15/2021  Primary Care Physician:  Darrell Douglas     Reason for visit: pre operative examination, Malignant neoplasm of overlapping sites of right breast in  female, estrogen receptor positive     HPI  Talya Zuleta is a 75 year old female who presents for pre-operative H & P in preparation for right simple mastectomy, right sentinel node biopsy  with Dr. Newsome on 3/8/21 at [facility].     The patient is a 75 year old woman who has a past medical history significant for HTN, COPD, JAC, smoking, anemia, neuropathy and cellulitis of both feet. She was diagnosed with breast cancer and underwent neoadjuvant hormone and chemotherapy. She met with Dr. Newsome on 12/18/20 and they discussed her surgical treatment options. The patient is now scheduled for the procedure as above.      History is obtained from the patient and chart review    Past Medical History  Past Medical History:   Diagnosis Date     Anemia      Breast cancer (H)      Cellulitis      HTN (hypertension)      Neuropathy      Personal history of tobacco use, presenting hazards to health      Sleep apnea        Past Surgical History  Past Surgical History:   Procedure Laterality Date     BLEPHAROPLASTY       BUNIONECTOMY       CHOLECYSTECTOMY       HYSTERECTOMY  1976     INSERT PORT VASCULAR ACCESS N/A 10/02/2020    Procedure: PORT PLACEMENT;  Surgeon: Hawa Lowery MD;  Location: [facility] OR     THYROIDECTOMY       partial       Hx of Blood transfusions/reactions: denies     Hx of abnormal bleeding or anti-platelet use: none    Menstrual history: No LMP recorded. Patient is postmenopausal.: s/p hysterectomy    Steroid use in the last year: dexamethasone with chemotherapy    Personal or FH with difficulty with Anesthesia:  denies    Prior to Admission Medications  Current Outpatient Medications   Medication Sig Dispense Refill     amLODIPine (NORVASC) 5 MG tablet Take 5 mg by mouth 2 times daily        atorvastatin (LIPITOR) 40 MG tablet Take 20 mg by mouth 2 times daily        benazepril (LOTENSIN) 20 MG tablet TAKE 1 TABLET BY MOUTH TWO TIMES A DAY.       cephALEXin (KEFLEX) 500 MG  capsule Take 1 capsule (500 mg) by mouth 3 times daily for 10 days 30 capsule 0     dexamethasone (DECADRON) 4 MG tablet Take 2 tablets (8 mg) by mouth daily (with breakfast) Start on Day 2. 6 tablet 3     furosemide (LASIX) 80 MG tablet Take 80 mg by mouth 2 times daily       HYDROcodone-acetaminophen (NORCO) 5-325 MG tablet Take 1 tablet by mouth every 4 hours as needed for moderate to severe pain 5 tablet 0     loperamide (IMODIUM A-D) 1 MG/7.5ML Take 15 mLs (2 mg) by mouth 4 times daily as needed for diarrhea 237 mL 1     loratadine (CLARITIN) 10 MG capsule Take 1 tab the day before, the day of, and the day after Neulasta. 30 capsule 1     ondansetron (ZOFRAN-ODT) 8 MG ODT tab Take 1 tablet (8 mg) by mouth every 8 hours as needed for nausea 90 tablet 1     potassium chloride ER (KLOR-CON M) 20 MEQ CR tablet Take 40 mEq by mouth 3 times daily        triamcinolone (KENALOG) 0.1 % external cream          Allergies  Allergies   Allergen Reactions     Bacitracin-Neomycin-Polymyxin      PN: LW Reaction: Unknown Reaction     Latex        Social History  Social History     Socioeconomic History     Marital status:      Spouse name: Not on file     Number of children: Not on file     Years of education: Not on file     Highest education level: Not on file   Occupational History     Not on file   Social Needs     Financial resource strain: Not on file     Food insecurity     Worry: Not on file     Inability: Not on file     Transportation needs     Medical: Not on file     Non-medical: Not on file   Tobacco Use     Smoking status: Current Every Day Smoker     Packs/day: 0.25     Years: 66.00     Pack years: 16.50     Types: Cigarettes     Smokeless tobacco: Current User   Substance and Sexual Activity     Alcohol use: Yes     Frequency: 4 or more times a week     Drinks per session: 3 or 4     Binge frequency: Daily or almost daily     Comment: scotch & water 2-3 classes a day     Drug use: Never     Sexual  activity: Not Currently     Partners: Male   Lifestyle     Physical activity     Days per week: Not on file     Minutes per session: Not on file     Stress: Not on file   Relationships     Social connections     Talks on phone: Not on file     Gets together: Not on file     Attends [employer] service: Not on file     Active member of club or organization: Not on file     Attends meetings of clubs or organizations: Not on file     Relationship status: Not on file     Intimate partner violence     Fear of current or ex partner: Not on file     Emotionally abused: Not on file     Physically abused: Not on file     Forced sexual activity: Not on file   Other Topics Concern     Not on file   Social History Narrative     Not on file       Family History  Family History   Problem Relation Age of Onset     No Known Problems Mother      No Known Problems Father      No Known Problems Maternal Grandmother      No Known Problems Maternal Grandfather      No Known Problems Paternal Grandmother        Review of Systems      ROS/MED HX  ENT/Pulmonary:     (+) sleep apnea, doesn't use CPAP, tobacco use (0.25 ppd), Current use, 0 packs/day,     Neurologic:     (+) peripheral neuropathy, - hands.  (-) no seizures, no CVA and migraines   Cardiovascular:     (+) hypertension-----Previous cardiac testing   Echo: Date: 12/30/20 Results:  Interpretation Summary          Global and regional left ventricular function is hyperkinetic with an EF >70%.     Global peak LV longitudinal strain is averaged at -23%. This is within     reported normal limits (normal <-18%).     Right ventricular function, chamber size, wall motion, and thickness are     normal.     The inferior vena cava is normal.     No pericardial effusion is present.   Stress Test: Date: Results:    ECG Reviewed: Date: 2010 Results:    Cath: Date: Results:      METS/Exercise Tolerance: 3 - Able to walk 1-2 blocks without stopping    Hematologic:     (+) anemia,  (-) history of  "blood clots and history of blood transfusion   Musculoskeletal:  - neg musculoskeletal ROS     GI/Hepatic:  - neg GI/hepatic ROS  (-) GERD   Renal/Genitourinary:  - neg Renal ROS     Endo:  - neg endo ROS     Psychiatric/Substance Use:  - neg psychiatric ROS     Infectious Disease: Comment: Cellulitis of feet       Malignancy:   (+) Malignancy, History of Breast.Breast CA Active status post Chemo.        Other:  - neg other ROS        The complete review of systems is negative other than noted in the HPI or here.                         159 lbs 0 oz  5' 5.5\"   Body mass index is 26.06 kg/m .       Physical Exam  Constitutional: Awake, alert, cooperative, no apparent distress, and appears stated age.  Eyes: Glasses  HENT: Normocephalic  Respiratory: non labored breathing   Neurologic: Awake, alert, oriented to name, place and time.   Neuropsychiatric: Calm, cooperative. Normal affect.     Labs: (personally reviewed)  CBC:   Lab Results   Component Value Date    WBC 12.8 (H) 02/09/2021    WBC 6.7 02/05/2021    HGB 10.3 (L) 02/09/2021    HGB 10.0 (L) 02/05/2021    HCT 30.9 (L) 02/09/2021    HCT 30.9 (L) 02/05/2021     02/09/2021     02/05/2021     BMP:   Lab Results   Component Value Date     02/09/2021     01/26/2021    POTASSIUM 3.8 02/09/2021    POTASSIUM 3.5 01/26/2021    CHLORIDE 108 02/09/2021    CHLORIDE 106 01/26/2021    CO2 28 02/09/2021    CO2 29 01/26/2021    BUN 13 02/09/2021    BUN 12 01/26/2021    CR 0.74 02/09/2021    CR 0.83 01/26/2021     (H) 02/09/2021     (H) 01/26/2021     COAGS: No results found for: PTT, INR, FIBR  POC: No results found for: BGM, HCG, HCGS  HEPATIC:   Lab Results   Component Value Date    ALBUMIN 3.4 02/09/2021    PROTTOTAL 7.0 02/09/2021    ALT 16 02/09/2021    AST 13 02/09/2021    ALKPHOS 90 02/09/2021    BILITOTAL 0.2 02/09/2021     OTHER:   Lab Results   Component Value Date    A1C 5.7 (H) 09/28/2020    JIMBO 8.8 02/09/2021    MAG 2.4 (H) " 01/12/2021    TSH 1.59 09/28/2020    SED 74 (H) 02/05/2021     EKG 2010     Normal sinus rhythm     Non specific T  wave abnormality (ies)     Abnormal ECG          Echo 12/30/20     Interpretation Summary           Global and regional left ventricular function is hyperkinetic with an EF >70%.     Global peak LV longitudinal strain is averaged at -23%. This is within     reported normal limits (normal <-18%).     Right ventricular function, chamber size, wall motion, and thickness are     normal.     The inferior vena cava is normal.     No pericardial effusion is present.          The patient's records and results personally reviewed by this provider.     Outside records reviewed from: care everywhere     ASSESSMENT and PLAN  Gia is a 75 year old woman who is scheduled for right simple mastectomy, right sentinel node biopsy on 3/8/21by Dr. Newsome in treatment of Malignant neoplasm of overlapping sites of right breast in female, estrogen receptor positive.  PAC referral for risk assessment and optimization for anesthesia with comorbid conditions of HTN, COPD, smoking, JAC, anemia, neuropathy, cellulitis:        Pre-operative considerations:   1.  Cardiac:  Functional status- METS 3, the patient reports due to chemotherapy she has been more fatigued and due to weather hasn't been walking. Prior to chemotherapy she was able to do anything that she wanted. She denies any cardiac symptoms.  Low risk surgery with 0.4% (RCRI #) risk of major adverse cardiac event.    ~ HTN - continue norvasc and Lipitor. Hold benazepril and lasix. The patient had recent echo on 12/30/20 with EF >70% and no wall motion abnormalities. No further testing indicated.      2.  Pulm:  Airway feasible.  JAC - the patient reports she's been intolerant to CPAP. She feels that her breathing has improved however since she was first diagnosed. Consideration for close monitoring of airway.   ~ COPD - the patient denies this diagnosis. She is not on  any inhalers.    ~ Smoker - 0.25 ppd for 66 years. We discussed smoking cessation and the patient agrees to not smoke on the DOS.     3. Heme:  Anemia - hgb 10.3 on 2/9/21. Low bleeding risk procedure    4. 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     5. GI:  Risk of PONV score = 2.  If > 2, anti-emetic intervention recommended.     6. : Breast cancer - s/p chemotherapy, last infusion on 2/9/21. Procedure as above.      7. Musculoskeletal: Cellulitis of feet - She took her last dose of keflex today and reports the blisters on her feet are improved. Norco for pain. Morphine eq =  30 mg.  Discussed with Dr. Forrest and as cannot visually inspect the patient's feet today will have her repeat CBC and let Dr. Newsome know about the cellulitis.        VTE risk: 3%     **Please refer to the physical examination documented by the anesthesiologist in the anesthesia record on the day of surgery**    The patient is optimized for their procedure. AVS with information on surgery time/arrival time, meds and NPO status given by nursing staff.          Video-Visit Details    Type of service:  Video Visit    Patient verbally consented to video service today: YES      Video Start Time: 8:25  Video End Time (time video stopped): 8:37    Originating Location (pt. Location): Home    Distant Location (provider location):  [facility] PREOPERATIVE ASSESSMENT CENTER     Mode of Communication:  Video Conference via [employer]    On the day of service:     Prep time: 10 minutes  Visit time: 12 minutes  Documentation time: 10 minutes  ------------------------------------------  Total time: 32 minutes      Aleta Horn PA-C  Preoperative Assessment Center  [facility]  Clinic and Surgery Center  Phone: [phone]  Fax: [phone]

## 2021-02-15 NOTE — PATIENT INSTRUCTIONS
Preparing for Your Surgery      Name:  Talya Zuleta   MRN:  [id number]   :  1945   Today's Date:  2/15/2021         Arriving for surgery:  Surgery date:  3/8/21  Arrival time:  5:45AM    Restrictions due to COVID 19:  One consistent visitor is allowed per patient  No ill visitors  All visitors must wear face mask     parking is available for anyone with mobility limitations or disabilities. (Monday- Friday 7 am- 5 pm)    Please come to:    [facility]  [address] [place] [id number]    Please check in on the 5th floor at the Ambulatory Surgery Center       What can I eat or drink?    -  You may eat and drink normally until 8 hours before surgery. (Until 3/7/21, 11:15PM)  -  You may have clear liquids up to 4 hours before surgery. (Until 3/8/21, 4:15AM)  Examples of clear liquids:  Water  Clear broth  Juices (apple, white grape, white cranberry  and cider) without pulp  Noncarbonated, powder based beverages  (lemonade and Theron-Aid)  Sodas (Sprite, 7-Up, ginger ale and seltzer)  Coffee or tea (without milk or cream)  Gatorade    --No alcohol for at least 24 hours before surgery    Which medicines can I take?    Hold Aspirin for 7 days before surgery.   Hold Multivitamins for 7 days before surgery.  Hold Supplements for 7 days before surgery.  Hold Ibuprofen (Advil, Motrin) for 1 day before surgery--unless otherwise directed by surgeon.  Hold Naproxen (Aleve) for 4 days before surgery.    -  DO NOT take the following medications the day of surgery:        Benazepril(Lotensin)   Furosemide(Lasix)    Potassium    -  PLEASE TAKE the following medications the day of surgery     Amlodipine(Norvasc)  Atorvastatin(Lipitor)    Dexamethasone(Decadron)   -As Needed:    Hydrocodone-Acetaminophen(Norco)    Loperamide(Imodium)    Ondansetron(Zofran)    How do I prepare myself?  - Please take 2 showers before surgery using Scrubcare or Hibiclens soap.    Use this soap only from  the neck to your toes.     Leave the soap on your skin for one minute--then rinse thoroughly.      You may use your own shampoo and conditioner; no other hair products.   - Please remove all jewelry and body piercings.  - No lotions, deodorants or fragrance.  - No makeup or fingernail polish.   - Bring your ID and insurance card.        - All patients are required to have a Covid-19 test within 4 days of surgery/procedure.      -Patients will be contacted by the [facility] scheduling team within 1 week of surgery to make an appointment.      - Patients may call the Scheduling team at [phone] if they have not been scheduled within 4 days of  surgery.      ALL PATIENTS ARE REQUIRED TO HAVE A RESPONSIBLE ADULT TO DRIVE AND BE IN ATTENDANCE WITH THEM FOR 24 HOURS FOLLOWING SURGERY       Questions or Concerns:    -For questions regarding the day of surgery please contact the Ambulatory Surgery Center at [phone].    -If you have health changes between today and your surgery please contact your surgeon.     For questions after surgery please call your surgeons office.

## 2021-02-15 NOTE — TELEPHONE ENCOUNTER
I spoke with this patient; she wanted the [facility] [phone].    SHANNA Finley LPN

## 2021-02-15 NOTE — ANESTHESIA PREPROCEDURE EVALUATION
Anesthesia Pre-Procedure Evaluation    Patient: Talya Zuleta   MRN: [id number] : 1945        Preoperative Diagnosis: * No surgery found *   Procedure :      Past Medical History:   Diagnosis Date     Breast cancer (H)      Sleep apnea       Past Surgical History:   Procedure Laterality Date     CHOLECYSTECTOMY       GYN SURGERY       HYSTERECTOMY       INSERT PORT VASCULAR ACCESS N/A 10/2/2020    Procedure: PORT PLACEMENT;  Surgeon: Hawa Lowery MD;  Location: [facility]     THYROIDECTOMY       partial      Allergies   Allergen Reactions     Bacitracin-Neomycin-Polymyxin      PN: LW Reaction: Unknown Reaction     Latex       Social History     Tobacco Use     Smoking status: Current Every Day Smoker     Packs/day: 0.25     Types: Cigarettes     Smokeless tobacco: Current User   Substance Use Topics     Alcohol use: Yes     Frequency: 4 or more times a week     Drinks per session: 3 or 4     Binge frequency: Daily or almost daily     Comment: scotch & water 2-3 classes a day      Wt Readings from Last 1 Encounters:   21 71.3 kg (157 lb 3.2 oz)        Anesthesia Evaluation   Pt has had prior anesthetic. Type: General.    History of anesthetic complications   Patient responds best to Gia when waking and not Dominga.    ROS/MED HX  ENT/Pulmonary:     (+) sleep apnea, doesn't use CPAP, tobacco use (0.25 ppd), Current use, 0 packs/day,     Neurologic:     (+) peripheral neuropathy, - hands.  (-) no seizures, no CVA and migraines   Cardiovascular:     (+) hypertension-----Previous cardiac testing   Echo: Date: 20 Results:  Interpretation Summary          Global and regional left ventricular function is hyperkinetic with an EF >70%.     Global peak LV longitudinal strain is averaged at -23%. This is within     reported normal limits (normal <-18%).     Right ventricular function, chamber size, wall motion, and thickness are     normal.     The inferior vena cava is normal.     No pericardial  effusion is present.   Stress Test: Date: Results:    ECG Reviewed: Date: 2010 Results:    Cath: Date: Results:      METS/Exercise Tolerance: 3 - Able to walk 1-2 blocks without stopping    Hematologic:     (+) anemia,  (-) history of blood clots and history of blood transfusion   Musculoskeletal:  - neg musculoskeletal ROS     GI/Hepatic:  - neg GI/hepatic ROS  (-) GERD   Renal/Genitourinary:  - neg Renal ROS     Endo:  - neg endo ROS     Psychiatric/Substance Use:  - neg psychiatric ROS     Infectious Disease: Comment: Cellulitis of feet       Malignancy:   (+) Malignancy, History of Breast.Breast CA Active status post Chemo.        Other:  - neg other ROS             OUTSIDE LABS:  Results for OMER POTTER (MRN [id number]) as of 3/5/2021 09:53   Ref. Range 3/5/2021 09:08   Sodium Latest Ref Range: 133 - 144 mmol/L 142   Potassium Latest Ref Range: 3.4 - 5.3 mmol/L 3.9   Chloride Latest Ref Range: 94 - 109 mmol/L 109   Carbon Dioxide Latest Ref Range: 20 - 32 mmol/L 29   Urea Nitrogen Latest Ref Range: 7 - 30 mg/dL 11   Creatinine Latest Ref Range: 0.52 - 1.04 mg/dL 0.78   GFR Estimate Latest Ref Range: >60 mL/min/1.73_m2 73   GFR Estimate If Black Latest Ref Range: >60 mL/min/1.73_m2 85   Calcium Latest Ref Range: 8.5 - 10.1 mg/dL 9.2   Anion Gap Latest Ref Range: 3 - 14 mmol/L 4   Albumin Latest Ref Range: 3.4 - 5.0 g/dL 3.5   Protein Total Latest Ref Range: 6.8 - 8.8 g/dL 6.8   Bilirubin Total Latest Ref Range: 0.2 - 1.3 mg/dL 0.3   Alkaline Phosphatase Latest Ref Range: 40 - 150 U/L 74   ALT Latest Ref Range: 0 - 50 U/L 17   AST Latest Ref Range: 0 - 45 U/L 13   Glucose Latest Ref Range: 70 - 99 mg/dL 102 (H)   WBC Latest Ref Range: 4.0 - 11.0 10e9/L 6.3   Hemoglobin Latest Ref Range: 11.7 - 15.7 g/dL 11.3 (L)   Hematocrit Latest Ref Range: 35.0 - 47.0 % 34.7 (L)   Platelet Count Latest Ref Range: 150 - 450 10e9/L 309   RBC Count Latest Ref Range: 3.8 - 5.2 10e12/L 3.19 (L)   MCV Latest Ref Range: 78 -  100 fl 109 (H)   MCH Latest Ref Range: 26.5 - 33.0 pg 35.4 (H)   MCHC Latest Ref Range: 31.5 - 36.5 g/dL 32.6   RDW Latest Ref Range: 10.0 - 15.0 % 19.2 (H)           PAC Discussion and Assessment    ASA Classification: 3  Case is suitable for: ASC  Anesthetic techniques and relevant risks discussed: GA                  PAC Resident/NP Anesthesia Assessment: Gia is a 75 year old woman who is scheduled for right simple mastectomy, right sentinel node biopsy on 3/8/21by Dr. Newsome in treatment of Malignant neoplasm of overlapping sites of right breast in female, estrogen receptor positive.  PAC referral for risk assessment and optimization for anesthesia with comorbid conditions of HTN, COPD, smoking, JAC, anemia, neuropathy, cellulitis:        Pre-operative considerations:   1.  Cardiac:  Functional status- METS 3, the patient reports due to chemotherapy she has been more fatigued and due to weather hasn't been walking. Prior to chemotherapy she was able to do anything that she wanted. She denies any cardiac symptoms.  Low risk surgery with 0.4% (RCRI #) risk of major adverse cardiac event.    ~ HTN - continue norvasc and Lipitor. Hold benazepril and lasix. The patient had recent echo on 12/30/20 with EF >70% and no wall motion abnormalities. No further testing indicated.      2.  Pulm:  Airway feasible.  JAC - the patient reports she's been intolerant to CPAP. She feels that her breathing has improved however since she was first diagnosed. Consideration for close monitoring of airway.   ~ COPD - the patient denies this diagnosis. She is not on any inhalers.    ~ Smoker - 0.25 ppd for 66 years. We discussed smoking cessation and the patient agrees to not smoke on the DOS.     3. Heme:  Anemia - hgb 10.3 on 2/9/21. Low bleeding risk procedure    4. 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     5. GI:  Risk of PONV score = 2.  If > 2, anti-emetic intervention recommended.     6. : Breast cancer - s/p  chemotherapy, last infusion on 2/9/21. Procedure as above.      7. Musculoskeletal: Cellulitis of feet - She took her last dose of keflex today and reports the blisters on her feet are improved. Norco for pain. Morphine eq =  30 mg. Discussed with Dr. Forrest and as cannot visually inspect the patient's feet today will have her repeat CBC and let Dr. Newsome know about the cellulitis.        VTE risk: 3%     **Please refer to the physical examination documented by the anesthesiologist in the anesthesia record on the day of surgery**    Patient is optimized and is acceptable candidate for the proposed procedure.  No further diagnostic evaluation is needed.      For further details of assessment, testing, and physical exam please see H and P completed on same date.     Aleta Honr PA-C     ADDENDUM: 3/5/21  The patient had her follow up CBC and her WBC normalized. Her hgb improved as well but continues to have some anemia. Platelets are within normal limits. The patient is optimized for surgery.       Mid-Level Provider/Resident: Aleta Horn PA-C  Date: 2/15/21      Attending Anesthesiologist Anesthesia Assessment: Pt with COPD, HTN, JAC, peripheral neuropathy (chemo-induced), and cellulitis of the feet is scheduled for breast surgery.    Elevated white blood cell count and the presence of cellulitis and blistering of feet warrants surgeon's attention prior to progressing to surgery.  Although cellulitis symptoms appear to respond to antibiotics, elevated white blood cell count warrants delay in surgery, if possible.  She   Reviewed and Signed by PAC Anesthesiologist  Anesthesiologist: Adriane  Date: Feb 15, 2021  Time: 0900  Pass/Fail: Fail  Disposition: Dr. Newsome's opinion on cellulitis issue               Aleta Horn PA-C

## 2021-02-16 ENCOUNTER — TELEPHONE (OUTPATIENT)
Dept: SURGERY | Facility: CLINIC | Age: 76
End: 2021-02-16

## 2021-02-16 ENCOUNTER — PATIENT OUTREACH (OUTPATIENT)
Dept: ONCOLOGY | Facility: CLINIC | Age: 76
End: 2021-02-16

## 2021-02-16 DIAGNOSIS — L03.116 CELLULITIS OF BOTH FEET: ICD-10-CM

## 2021-02-16 DIAGNOSIS — C50.811 MALIGNANT NEOPLASM OF OVERLAPPING SITES OF RIGHT BREAST IN FEMALE, ESTROGEN RECEPTOR POSITIVE (H): ICD-10-CM

## 2021-02-16 DIAGNOSIS — L03.115 CELLULITIS OF BOTH FEET: ICD-10-CM

## 2021-02-16 DIAGNOSIS — D70.1 CHEMOTHERAPY-INDUCED NEUTROPENIA (H): ICD-10-CM

## 2021-02-16 DIAGNOSIS — T45.1X5A CHEMOTHERAPY-INDUCED NEUTROPENIA (H): ICD-10-CM

## 2021-02-16 DIAGNOSIS — Z17.0 MALIGNANT NEOPLASM OF OVERLAPPING SITES OF RIGHT BREAST IN FEMALE, ESTROGEN RECEPTOR POSITIVE (H): ICD-10-CM

## 2021-02-16 LAB
ALBUMIN SERPL-MCNC: 3.4 G/DL (ref 3.4–5)
ALP SERPL-CCNC: 108 U/L (ref 40–150)
ALT SERPL W P-5'-P-CCNC: 15 U/L (ref 0–50)
ANION GAP SERPL CALCULATED.3IONS-SCNC: 3 MMOL/L (ref 3–14)
AST SERPL W P-5'-P-CCNC: 8 U/L (ref 0–45)
BILIRUB SERPL-MCNC: 0.5 MG/DL (ref 0.2–1.3)
BUN SERPL-MCNC: 12 MG/DL (ref 7–30)
CALCIUM SERPL-MCNC: 9 MG/DL (ref 8.5–10.1)
CHLORIDE SERPL-SCNC: 109 MMOL/L (ref 94–109)
CO2 SERPL-SCNC: 28 MMOL/L (ref 20–32)
CREAT SERPL-MCNC: 0.71 MG/DL (ref 0.52–1.04)
ERYTHROCYTE [DISTWIDTH] IN BLOOD BY AUTOMATED COUNT: 17.5 % (ref 10–15)
GFR SERPL CREATININE-BSD FRML MDRD: 83 ML/MIN/{1.73_M2}
GLUCOSE SERPL-MCNC: 102 MG/DL (ref 70–99)
HCT VFR BLD AUTO: 29.4 % (ref 35–47)
HGB BLD-MCNC: 9.6 G/DL (ref 11.7–15.7)
MCH RBC QN AUTO: 35 PG (ref 26.5–33)
MCHC RBC AUTO-ENTMCNC: 32.7 G/DL (ref 31.5–36.5)
MCV RBC AUTO: 107 FL (ref 78–100)
PLATELET # BLD AUTO: 178 10E9/L (ref 150–450)
POTASSIUM SERPL-SCNC: 4.6 MMOL/L (ref 3.4–5.3)
PROT SERPL-MCNC: 6.4 G/DL (ref 6.8–8.8)
RBC # BLD AUTO: 2.74 10E12/L (ref 3.8–5.2)
SODIUM SERPL-SCNC: 140 MMOL/L (ref 133–144)
WBC # BLD AUTO: 1.3 10E9/L (ref 4–11)

## 2021-02-16 PROCEDURE — 36415 COLL VENOUS BLD VENIPUNCTURE: CPT | Performed by: PHYSICIAN ASSISTANT

## 2021-02-16 PROCEDURE — 85027 COMPLETE CBC AUTOMATED: CPT | Performed by: PHYSICIAN ASSISTANT

## 2021-02-16 PROCEDURE — 80053 COMPREHEN METABOLIC PANEL: CPT | Performed by: INTERNAL MEDICINE

## 2021-02-16 NOTE — TELEPHONE ENCOUNTER
I received a message for the patient's oncology care coordinator about her lab result from the CBC we ordered yesterday to follow up on her cellulitis. It came back with critical result of WBC 1.30  today. The lab then also called the clinic and gave the critical report to our staff. The patient was called by the clinic coordinator and went over neutropenic precautions given the patient is at her Nikita. I also called the patient and she denies any fevers, chills and reports her better are improving. We discussed that she needs to keep the same precautions with good hand washing, social distancing and to inform her team if she develops symptoms. In terms of her surgery, we will need to repeat the CBC further out from her last chemotherapy. She would again prefer to go to the [place] lab. I will place another CBC order and have her complete this in about 2 weeks. The patient had no further questions.

## 2021-02-16 NOTE — TELEPHONE ENCOUNTER
Thank you for the updated and information on the lab results. We were checking for reduction of her leukocytosis given her cellulitis history but if she's at her Nikita will need to wait for repeat CBC when she's further out from her last chemotherapy before her surgery.

## 2021-02-16 NOTE — PROGRESS NOTES
RN CARE COORDINATION NOTE      Incoming:  Received a call from Quyen in [facility] with critical lab results, WBC 1.30 ANC 0.87  Lab was ordered by albert Horn PA-C in the PAC clinic. Lab reports they were unable to reach her so they called Dr Alvarez.      Outgoing: Patient had last AC chemotherapy on 2/9, patient is in her Nikita.    Spoke to patient who denies s/sx of infection. She reports feeling well.   Reviewed neutropenic precautions. She verbalized understanding.     Tatiana Valles MSN, RN, OCN  RN Care Coordinator  [facility]  [phone]

## 2021-02-16 NOTE — ADDENDUM NOTE
Addendum  created 02/16/21 [id number] by Aleta Horn PA-C    Clinical Note Signed, Diagnosis association updated, Order list changed, Visit diagnoses modified

## 2021-02-20 DIAGNOSIS — Z11.59 ENCOUNTER FOR SCREENING FOR OTHER VIRAL DISEASES: ICD-10-CM

## 2021-03-02 ENCOUNTER — TELEPHONE (OUTPATIENT)
Dept: SURGERY | Facility: CLINIC | Age: 76
End: 2021-03-02

## 2021-03-02 NOTE — TELEPHONE ENCOUNTER
I left vm for patient to get her CBC done at the same time as her Covid, I put in a note about this in the appt notes. I left our CB number for questions.    SHANNA Finley LPN

## 2021-03-05 ENCOUNTER — TELEPHONE (OUTPATIENT)
Dept: SURGERY | Facility: CLINIC | Age: 76
End: 2021-03-05

## 2021-03-05 DIAGNOSIS — D70.1 CHEMOTHERAPY-INDUCED NEUTROPENIA (H): ICD-10-CM

## 2021-03-05 DIAGNOSIS — L03.115 CELLULITIS OF BOTH FEET: ICD-10-CM

## 2021-03-05 DIAGNOSIS — C50.811 MALIGNANT NEOPLASM OF OVERLAPPING SITES OF RIGHT BREAST IN FEMALE, ESTROGEN RECEPTOR POSITIVE (H): ICD-10-CM

## 2021-03-05 DIAGNOSIS — L03.116 CELLULITIS OF BOTH FEET: ICD-10-CM

## 2021-03-05 DIAGNOSIS — T45.1X5A CHEMOTHERAPY-INDUCED NEUTROPENIA (H): ICD-10-CM

## 2021-03-05 DIAGNOSIS — Z17.0 MALIGNANT NEOPLASM OF OVERLAPPING SITES OF RIGHT BREAST IN FEMALE, ESTROGEN RECEPTOR POSITIVE (H): ICD-10-CM

## 2021-03-05 DIAGNOSIS — Z11.59 ENCOUNTER FOR SCREENING FOR OTHER VIRAL DISEASES: ICD-10-CM

## 2021-03-05 LAB
ALBUMIN SERPL-MCNC: 3.5 G/DL (ref 3.4–5)
ALP SERPL-CCNC: 74 U/L (ref 40–150)
ALT SERPL W P-5'-P-CCNC: 17 U/L (ref 0–50)
ANION GAP SERPL CALCULATED.3IONS-SCNC: 4 MMOL/L (ref 3–14)
AST SERPL W P-5'-P-CCNC: 13 U/L (ref 0–45)
BILIRUB SERPL-MCNC: 0.3 MG/DL (ref 0.2–1.3)
BUN SERPL-MCNC: 11 MG/DL (ref 7–30)
CALCIUM SERPL-MCNC: 9.2 MG/DL (ref 8.5–10.1)
CHLORIDE SERPL-SCNC: 109 MMOL/L (ref 94–109)
CO2 SERPL-SCNC: 29 MMOL/L (ref 20–32)
CREAT SERPL-MCNC: 0.78 MG/DL (ref 0.52–1.04)
ERYTHROCYTE [DISTWIDTH] IN BLOOD BY AUTOMATED COUNT: 19.2 % (ref 10–15)
GFR SERPL CREATININE-BSD FRML MDRD: 73 ML/MIN/{1.73_M2}
GLUCOSE SERPL-MCNC: 102 MG/DL (ref 70–99)
HCT VFR BLD AUTO: 34.7 % (ref 35–47)
HGB BLD-MCNC: 11.3 G/DL (ref 11.7–15.7)
LABORATORY COMMENT REPORT: NORMAL
MCH RBC QN AUTO: 35.4 PG (ref 26.5–33)
MCHC RBC AUTO-ENTMCNC: 32.6 G/DL (ref 31.5–36.5)
MCV RBC AUTO: 109 FL (ref 78–100)
PLATELET # BLD AUTO: 309 10E9/L (ref 150–450)
POTASSIUM SERPL-SCNC: 3.9 MMOL/L (ref 3.4–5.3)
PROT SERPL-MCNC: 6.8 G/DL (ref 6.8–8.8)
RBC # BLD AUTO: 3.19 10E12/L (ref 3.8–5.2)
SARS-COV-2 RNA RESP QL NAA+PROBE: NEGATIVE
SARS-COV-2 RNA RESP QL NAA+PROBE: NORMAL
SODIUM SERPL-SCNC: 142 MMOL/L (ref 133–144)
SPECIMEN SOURCE: NORMAL
SPECIMEN SOURCE: NORMAL
WBC # BLD AUTO: 6.3 10E9/L (ref 4–11)

## 2021-03-05 PROCEDURE — 87635 SARS-COV-2 COVID-19 AMP PRB: CPT | Performed by: SURGERY

## 2021-03-05 PROCEDURE — 80053 COMPREHEN METABOLIC PANEL: CPT | Performed by: PHYSICIAN ASSISTANT

## 2021-03-05 PROCEDURE — 36415 COLL VENOUS BLD VENIPUNCTURE: CPT | Performed by: PHYSICIAN ASSISTANT

## 2021-03-05 PROCEDURE — 85027 COMPLETE CBC AUTOMATED: CPT | Performed by: PHYSICIAN ASSISTANT

## 2021-03-05 NOTE — TELEPHONE ENCOUNTER
The patient completed her labs this morning and I called her to discuss results. WBC normalized. Her platelets remain within normal limits and her hemoglobin improved to 11.3. She reports she no longer has any blisters and denies fevers, chills or drainage from her feet. At this time the patient is optimized for her procedure. She verbalized understanding and agreement with the plan.

## 2021-03-07 ENCOUNTER — ANESTHESIA EVENT (OUTPATIENT)
Dept: SURGERY | Facility: AMBULATORY SURGERY CENTER | Age: 76
End: 2021-03-07

## 2021-03-08 ENCOUNTER — HOSPITAL ENCOUNTER (OUTPATIENT)
Facility: AMBULATORY SURGERY CENTER | Age: 76
Discharge: HOME | End: 2021-03-08
Attending: SURGERY | Admitting: SURGERY
Payer: COMMERCIAL

## 2021-03-08 ENCOUNTER — HOSPITAL ENCOUNTER (OUTPATIENT)
Dept: NUCLEAR MEDICINE | Facility: CLINIC | Age: 76
Setting detail: NUCLEAR MEDICINE
End: 2021-03-08
Attending: SURGERY
Payer: COMMERCIAL

## 2021-03-08 ENCOUNTER — ANESTHESIA (OUTPATIENT)
Dept: SURGERY | Facility: AMBULATORY SURGERY CENTER | Age: 76
End: 2021-03-08

## 2021-03-08 VITALS
OXYGEN SATURATION: 97 % | BODY MASS INDEX: 25.55 KG/M2 | HEIGHT: 66 IN | WEIGHT: 159 LBS | HEART RATE: 77 BPM | DIASTOLIC BLOOD PRESSURE: 51 MMHG | RESPIRATION RATE: 18 BRPM | TEMPERATURE: 97.5 F | SYSTOLIC BLOOD PRESSURE: 103 MMHG

## 2021-03-08 DIAGNOSIS — Z17.0 MALIGNANT NEOPLASM OF OVERLAPPING SITES OF RIGHT BREAST IN FEMALE, ESTROGEN RECEPTOR POSITIVE (H): ICD-10-CM

## 2021-03-08 DIAGNOSIS — C50.811 MALIGNANT NEOPLASM OF OVERLAPPING SITES OF RIGHT BREAST IN FEMALE, ESTROGEN RECEPTOR POSITIVE (H): ICD-10-CM

## 2021-03-08 PROCEDURE — 19303 MAST SIMPLE COMPLETE: CPT | Mod: RT

## 2021-03-08 PROCEDURE — 88307 TISSUE EXAM BY PATHOLOGIST: CPT | Mod: GC | Performed by: PATHOLOGY

## 2021-03-08 PROCEDURE — 88342 IMHCHEM/IMCYTCHM 1ST ANTB: CPT | Mod: GC | Performed by: PATHOLOGY

## 2021-03-08 PROCEDURE — 38525 BIOPSY/REMOVAL LYMPH NODES: CPT | Mod: RT

## 2021-03-08 RX ORDER — GABAPENTIN 100 MG/1
100 CAPSULE ORAL ONCE
Status: DISCONTINUED | OUTPATIENT
Start: 2021-03-08 | End: 2021-03-08 | Stop reason: HOSPADM

## 2021-03-08 RX ORDER — ONDANSETRON 2 MG/ML
4 INJECTION INTRAMUSCULAR; INTRAVENOUS EVERY 30 MIN PRN
Status: DISCONTINUED | OUTPATIENT
Start: 2021-03-08 | End: 2021-03-09 | Stop reason: HOSPADM

## 2021-03-08 RX ORDER — SODIUM CHLORIDE, SODIUM LACTATE, POTASSIUM CHLORIDE, CALCIUM CHLORIDE 600; 310; 30; 20 MG/100ML; MG/100ML; MG/100ML; MG/100ML
INJECTION, SOLUTION INTRAVENOUS CONTINUOUS
Status: DISCONTINUED | OUTPATIENT
Start: 2021-03-08 | End: 2021-03-08 | Stop reason: HOSPADM

## 2021-03-08 RX ORDER — ACETAMINOPHEN 325 MG/1
975 TABLET ORAL ONCE
Status: DISCONTINUED | OUTPATIENT
Start: 2021-03-08 | End: 2021-03-08 | Stop reason: HOSPADM

## 2021-03-08 RX ORDER — FENTANYL CITRATE 50 UG/ML
25-50 INJECTION, SOLUTION INTRAMUSCULAR; INTRAVENOUS
Status: DISCONTINUED | OUTPATIENT
Start: 2021-03-08 | End: 2021-03-08 | Stop reason: HOSPADM

## 2021-03-08 RX ORDER — DEXAMETHASONE SODIUM PHOSPHATE 4 MG/ML
INJECTION, SOLUTION INTRA-ARTICULAR; INTRALESIONAL; INTRAMUSCULAR; INTRAVENOUS; SOFT TISSUE PRN
Status: DISCONTINUED | OUTPATIENT
Start: 2021-03-08 | End: 2021-03-08

## 2021-03-08 RX ORDER — EPHEDRINE SULFATE 50 MG/ML
INJECTION, SOLUTION INTRAMUSCULAR; INTRAVENOUS; SUBCUTANEOUS PRN
Status: DISCONTINUED | OUTPATIENT
Start: 2021-03-08 | End: 2021-03-08

## 2021-03-08 RX ORDER — OXYCODONE HYDROCHLORIDE 5 MG/1
5 TABLET ORAL EVERY 4 HOURS PRN
Status: DISCONTINUED | OUTPATIENT
Start: 2021-03-08 | End: 2021-03-09 | Stop reason: HOSPADM

## 2021-03-08 RX ORDER — LIDOCAINE 40 MG/G
CREAM TOPICAL
Status: DISCONTINUED | OUTPATIENT
Start: 2021-03-08 | End: 2021-03-08 | Stop reason: HOSPADM

## 2021-03-08 RX ORDER — ISOSULFAN BLUE 50 MG/5ML
INJECTION, SOLUTION SUBCUTANEOUS PRN
Status: DISCONTINUED | OUTPATIENT
Start: 2021-03-08 | End: 2021-03-08 | Stop reason: HOSPADM

## 2021-03-08 RX ORDER — MEPERIDINE HYDROCHLORIDE 25 MG/ML
12.5 INJECTION INTRAMUSCULAR; INTRAVENOUS; SUBCUTANEOUS
Status: DISCONTINUED | OUTPATIENT
Start: 2021-03-08 | End: 2021-03-09 | Stop reason: HOSPADM

## 2021-03-08 RX ORDER — FENTANYL CITRATE 50 UG/ML
INJECTION, SOLUTION INTRAMUSCULAR; INTRAVENOUS PRN
Status: DISCONTINUED | OUTPATIENT
Start: 2021-03-08 | End: 2021-03-08

## 2021-03-08 RX ORDER — PROPOFOL 10 MG/ML
INJECTION, EMULSION INTRAVENOUS PRN
Status: DISCONTINUED | OUTPATIENT
Start: 2021-03-08 | End: 2021-03-08

## 2021-03-08 RX ORDER — OXYCODONE HYDROCHLORIDE 5 MG/1
5 TABLET ORAL
Status: DISCONTINUED | OUTPATIENT
Start: 2021-03-08 | End: 2021-03-09 | Stop reason: HOSPADM

## 2021-03-08 RX ORDER — NALOXONE HYDROCHLORIDE 0.4 MG/ML
0.4 INJECTION, SOLUTION INTRAMUSCULAR; INTRAVENOUS; SUBCUTANEOUS
Status: DISCONTINUED | OUTPATIENT
Start: 2021-03-08 | End: 2021-03-09 | Stop reason: HOSPADM

## 2021-03-08 RX ORDER — NALOXONE HYDROCHLORIDE 0.4 MG/ML
0.2 INJECTION, SOLUTION INTRAMUSCULAR; INTRAVENOUS; SUBCUTANEOUS
Status: DISCONTINUED | OUTPATIENT
Start: 2021-03-08 | End: 2021-03-09 | Stop reason: HOSPADM

## 2021-03-08 RX ORDER — ACETAMINOPHEN 325 MG/1
650 TABLET ORAL
Status: DISCONTINUED | OUTPATIENT
Start: 2021-03-08 | End: 2021-03-09 | Stop reason: HOSPADM

## 2021-03-08 RX ORDER — CEFAZOLIN SODIUM 2 G/50ML
2 SOLUTION INTRAVENOUS SEE ADMIN INSTRUCTIONS
Status: DISCONTINUED | OUTPATIENT
Start: 2021-03-08 | End: 2021-03-08 | Stop reason: HOSPADM

## 2021-03-08 RX ORDER — OXYCODONE HYDROCHLORIDE 5 MG/1
5 TABLET ORAL EVERY 6 HOURS PRN
Qty: 12 TABLET | Refills: 0 | Status: SHIPPED | OUTPATIENT
Start: 2021-03-08 | End: 2021-03-11

## 2021-03-08 RX ORDER — CEFAZOLIN SODIUM 2 G/50ML
2 SOLUTION INTRAVENOUS
Status: COMPLETED | OUTPATIENT
Start: 2021-03-08 | End: 2021-03-08

## 2021-03-08 RX ORDER — KETOROLAC TROMETHAMINE 30 MG/ML
INJECTION, SOLUTION INTRAMUSCULAR; INTRAVENOUS PRN
Status: DISCONTINUED | OUTPATIENT
Start: 2021-03-08 | End: 2021-03-08

## 2021-03-08 RX ORDER — ONDANSETRON 4 MG/1
4 TABLET, ORALLY DISINTEGRATING ORAL EVERY 30 MIN PRN
Status: DISCONTINUED | OUTPATIENT
Start: 2021-03-08 | End: 2021-03-09 | Stop reason: HOSPADM

## 2021-03-08 RX ORDER — SODIUM CHLORIDE, SODIUM LACTATE, POTASSIUM CHLORIDE, CALCIUM CHLORIDE 600; 310; 30; 20 MG/100ML; MG/100ML; MG/100ML; MG/100ML
INJECTION, SOLUTION INTRAVENOUS CONTINUOUS
Status: DISCONTINUED | OUTPATIENT
Start: 2021-03-08 | End: 2021-03-09 | Stop reason: HOSPADM

## 2021-03-08 RX ORDER — ALBUTEROL SULFATE 0.83 MG/ML
2.5 SOLUTION RESPIRATORY (INHALATION) EVERY 4 HOURS PRN
Status: DISCONTINUED | OUTPATIENT
Start: 2021-03-08 | End: 2021-03-08 | Stop reason: HOSPADM

## 2021-03-08 RX ORDER — LIDOCAINE HYDROCHLORIDE 20 MG/ML
INJECTION, SOLUTION INFILTRATION; PERINEURAL PRN
Status: DISCONTINUED | OUTPATIENT
Start: 2021-03-08 | End: 2021-03-08

## 2021-03-08 RX ORDER — PROPOFOL 10 MG/ML
INJECTION, EMULSION INTRAVENOUS CONTINUOUS PRN
Status: DISCONTINUED | OUTPATIENT
Start: 2021-03-08 | End: 2021-03-08

## 2021-03-08 RX ORDER — ONDANSETRON 2 MG/ML
INJECTION INTRAMUSCULAR; INTRAVENOUS PRN
Status: DISCONTINUED | OUTPATIENT
Start: 2021-03-08 | End: 2021-03-08

## 2021-03-08 RX ADMIN — KETOROLAC TROMETHAMINE 15 MG: 30 INJECTION, SOLUTION INTRAMUSCULAR; INTRAVENOUS at 09:05

## 2021-03-08 RX ADMIN — FENTANYL CITRATE 25 MCG: 50 INJECTION, SOLUTION INTRAMUSCULAR; INTRAVENOUS at 07:21

## 2021-03-08 RX ADMIN — DEXAMETHASONE SODIUM PHOSPHATE 4 MG: 4 INJECTION, SOLUTION INTRA-ARTICULAR; INTRALESIONAL; INTRAMUSCULAR; INTRAVENOUS; SOFT TISSUE at 07:45

## 2021-03-08 RX ADMIN — LIDOCAINE HYDROCHLORIDE 60 MG: 20 INJECTION, SOLUTION INFILTRATION; PERINEURAL at 07:29

## 2021-03-08 RX ADMIN — Medication 0.5 MG: at 08:27

## 2021-03-08 RX ADMIN — FENTANYL CITRATE 25 MCG: 50 INJECTION, SOLUTION INTRAMUSCULAR; INTRAVENOUS at 08:06

## 2021-03-08 RX ADMIN — CEFAZOLIN SODIUM 2 G: 2 SOLUTION INTRAVENOUS at 07:37

## 2021-03-08 RX ADMIN — EPHEDRINE SULFATE 5 MG: 50 INJECTION, SOLUTION INTRAMUSCULAR; INTRAVENOUS; SUBCUTANEOUS at 08:48

## 2021-03-08 RX ADMIN — PROPOFOL 150 MCG/KG/MIN: 10 INJECTION, EMULSION INTRAVENOUS at 07:29

## 2021-03-08 RX ADMIN — SODIUM CHLORIDE, SODIUM LACTATE, POTASSIUM CHLORIDE, CALCIUM CHLORIDE 25 ML/HR: 600; 310; 30; 20 INJECTION, SOLUTION INTRAVENOUS at 06:56

## 2021-03-08 RX ADMIN — EPHEDRINE SULFATE 5 MG: 50 INJECTION, SOLUTION INTRAMUSCULAR; INTRAVENOUS; SUBCUTANEOUS at 07:49

## 2021-03-08 RX ADMIN — PROPOFOL 200 MG: 10 INJECTION, EMULSION INTRAVENOUS at 07:29

## 2021-03-08 RX ADMIN — CEFAZOLIN SODIUM 1 G: 2 SOLUTION INTRAVENOUS at 09:28

## 2021-03-08 RX ADMIN — ONDANSETRON 4 MG: 2 INJECTION INTRAMUSCULAR; INTRAVENOUS at 09:04

## 2021-03-08 RX ADMIN — FENTANYL CITRATE 25 MCG: 50 INJECTION, SOLUTION INTRAMUSCULAR; INTRAVENOUS at 07:44

## 2021-03-08 RX ADMIN — PROPOFOL: 10 INJECTION, EMULSION INTRAVENOUS at 08:20

## 2021-03-08 RX ADMIN — PROPOFOL: 10 INJECTION, EMULSION INTRAVENOUS at 09:18

## 2021-03-08 RX ADMIN — FENTANYL CITRATE 25 MCG: 50 INJECTION, SOLUTION INTRAMUSCULAR; INTRAVENOUS at 07:46

## 2021-03-08 RX ADMIN — EPHEDRINE SULFATE 5 MG: 50 INJECTION, SOLUTION INTRAMUSCULAR; INTRAVENOUS; SUBCUTANEOUS at 07:36

## 2021-03-08 ASSESSMENT — MIFFLIN-ST. JEOR: SCORE: 1220.22

## 2021-03-08 NOTE — ANESTHESIA CARE TRANSFER NOTE
Patient: Talya Zuleta    Procedure(s):  right simple mastectomy, right sentinel node biopsy    Diagnosis: Malignant neoplasm of overlapping sites of right breast in female, estrogen receptor positive (H) [C50.811, Z17.0]  Diagnosis Additional Information: No value filed.    Anesthesia Type:   No value filed.     Note:    Oropharynx: spontaneously breathing  Level of Consciousness: awake  Oxygen Supplementation: face mask    Independent Airway: airway patency satisfactory and stable  Dentition: dentition unchanged  Vital Signs Stable: post-procedure vital signs reviewed and stable  Report to RN Given: handoff report given  Patient transferred to: PACU  Comments: 101/49  99%  97.4-79-16    Handoff Report: Identifed the Patient, Identified the Reponsible Provider, Reviewed the pertinent medical history, Discussed the surgical course, Reviewed Intra-OP anesthesia mangement and issues during anesthesia, Set expectations for post-procedure period and Allowed opportunity for questions and acknowledgement of understanding      Vitals: (Last set prior to Anesthesia Care Transfer)  CRNA VITALS  3/8/2021 0909 - 3/8/2021 0947      3/8/2021             Pulse:  78    SpO2:  97 %    Resp Rate (observed):  (!) 1    Resp Rate (set):  10        Electronically Signed By: PEDRO Dixon CRNA  March 8, 2021  9:47 AM

## 2021-03-08 NOTE — BRIEF OP NOTE
[facility]    Brief Operative Note    Pre-operative diagnosis: Malignant neoplasm of overlapping sites of right breast in female, estrogen receptor positive (H) [C50.811, Z17.0]  Post-operative diagnosis Same as pre-operative diagnosis    Procedure: Procedure(s):  right simple mastectomy, right sentinel node biopsy  Surgeon: Surgeon(s) and Role:     * Laci Newsome MD - Primary  Anesthesia: General   Estimated blood loss: Minimal  Drains: Louie-Dent  Specimens:   ID Type Source Tests Collected by Time Destination   A : right mastectomy - stitch at 12 o'clock Tissue Breast, Right SURGICAL PATHOLOGY EXAM Laci Newsome MD 3/8/2021  8:49 AM    B : right sentinel lymph node #1 Tissue Lymph Node, [place] SURGICAL PATHOLOGY EXAM Laci Newsome MD 3/8/2021  8:55 AM      Findings:   None.  Complications: None.  Implants: * No implants in log *

## 2021-03-08 NOTE — ANESTHESIA PREPROCEDURE EVALUATION
Anesthesia Pre-Procedure Evaluation    Patient: Talya Zuleta   MRN: [id number] : 1945        Preoperative Diagnosis: Malignant neoplasm of overlapping sites of right breast in female, estrogen receptor positive (H) [C50.811, Z17.0]   Procedure : Procedure(s):  right simple mastectomy, right sentinel node biopsy     Past Medical History:   Diagnosis Date     Anemia      Breast cancer (H)      Cellulitis      HTN (hypertension)      Neuropathy      Personal history of tobacco use, presenting hazards to health      Sleep apnea       Past Surgical History:   Procedure Laterality Date     BLEPHAROPLASTY       BUNIONECTOMY       CHOLECYSTECTOMY       HYSTERECTOMY       INSERT PORT VASCULAR ACCESS N/A 10/02/2020    Procedure: PORT PLACEMENT;  Surgeon: Hawa Lowery MD;  Location: [facility]     THYROIDECTOMY       partial      Allergies   Allergen Reactions     Bacitracin-Neomycin-Polymyxin      PN: LW Reaction: Unknown Reaction     Latex       Social History     Tobacco Use     Smoking status: Current Every Day Smoker     Packs/day: 0.25     Years: 66.00     Pack years: 16.50     Types: Cigarettes     Smokeless tobacco: Current User   Substance Use Topics     Alcohol use: Yes     Frequency: 4 or more times a week     Drinks per session: 3 or 4     Binge frequency: Daily or almost daily     Comment: scotch & water 2-3 classes a day      Wt Readings from Last 1 Encounters:   21 72.1 kg (159 lb)        Anesthesia Evaluation            ROS/MED HX  ENT/Pulmonary:     (+) sleep apnea,     Neurologic:     (+) peripheral neuropathy,     Cardiovascular:     (+) hypertension-----    METS/Exercise Tolerance:     Hematologic:       Musculoskeletal:       GI/Hepatic:       Renal/Genitourinary:       Endo:       Psychiatric/Substance Use:       Infectious Disease: Comment: Recent cellulitis of lower extremities, recovered since February.       Malignancy:       Other:               OUTSIDE LABS:  CBC:   Lab  Results   Component Value Date    WBC 6.3 03/05/2021    WBC 1.3 (LL) 02/16/2021    HGB 11.3 (L) 03/05/2021    HGB 9.6 (L) 02/16/2021    HCT 34.7 (L) 03/05/2021    HCT 29.4 (L) 02/16/2021     03/05/2021     02/16/2021     BMP:   Lab Results   Component Value Date     03/05/2021     02/16/2021    POTASSIUM 3.9 03/05/2021    POTASSIUM 4.6 02/16/2021    CHLORIDE 109 03/05/2021    CHLORIDE 109 02/16/2021    CO2 29 03/05/2021    CO2 28 02/16/2021    BUN 11 03/05/2021    BUN 12 02/16/2021    CR 0.78 03/05/2021    CR 0.71 02/16/2021     (H) 03/05/2021     (H) 02/16/2021     COAGS: No results found for: PTT, INR, FIBR  POC: No results found for: BGM, HCG, HCGS  HEPATIC:   Lab Results   Component Value Date    ALBUMIN 3.5 03/05/2021    PROTTOTAL 6.8 03/05/2021    ALT 17 03/05/2021    AST 13 03/05/2021    ALKPHOS 74 03/05/2021    BILITOTAL 0.3 03/05/2021     OTHER:   Lab Results   Component Value Date    A1C 5.7 (H) 09/28/2020    JIMBO 9.2 03/05/2021    MAG 2.4 (H) 01/12/2021    TSH 1.59 09/28/2020    SED 74 (H) 02/05/2021       Anesthesia Plan    ASA Status:  2      Anesthesia Type: General.     - Airway: LMA      Maintenance: TIVA.        Consents    Anesthesia Plan(s) and associated risks, benefits, and realistic alternatives discussed. Questions answered and patient/representative(s) expressed understanding.     - Discussed with:  Patient         Postoperative Care    Pain management: Multi-modal analgesia.   PONV prophylaxis: Ondansetron (or other 5HT-3), Background Propofol Infusion     Comments:                Anusha Shaffer MD

## 2021-03-08 NOTE — ANESTHESIA POSTPROCEDURE EVALUATION
Patient: Talya Zuleta    Procedure(s):  right simple mastectomy, right sentinel node biopsy    Diagnosis:Malignant neoplasm of overlapping sites of right breast in female, estrogen receptor positive (H) [C50.811, Z17.0]  Diagnosis Additional Information: No value filed.    Anesthesia Type:  General    Note:  Disposition: Outpatient   Postop Pain Control: Uneventful            Sign Out: Well controlled pain   PONV: No   Neuro/Psych: Uneventful            Sign Out: Acceptable/Baseline neuro status   Airway/Respiratory: Uneventful            Sign Out: Acceptable/Baseline resp. status   CV/Hemodynamics: Uneventful            Sign Out: Acceptable CV status   Other NRE: NONE   DID A NON-ROUTINE EVENT OCCUR?          Last vitals:  Vitals:    03/08/21 0956 03/08/21 1015 03/08/21 1030   BP: 119/58 102/56 103/51   Pulse: 83 80 77   Resp: 20 18 18   Temp: 36.4  C (97.6  F) 36.4  C (97.6  F) 36.4  C (97.5  F)   SpO2: 97% 97% 97%       Last vitals prior to Anesthesia Care Transfer:  CRNA VITALS  3/8/2021 0909 - 3/8/2021 1009      3/8/2021             Pulse:  78    SpO2:  97 %    Resp Rate (observed):  (!) 1    Resp Rate (set):  10          Electronically Signed By: Anusha Shaffer MD  March 8, 2021  1:56 PM

## 2021-03-08 NOTE — OP NOTE
Procedure Date: 03/08/2021      PREOPERATIVE DIAGNOSIS:  Right breast cancer.      POSTOPERATIVE DIAGNOSIS:  Right breast cancer.      PROCEDURE:  Right mastectomy, lymphatic mapping and right axillary sentinel lymph node biopsy x1.      ATTENDING SURGEON:  Laci Newsome MD      RESIDENT SURGEON:  Varghese Saldana MD      ANESTHESIA:  General with LMA.      INDICATIONS FOR SURGERY:  The patient is a 75-year-old woman who presented with a T2 N0 M0 right breast cancer.  She was treated with preoperative chemotherapy.  She now presents for surgical treatment.      DESCRIPTION IN DETAIL:  After informed consent, the patient was brought to the operating room, given a general anesthetic, and LMA was placed.  I injected technetium sulfur colloid and isosulfan blue in her right breast.  She was prepped and draped in the usual fashion.  I made an elliptical skin incision to include the nipple areolar complex.  The Bovie cautery was used to incise the subcutaneous tissues.  I raised flaps sharply with a Bovie cautery.  A superior skin flap was raised to the clavicle, a medial skin flap was raised to the lateral border of the sternum and inferior skin flap was raised to the inframammary fold.  Next, we removed the breast and pectoralis fascia from the pectoralis major muscle from superior to inferior and from medial to lateral.  After the specimen was dissected off the lateral border of the pectoralis major muscle, it was divided, oriented and sent to Pathology.  Strict hemostasis was obtained with the Bovie cautery.  We incised the clavipectoral fascia and identified a mildly blue radioactive lymph node.  The sentinel lymph node was removed and had ex vivo counts of 21 counts per second.  There were no additional blue radioactive or palpable lymph nodes.  A 15 round Louie-Dent drain was placed in the anterior axillary line and secured to the skin with a 3-0 nylon suture.  The dermis was closed in layers with interrupted 3-0  Vicryl suture for the skin and a running 4-0 PDS subcuticular stitch.  Dermabond was placed, an Ace wrap was placed around the chest, and the patient was taken to the recovery room in stable condition.         LUIS YANG MD             D: 2021   T: 2021   MT: al      Name:     OMER POTTER   MRN:      [id number]        Account:        [id number]   :      1945           Procedure Date: 2021      Document: [id number]

## 2021-03-16 ENCOUNTER — IMMUNIZATION (OUTPATIENT)
Dept: NURSING | Facility: CLINIC | Age: 76
End: 2021-03-16
Payer: COMMERCIAL

## 2021-03-16 LAB — COPATH REPORT: NORMAL

## 2021-03-16 PROCEDURE — 0001A PR COVID VAC PFIZER DIL RECON 30 MCG/0.3 ML IM: CPT

## 2021-03-16 PROCEDURE — 91300 PR COVID VAC PFIZER DIL RECON 30 MCG/0.3 ML IM: CPT

## 2021-03-23 ASSESSMENT — ENCOUNTER SYMPTOMS
EYE IRRITATION: 0
MUSCLE CRAMPS: 1
SMELL DISTURBANCE: 1
BLOOD IN STOOL: 0
NECK PAIN: 0
HOARSE VOICE: 0
NAUSEA: 0
SEIZURES: 0
WEAKNESS: 1
VOMITING: 0
EYE REDNESS: 0
SKIN CHANGES: 0
TINGLING: 0
BOWEL INCONTINENCE: 0
SINUS PAIN: 0
DISTURBANCES IN COORDINATION: 0
LOSS OF CONSCIOUSNESS: 0
NUMBNESS: 1
HEARTBURN: 0
RECTAL PAIN: 0
MYALGIAS: 1
ARTHRALGIAS: 0
EYE PAIN: 0
NECK MASS: 0
CONSTIPATION: 0
DOUBLE VISION: 1
BACK PAIN: 1
TROUBLE SWALLOWING: 0
TASTE DISTURBANCE: 1
STIFFNESS: 1
SORE THROAT: 0
EYE WATERING: 0
NAIL CHANGES: 1
DIZZINESS: 1
POOR WOUND HEALING: 0
MUSCLE WEAKNESS: 1
BLOATING: 0
MEMORY LOSS: 0
PARALYSIS: 0
SPEECH CHANGE: 0
TREMORS: 0
SINUS CONGESTION: 0
JOINT SWELLING: 0
ABDOMINAL PAIN: 0
HEADACHES: 0
JAUNDICE: 0
DIARRHEA: 0

## 2021-03-26 ENCOUNTER — ONCOLOGY VISIT (OUTPATIENT)
Dept: ONCOLOGY | Facility: CLINIC | Age: 76
End: 2021-03-26
Attending: SURGERY
Payer: COMMERCIAL

## 2021-03-26 VITALS
OXYGEN SATURATION: 100 % | RESPIRATION RATE: 16 BRPM | HEIGHT: 66 IN | DIASTOLIC BLOOD PRESSURE: 70 MMHG | HEART RATE: 76 BPM | SYSTOLIC BLOOD PRESSURE: 110 MMHG | WEIGHT: 159.5 LBS | BODY MASS INDEX: 25.63 KG/M2

## 2021-03-26 DIAGNOSIS — C50.811 MALIGNANT NEOPLASM OF OVERLAPPING SITES OF RIGHT BREAST IN FEMALE, ESTROGEN RECEPTOR POSITIVE (H): Primary | ICD-10-CM

## 2021-03-26 DIAGNOSIS — Z17.0 MALIGNANT NEOPLASM OF OVERLAPPING SITES OF RIGHT BREAST IN FEMALE, ESTROGEN RECEPTOR POSITIVE (H): Primary | ICD-10-CM

## 2021-03-26 PROCEDURE — G0463 HOSPITAL OUTPT CLINIC VISIT: HCPCS

## 2021-03-26 ASSESSMENT — PAIN SCALES - GENERAL: PAINLEVEL: NO PAIN (0)

## 2021-03-26 ASSESSMENT — MIFFLIN-ST. JEOR: SCORE: 1222.46

## 2021-03-26 NOTE — LETTER
3/26/2021         RE: Talya Zuleta  [address] [address]        Dear Colleague,    Thank you for referring your patient, Talya Zuleta, to the [facility]. Please see a copy of my visit note below.    HISTORY OF PRESENT ILLNESS:  Today, I had a postoperative appointment with Talya Zuleta after she underwent a right mastectomy and sentinel lymph node biopsy after receiving preoperative chemotherapy.  Her surgical procedure was on 03/08/2021.  She has done well since her surgery with no complaints.  Her drain output has been less than 30 mL per several days.  I removed her drain today without any difficulty.  Her incision is healing well with no evidence of infection.  Her pathology reported demonstrated rare residual tumor cells in the tumor bed which is less than 1% of the tumor volume.  Her sentinel lymph node was negative.  Her RCB was class I.       IMPRESSION:  Postop check.      PLAN:  She has a followup appointment with Dr. Alvarez to discuss endocrine therapy.  I will see her in the future if any problems arise.         Again, thank you for allowing me to participate in the care of your patient.      Sincerely,    Laci Newsome MD

## 2021-03-26 NOTE — NURSING NOTE
"Oncology Rooming Note    March 26, 2021 8:52 AM   Talya Zuleta is a 75 year old female who presents for:    Chief Complaint   Patient presents with     Oncology Clinic Visit     RIGHT MASTECTOMY      Initial Vitals: /70   Pulse 76   Resp 16   Ht 1.664 m (5' 5.51\")   Wt 72.3 kg (159 lb 8 oz)   SpO2 100%   BMI 26.13 kg/m   Estimated body mass index is 26.13 kg/m  as calculated from the following:    Height as of this encounter: 1.664 m (5' 5.51\").    Weight as of this encounter: 72.3 kg (159 lb 8 oz). Body surface area is 1.83 meters squared.  No Pain (0) Comment: Data Unavailable   No LMP recorded. Patient is postmenopausal.  Allergies reviewed: Yes  Medications reviewed: Yes    Medications: Medication refills not needed today.  Pharmacy name entered into [employer]:    [id number] - [place], [place] - [address] OF [address] [place] [address] - [place], [place] - [address], [id number]  [facility] - [facility] [address]    Clinical concerns: No new concerns today  Dr Newsome  was NOT notified.      Kathryn Singleton            "

## 2021-03-26 NOTE — PROGRESS NOTES
HISTORY OF PRESENT ILLNESS:  Today, I had a postoperative appointment with Talya Zuleta after she underwent a right mastectomy and sentinel lymph node biopsy after receiving preoperative chemotherapy.  Her surgical procedure was on 03/08/2021.  She has done well since her surgery with no complaints.  Her drain output has been less than 30 mL per several days.  I removed her drain today without any difficulty.  Her incision is healing well with no evidence of infection.  Her pathology reported demonstrated rare residual tumor cells in the tumor bed which is less than 1% of the tumor volume.  Her sentinel lymph node was negative.  Her RCB was class I.       IMPRESSION:  Postop check.      PLAN:  She has a followup appointment with Dr. Alvarez to discuss endocrine therapy.  I will see her in the future if any problems arise.

## 2021-03-29 NOTE — PROGRESS NOTES
Talya is a 75 year old who is being evaluated via a billable video visit.      How would you like to obtain your AVS? MyChart  If the video visit is dropped, the invitation should be resent by: Send to e-mail at: [email or web address]  Will anyone else be joining your video visit? No    PAULETTE Gutiérrez    Video-Visit Details    Type of service:  Video Visit    45 minutes spent on the date of the encounter doing chart review, review of test results, interpretation of tests, patient visit and documentation     Originating Location (pt. Location): Home    Distant Location (provider location):  [facility]CER [facility]     Platform used for Video Visit: [employer]      Oncology Follow Up:  Date on this visit: 3/30/2021    Diagnosis:  right breast cancer, clinical stage T2N0M0.    Primary Physician: Cole Weston     History Of Present Illness:  Ms. Zuleta is a 75 year old female with right breast cancer.  Routine screening mammogram in 07/2020 showed bilateral breast asymmetries.  Diagnostic mammograms and ultrasound showed a 2.7 cm mass in the right breast at 12:00, 5 cm from the nipple with ductal extension including a 6 mm mass at 3 cm from the nipple.  In the left breast were benign appearing cysts.  Ultrasound of the right axilla was without lymphadenopathy.  Contrast enhanced mammogram showed the right breast mass, including extension, to measure 4.9 cm.  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  Estrogen receptor was moderate in 50% and progesterone receptor staining was negative.  HER-2 was negative by IHC; HER2 FISH showed approximately 10% of tumor cells to have 6.8 HER2 signals/nucleus and a HER2/CEN17 ratio of 1.6.  The HER2 positive cells were noted to be the large pleomorphic cells.  Multiple neutrophils were noted to be infiltrating through tumor stroma.    She received 12 weeks of neoadjuvant Taxol, Herceptin, and  pertuzumab from 10/7/2020 - 12/22/2020.  She received 4 cycles of dose dense adriamycin and cyclophosphamide from 12/29/2020 - 2/9/2021.  She underwent right breast mastectomy and sentinel lymph node biopsy on 3/8/2021.  Pathology showed rare residual tumor cells with therapy related changes.  Residual tumor is <1% of the tumor bed volume.  There was no lymphovascular invasion and surgical margins were negative.  A single sentinel lymph node was benign.    Interval History:  Ms. Zuleta was seen via video visit today for routine breast cancer followup.  Since our last visit, she underwent right breast mastectomy and right axillary sentinel lymph node biopsy.  She states overall the procedure went remarkably well for her.  She had significant burning pain at the site of her JUNE drain; however, has since had the drain removed with subsequent improvement in her discomfort.  She reports full range of movement of her right upper extremity without tightness.  She denies fluid accumulation of either the right chest or the right upper extremity.      She denies any redness or drainage of her mastectomy incision; however, has noted pus-like drainage coming from her area of skin involved with hyperhidrosis.  At times it does have an odor to it.  There is surrounding erythema.  However, she thinks this is slightly improving over time.  She has not had any fevers or chills.      She is going to receive her second COVID vaccination on 04/06.      She denies current abdominal complaints.  She continues to have persistent significant neuropathy since completion of chemotherapy.  She states neuropathy seems to be improving in the right hand; however, the entirety of her left hand remains numb.  She has had significant improvement in nose sores.  She is inquiring about when she will experience hair regrowth.  The remainder of a complete 12-point review of systems was reviewed with the patient and was negative with the exception of  that mentioned above.         Past Medical/Surgical History:  1.  Breast cancer as per HPI  2.  Hypertension  3.  Diabetes  4.  COPD  5.  Hypothyroidism  6.  Hypohidrosis    Allergies:  Allergies as of 03/30/2021 - Reviewed 03/26/2021   Allergen Reaction Noted     Bacitracin-neomycin-polymyxin  04/18/2003     Latex  09/30/2005     Current Medications:  Current Outpatient Medications   Medication Sig Dispense Refill     amLODIPine (NORVASC) 5 MG tablet Take 5 mg by mouth 2 times daily        atorvastatin (LIPITOR) 40 MG tablet Take 20 mg by mouth 2 times daily        benazepril (LOTENSIN) 20 MG tablet TAKE 1 TABLET BY MOUTH TWO TIMES A DAY.       dexamethasone (DECADRON) 4 MG tablet Take 2 tablets (8 mg) by mouth daily (with breakfast) Start on Day 2. 6 tablet 3     furosemide (LASIX) 80 MG tablet Take 80 mg by mouth 2 times daily       loperamide (IMODIUM A-D) 1 MG/7.5ML Take 15 mLs (2 mg) by mouth 4 times daily as needed for diarrhea 237 mL 1     loratadine (CLARITIN) 10 MG capsule Take 1 tab the day before, the day of, and the day after Neulasta. (Patient not taking: Reported on 3/26/2021) 30 capsule 1     ondansetron (ZOFRAN-ODT) 8 MG ODT tab Take 1 tablet (8 mg) by mouth every 8 hours as needed for nausea (Patient not taking: Reported on 3/26/2021) 90 tablet 1     potassium chloride ER (KLOR-CON M) 20 MEQ CR tablet Take 40 mEq by mouth 3 times daily        triamcinolone (KENALOG) 0.1 % external cream         Family and Social History:  Please see initial consultation dated 9/21/2020 for further details.  Breast Actionable Panel on 10/1/2020 was negative for mutation.    Physical Exam:  General:  Well appearing adult female in NAD.  (+) alopecia, wearing a wig.  Eyes:  No erythema or discharge  Respiratory:  Breathing comfortably on room air.  No wheezing or distress.  Musculoskeletal:  Full ROM of the bilateral upper extremities.  Skin:  No visible concerning skin rashes or lesions  Neuro:  No notable tremor and  dyskinetic movements.  Psych:  Mood and affect appear normal.    The rest of a comprehensive physical examination is deferred due to PHE (public health emergency) video visit restrictions.    Laboratory/Imaging Studies  3/8/2021 Pathology right breast mastectomy and right axillary sentinel lymph node procedure were reviewed in detail and summarized in the HPI.    ASSESSMENT/PLAN:  75 year old female with a history of HTN, dyslipidemia, COPD, diabetes, and OA with a newly diagnosed clinical stage, T2N0M0, right breast cancer, that is ER positive (50%), MN negative, and HER2 positive.  She is s/p treatment with 12 weeks THP, 4 cycles ddAC, right breast mastectomy and right axillary SLNBx.      1.  Right breast cancer:  We reviewed the pathology from her right breast mastectomy which showed rare residual tumor cells and no lymph node involvement.  [facility] residual cancer burden is category 1.     Given mastectomy and negative sentinel node, there is no indication for adjuvant radiation therapy.  Recommendation at this time is to resume treatment with Herceptin and pertuzumab.  I recommend this in the form of Phesgo, a subcutaneous injection, once every 3 weeks x 13 times to complete a total of 1 year of HER2 targeted therapy. We reviewed the potential side effects including skin reaction, diarrhea, and cardiac toxicity.  We will plan to leave her port in for now in case she has intolerable skin reaction to Phesgo.  If she tolerates the first couple of Phesgo well, will schedule for port removal.     I also recommend initiating treatment with letrozole at this time.  We reviewed letrozole is a pill taken daily for 5 years.  We discussed the potential side effects of letrozole including arthralgias, hot flashes, vaginal dryness, mood disturbances, and thinning of the bones.  She is agreeable to starting letrozole at this time.      2.  Bone Health:  She is at risk for thinning of the bones on letrozole.  I recommend  obtaining a baseline DEXA bone density scan and repeating once every 2 years while on letrozole.  I also recommend that she take vitamin D 1000 international unit(s) daily and try to walk 20-30 minutes per day, 5 days per week.  Will discuss the risks and benefits of Zometa at a future visit.    3.  Cellulitis:  Has symptoms of pus-like drainage and erythema in area of hypohidrosis changes in the axilla.  Keflex 500 mg PO QID x 7 days ordered today.  She is to contact the clinic if no improvement in symptoms after the first 3-4 days of antibiotic.    4.  COVID vaccination:  Will get 2nd Pfizer vaccine on 4/6/2021.      5.  Neuropathy:  Taxol induced.  Some improvement in right hand, persistent in left.  Discussed may see improvement in neuropathy up until 1 year after completion of Taxol, however, it can be permanent in around 17% of patients.  Recommend she continues to take a daily vitamin B6 supplement.    6.  Alopecia:  She has chronic alopecia due to hypohidrosis suppurativa, however, has not yet noted growth of eyebrows, eyelashes etc.  I reassured her that she should start to see this regrowth anytime.  If no improvement by next visit, will refer to dermatology.    6.  At risk for cardiomyopathy:  2/2 h/o anthracycline and HER2 targeted therapy.  Will continue to monitor an echocardiogram once every 3 months while on HER2 targeted therapy.    7.  Follow Up:  DEXA bone density scan, echocardiogram and Phesgo injection within 2 weeks, then once every 3 weeks x 6 thereafter. Of note, patient does not need labs prior to Phesgo injection.  All appointments at [facility].  Patient states prior appointments at [place] don't always show up in MyChart.  Labs and Survivor clinic visit with Hoda Mcintosh PA-C in approximately 9 weeks.  Labs and visit with me in approximately 18 weeks.

## 2021-03-30 ENCOUNTER — VIRTUAL VISIT (OUTPATIENT)
Dept: ONCOLOGY | Facility: CLINIC | Age: 76
End: 2021-03-30
Attending: INTERNAL MEDICINE
Payer: COMMERCIAL

## 2021-03-30 DIAGNOSIS — G62.0 DRUG-INDUCED POLYNEUROPATHY (H): ICD-10-CM

## 2021-03-30 DIAGNOSIS — L03.818 CELLULITIS OF OTHER SPECIFIED SITE: ICD-10-CM

## 2021-03-30 DIAGNOSIS — C50.811 MALIGNANT NEOPLASM OF OVERLAPPING SITES OF RIGHT BREAST IN FEMALE, ESTROGEN RECEPTOR POSITIVE (H): Primary | ICD-10-CM

## 2021-03-30 DIAGNOSIS — Z17.0 MALIGNANT NEOPLASM OF OVERLAPPING SITES OF RIGHT BREAST IN FEMALE, ESTROGEN RECEPTOR POSITIVE (H): Primary | ICD-10-CM

## 2021-03-30 DIAGNOSIS — Z79.811 LONG TERM (CURRENT) USE OF AROMATASE INHIBITORS: ICD-10-CM

## 2021-03-30 DIAGNOSIS — T45.1X5A CHEMOTHERAPY-INDUCED NEUTROPENIA (H): ICD-10-CM

## 2021-03-30 DIAGNOSIS — J34.89 NASAL SORE: ICD-10-CM

## 2021-03-30 DIAGNOSIS — R19.5 LOOSE STOOLS: ICD-10-CM

## 2021-03-30 DIAGNOSIS — Z51.11 ENCOUNTER FOR ANTINEOPLASTIC CHEMOTHERAPY: ICD-10-CM

## 2021-03-30 DIAGNOSIS — D70.1 CHEMOTHERAPY-INDUCED NEUTROPENIA (H): ICD-10-CM

## 2021-03-30 PROCEDURE — 99215 OFFICE O/P EST HI 40 MIN: CPT | Mod: 95 | Performed by: INTERNAL MEDICINE

## 2021-03-30 PROCEDURE — 999N001193 HC VIDEO/TELEPHONE VISIT; NO CHARGE

## 2021-03-30 RX ORDER — MEPERIDINE HYDROCHLORIDE 25 MG/ML
25 INJECTION INTRAMUSCULAR; INTRAVENOUS; SUBCUTANEOUS EVERY 30 MIN PRN
Status: CANCELLED | OUTPATIENT
Start: 2021-04-13

## 2021-03-30 RX ORDER — NALOXONE HYDROCHLORIDE 0.4 MG/ML
.1-.4 INJECTION, SOLUTION INTRAMUSCULAR; INTRAVENOUS; SUBCUTANEOUS
Status: CANCELLED | OUTPATIENT
Start: 2021-04-13

## 2021-03-30 RX ORDER — METHYLPREDNISOLONE SODIUM SUCCINATE 125 MG/2ML
125 INJECTION, POWDER, LYOPHILIZED, FOR SOLUTION INTRAMUSCULAR; INTRAVENOUS
Status: CANCELLED
Start: 2021-04-13

## 2021-03-30 RX ORDER — SODIUM CHLORIDE 9 MG/ML
1000 INJECTION, SOLUTION INTRAVENOUS CONTINUOUS PRN
Status: CANCELLED
Start: 2021-04-13

## 2021-03-30 RX ORDER — EPINEPHRINE 1 MG/ML
0.3 INJECTION, SOLUTION INTRAMUSCULAR; SUBCUTANEOUS EVERY 5 MIN PRN
Status: CANCELLED | OUTPATIENT
Start: 2021-04-13

## 2021-03-30 RX ORDER — LORAZEPAM 2 MG/ML
0.5 INJECTION INTRAMUSCULAR EVERY 4 HOURS PRN
Status: CANCELLED | OUTPATIENT
Start: 2021-04-13

## 2021-03-30 RX ORDER — ALBUTEROL SULFATE 90 UG/1
1-2 AEROSOL, METERED RESPIRATORY (INHALATION)
Status: CANCELLED
Start: 2021-04-13

## 2021-03-30 RX ORDER — CEPHALEXIN 500 MG/1
500 CAPSULE ORAL 4 TIMES DAILY
Qty: 28 CAPSULE | Refills: 0 | Status: SHIPPED | OUTPATIENT
Start: 2021-03-30 | End: 2021-04-06

## 2021-03-30 RX ORDER — ALBUTEROL SULFATE 0.83 MG/ML
2.5 SOLUTION RESPIRATORY (INHALATION)
Status: CANCELLED | OUTPATIENT
Start: 2021-04-13

## 2021-03-30 RX ORDER — DIPHENHYDRAMINE HYDROCHLORIDE 50 MG/ML
50 INJECTION INTRAMUSCULAR; INTRAVENOUS
Status: CANCELLED
Start: 2021-04-13

## 2021-03-30 NOTE — LETTER
3/30/2021         RE: Talya Zuleta  [address] [id number]        Dear Colleague,    Thank you for referring your patient, Talya Zuleta, to the [facility]. Please see a copy of my visit note below.    Oncology Follow Up:  Date on this visit: 3/30/2021    Diagnosis:  right breast cancer, clinical stage T2N0M0.    Primary Physician: Cole Weston     History Of Present Illness:  Ms. Zuleta is a 75 year old female with right breast cancer.  Routine screening mammogram in 07/2020 showed bilateral breast asymmetries.  Diagnostic mammograms and ultrasound showed a 2.7 cm mass in the right breast at 12:00, 5 cm from the nipple with ductal extension including a 6 mm mass at 3 cm from the nipple.  In the left breast were benign appearing cysts.  Ultrasound of the right axilla was without lymphadenopathy.  Contrast enhanced mammogram showed the right breast mass, including extension, to measure 4.9 cm.  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  Estrogen receptor was moderate in 50% and progesterone receptor staining was negative.  HER-2 was negative by IHC; HER2 FISH showed approximately 10% of tumor cells to have 6.8 HER2 signals/nucleus and a HER2/CEN17 ratio of 1.6.  The HER2 positive cells were noted to be the large pleomorphic cells.  Multiple neutrophils were noted to be infiltrating through tumor stroma.    She received 12 weeks of neoadjuvant Taxol, Herceptin, and pertuzumab from 10/7/2020 - 12/22/2020.  She received 4 cycles of dose dense adriamycin and cyclophosphamide from 12/29/2020 - 2/9/2021.  She underwent right breast mastectomy and sentinel lymph node biopsy on 3/8/2021.  Pathology showed rare residual tumor cells with therapy related changes.  Residual tumor is <1% of the tumor bed volume.  There was no lymphovascular invasion and surgical margins were negative.  A single  sentinel lymph node was benign.    Interval History:  Ms. Zuleta was seen via video visit today for routine breast cancer followup.  Since our last visit, she underwent right breast mastectomy and right axillary sentinel lymph node biopsy.  She states overall the procedure went remarkably well for her.  She had significant burning pain at the site of her JUNE drain; however, has since had the drain removed with subsequent improvement in her discomfort.  She reports full range of movement of her right upper extremity without tightness.  She denies fluid accumulation of either the right chest or the right upper extremity.      She denies any redness or drainage of her mastectomy incision; however, has noted pus-like drainage coming from her area of skin involved with hyperhidrosis.  At times it does have an odor to it.  There is surrounding erythema.  However, she thinks this is slightly improving over time.  She has not had any fevers or chills.      She is going to receive her second COVID vaccination on 04/06.      She denies current abdominal complaints.  She continues to have persistent significant neuropathy since completion of chemotherapy.  She states neuropathy seems to be improving in the right hand; however, the entirety of her left hand remains numb.  She has had significant improvement in nose sores.  She is inquiring about when she will experience hair regrowth.  The remainder of a complete 12-point review of systems was reviewed with the patient and was negative with the exception of that mentioned above.         Past Medical/Surgical History:  1.  Breast cancer as per HPI  2.  Hypertension  3.  Diabetes  4.  COPD  5.  Hypothyroidism  6.  Hypohidrosis    Allergies:  Allergies as of 03/30/2021 - Reviewed 03/26/2021   Allergen Reaction Noted     Bacitracin-neomycin-polymyxin  04/18/2003     Latex  09/30/2005     Current Medications:  Current Outpatient Medications   Medication Sig Dispense Refill      amLODIPine (NORVASC) 5 MG tablet Take 5 mg by mouth 2 times daily        atorvastatin (LIPITOR) 40 MG tablet Take 20 mg by mouth 2 times daily        benazepril (LOTENSIN) 20 MG tablet TAKE 1 TABLET BY MOUTH TWO TIMES A DAY.       dexamethasone (DECADRON) 4 MG tablet Take 2 tablets (8 mg) by mouth daily (with breakfast) Start on Day 2. 6 tablet 3     furosemide (LASIX) 80 MG tablet Take 80 mg by mouth 2 times daily       loperamide (IMODIUM A-D) 1 MG/7.5ML Take 15 mLs (2 mg) by mouth 4 times daily as needed for diarrhea 237 mL 1     loratadine (CLARITIN) 10 MG capsule Take 1 tab the day before, the day of, and the day after Neulasta. (Patient not taking: Reported on 3/26/2021) 30 capsule 1     ondansetron (ZOFRAN-ODT) 8 MG ODT tab Take 1 tablet (8 mg) by mouth every 8 hours as needed for nausea (Patient not taking: Reported on 3/26/2021) 90 tablet 1     potassium chloride ER (KLOR-CON M) 20 MEQ CR tablet Take 40 mEq by mouth 3 times daily        triamcinolone (KENALOG) 0.1 % external cream         Family and Social History:  Please see initial consultation dated 9/21/2020 for further details.  Breast Actionable Panel on 10/1/2020 was negative for mutation.    Physical Exam:  General:  Well appearing adult female in NAD.  (+) alopecia, wearing a wig.  Eyes:  No erythema or discharge  Respiratory:  Breathing comfortably on room air.  No wheezing or distress.  Musculoskeletal:  Full ROM of the bilateral upper extremities.  Skin:  No visible concerning skin rashes or lesions  Neuro:  No notable tremor and dyskinetic movements.  Psych:  Mood and affect appear normal.    The rest of a comprehensive physical examination is deferred due to PHE (public health emergency) video visit restrictions.    Laboratory/Imaging Studies  3/8/2021 Pathology right breast mastectomy and right axillary sentinel lymph node procedure were reviewed in detail and summarized in the HPI.    ASSESSMENT/PLAN:  75 year old female with a history of  HTN, dyslipidemia, COPD, diabetes, and OA with a newly diagnosed clinical stage, T2N0M0, right breast cancer, that is ER positive (50%), IA negative, and HER2 positive.  She is s/p treatment with 12 weeks THP, 4 cycles ddAC, right breast mastectomy and right axillary SLNBx.      1.  Right breast cancer:  We reviewed the pathology from her right breast mastectomy which showed rare residual tumor cells and no lymph node involvement.  [facility] residual cancer burden is category 1.     Given mastectomy and negative sentinel node, there is no indication for adjuvant radiation therapy.  Recommendation at this time is to resume treatment with Herceptin and pertuzumab.  I recommend this in the form of Phesgo, a subcutaneous injection, once every 3 weeks x 13 times to complete a total of 1 year of HER2 targeted therapy. We reviewed the potential side effects including skin reaction, diarrhea, and cardiac toxicity.  We will plan to leave her port in for now in case she has intolerable skin reaction to Phesgo.  If she tolerates the first couple of Phesgo well, will schedule for port removal.     I also recommend initiating treatment with letrozole at this time.  We reviewed letrozole is a pill taken daily for 5 years.  We discussed the potential side effects of letrozole including arthralgias, hot flashes, vaginal dryness, mood disturbances, and thinning of the bones.  She is agreeable to starting letrozole at this time.      2.  Bone Health:  She is at risk for thinning of the bones on letrozole.  I recommend obtaining a baseline DEXA bone density scan and repeating once every 2 years while on letrozole.  I also recommend that she take vitamin D 1000 international unit(s) daily and try to walk 20-30 minutes per day, 5 days per week.  Will discuss the risks and benefits of Zometa at a future visit.    3.  Cellulitis:  Has symptoms of pus-like drainage and erythema in area of hypohidrosis changes in the axilla.  Keflex 500  mg PO QID x 7 days ordered today.  She is to contact the clinic if no improvement in symptoms after the first 3-4 days of antibiotic.    4.  COVID vaccination:  Will get 2nd Pfizer vaccine on 4/6/2021.      5.  Neuropathy:  Taxol induced.  Some improvement in right hand, persistent in left.  Discussed may see improvement in neuropathy up until 1 year after completion of Taxol, however, it can be permanent in around 17% of patients.  Recommend she continues to take a daily vitamin B6 supplement.    6.  Alopecia:  She has chronic alopecia due to hypohidrosis suppurativa, however, has not yet noted growth of eyebrows, eyelashes etc.  I reassured her that she should start to see this regrowth anytime.  If no improvement by next visit, will refer to dermatology.    6.  At risk for cardiomyopathy:  2/2 h/o anthracycline and HER2 targeted therapy.  Will continue to monitor an echocardiogram once every 3 months while on HER2 targeted therapy.    7.  Follow Up:  DEXA bone density scan, echocardiogram and Phesgo injection within 2 weeks, then once every 3 weeks x 6 thereafter. Of note, patient does not need labs prior to Phesgo injection.  All appointments at [facility].  Patient states prior appointments at [place] don't always show up in MyChart.  Labs and Survivor clinic visit with Hoda Mcintosh PA-C in approximately 9 weeks.  Labs and visit with me in approximately 18 weeks.      Again, thank you for allowing me to participate in the care of your patient.      Sincerely,    Perlita Alvarez MD

## 2021-04-06 ENCOUNTER — IMMUNIZATION (OUTPATIENT)
Dept: NURSING | Facility: CLINIC | Age: 76
End: 2021-04-06
Attending: FAMILY MEDICINE
Payer: COMMERCIAL

## 2021-04-06 PROCEDURE — 0002A PR COVID VAC PFIZER DIL RECON 30 MCG/0.3 ML IM: CPT

## 2021-04-06 PROCEDURE — 91300 PR COVID VAC PFIZER DIL RECON 30 MCG/0.3 ML IM: CPT

## 2021-04-13 ENCOUNTER — ANCILLARY PROCEDURE (OUTPATIENT)
Dept: BONE DENSITY | Facility: CLINIC | Age: 76
End: 2021-04-13
Attending: INTERNAL MEDICINE
Payer: COMMERCIAL

## 2021-04-13 ENCOUNTER — ANCILLARY PROCEDURE (OUTPATIENT)
Dept: CARDIOLOGY | Facility: CLINIC | Age: 76
End: 2021-04-13
Attending: INTERNAL MEDICINE
Payer: COMMERCIAL

## 2021-04-13 ENCOUNTER — INFUSION THERAPY VISIT (OUTPATIENT)
Dept: INFUSION THERAPY | Facility: CLINIC | Age: 76
End: 2021-04-13
Attending: INTERNAL MEDICINE
Payer: COMMERCIAL

## 2021-04-13 ENCOUNTER — APPOINTMENT (OUTPATIENT)
Dept: GENERAL RADIOLOGY | Facility: CLINIC | Age: 76
End: 2021-04-13
Payer: COMMERCIAL

## 2021-04-13 VITALS
BODY MASS INDEX: 25.97 KG/M2 | HEART RATE: 80 BPM | SYSTOLIC BLOOD PRESSURE: 111 MMHG | TEMPERATURE: 98 F | DIASTOLIC BLOOD PRESSURE: 61 MMHG | WEIGHT: 158.5 LBS | OXYGEN SATURATION: 98 %

## 2021-04-13 DIAGNOSIS — Z17.0 MALIGNANT NEOPLASM OF OVERLAPPING SITES OF RIGHT BREAST IN FEMALE, ESTROGEN RECEPTOR POSITIVE (H): Primary | ICD-10-CM

## 2021-04-13 DIAGNOSIS — Z79.811 LONG TERM (CURRENT) USE OF AROMATASE INHIBITORS: ICD-10-CM

## 2021-04-13 DIAGNOSIS — Z51.11 ENCOUNTER FOR ANTINEOPLASTIC CHEMOTHERAPY: ICD-10-CM

## 2021-04-13 DIAGNOSIS — C50.811 MALIGNANT NEOPLASM OF OVERLAPPING SITES OF RIGHT BREAST IN FEMALE, ESTROGEN RECEPTOR POSITIVE (H): Primary | ICD-10-CM

## 2021-04-13 PROCEDURE — 93308 TTE F-UP OR LMTD: CPT | Performed by: INTERNAL MEDICINE

## 2021-04-13 PROCEDURE — 93325 DOPPLER ECHO COLOR FLOW MAPG: CPT | Performed by: INTERNAL MEDICINE

## 2021-04-13 PROCEDURE — 93321 DOPPLER ECHO F-UP/LMTD STD: CPT | Performed by: INTERNAL MEDICINE

## 2021-04-13 PROCEDURE — 99207 PR NO CHARGE LOS: CPT

## 2021-04-13 PROCEDURE — 77080 DXA BONE DENSITY AXIAL: CPT | Performed by: RADIOLOGY

## 2021-04-13 PROCEDURE — 96401 CHEMO ANTI-NEOPL SQ/IM: CPT | Performed by: INTERNAL MEDICINE

## 2021-04-13 RX ORDER — HEPARIN SODIUM (PORCINE) LOCK FLUSH IV SOLN 100 UNIT/ML 100 UNIT/ML
5 SOLUTION INTRAVENOUS ONCE
Status: COMPLETED | OUTPATIENT
Start: 2021-04-13 | End: 2021-04-13

## 2021-04-13 RX ORDER — MULTIVIT-MIN/IRON/FOLIC ACID/K 18-600-40
1000 CAPSULE ORAL DAILY
COMMUNITY

## 2021-04-13 RX ADMIN — Medication 7 ML: at 13:53

## 2021-04-13 RX ADMIN — HEPARIN SODIUM (PORCINE) LOCK FLUSH IV SOLN 100 UNIT/ML 5 ML: 100 SOLUTION at 14:09

## 2021-04-13 ASSESSMENT — PAIN SCALES - GENERAL: PAINLEVEL: NO PAIN (0)

## 2021-04-13 NOTE — PROGRESS NOTES
Infusion Nursing Note:  Talya Zuleta presents today for C1D1 Phesgo.    Patient seen by provider today: No   present during visit today: Not Applicable.    Note: Pt states she doesn't need any of the take home meds that are ordered, states she has enough at home.  Admits to taking Zofran occasionally at bedtime and occasional use of Immodium.  See flow sheet for assessment      Intravenous Access:  No Intravenous access/labs at this visit.    Treatment Conditions:  ECHO/MUGA completed 4/13/21  EF >70%.      Post Infusion Assessment:  Patient tolerated injection without incident.  Patient observed for 30 minutes post Phesgo per protocol.  Site patent and intact, free from redness, edema or discomfort.  No evidence of extravasations.  Access discontinued per protocol.       Discharge Plan:   Patient discharged in stable condition accompanied by: self.  Departure Mode: Ambulatory.  Pt will RTC 5/4/21 for Phesgo.    Remi Herron RN

## 2021-04-28 DIAGNOSIS — C50.811 MALIGNANT NEOPLASM OF OVERLAPPING SITES OF RIGHT BREAST IN FEMALE, ESTROGEN RECEPTOR POSITIVE (H): Primary | ICD-10-CM

## 2021-04-28 DIAGNOSIS — Z17.0 MALIGNANT NEOPLASM OF OVERLAPPING SITES OF RIGHT BREAST IN FEMALE, ESTROGEN RECEPTOR POSITIVE (H): Primary | ICD-10-CM

## 2021-04-28 RX ORDER — DIPHENHYDRAMINE HCL 25 MG
50 CAPSULE ORAL
Status: CANCELLED | OUTPATIENT
Start: 2021-05-04

## 2021-04-28 RX ORDER — ALBUTEROL SULFATE 0.83 MG/ML
2.5 SOLUTION RESPIRATORY (INHALATION)
Status: CANCELLED | OUTPATIENT
Start: 2021-05-04

## 2021-04-28 RX ORDER — LORAZEPAM 2 MG/ML
0.5 INJECTION INTRAMUSCULAR EVERY 4 HOURS PRN
Status: CANCELLED | OUTPATIENT
Start: 2021-05-04

## 2021-04-28 RX ORDER — MEPERIDINE HYDROCHLORIDE 25 MG/ML
25 INJECTION INTRAMUSCULAR; INTRAVENOUS; SUBCUTANEOUS EVERY 30 MIN PRN
Status: CANCELLED | OUTPATIENT
Start: 2021-05-04

## 2021-04-28 RX ORDER — METHYLPREDNISOLONE SODIUM SUCCINATE 125 MG/2ML
125 INJECTION, POWDER, LYOPHILIZED, FOR SOLUTION INTRAMUSCULAR; INTRAVENOUS
Status: CANCELLED
Start: 2021-05-04

## 2021-04-28 RX ORDER — SODIUM CHLORIDE 9 MG/ML
1000 INJECTION, SOLUTION INTRAVENOUS CONTINUOUS PRN
Status: CANCELLED
Start: 2021-05-04

## 2021-04-28 RX ORDER — ALBUTEROL SULFATE 90 UG/1
1-2 AEROSOL, METERED RESPIRATORY (INHALATION)
Status: CANCELLED
Start: 2021-05-04

## 2021-04-28 RX ORDER — ACETAMINOPHEN 325 MG/1
650 TABLET ORAL
Status: CANCELLED
Start: 2021-05-04

## 2021-04-28 RX ORDER — DIPHENHYDRAMINE HYDROCHLORIDE 50 MG/ML
50 INJECTION INTRAMUSCULAR; INTRAVENOUS
Status: CANCELLED
Start: 2021-05-04

## 2021-04-28 RX ORDER — EPINEPHRINE 1 MG/ML
0.3 INJECTION, SOLUTION INTRAMUSCULAR; SUBCUTANEOUS EVERY 5 MIN PRN
Status: CANCELLED | OUTPATIENT
Start: 2021-05-04

## 2021-04-28 RX ORDER — NALOXONE HYDROCHLORIDE 0.4 MG/ML
.1-.4 INJECTION, SOLUTION INTRAMUSCULAR; INTRAVENOUS; SUBCUTANEOUS
Status: CANCELLED | OUTPATIENT
Start: 2021-05-04

## 2021-05-04 ENCOUNTER — INFUSION THERAPY VISIT (OUTPATIENT)
Dept: INFUSION THERAPY | Facility: CLINIC | Age: 76
End: 2021-05-04
Payer: COMMERCIAL

## 2021-05-04 VITALS
HEART RATE: 75 BPM | RESPIRATION RATE: 14 BRPM | TEMPERATURE: 97.9 F | SYSTOLIC BLOOD PRESSURE: 118 MMHG | DIASTOLIC BLOOD PRESSURE: 67 MMHG | OXYGEN SATURATION: 98 % | BODY MASS INDEX: 26.39 KG/M2 | WEIGHT: 161.1 LBS

## 2021-05-04 DIAGNOSIS — Z17.0 MALIGNANT NEOPLASM OF OVERLAPPING SITES OF RIGHT BREAST IN FEMALE, ESTROGEN RECEPTOR POSITIVE (H): Primary | ICD-10-CM

## 2021-05-04 DIAGNOSIS — C50.811 MALIGNANT NEOPLASM OF OVERLAPPING SITES OF RIGHT BREAST IN FEMALE, ESTROGEN RECEPTOR POSITIVE (H): Primary | ICD-10-CM

## 2021-05-04 PROCEDURE — 99207 PR NO CHARGE LOS: CPT

## 2021-05-04 PROCEDURE — 96401 CHEMO ANTI-NEOPL SQ/IM: CPT | Performed by: NURSE PRACTITIONER

## 2021-05-04 NOTE — PROGRESS NOTES
Infusion Nursing Note:  Talya Zuleta presents today for C2D1 Phesgo injection.    Patient seen by provider today: No   present during visit today: Not Applicable.    Note:   Patient states her main side effect recently is diarrhea. Having 4+ bowel movements per day. This makes it difficult to leave the home to go for walks, etc. Takes Imodium at least 2 times per day.  Drinks Pedialyte to replace fluids lost.    Intravenous Access:  No Intravenous access/labs at this visit.    Treatment Conditions:  ECHO/MUGA completed 4/13/21 EF >70%%.      Post Infusion Assessment:  Patient tolerated injection without incident.  Patient observed for 15 minutes post Phesgo per protocol.       Discharge Plan:   [facility]  Patient will return 5/25/21 for next appointment.   Patient discharged in stable condition accompanied by: adrienne Bailey is .  Departure Mode: Ambulatory.    Michaela Anderson RN

## 2021-05-20 DIAGNOSIS — C50.811 MALIGNANT NEOPLASM OF OVERLAPPING SITES OF RIGHT BREAST IN FEMALE, ESTROGEN RECEPTOR POSITIVE (H): Primary | ICD-10-CM

## 2021-05-20 DIAGNOSIS — Z17.0 MALIGNANT NEOPLASM OF OVERLAPPING SITES OF RIGHT BREAST IN FEMALE, ESTROGEN RECEPTOR POSITIVE (H): Primary | ICD-10-CM

## 2021-05-20 RX ORDER — NALOXONE HYDROCHLORIDE 0.4 MG/ML
.1-.4 INJECTION, SOLUTION INTRAMUSCULAR; INTRAVENOUS; SUBCUTANEOUS
Status: CANCELLED | OUTPATIENT
Start: 2021-05-25

## 2021-05-20 RX ORDER — METHYLPREDNISOLONE SODIUM SUCCINATE 125 MG/2ML
125 INJECTION, POWDER, LYOPHILIZED, FOR SOLUTION INTRAMUSCULAR; INTRAVENOUS
Status: CANCELLED
Start: 2021-05-25

## 2021-05-20 RX ORDER — SODIUM CHLORIDE 9 MG/ML
1000 INJECTION, SOLUTION INTRAVENOUS CONTINUOUS PRN
Status: CANCELLED
Start: 2021-05-25

## 2021-05-20 RX ORDER — LORAZEPAM 2 MG/ML
0.5 INJECTION INTRAMUSCULAR EVERY 4 HOURS PRN
Status: CANCELLED | OUTPATIENT
Start: 2021-05-25

## 2021-05-20 RX ORDER — ACETAMINOPHEN 325 MG/1
650 TABLET ORAL
Status: CANCELLED
Start: 2021-05-25

## 2021-05-20 RX ORDER — ALBUTEROL SULFATE 90 UG/1
1-2 AEROSOL, METERED RESPIRATORY (INHALATION)
Status: CANCELLED
Start: 2021-05-25

## 2021-05-20 RX ORDER — MEPERIDINE HYDROCHLORIDE 25 MG/ML
25 INJECTION INTRAMUSCULAR; INTRAVENOUS; SUBCUTANEOUS EVERY 30 MIN PRN
Status: CANCELLED | OUTPATIENT
Start: 2021-05-25

## 2021-05-20 RX ORDER — DIPHENHYDRAMINE HCL 25 MG
50 CAPSULE ORAL
Status: CANCELLED | OUTPATIENT
Start: 2021-05-25

## 2021-05-20 RX ORDER — DIPHENHYDRAMINE HYDROCHLORIDE 50 MG/ML
50 INJECTION INTRAMUSCULAR; INTRAVENOUS
Status: CANCELLED
Start: 2021-05-25

## 2021-05-20 RX ORDER — ALBUTEROL SULFATE 0.83 MG/ML
2.5 SOLUTION RESPIRATORY (INHALATION)
Status: CANCELLED | OUTPATIENT
Start: 2021-05-25

## 2021-05-20 RX ORDER — EPINEPHRINE 1 MG/ML
0.3 INJECTION, SOLUTION INTRAMUSCULAR; SUBCUTANEOUS EVERY 5 MIN PRN
Status: CANCELLED | OUTPATIENT
Start: 2021-05-25

## 2021-05-25 ENCOUNTER — PATIENT OUTREACH (OUTPATIENT)
Dept: ONCOLOGY | Facility: CLINIC | Age: 76
End: 2021-05-25

## 2021-05-25 NOTE — PROGRESS NOTES
Spoke to patient with [facility] not open today and getting her Phesgo today. Message sent to Vera Lin to contact patient to get re-scheduled tomorrow. Jackelyn Torres RN, BSN Breast Center Nurse Coordinator

## 2021-05-27 ENCOUNTER — INFUSION THERAPY VISIT (OUTPATIENT)
Dept: INFUSION THERAPY | Facility: CLINIC | Age: 76
End: 2021-05-27
Payer: COMMERCIAL

## 2021-05-27 VITALS
RESPIRATION RATE: 16 BRPM | SYSTOLIC BLOOD PRESSURE: 128 MMHG | TEMPERATURE: 98 F | WEIGHT: 159.9 LBS | HEART RATE: 66 BPM | DIASTOLIC BLOOD PRESSURE: 59 MMHG | OXYGEN SATURATION: 99 % | BODY MASS INDEX: 26.2 KG/M2

## 2021-05-27 DIAGNOSIS — Z51.11 ENCOUNTER FOR ANTINEOPLASTIC CHEMOTHERAPY: ICD-10-CM

## 2021-05-27 DIAGNOSIS — Z17.0 MALIGNANT NEOPLASM OF OVERLAPPING SITES OF RIGHT BREAST IN FEMALE, ESTROGEN RECEPTOR POSITIVE (H): Primary | ICD-10-CM

## 2021-05-27 DIAGNOSIS — C50.811 MALIGNANT NEOPLASM OF OVERLAPPING SITES OF RIGHT BREAST IN FEMALE, ESTROGEN RECEPTOR POSITIVE (H): Primary | ICD-10-CM

## 2021-05-27 LAB
ALBUMIN SERPL-MCNC: 3.6 G/DL (ref 3.4–5)
ALP SERPL-CCNC: 72 U/L (ref 40–150)
ALT SERPL W P-5'-P-CCNC: 17 U/L (ref 0–50)
ANION GAP SERPL CALCULATED.3IONS-SCNC: 4 MMOL/L (ref 3–14)
AST SERPL W P-5'-P-CCNC: 16 U/L (ref 0–45)
BASOPHILS # BLD AUTO: 0.1 10E9/L (ref 0–0.2)
BASOPHILS NFR BLD AUTO: 1.3 %
BILIRUB SERPL-MCNC: 0.4 MG/DL (ref 0.2–1.3)
BUN SERPL-MCNC: 12 MG/DL (ref 7–30)
CALCIUM SERPL-MCNC: 9.2 MG/DL (ref 8.5–10.1)
CHLORIDE SERPL-SCNC: 108 MMOL/L (ref 94–109)
CO2 SERPL-SCNC: 28 MMOL/L (ref 20–32)
CREAT SERPL-MCNC: 0.82 MG/DL (ref 0.52–1.04)
DIFFERENTIAL METHOD BLD: ABNORMAL
EOSINOPHIL # BLD AUTO: 0.1 10E9/L (ref 0–0.7)
EOSINOPHIL NFR BLD AUTO: 1.8 %
ERYTHROCYTE [DISTWIDTH] IN BLOOD BY AUTOMATED COUNT: 14.8 % (ref 10–15)
GFR SERPL CREATININE-BSD FRML MDRD: 69 ML/MIN/{1.73_M2}
GLUCOSE SERPL-MCNC: 105 MG/DL (ref 70–99)
HCT VFR BLD AUTO: 41 % (ref 35–47)
HGB BLD-MCNC: 14 G/DL (ref 11.7–15.7)
IMM GRANULOCYTES # BLD: 0 10E9/L (ref 0–0.4)
IMM GRANULOCYTES NFR BLD: 0.4 %
LYMPHOCYTES # BLD AUTO: 0.8 10E9/L (ref 0.8–5.3)
LYMPHOCYTES NFR BLD AUTO: 16.7 %
MCH RBC QN AUTO: 33.9 PG (ref 26.5–33)
MCHC RBC AUTO-ENTMCNC: 34.1 G/DL (ref 31.5–36.5)
MCV RBC AUTO: 99 FL (ref 78–100)
MONOCYTES # BLD AUTO: 0.4 10E9/L (ref 0–1.3)
MONOCYTES NFR BLD AUTO: 7.9 %
NEUTROPHILS # BLD AUTO: 3.3 10E9/L (ref 1.6–8.3)
NEUTROPHILS NFR BLD AUTO: 71.9 %
PLATELET # BLD AUTO: 198 10E9/L (ref 150–450)
POTASSIUM SERPL-SCNC: 3.5 MMOL/L (ref 3.4–5.3)
PROT SERPL-MCNC: 7.1 G/DL (ref 6.8–8.8)
RBC # BLD AUTO: 4.13 10E12/L (ref 3.8–5.2)
SODIUM SERPL-SCNC: 140 MMOL/L (ref 133–144)
WBC # BLD AUTO: 4.6 10E9/L (ref 4–11)

## 2021-05-27 PROCEDURE — 96401 CHEMO ANTI-NEOPL SQ/IM: CPT | Performed by: PHYSICIAN ASSISTANT

## 2021-05-27 PROCEDURE — 99207 PR NO CHARGE NURSE ONLY: CPT

## 2021-05-27 PROCEDURE — 85025 COMPLETE CBC W/AUTO DIFF WBC: CPT | Performed by: INTERNAL MEDICINE

## 2021-05-27 PROCEDURE — 99207 PR NO CHARGE LOS: CPT

## 2021-05-27 PROCEDURE — 80053 COMPREHEN METABOLIC PANEL: CPT | Performed by: INTERNAL MEDICINE

## 2021-05-27 RX ORDER — HEPARIN SODIUM (PORCINE) LOCK FLUSH IV SOLN 100 UNIT/ML 100 UNIT/ML
500 SOLUTION INTRAVENOUS ONCE
Status: COMPLETED | OUTPATIENT
Start: 2021-05-27 | End: 2021-05-27

## 2021-05-27 RX ADMIN — HEPARIN SODIUM (PORCINE) LOCK FLUSH IV SOLN 100 UNIT/ML 500 UNITS: 100 SOLUTION at 12:01

## 2021-05-27 ASSESSMENT — PAIN SCALES - GENERAL: PAINLEVEL: NO PAIN (0)

## 2021-05-27 NOTE — PROGRESS NOTES
"Talya is a 76 year old who is being evaluated via a billable video visit.      How would you like to obtain your AVS? MyChart  If the video visit is dropped, the invitation should be resent by: Send to e-mail at: [email or web address]  Will anyone else be joining your video visit? No    Vitals - Patient Reported  Weight (Patient Reported): 72.1 kg (159 lb)  Height (Patient Reported): 166.4 cm (5' 5.51\")  BMI (Based on Pt Reported Ht/Wt): 26.05  Pain Score: No Pain (0)  Video Start Time: 1:43 PM  Video-Visit Details    Type of service:  Video Visit    Video End Time:2:10 pm     Originating Location (pt. Location): Home    Distant Location (provider location):  [facility]     Platform used for Video Visit: Kelly Enrique MA        Oncology Follow Up:  Date on this visit: Jun 1, 2021    Diagnosis:  right breast cancer, clinical stage T2N0M0.    Primary Physician: Cole Weston     History Of Present Illness:  Ms. Zuleta is a 76 year old female with right breast cancer.  Routine screening mammogram in 07/2020 showed bilateral breast asymmetries.  Diagnostic mammograms and ultrasound showed a 2.7 cm mass in the right breast at 12:00, 5 cm from the nipple with ductal extension including a 6 mm mass at 3 cm from the nipple.  In the left breast were benign appearing cysts.  Ultrasound of the right axilla was without lymphadenopathy.  Contrast enhanced mammogram showed the right breast mass, including extension, to measure 4.9 cm.  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  Estrogen receptor was moderate in 50% and progesterone receptor staining was negative.  HER-2 was negative by IHC; HER2 FISH showed approximately 10% of tumor cells to have 6.8 HER2 signals/nucleus and a HER2/CEN17 ratio of 1.6.  The HER2 positive cells were noted to be the large pleomorphic cells.  Multiple neutrophils were noted to be " infiltrating through tumor stroma.    She received 12 weeks of neoadjuvant Taxol, Herceptin, and pertuzumab from 10/7/2020 - 12/22/2020.  She received 4 cycles of dose dense adriamycin and cyclophosphamide from 12/29/2020 - 2/9/2021.  She underwent right breast mastectomy and sentinel lymph node biopsy on 3/8/2021.  Pathology showed rare residual tumor cells with therapy related changes.  Residual tumor is <1% of the tumor bed volume.  There was no lymphovascular invasion and surgical margins were negative.  A single sentinel lymph node was benign.    She started adjuvant Phesgo (Herceptin and Perjeta) on 4/13/21.     Interval History: Talya is feeling okay today. She has had 3 doses of Phesgo so far. She continues to have a lot of diarrhea. This has been ongoing since the initial HER2 treatment started. She goes daily and has between 5-10 episodes per day. She is eating and drinking well. Sometimes has abdominal cramping and nausea at night and uses zofran before bed. No emesis. She takes 1 tab of Imodium every 4 hours during the day and this does help. No fevers/chills or infectious concerns. Has eczema rash which is stable. Appetite is good.     She started letrozole and is tolerating well without any adverse side effects. No hot flashes, joint stiffness/pain. She has insomnia about 4x during the week but had this prior to starting and attributes it more to her abdominal symptoms.     Past Medical/Surgical History:  1.  Breast cancer as per HPI  2.  Hypertension  3.  Diabetes  4.  COPD  5.  Hypothyroidism  6.  Hypohidrosis    Allergies:  Allergies as of 06/01/2021 - Reviewed 05/27/2021   Allergen Reaction Noted     Bacitracin-neomycin-polymyxin  04/18/2003     Latex  09/30/2005     Current Medications:  Current Outpatient Medications   Medication Sig Dispense Refill     amLODIPine (NORVASC) 5 MG tablet Take 5 mg by mouth 2 times daily        atorvastatin (LIPITOR) 40 MG tablet Take 20 mg by mouth 2 times daily         benazepril (LOTENSIN) 20 MG tablet TAKE 1 TABLET BY MOUTH TWO TIMES A DAY.       furosemide (LASIX) 80 MG tablet Take 80 mg by mouth 2 times daily       loperamide (IMODIUM A-D) 1 MG/7.5ML Take 15 mLs (2 mg) by mouth 4 times daily as needed for diarrhea 237 mL 1     ondansetron (ZOFRAN-ODT) 8 MG ODT tab Take 1 tablet (8 mg) by mouth every 8 hours as needed for nausea 90 tablet 1     potassium chloride ER (KLOR-CON M) 20 MEQ CR tablet Take 40 mEq by mouth 3 times daily        pyridOXINE (VITAMIN B6) 100 MG TABS Take 100 mg by mouth daily       triamcinolone (KENALOG) 0.1 % external cream        Vitamin D, Cholecalciferol, 25 MCG (1000 UT) TABS Take 1,000 Units by mouth daily         Physical Exam:  Video physical exam  General: Patient appears well in no acute distress.   Skin: No visualized rash or lesions on visualized skin  Eyes: EOMI, no erythema, sclera icterus or discharge noted  Resp: Appears to be breathing comfortably without accessory muscle usage, speaking in full sentences, no cough  MSK: Appears to have normal range of motion based on visualized movements  Neurologic: No apparent tremors, facial movements symmetric  Psych: affect bright, alert and oriented    The rest of a comprehensive physical examination is deferred due to PHE (public health emergency) video restrictions        Laboratory/Imaging Studies  Reviewed today   5/27/2021 12:00   Sodium 140   Potassium 3.5   Chloride 108   Carbon Dioxide 28   Urea Nitrogen 12   Creatinine 0.82   GFR Estimate 69   GFR Estimate If Black 80   Calcium 9.2   Anion Gap 4   Albumin 3.6   Protein Total 7.1   Bilirubin Total 0.4   Alkaline Phosphatase 72   ALT 17   AST 16   Glucose 105 (H)   WBC 4.6   Hemoglobin 14.0   Hematocrit 41.0   Platelet Count 198   RBC Count 4.13   MCV 99   MCH 33.9 (H)   MCHC 34.1   RDW 14.8   Diff Method Automated Method   % Neutrophils 71.9   % Lymphocytes 16.7   % Monocytes 7.9   % Eosinophils 1.8   % Basophils 1.3   % Immature  Granulocytes 0.4   Absolute Neutrophil 3.3   Absolute Lymphocytes 0.8   Absolute Monocytes 0.4   Absolute Eosinophils 0.1   Absolute Basophils 0.1   Abs Immature Granulocytes 0.0       ASSESSMENT/PLAN:  76 year old female with a history of HTN, dyslipidemia, COPD, diabetes, and OA with a newly diagnosed clinical stage, T2N0M0, right breast cancer, that is ER positive (50%), CO negative, and HER2 positive.  She is s/p treatment with 12 weeks THP, 4 cycles ddAC, right breast mastectomy and right axillary SLNBx. RCB I.     1.  Right breast cancer:  Currently receiving adjuvant HER2 therapy with Phesgo with plans to complete a total of one year of treatment. She is s/p 3/13 treatments. She has also started adjuvant letrozole with plans for a 5 year course of therapy. She is tolerating letrozole well. Has moderate diarrhea and nausea from HER2 therapy. Since she is maintaining her weight, has no electrolyte abnormalities, JUANCARLOS, or BP issues will continue on. See below for management. Would recommend follow-up in about a month to check in. I deferred survivorship visit for now with current issues with treatment. Will plan to have full visit closer to the end of treatment.     2.  Bone Health:  She is at risk for thinning of the bones on letrozole. Baseline DEXA from 4/2021 shows osteopenia. Calcium, vitamin D, and weight bearing exercise has been recommended. Dr. Alvarez may discuss Zometa at future visit.     3. Nausea: Continue zofran ODT before bed. Can also be used every 8 hours PRN.     4.  Diarrhea: Discussed changing loperamide dosing to 2 tabs with first loose stool and one tab with each subsequent loose stool up to 8 tabs daily. Call with any concerns of dehydration. Labs look good from last week.     5.  At risk for cardiomyopathy:  2/2 h/o anthracycline and HER2 targeted therapy.  Will continue to monitor an echocardiogram once every 3 months while on HER2 targeted therapy. Due July 2021.     40 minutes spent  on the date of the encounter doing chart review, review of test results, interpretation of tests, patient visit and documentation     Hoda Mcintosh PA-C

## 2021-05-27 NOTE — PROGRESS NOTES
Infusion Nursing Note:  Talya Zuleta presents today for Phesgo.    Patient seen by provider today: No   present during visit today: Not Applicable.    Note: N/A.  Patient did meet criteria for an asymptomatic covid-19 PCR test in infusion today. Patient declined the covid-19 test.    Intravenous Access:  No Intravenous access/labs at this visit.    Treatment Conditions:  Not Applicable.    Post Infusion Assessment:  Patient tolerated injection without incident.  Patient observed for 15 minutes post Phesgo per protocol.       Discharge Plan:   Patient will return 6/15/2021 for next appointment.   Patient discharged in stable condition accompanied by: self.  Departure Mode: Ambulatory.    Magaly Osorio RN-BSN, PHN, OCN  [facility]

## 2021-05-27 NOTE — PROGRESS NOTES
Patient's port accessed using sterile procedure. Blood return noted. Gold, green and purple tube drawn, labeled and sent to lab. Port flushed with saline and heparin. Patient tolerated lab draw well.       Laurel Nguyen RN

## 2021-06-01 ENCOUNTER — VIRTUAL VISIT (OUTPATIENT)
Dept: ONCOLOGY | Facility: CLINIC | Age: 76
End: 2021-06-01
Attending: PHYSICIAN ASSISTANT
Payer: COMMERCIAL

## 2021-06-01 DIAGNOSIS — Z91.89 AT RISK FOR CARDIOMYOPATHY: Primary | ICD-10-CM

## 2021-06-01 DIAGNOSIS — C50.811 MALIGNANT NEOPLASM OF OVERLAPPING SITES OF RIGHT BREAST IN FEMALE, ESTROGEN RECEPTOR POSITIVE (H): ICD-10-CM

## 2021-06-01 DIAGNOSIS — Z51.11 ENCOUNTER FOR ANTINEOPLASTIC CHEMOTHERAPY: ICD-10-CM

## 2021-06-01 DIAGNOSIS — Z17.0 MALIGNANT NEOPLASM OF OVERLAPPING SITES OF RIGHT BREAST IN FEMALE, ESTROGEN RECEPTOR POSITIVE (H): ICD-10-CM

## 2021-06-01 PROCEDURE — 999N001193 HC VIDEO/TELEPHONE VISIT; NO CHARGE

## 2021-06-01 PROCEDURE — 99215 OFFICE O/P EST HI 40 MIN: CPT | Mod: 95 | Performed by: PHYSICIAN ASSISTANT

## 2021-06-01 RX ORDER — METHYLPREDNISOLONE SODIUM SUCCINATE 125 MG/2ML
125 INJECTION, POWDER, LYOPHILIZED, FOR SOLUTION INTRAMUSCULAR; INTRAVENOUS
Status: CANCELLED
Start: 2021-06-15

## 2021-06-01 RX ORDER — LOPERAMIDE HYDROCHLORIDE 2 MG/1
TABLET ORAL
Qty: 100 TABLET | Refills: 3 | Status: SHIPPED | OUTPATIENT
Start: 2021-06-01 | End: 2021-11-15

## 2021-06-01 RX ORDER — ACETAMINOPHEN 325 MG/1
650 TABLET ORAL
Status: CANCELLED
Start: 2021-06-15

## 2021-06-01 RX ORDER — ALBUTEROL SULFATE 0.83 MG/ML
2.5 SOLUTION RESPIRATORY (INHALATION)
Status: CANCELLED | OUTPATIENT
Start: 2021-06-15

## 2021-06-01 RX ORDER — EPINEPHRINE 1 MG/ML
0.3 INJECTION, SOLUTION INTRAMUSCULAR; SUBCUTANEOUS EVERY 5 MIN PRN
Status: CANCELLED | OUTPATIENT
Start: 2021-07-06

## 2021-06-01 RX ORDER — ALBUTEROL SULFATE 0.83 MG/ML
2.5 SOLUTION RESPIRATORY (INHALATION)
Status: CANCELLED | OUTPATIENT
Start: 2021-07-06

## 2021-06-01 RX ORDER — DIPHENHYDRAMINE HCL 25 MG
50 CAPSULE ORAL
Status: CANCELLED | OUTPATIENT
Start: 2021-06-15

## 2021-06-01 RX ORDER — NALOXONE HYDROCHLORIDE 0.4 MG/ML
.1-.4 INJECTION, SOLUTION INTRAMUSCULAR; INTRAVENOUS; SUBCUTANEOUS
Status: CANCELLED | OUTPATIENT
Start: 2021-07-06

## 2021-06-01 RX ORDER — DIPHENHYDRAMINE HYDROCHLORIDE 50 MG/ML
50 INJECTION INTRAMUSCULAR; INTRAVENOUS
Status: CANCELLED
Start: 2021-06-15

## 2021-06-01 RX ORDER — METHYLPREDNISOLONE SODIUM SUCCINATE 125 MG/2ML
125 INJECTION, POWDER, LYOPHILIZED, FOR SOLUTION INTRAMUSCULAR; INTRAVENOUS
Status: CANCELLED
Start: 2021-07-06

## 2021-06-01 RX ORDER — LORAZEPAM 2 MG/ML
0.5 INJECTION INTRAMUSCULAR EVERY 4 HOURS PRN
Status: CANCELLED | OUTPATIENT
Start: 2021-07-06

## 2021-06-01 RX ORDER — NALOXONE HYDROCHLORIDE 0.4 MG/ML
.1-.4 INJECTION, SOLUTION INTRAMUSCULAR; INTRAVENOUS; SUBCUTANEOUS
Status: CANCELLED | OUTPATIENT
Start: 2021-06-15

## 2021-06-01 RX ORDER — MEPERIDINE HYDROCHLORIDE 25 MG/ML
25 INJECTION INTRAMUSCULAR; INTRAVENOUS; SUBCUTANEOUS EVERY 30 MIN PRN
Status: CANCELLED | OUTPATIENT
Start: 2021-06-15

## 2021-06-01 RX ORDER — SODIUM CHLORIDE 9 MG/ML
1000 INJECTION, SOLUTION INTRAVENOUS CONTINUOUS PRN
Status: CANCELLED
Start: 2021-07-06

## 2021-06-01 RX ORDER — EPINEPHRINE 1 MG/ML
0.3 INJECTION, SOLUTION INTRAMUSCULAR; SUBCUTANEOUS EVERY 5 MIN PRN
Status: CANCELLED | OUTPATIENT
Start: 2021-06-15

## 2021-06-01 RX ORDER — DIPHENHYDRAMINE HYDROCHLORIDE 50 MG/ML
50 INJECTION INTRAMUSCULAR; INTRAVENOUS
Status: CANCELLED
Start: 2021-07-06

## 2021-06-01 RX ORDER — ALBUTEROL SULFATE 90 UG/1
1-2 AEROSOL, METERED RESPIRATORY (INHALATION)
Status: CANCELLED
Start: 2021-07-06

## 2021-06-01 RX ORDER — ACETAMINOPHEN 325 MG/1
650 TABLET ORAL
Status: CANCELLED
Start: 2021-07-06

## 2021-06-01 RX ORDER — DIPHENHYDRAMINE HCL 25 MG
50 CAPSULE ORAL
Status: CANCELLED | OUTPATIENT
Start: 2021-07-06

## 2021-06-01 RX ORDER — ONDANSETRON 8 MG/1
8 TABLET, ORALLY DISINTEGRATING ORAL EVERY 8 HOURS PRN
Qty: 90 TABLET | Refills: 1 | Status: SHIPPED | OUTPATIENT
Start: 2021-06-01 | End: 2021-06-07

## 2021-06-01 RX ORDER — ALBUTEROL SULFATE 90 UG/1
1-2 AEROSOL, METERED RESPIRATORY (INHALATION)
Status: CANCELLED
Start: 2021-06-15

## 2021-06-01 RX ORDER — SODIUM CHLORIDE 9 MG/ML
1000 INJECTION, SOLUTION INTRAVENOUS CONTINUOUS PRN
Status: CANCELLED
Start: 2021-06-15

## 2021-06-01 RX ORDER — LORAZEPAM 2 MG/ML
0.5 INJECTION INTRAMUSCULAR EVERY 4 HOURS PRN
Status: CANCELLED | OUTPATIENT
Start: 2021-06-15

## 2021-06-01 RX ORDER — MEPERIDINE HYDROCHLORIDE 25 MG/ML
25 INJECTION INTRAMUSCULAR; INTRAVENOUS; SUBCUTANEOUS EVERY 30 MIN PRN
Status: CANCELLED | OUTPATIENT
Start: 2021-07-06

## 2021-06-01 NOTE — LETTER
"    6/1/2021         RE: Talya Zuleta  [address] [address]        Dear Colleague,    Thank you for referring your patient, Talya Zuleta, to the [facility]. Please see a copy of my visit note below.    Talya is a 76 year old who is being evaluated via a billable video visit.      How would you like to obtain your AVS? MyChart  If the video visit is dropped, the invitation should be resent by: Send to e-mail at: [email or web address]  Will anyone else be joining your video visit? No    Vitals - Patient Reported  Weight (Patient Reported): 72.1 kg (159 lb)  Height (Patient Reported): 166.4 cm (5' 5.51\")  BMI (Based on Pt Reported Ht/Wt): 26.05  Pain Score: No Pain (0)  Video Start Time: 1:43 PM  Video-Visit Details    Type of service:  Video Visit    Video End Time:2:10 pm     Originating Location (pt. Location): Home    Distant Location (provider location):  [facility]     Platform used for Video Visit: Kelly Enrique MA        Oncology Follow Up:  Date on this visit: Jun 1, 2021    Diagnosis:  right breast cancer, clinical stage T2N0M0.    Primary Physician: Cole Weston     History Of Present Illness:  Ms. Zuleta is a 76 year old female with right breast cancer.  Routine screening mammogram in 07/2020 showed bilateral breast asymmetries.  Diagnostic mammograms and ultrasound showed a 2.7 cm mass in the right breast at 12:00, 5 cm from the nipple with ductal extension including a 6 mm mass at 3 cm from the nipple.  In the left breast were benign appearing cysts.  Ultrasound of the right axilla was without lymphadenopathy.  Contrast enhanced mammogram showed the right breast mass, including extension, to measure 4.9 cm.  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  Estrogen receptor was moderate in 50% and progesterone receptor staining was " negative.  HER-2 was negative by IHC; HER2 FISH showed approximately 10% of tumor cells to have 6.8 HER2 signals/nucleus and a HER2/CEN17 ratio of 1.6.  The HER2 positive cells were noted to be the large pleomorphic cells.  Multiple neutrophils were noted to be infiltrating through tumor stroma.    She received 12 weeks of neoadjuvant Taxol, Herceptin, and pertuzumab from 10/7/2020 - 12/22/2020.  She received 4 cycles of dose dense adriamycin and cyclophosphamide from 12/29/2020 - 2/9/2021.  She underwent right breast mastectomy and sentinel lymph node biopsy on 3/8/2021.  Pathology showed rare residual tumor cells with therapy related changes.  Residual tumor is <1% of the tumor bed volume.  There was no lymphovascular invasion and surgical margins were negative.  A single sentinel lymph node was benign.    She started adjuvant Phesgo (Herceptin and Perjeta) on 4/13/21.     Interval History: Talya is feeling okay today. She has had 3 doses of Phesgo so far. She continues to have a lot of diarrhea. This has been ongoing since the initial HER2 treatment started. She goes daily and has between 5-10 episodes per day. She is eating and drinking well. Sometimes has abdominal cramping and nausea at night and uses zofran before bed. No emesis. She takes 1 tab of Imodium every 4 hours during the day and this does help. No fevers/chills or infectious concerns. Has eczema rash which is stable. Appetite is good.     She started letrozole and is tolerating well without any adverse side effects. No hot flashes, joint stiffness/pain. She has insomnia about 4x during the week but had this prior to starting and attributes it more to her abdominal symptoms.     Past Medical/Surgical History:  1.  Breast cancer as per HPI  2.  Hypertension  3.  Diabetes  4.  COPD  5.  Hypothyroidism  6.  Hypohidrosis    Allergies:  Allergies as of 06/01/2021 - Reviewed 05/27/2021   Allergen Reaction Noted     Bacitracin-neomycin-polymyxin   04/18/2003     Latex  09/30/2005     Current Medications:  Current Outpatient Medications   Medication Sig Dispense Refill     amLODIPine (NORVASC) 5 MG tablet Take 5 mg by mouth 2 times daily        atorvastatin (LIPITOR) 40 MG tablet Take 20 mg by mouth 2 times daily        benazepril (LOTENSIN) 20 MG tablet TAKE 1 TABLET BY MOUTH TWO TIMES A DAY.       furosemide (LASIX) 80 MG tablet Take 80 mg by mouth 2 times daily       loperamide (IMODIUM A-D) 1 MG/7.5ML Take 15 mLs (2 mg) by mouth 4 times daily as needed for diarrhea 237 mL 1     ondansetron (ZOFRAN-ODT) 8 MG ODT tab Take 1 tablet (8 mg) by mouth every 8 hours as needed for nausea 90 tablet 1     potassium chloride ER (KLOR-CON M) 20 MEQ CR tablet Take 40 mEq by mouth 3 times daily        pyridOXINE (VITAMIN B6) 100 MG TABS Take 100 mg by mouth daily       triamcinolone (KENALOG) 0.1 % external cream        Vitamin D, Cholecalciferol, 25 MCG (1000 UT) TABS Take 1,000 Units by mouth daily         Physical Exam:  Video physical exam  General: Patient appears well in no acute distress.   Skin: No visualized rash or lesions on visualized skin  Eyes: EOMI, no erythema, sclera icterus or discharge noted  Resp: Appears to be breathing comfortably without accessory muscle usage, speaking in full sentences, no cough  MSK: Appears to have normal range of motion based on visualized movements  Neurologic: No apparent tremors, facial movements symmetric  Psych: affect bright, alert and oriented    The rest of a comprehensive physical examination is deferred due to PHE (public health emergency) video restrictions        Laboratory/Imaging Studies  Reviewed today   5/27/2021 12:00   Sodium 140   Potassium 3.5   Chloride 108   Carbon Dioxide 28   Urea Nitrogen 12   Creatinine 0.82   GFR Estimate 69   GFR Estimate If Black 80   Calcium 9.2   Anion Gap 4   Albumin 3.6   Protein Total 7.1   Bilirubin Total 0.4   Alkaline Phosphatase 72   ALT 17   AST 16   Glucose 105 (H)   WBC  4.6   Hemoglobin 14.0   Hematocrit 41.0   Platelet Count 198   RBC Count 4.13   MCV 99   MCH 33.9 (H)   MCHC 34.1   RDW 14.8   Diff Method Automated Method   % Neutrophils 71.9   % Lymphocytes 16.7   % Monocytes 7.9   % Eosinophils 1.8   % Basophils 1.3   % Immature Granulocytes 0.4   Absolute Neutrophil 3.3   Absolute Lymphocytes 0.8   Absolute Monocytes 0.4   Absolute Eosinophils 0.1   Absolute Basophils 0.1   Abs Immature Granulocytes 0.0       ASSESSMENT/PLAN:  76 year old female with a history of HTN, dyslipidemia, COPD, diabetes, and OA with a newly diagnosed clinical stage, T2N0M0, right breast cancer, that is ER positive (50%), ID negative, and HER2 positive.  She is s/p treatment with 12 weeks THP, 4 cycles ddAC, right breast mastectomy and right axillary SLNBx. RCB I.     1.  Right breast cancer:  Currently receiving adjuvant HER2 therapy with Phesgo with plans to complete a total of one year of treatment. She is s/p 3/13 treatments. She has also started adjuvant letrozole with plans for a 5 year course of therapy. She is tolerating letrozole well. Has moderate diarrhea and nausea from HER2 therapy. Since she is maintaining her weight, has no electrolyte abnormalities, JUANCARLOS, or BP issues will continue on. See below for management. Would recommend follow-up in about a month to check in. I deferred survivorship visit for now with current issues with treatment. Will plan to have full visit closer to the end of treatment.     2.  Bone Health:  She is at risk for thinning of the bones on letrozole. Baseline DEXA from 4/2021 shows osteopenia. Calcium, vitamin D, and weight bearing exercise has been recommended. Dr. Alvarez may discuss Zometa at future visit.     3. Nausea: Continue zofran ODT before bed. Can also be used every 8 hours PRN.     4.  Diarrhea: Discussed changing loperamide dosing to 2 tabs with first loose stool and one tab with each subsequent loose stool up to 8 tabs daily. Call with any concerns  of dehydration. Labs look good from last week.     5.  At risk for cardiomyopathy:  2/2 h/o anthracycline and HER2 targeted therapy.  Will continue to monitor an echocardiogram once every 3 months while on HER2 targeted therapy. Due July 2021.     40 minutes spent on the date of the encounter doing chart review, review of test results, interpretation of tests, patient visit and documentation     Hoda Mcintosh PA-C       Again, thank you for allowing me to participate in the care of your patient.        Sincerely,        Hoda Mcintosh PA-C

## 2021-06-15 ENCOUNTER — INFUSION THERAPY VISIT (OUTPATIENT)
Dept: INFUSION THERAPY | Facility: CLINIC | Age: 76
End: 2021-06-15
Payer: COMMERCIAL

## 2021-06-15 VITALS
HEART RATE: 81 BPM | RESPIRATION RATE: 16 BRPM | SYSTOLIC BLOOD PRESSURE: 123 MMHG | OXYGEN SATURATION: 97 % | DIASTOLIC BLOOD PRESSURE: 61 MMHG | TEMPERATURE: 98 F | WEIGHT: 160.5 LBS | BODY MASS INDEX: 26.29 KG/M2

## 2021-06-15 DIAGNOSIS — C50.811 MALIGNANT NEOPLASM OF OVERLAPPING SITES OF RIGHT BREAST IN FEMALE, ESTROGEN RECEPTOR POSITIVE (H): Primary | ICD-10-CM

## 2021-06-15 DIAGNOSIS — Z17.0 MALIGNANT NEOPLASM OF OVERLAPPING SITES OF RIGHT BREAST IN FEMALE, ESTROGEN RECEPTOR POSITIVE (H): Primary | ICD-10-CM

## 2021-06-15 PROCEDURE — 99207 PR NO CHARGE LOS: CPT

## 2021-06-15 PROCEDURE — 96401 CHEMO ANTI-NEOPL SQ/IM: CPT | Performed by: NURSE PRACTITIONER

## 2021-06-15 ASSESSMENT — PAIN SCALES - GENERAL: PAINLEVEL: NO PAIN (0)

## 2021-06-29 NOTE — PROGRESS NOTES
"Talya is a 76 year old who is being evaluated via a billable video visit.      How would you like to obtain your AVS? MyChart  If the video visit is dropped, the invitation should be resent by: Send to e-mail at: [email or web address]  Will anyone else be joining your video visit? No     Vitals - Patient Reported  Weight (Patient Reported): 71.2 kg (157 lb)  Height (Patient Reported): 167 cm (5' 5.75\")  BMI (Based on Pt Reported Ht/Wt): 25.53  Pain Score: Severe Pain (7)  Pain Loc: Other - see comment(MIDDLE OF LEGS)    Kerry CALDWELL        Video Start Time: 12:31 PM  Video-Visit Details    Type of service:  Video Visit    Video End Time:12:47 PM    Originating Location (pt. Location): Home    Distant Location (provider location):  [facility]     Platform used for Video Visit: [facility]        Oncology Follow Up:  Date on this visit: Jun 30, 2021    Diagnosis:  right breast cancer, clinical stage T2N0M0.    Primary Physician: Cole Weston     History Of Present Illness:  Ms. Zuleta is a 76 year old female with right breast cancer.  Routine screening mammogram in 07/2020 showed bilateral breast asymmetries.  Diagnostic mammograms and ultrasound showed a 2.7 cm mass in the right breast at 12:00, 5 cm from the nipple with ductal extension including a 6 mm mass at 3 cm from the nipple.  In the left breast were benign appearing cysts.  Ultrasound of the right axilla was without lymphadenopathy.  Contrast enhanced mammogram showed the right breast mass, including extension, to measure 4.9 cm.  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  Estrogen receptor was moderate in 50% and progesterone receptor staining was negative.  HER-2 was negative by IHC; HER2 FISH showed approximately 10% of tumor cells to have 6.8 HER2 signals/nucleus and a HER2/CEN17 ratio of 1.6.  The HER2 positive cells were noted to be the large pleomorphic " cells.  Multiple neutrophils were noted to be infiltrating through tumor stroma.    She received 12 weeks of neoadjuvant Taxol, Herceptin, and pertuzumab from 10/7/2020 - 12/22/2020.  She received 4 cycles of dose dense adriamycin and cyclophosphamide from 12/29/2020 - 2/9/2021.  She underwent right breast mastectomy and sentinel lymph node biopsy on 3/8/2021.  Pathology showed rare residual tumor cells with therapy related changes.  Residual tumor is <1% of the tumor bed volume.  There was no lymphovascular invasion and surgical margins were negative.  A single sentinel lymph node was benign.    She started adjuvant Phesgo (Herceptin and Perjeta) on 4/13/21.     Interval History: Talya is feeling okay today. She has had 4 doses of Phesgo. She continues to have grade 2 diarrhea 7-10 episodes per day on HER2 treatment. Diarrhea has not been worse on phesgo--it was similar with prior IV treatment. She is using liquid Imodium as she did not tolerate loperamide tabs. She had too much abdominal cramping with these. She has been using liquid Imodium 3-4x per day with some relief. She is not leaving the house much or even going on walks due to bowel urgency. No fevers/chills or focal abdominal pain. Has nausea at night which is controlled with zofran before bed.     She is drinking 1 pedialyte per day in addition to water. She has had leg cramps and arm cramps the past few days. Appetite is intact but sometimes she purposely does not eat due to diarrhea.       Past Medical/Surgical History:  1.  Breast cancer as per HPI  2.  Hypertension  3.  Diabetes  4.  COPD  5.  Hypothyroidism  6.  Hypohidrosis    Allergies:  Allergies as of 06/30/2021 - Reviewed 06/15/2021   Allergen Reaction Noted     Bacitracin-neomycin-polymyxin  04/18/2003     Latex  09/30/2005     Current Medications:  Current Outpatient Medications   Medication Sig Dispense Refill     amLODIPine (NORVASC) 5 MG tablet Take 1 tablet (5 mg) by mouth 2 times daily  180 tablet 0     atorvastatin (LIPITOR) 40 MG tablet Take 20 mg by mouth 2 times daily        benazepril (LOTENSIN) 20 MG tablet TAKE 1 TABLET BY MOUTH TWO TIMES A DAY. 180 tablet 0     furosemide (LASIX) 80 MG tablet Take 1 tablet (80 mg) by mouth 2 times daily 180 tablet 0     loperamide (IMODIUM A-D) 1 MG/7.5ML Take 15 mLs (2 mg) by mouth 4 times daily as needed for diarrhea 237 mL 1     loperamide (IMODIUM A-D) 2 MG tablet Take 2 tablets with first loose stool then 1 tablet with each subsequent loose stool up to 8 tablets daily 100 tablet 3     ondansetron (ZOFRAN-ODT) 8 MG ODT tab Take 1 tablet (8 mg) by mouth every 8 hours as needed for nausea 90 tablet 1     potassium chloride ER (KLOR-CON M) 20 MEQ CR tablet Take 2 tablets (40 mEq) by mouth 3 times daily 180 tablet 0     pyridOXINE (VITAMIN B6) 100 MG TABS Take 100 mg by mouth daily       triamcinolone (KENALOG) 0.1 % external cream Apply topically 2 times daily as needed for irritation 80 g 0     Vitamin D, Cholecalciferol, 25 MCG (1000 UT) TABS Take 1,000 Units by mouth daily         Physical Exam:  Video physical exam  General: Patient appears well in no acute distress.   Skin: No visualized rash or lesions on visualized skin  Eyes: EOMI, no erythema, sclera icterus or discharge noted  Resp: Appears to be breathing comfortably without accessory muscle usage, speaking in full sentences, no cough  MSK: Appears to have normal range of motion based on visualized movements  Neurologic: No apparent tremors, facial movements symmetric  Psych: affect bright, alert and oriented    The rest of a comprehensive physical examination is deferred due to PHE (public health emergency) video restrictions        Laboratory/Imaging Studies  Nothing new       ASSESSMENT/PLAN:  76 year old female with a history of HTN, dyslipidemia, COPD, diabetes, and OA with a newly diagnosed clinical stage, T2N0M0, right breast cancer, that is ER positive (50%), IA negative, and HER2  positive.  She is s/p treatment with 12 weeks THP, 4 cycles ddAC, right breast mastectomy and right axillary SLNBx. RCB I.     1.  Right breast cancer:  Currently receiving adjuvant HER2 therapy with Phesgo with plans to complete a total of one year of treatment. She is s/p 4/13 treatments. She has also started adjuvant letrozole with plans for a 5 year course of therapy. She is tolerating letrozole well. Has moderate diarrhea and nausea from HER2 therapy. Her RCB is 1 but she actually had rare tumor cells in pathology specimen and less than <1% overall. Due to low amount of residual disease and fair to poor tolerance and quality of life to Perjeta, I would recommend pursuing Herceptin alone. Perjeta adds only a small amount of benefit to Herceptin anyway.     2.  Bone Health:  She is at risk for thinning of the bones on letrozole. Baseline DEXA from 4/2021 shows osteopenia. Calcium, vitamin D, and weight bearing exercise has been recommended. Dr. Alvarez may discuss Zometa at future visit.     3. Nausea: Continue zofran ODT before bed. Can also be used every 8 hours PRN.     4.  Diarrhea: Continue aggressive Imodium dosing for now. With muscle cramps will check labs next week.     5.  At risk for cardiomyopathy:  2/2 h/o anthracycline and HER2 targeted therapy.  Will continue to monitor an echocardiogram once every 3 months while on HER2 targeted therapy. Due July 2021.     35 minutes spent on the date of the encounter doing chart review, interpretation of tests, patient visit and documentation     Hoda Mcintosh PA-C

## 2021-06-30 ENCOUNTER — VIRTUAL VISIT (OUTPATIENT)
Dept: ONCOLOGY | Facility: CLINIC | Age: 76
End: 2021-06-30
Attending: INTERNAL MEDICINE
Payer: COMMERCIAL

## 2021-06-30 DIAGNOSIS — C50.811 MALIGNANT NEOPLASM OF OVERLAPPING SITES OF RIGHT BREAST IN FEMALE, ESTROGEN RECEPTOR POSITIVE (H): Primary | ICD-10-CM

## 2021-06-30 DIAGNOSIS — R19.7 DIARRHEA, UNSPECIFIED TYPE: ICD-10-CM

## 2021-06-30 DIAGNOSIS — Z17.0 MALIGNANT NEOPLASM OF OVERLAPPING SITES OF RIGHT BREAST IN FEMALE, ESTROGEN RECEPTOR POSITIVE (H): Primary | ICD-10-CM

## 2021-06-30 PROCEDURE — 999N001193 HC VIDEO/TELEPHONE VISIT; NO CHARGE

## 2021-06-30 PROCEDURE — 99214 OFFICE O/P EST MOD 30 MIN: CPT | Mod: 95 | Performed by: PHYSICIAN ASSISTANT

## 2021-06-30 RX ORDER — EPINEPHRINE 1 MG/ML
0.3 INJECTION, SOLUTION INTRAMUSCULAR; SUBCUTANEOUS EVERY 5 MIN PRN
Status: CANCELLED | OUTPATIENT
Start: 2021-07-06

## 2021-06-30 RX ORDER — NALOXONE HYDROCHLORIDE 0.4 MG/ML
0.2 INJECTION, SOLUTION INTRAMUSCULAR; INTRAVENOUS; SUBCUTANEOUS
Status: CANCELLED | OUTPATIENT
Start: 2021-07-06

## 2021-06-30 RX ORDER — ALBUTEROL SULFATE 90 UG/1
1-2 AEROSOL, METERED RESPIRATORY (INHALATION)
Status: CANCELLED
Start: 2021-07-06

## 2021-06-30 RX ORDER — MEPERIDINE HYDROCHLORIDE 25 MG/ML
25 INJECTION INTRAMUSCULAR; INTRAVENOUS; SUBCUTANEOUS EVERY 30 MIN PRN
Status: CANCELLED | OUTPATIENT
Start: 2021-07-06

## 2021-06-30 RX ORDER — HEPARIN SODIUM (PORCINE) LOCK FLUSH IV SOLN 100 UNIT/ML 100 UNIT/ML
5 SOLUTION INTRAVENOUS
Status: CANCELLED | OUTPATIENT
Start: 2021-07-06

## 2021-06-30 RX ORDER — METHYLPREDNISOLONE SODIUM SUCCINATE 125 MG/2ML
125 INJECTION, POWDER, LYOPHILIZED, FOR SOLUTION INTRAMUSCULAR; INTRAVENOUS
Status: CANCELLED
Start: 2021-07-06

## 2021-06-30 RX ORDER — ALBUTEROL SULFATE 5 MG/ML
2.5 SOLUTION RESPIRATORY (INHALATION)
Status: CANCELLED | OUTPATIENT
Start: 2021-07-06

## 2021-06-30 RX ORDER — HEPARIN SODIUM,PORCINE 10 UNIT/ML
5 VIAL (ML) INTRAVENOUS
Status: CANCELLED | OUTPATIENT
Start: 2021-07-06

## 2021-06-30 RX ORDER — DIPHENHYDRAMINE HYDROCHLORIDE 50 MG/ML
50 INJECTION INTRAMUSCULAR; INTRAVENOUS
Status: CANCELLED
Start: 2021-07-06

## 2021-06-30 RX ORDER — LORAZEPAM 2 MG/ML
0.5 INJECTION INTRAMUSCULAR EVERY 4 HOURS PRN
Status: CANCELLED
Start: 2021-07-06

## 2021-06-30 NOTE — LETTER
"    6/30/2021         RE: Talya Zuleta  [address] [facility] [id number]        Dear Colleague,    Thank you for referring your patient, Talya Zuleta, to the [facility]. Please see a copy of my visit note below.    Talya is a 76 year old who is being evaluated via a billable video visit.      How would you like to obtain your AVS? MyChart  If the video visit is dropped, the invitation should be resent by: Send to e-mail at: [email or web address]  Will anyone else be joining your video visit? No     Vitals - Patient Reported  Weight (Patient Reported): 71.2 kg (157 lb)  Height (Patient Reported): 167 cm (5' 5.75\")  BMI (Based on Pt Reported Ht/Wt): 25.53  Pain Score: Severe Pain (7)  Pain Loc: Other - see comment(MIDDLE OF LEGS)    Kerry CALDWELL        Video Start Time: 12:31 PM  Video-Visit Details    Type of service:  Video Visit    Video End Time:12:47 PM    Originating Location (pt. Location): Home    Distant Location (provider location):  [facility]     Platform used for Video Visit: [facility]        Oncology Follow Up:  Date on this visit: Jun 30, 2021    Diagnosis:  right breast cancer, clinical stage T2N0M0.    Primary Physician: Cole Weston     History Of Present Illness:  Ms. Zuleta is a 76 year old female with right breast cancer.  Routine screening mammogram in 07/2020 showed bilateral breast asymmetries.  Diagnostic mammograms and ultrasound showed a 2.7 cm mass in the right breast at 12:00, 5 cm from the nipple with ductal extension including a 6 mm mass at 3 cm from the nipple.  In the left breast were benign appearing cysts.  Ultrasound of the right axilla was without lymphadenopathy.  Contrast enhanced mammogram showed the right breast mass, including extension, to measure 4.9 cm.  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  Estrogen receptor was " moderate in 50% and progesterone receptor staining was negative.  HER-2 was negative by IHC; HER2 FISH showed approximately 10% of tumor cells to have 6.8 HER2 signals/nucleus and a HER2/CEN17 ratio of 1.6.  The HER2 positive cells were noted to be the large pleomorphic cells.  Multiple neutrophils were noted to be infiltrating through tumor stroma.    She received 12 weeks of neoadjuvant Taxol, Herceptin, and pertuzumab from 10/7/2020 - 12/22/2020.  She received 4 cycles of dose dense adriamycin and cyclophosphamide from 12/29/2020 - 2/9/2021.  She underwent right breast mastectomy and sentinel lymph node biopsy on 3/8/2021.  Pathology showed rare residual tumor cells with therapy related changes.  Residual tumor is <1% of the tumor bed volume.  There was no lymphovascular invasion and surgical margins were negative.  A single sentinel lymph node was benign.    She started adjuvant Phesgo (Herceptin and Perjeta) on 4/13/21.     Interval History: Talya is feeling okay today. She has had 4 doses of Phesgo. She continues to have grade 2 diarrhea 7-10 episodes per day on HER2 treatment. Diarrhea has not been worse on phesgo--it was similar with prior IV treatment. She is using liquid Imodium as she did not tolerate loperamide tabs. She had too much abdominal cramping with these. She has been using liquid Imodium 3-4x per day with some relief. She is not leaving the house much or even going on walks due to bowel urgency. No fevers/chills or focal abdominal pain. Has nausea at night which is controlled with zofran before bed.     She is drinking 1 pedialyte per day in addition to water. She has had leg cramps and arm cramps the past few days. Appetite is intact but sometimes she purposely does not eat due to diarrhea.       Past Medical/Surgical History:  1.  Breast cancer as per HPI  2.  Hypertension  3.  Diabetes  4.  COPD  5.  Hypothyroidism  6.  Hypohidrosis    Allergies:  Allergies as of 06/30/2021 - Reviewed  06/15/2021   Allergen Reaction Noted     Bacitracin-neomycin-polymyxin  04/18/2003     Latex  09/30/2005     Current Medications:  Current Outpatient Medications   Medication Sig Dispense Refill     amLODIPine (NORVASC) 5 MG tablet Take 1 tablet (5 mg) by mouth 2 times daily 180 tablet 0     atorvastatin (LIPITOR) 40 MG tablet Take 20 mg by mouth 2 times daily        benazepril (LOTENSIN) 20 MG tablet TAKE 1 TABLET BY MOUTH TWO TIMES A DAY. 180 tablet 0     furosemide (LASIX) 80 MG tablet Take 1 tablet (80 mg) by mouth 2 times daily 180 tablet 0     loperamide (IMODIUM A-D) 1 MG/7.5ML Take 15 mLs (2 mg) by mouth 4 times daily as needed for diarrhea 237 mL 1     loperamide (IMODIUM A-D) 2 MG tablet Take 2 tablets with first loose stool then 1 tablet with each subsequent loose stool up to 8 tablets daily 100 tablet 3     ondansetron (ZOFRAN-ODT) 8 MG ODT tab Take 1 tablet (8 mg) by mouth every 8 hours as needed for nausea 90 tablet 1     potassium chloride ER (KLOR-CON M) 20 MEQ CR tablet Take 2 tablets (40 mEq) by mouth 3 times daily 180 tablet 0     pyridOXINE (VITAMIN B6) 100 MG TABS Take 100 mg by mouth daily       triamcinolone (KENALOG) 0.1 % external cream Apply topically 2 times daily as needed for irritation 80 g 0     Vitamin D, Cholecalciferol, 25 MCG (1000 UT) TABS Take 1,000 Units by mouth daily         Physical Exam:  Video physical exam  General: Patient appears well in no acute distress.   Skin: No visualized rash or lesions on visualized skin  Eyes: EOMI, no erythema, sclera icterus or discharge noted  Resp: Appears to be breathing comfortably without accessory muscle usage, speaking in full sentences, no cough  MSK: Appears to have normal range of motion based on visualized movements  Neurologic: No apparent tremors, facial movements symmetric  Psych: affect bright, alert and oriented    The rest of a comprehensive physical examination is deferred due to PHE (public health emergency) video  restrictions        Laboratory/Imaging Studies  Nothing new       ASSESSMENT/PLAN:  76 year old female with a history of HTN, dyslipidemia, COPD, diabetes, and OA with a newly diagnosed clinical stage, T2N0M0, right breast cancer, that is ER positive (50%), TX negative, and HER2 positive.  She is s/p treatment with 12 weeks THP, 4 cycles ddAC, right breast mastectomy and right axillary SLNBx. RCB I.     1.  Right breast cancer:  Currently receiving adjuvant HER2 therapy with Phesgo with plans to complete a total of one year of treatment. She is s/p 4/13 treatments. She has also started adjuvant letrozole with plans for a 5 year course of therapy. She is tolerating letrozole well. Has moderate diarrhea and nausea from HER2 therapy. Her RCB is 1 but she actually had rare tumor cells in pathology specimen and less than <1% overall. Due to low amount of residual disease and fair to poor tolerance and quality of life to Perjeta, I would recommend pursuing Herceptin alone. Perjeta adds only a small amount of benefit to Herceptin anyway.     2.  Bone Health:  She is at risk for thinning of the bones on letrozole. Baseline DEXA from 4/2021 shows osteopenia. Calcium, vitamin D, and weight bearing exercise has been recommended. Dr. Alvarez may discuss Zometa at future visit.     3. Nausea: Continue zofran ODT before bed. Can also be used every 8 hours PRN.     4.  Diarrhea: Continue aggressive Imodium dosing for now. With muscle cramps will check labs next week.     5.  At risk for cardiomyopathy:  2/2 h/o anthracycline and HER2 targeted therapy.  Will continue to monitor an echocardiogram once every 3 months while on HER2 targeted therapy. Due July 2021.     35 minutes spent on the date of the encounter doing chart review, interpretation of tests, patient visit and documentation     Hoda Mcintosh PA-C       Again, thank you for allowing me to participate in the care of your patient.        Sincerely,        Hoda Merrill  CHERELLE Mcintosh

## 2021-07-06 ENCOUNTER — INFUSION THERAPY VISIT (OUTPATIENT)
Dept: INFUSION THERAPY | Facility: CLINIC | Age: 76
End: 2021-07-06
Attending: PHYSICIAN ASSISTANT
Payer: COMMERCIAL

## 2021-07-06 VITALS
OXYGEN SATURATION: 98 % | RESPIRATION RATE: 16 BRPM | HEART RATE: 59 BPM | SYSTOLIC BLOOD PRESSURE: 103 MMHG | DIASTOLIC BLOOD PRESSURE: 66 MMHG | BODY MASS INDEX: 26.54 KG/M2 | TEMPERATURE: 97.9 F | WEIGHT: 162 LBS

## 2021-07-06 DIAGNOSIS — C50.811 MALIGNANT NEOPLASM OF OVERLAPPING SITES OF RIGHT BREAST IN FEMALE, ESTROGEN RECEPTOR POSITIVE (H): Primary | ICD-10-CM

## 2021-07-06 DIAGNOSIS — C50.811 MALIGNANT NEOPLASM OF OVERLAPPING SITES OF RIGHT BREAST IN FEMALE, ESTROGEN RECEPTOR POSITIVE (H): ICD-10-CM

## 2021-07-06 DIAGNOSIS — Z17.0 MALIGNANT NEOPLASM OF OVERLAPPING SITES OF RIGHT BREAST IN FEMALE, ESTROGEN RECEPTOR POSITIVE (H): Primary | ICD-10-CM

## 2021-07-06 DIAGNOSIS — Z17.0 MALIGNANT NEOPLASM OF OVERLAPPING SITES OF RIGHT BREAST IN FEMALE, ESTROGEN RECEPTOR POSITIVE (H): ICD-10-CM

## 2021-07-06 DIAGNOSIS — R19.7 DIARRHEA, UNSPECIFIED TYPE: ICD-10-CM

## 2021-07-06 LAB
ALBUMIN SERPL-MCNC: 3.5 G/DL (ref 3.4–5)
ALP SERPL-CCNC: 64 U/L (ref 40–150)
ALT SERPL W P-5'-P-CCNC: 21 U/L (ref 0–50)
ANION GAP SERPL CALCULATED.3IONS-SCNC: 3 MMOL/L (ref 3–14)
AST SERPL W P-5'-P-CCNC: 21 U/L (ref 0–45)
BASOPHILS # BLD AUTO: 0 10E9/L (ref 0–0.2)
BASOPHILS NFR BLD AUTO: 0.8 %
BILIRUB SERPL-MCNC: 0.4 MG/DL (ref 0.2–1.3)
BUN SERPL-MCNC: 15 MG/DL (ref 7–30)
CALCIUM SERPL-MCNC: 9 MG/DL (ref 8.5–10.1)
CHLORIDE SERPL-SCNC: 108 MMOL/L (ref 94–109)
CO2 SERPL-SCNC: 29 MMOL/L (ref 20–32)
CREAT SERPL-MCNC: 0.87 MG/DL (ref 0.52–1.04)
DIFFERENTIAL METHOD BLD: ABNORMAL
EOSINOPHIL # BLD AUTO: 0.1 10E9/L (ref 0–0.7)
EOSINOPHIL NFR BLD AUTO: 1.4 %
ERYTHROCYTE [DISTWIDTH] IN BLOOD BY AUTOMATED COUNT: 16 % (ref 10–15)
GFR SERPL CREATININE-BSD FRML MDRD: 64 ML/MIN/{1.73_M2}
GLUCOSE SERPL-MCNC: 109 MG/DL (ref 70–99)
HCT VFR BLD AUTO: 37.6 % (ref 35–47)
HGB BLD-MCNC: 13.1 G/DL (ref 11.7–15.7)
IMM GRANULOCYTES # BLD: 0 10E9/L (ref 0–0.4)
IMM GRANULOCYTES NFR BLD: 0.2 %
LYMPHOCYTES # BLD AUTO: 0.9 10E9/L (ref 0.8–5.3)
LYMPHOCYTES NFR BLD AUTO: 17.4 %
MAGNESIUM SERPL-MCNC: 2.3 MG/DL (ref 1.6–2.3)
MCH RBC QN AUTO: 34.2 PG (ref 26.5–33)
MCHC RBC AUTO-ENTMCNC: 34.8 G/DL (ref 31.5–36.5)
MCV RBC AUTO: 98 FL (ref 78–100)
MONOCYTES # BLD AUTO: 0.5 10E9/L (ref 0–1.3)
MONOCYTES NFR BLD AUTO: 8.7 %
NEUTROPHILS # BLD AUTO: 3.7 10E9/L (ref 1.6–8.3)
NEUTROPHILS NFR BLD AUTO: 71.5 %
PLATELET # BLD AUTO: 198 10E9/L (ref 150–450)
POTASSIUM SERPL-SCNC: 3.6 MMOL/L (ref 3.4–5.3)
PROT SERPL-MCNC: 6.9 G/DL (ref 6.8–8.8)
RBC # BLD AUTO: 3.83 10E12/L (ref 3.8–5.2)
SODIUM SERPL-SCNC: 140 MMOL/L (ref 133–144)
WBC # BLD AUTO: 5.2 10E9/L (ref 4–11)

## 2021-07-06 PROCEDURE — 83735 ASSAY OF MAGNESIUM: CPT | Performed by: PHYSICIAN ASSISTANT

## 2021-07-06 PROCEDURE — 99207 PR NO CHARGE LOS: CPT

## 2021-07-06 PROCEDURE — 85025 COMPLETE CBC W/AUTO DIFF WBC: CPT | Performed by: PHYSICIAN ASSISTANT

## 2021-07-06 PROCEDURE — 80053 COMPREHEN METABOLIC PANEL: CPT | Performed by: PHYSICIAN ASSISTANT

## 2021-07-06 PROCEDURE — 96413 CHEMO IV INFUSION 1 HR: CPT | Performed by: NURSE PRACTITIONER

## 2021-07-06 RX ORDER — HEPARIN SODIUM (PORCINE) LOCK FLUSH IV SOLN 100 UNIT/ML 100 UNIT/ML
5 SOLUTION INTRAVENOUS EVERY 8 HOURS
Status: DISCONTINUED | OUTPATIENT
Start: 2021-07-06 | End: 2021-07-06 | Stop reason: HOSPADM

## 2021-07-06 RX ORDER — HEPARIN SODIUM (PORCINE) LOCK FLUSH IV SOLN 100 UNIT/ML 100 UNIT/ML
5 SOLUTION INTRAVENOUS
Status: DISCONTINUED | OUTPATIENT
Start: 2021-07-06 | End: 2021-07-06 | Stop reason: HOSPADM

## 2021-07-06 RX ADMIN — Medication 250 ML: at 10:40

## 2021-07-06 RX ADMIN — HEPARIN SODIUM (PORCINE) LOCK FLUSH IV SOLN 100 UNIT/ML 5 ML: 100 SOLUTION at 10:05

## 2021-07-06 RX ADMIN — HEPARIN SODIUM (PORCINE) LOCK FLUSH IV SOLN 100 UNIT/ML 5 ML: 100 SOLUTION at 11:23

## 2021-07-06 ASSESSMENT — PAIN SCALES - GENERAL: PAINLEVEL: NO PAIN (0)

## 2021-07-06 NOTE — PROGRESS NOTES
Infusion Nursing Note:  Talya Zuleta presents today for Trazimera.   Patient seen by provider today: No   present during visit today: Not Applicable.    Note: N/A.    Intravenous Access:  Implanted Port.    Treatment Conditions:  Lab Results   Component Value Date    HGB 13.1 07/06/2021     Lab Results   Component Value Date    WBC 5.2 07/06/2021      Lab Results   Component Value Date    ANEU 3.7 07/06/2021     Lab Results   Component Value Date     07/06/2021      Lab Results   Component Value Date     07/06/2021                   Lab Results   Component Value Date    POTASSIUM 3.6 07/06/2021           Lab Results   Component Value Date    MAG 2.3 07/06/2021            Lab Results   Component Value Date    CR 0.87 07/06/2021                   Lab Results   Component Value Date    JIMBO 9.0 07/06/2021                Lab Results   Component Value Date    BILITOTAL PENDING 07/06/2021           Lab Results   Component Value Date    ALBUMIN PENDING 07/06/2021                    Lab Results   Component Value Date    ALT PENDING 07/06/2021           Lab Results   Component Value Date    AST PENDING 07/06/2021       Post Infusion Assessment:  Patient tolerated infusion without incident.  Site patent and intact, free from redness, edema or discomfort.  No evidence of extravasations.  Access discontinued per protocol.       Discharge Plan:   Patient will return 7/27 for next appointment.   Patient discharged in stable condition accompanied by: self.  Departure Mode: Ambulatory.      Sho Muñoz RN

## 2021-07-06 NOTE — PROGRESS NOTES
Port needle left for access for treatment, Sterile technique performed and maintained. Patient tolerated procedure well. Tubes drawn in rainbow order: red, green, purple. Transparent dressing placed with use of tegaderm.    Nisha Encarnacion RN  [facility] Oncology/Infusion [place]

## 2021-07-15 ENCOUNTER — ANCILLARY PROCEDURE (OUTPATIENT)
Dept: CARDIOLOGY | Facility: CLINIC | Age: 76
End: 2021-07-15
Attending: PHYSICIAN ASSISTANT
Payer: COMMERCIAL

## 2021-07-15 DIAGNOSIS — Z17.0 MALIGNANT NEOPLASM OF OVERLAPPING SITES OF RIGHT BREAST IN FEMALE, ESTROGEN RECEPTOR POSITIVE (H): ICD-10-CM

## 2021-07-15 DIAGNOSIS — Z51.11 ENCOUNTER FOR ANTINEOPLASTIC CHEMOTHERAPY: Primary | ICD-10-CM

## 2021-07-15 DIAGNOSIS — C50.811 MALIGNANT NEOPLASM OF OVERLAPPING SITES OF RIGHT BREAST IN FEMALE, ESTROGEN RECEPTOR POSITIVE (H): ICD-10-CM

## 2021-07-15 DIAGNOSIS — Z91.89 AT RISK FOR CARDIOMYOPATHY: ICD-10-CM

## 2021-07-15 LAB
BI-PLANE LVEF ECHO: NORMAL
LVEF ECHO: NORMAL

## 2021-07-15 PROCEDURE — 93321 DOPPLER ECHO F-UP/LMTD STD: CPT | Performed by: INTERNAL MEDICINE

## 2021-07-15 PROCEDURE — 93325 DOPPLER ECHO COLOR FLOW MAPG: CPT | Performed by: INTERNAL MEDICINE

## 2021-07-15 PROCEDURE — 93308 TTE F-UP OR LMTD: CPT | Performed by: INTERNAL MEDICINE

## 2021-07-15 RX ORDER — HEPARIN SODIUM (PORCINE) LOCK FLUSH IV SOLN 100 UNIT/ML 100 UNIT/ML
5 SOLUTION INTRAVENOUS ONCE
Status: COMPLETED | OUTPATIENT
Start: 2021-07-15 | End: 2021-07-15

## 2021-07-15 RX ADMIN — Medication 2.5 ML: at 10:21

## 2021-07-15 RX ADMIN — HEPARIN SODIUM (PORCINE) LOCK FLUSH IV SOLN 100 UNIT/ML 5 ML: 100 SOLUTION at 11:10

## 2021-07-22 DIAGNOSIS — C50.811 MALIGNANT NEOPLASM OF OVERLAPPING SITES OF RIGHT BREAST IN FEMALE, ESTROGEN RECEPTOR POSITIVE (H): Primary | ICD-10-CM

## 2021-07-22 DIAGNOSIS — Z17.0 MALIGNANT NEOPLASM OF OVERLAPPING SITES OF RIGHT BREAST IN FEMALE, ESTROGEN RECEPTOR POSITIVE (H): Primary | ICD-10-CM

## 2021-07-22 RX ORDER — HEPARIN SODIUM (PORCINE) LOCK FLUSH IV SOLN 100 UNIT/ML 100 UNIT/ML
5 SOLUTION INTRAVENOUS
Status: CANCELLED | OUTPATIENT
Start: 2021-07-27

## 2021-07-22 RX ORDER — ALBUTEROL SULFATE 0.83 MG/ML
2.5 SOLUTION RESPIRATORY (INHALATION)
Status: CANCELLED | OUTPATIENT
Start: 2021-07-27

## 2021-07-22 RX ORDER — EPINEPHRINE 1 MG/ML
0.3 INJECTION, SOLUTION INTRAMUSCULAR; SUBCUTANEOUS EVERY 5 MIN PRN
Status: CANCELLED | OUTPATIENT
Start: 2021-07-27

## 2021-07-22 RX ORDER — ACETAMINOPHEN 325 MG/1
650 TABLET ORAL
Status: CANCELLED
Start: 2021-07-27

## 2021-07-22 RX ORDER — MEPERIDINE HYDROCHLORIDE 25 MG/ML
25 INJECTION INTRAMUSCULAR; INTRAVENOUS; SUBCUTANEOUS EVERY 30 MIN PRN
Status: CANCELLED | OUTPATIENT
Start: 2021-07-27

## 2021-07-22 RX ORDER — METHYLPREDNISOLONE SODIUM SUCCINATE 125 MG/2ML
125 INJECTION, POWDER, LYOPHILIZED, FOR SOLUTION INTRAMUSCULAR; INTRAVENOUS
Status: CANCELLED
Start: 2021-07-27

## 2021-07-22 RX ORDER — DIPHENHYDRAMINE HCL 25 MG
50 CAPSULE ORAL
Status: CANCELLED
Start: 2021-07-27

## 2021-07-22 RX ORDER — ALBUTEROL SULFATE 90 UG/1
1-2 AEROSOL, METERED RESPIRATORY (INHALATION)
Status: CANCELLED
Start: 2021-07-27

## 2021-07-22 RX ORDER — NALOXONE HYDROCHLORIDE 0.4 MG/ML
0.2 INJECTION, SOLUTION INTRAMUSCULAR; INTRAVENOUS; SUBCUTANEOUS
Status: CANCELLED | OUTPATIENT
Start: 2021-07-27

## 2021-07-22 RX ORDER — DIPHENHYDRAMINE HYDROCHLORIDE 50 MG/ML
50 INJECTION INTRAMUSCULAR; INTRAVENOUS
Status: CANCELLED
Start: 2021-07-27

## 2021-07-22 RX ORDER — LORAZEPAM 2 MG/ML
0.5 INJECTION INTRAMUSCULAR EVERY 4 HOURS PRN
Status: CANCELLED
Start: 2021-07-27

## 2021-07-22 RX ORDER — HEPARIN SODIUM,PORCINE 10 UNIT/ML
5 VIAL (ML) INTRAVENOUS
Status: CANCELLED | OUTPATIENT
Start: 2021-07-27

## 2021-07-27 ENCOUNTER — INFUSION THERAPY VISIT (OUTPATIENT)
Dept: INFUSION THERAPY | Facility: CLINIC | Age: 76
End: 2021-07-27
Attending: PHYSICIAN ASSISTANT
Payer: COMMERCIAL

## 2021-07-27 VITALS
HEART RATE: 77 BPM | RESPIRATION RATE: 16 BRPM | SYSTOLIC BLOOD PRESSURE: 128 MMHG | DIASTOLIC BLOOD PRESSURE: 70 MMHG | BODY MASS INDEX: 26.7 KG/M2 | WEIGHT: 163 LBS | OXYGEN SATURATION: 96 % | TEMPERATURE: 97.7 F

## 2021-07-27 DIAGNOSIS — C50.811 MALIGNANT NEOPLASM OF OVERLAPPING SITES OF RIGHT BREAST IN FEMALE, ESTROGEN RECEPTOR POSITIVE (H): Primary | ICD-10-CM

## 2021-07-27 DIAGNOSIS — Z17.0 MALIGNANT NEOPLASM OF OVERLAPPING SITES OF RIGHT BREAST IN FEMALE, ESTROGEN RECEPTOR POSITIVE (H): Primary | ICD-10-CM

## 2021-07-27 PROCEDURE — 96413 CHEMO IV INFUSION 1 HR: CPT | Performed by: NURSE PRACTITIONER

## 2021-07-27 PROCEDURE — 99207 PR NO CHARGE LOS: CPT

## 2021-07-27 RX ORDER — HEPARIN SODIUM (PORCINE) LOCK FLUSH IV SOLN 100 UNIT/ML 100 UNIT/ML
5 SOLUTION INTRAVENOUS
Status: DISCONTINUED | OUTPATIENT
Start: 2021-07-27 | End: 2021-07-27 | Stop reason: HOSPADM

## 2021-07-27 RX ADMIN — Medication 250 ML: at 10:50

## 2021-07-27 RX ADMIN — HEPARIN SODIUM (PORCINE) LOCK FLUSH IV SOLN 100 UNIT/ML 5 ML: 100 SOLUTION at 11:42

## 2021-07-27 ASSESSMENT — PAIN SCALES - GENERAL: PAINLEVEL: NO PAIN (0)

## 2021-07-27 NOTE — PROGRESS NOTES
Infusion Nursing Note:  Talya Zuleta presents today for Trazimera.    Patient seen by provider today: No   present during visit today: Not Applicable.    Note: Patient notes that her diarrhea has not really improved since stopping Perjeta.  Patient did meet criteria for an asymptomatic covid-19 PCR test in infusion today. Patient declined the covid-19 test.    Intravenous Access:  Implanted Port.    Treatment Conditions:  Not Applicable.    Post Infusion Assessment:  Patient tolerated infusion without incident.  Blood return noted pre and post infusion.  Site patent and intact, free from redness, edema or discomfort.  No evidence of extravasations.  Access discontinued per protocol.       Discharge Plan:   Patient will return 8/17/2021 for next appointment.   Patient discharged in stable condition accompanied by: self.  Departure Mode: Ambulatory.    Magaly Osorio RN-BSN, PHN, OCN  [facility]

## 2021-08-01 RX ORDER — EPINEPHRINE 1 MG/ML
0.3 INJECTION, SOLUTION INTRAMUSCULAR; SUBCUTANEOUS EVERY 5 MIN PRN
Status: CANCELLED | OUTPATIENT
Start: 2021-08-17

## 2021-08-01 RX ORDER — METHYLPREDNISOLONE SODIUM SUCCINATE 125 MG/2ML
125 INJECTION, POWDER, LYOPHILIZED, FOR SOLUTION INTRAMUSCULAR; INTRAVENOUS
Status: CANCELLED
Start: 2021-08-17

## 2021-08-01 RX ORDER — MEPERIDINE HYDROCHLORIDE 25 MG/ML
25 INJECTION INTRAMUSCULAR; INTRAVENOUS; SUBCUTANEOUS EVERY 30 MIN PRN
Status: CANCELLED | OUTPATIENT
Start: 2021-08-17

## 2021-08-01 RX ORDER — HEPARIN SODIUM,PORCINE 10 UNIT/ML
5 VIAL (ML) INTRAVENOUS
Status: CANCELLED | OUTPATIENT
Start: 2021-09-07

## 2021-08-01 RX ORDER — ALBUTEROL SULFATE 90 UG/1
1-2 AEROSOL, METERED RESPIRATORY (INHALATION)
Status: CANCELLED
Start: 2021-09-07

## 2021-08-01 RX ORDER — EPINEPHRINE 1 MG/ML
0.3 INJECTION, SOLUTION INTRAMUSCULAR; SUBCUTANEOUS EVERY 5 MIN PRN
Status: CANCELLED | OUTPATIENT
Start: 2021-09-07

## 2021-08-01 RX ORDER — ACETAMINOPHEN 325 MG/1
650 TABLET ORAL
Status: CANCELLED
Start: 2021-08-17

## 2021-08-01 RX ORDER — NALOXONE HYDROCHLORIDE 0.4 MG/ML
0.2 INJECTION, SOLUTION INTRAMUSCULAR; INTRAVENOUS; SUBCUTANEOUS
Status: CANCELLED | OUTPATIENT
Start: 2021-08-17

## 2021-08-01 RX ORDER — LORAZEPAM 2 MG/ML
0.5 INJECTION INTRAMUSCULAR EVERY 4 HOURS PRN
Status: CANCELLED
Start: 2021-08-17

## 2021-08-01 RX ORDER — MEPERIDINE HYDROCHLORIDE 25 MG/ML
25 INJECTION INTRAMUSCULAR; INTRAVENOUS; SUBCUTANEOUS EVERY 30 MIN PRN
Status: CANCELLED | OUTPATIENT
Start: 2021-09-07

## 2021-08-01 RX ORDER — LORAZEPAM 2 MG/ML
0.5 INJECTION INTRAMUSCULAR EVERY 4 HOURS PRN
Status: CANCELLED
Start: 2021-09-07

## 2021-08-01 RX ORDER — HEPARIN SODIUM (PORCINE) LOCK FLUSH IV SOLN 100 UNIT/ML 100 UNIT/ML
5 SOLUTION INTRAVENOUS
Status: CANCELLED | OUTPATIENT
Start: 2021-08-17

## 2021-08-01 RX ORDER — DIPHENHYDRAMINE HCL 25 MG
50 CAPSULE ORAL
Status: CANCELLED
Start: 2021-08-17

## 2021-08-01 RX ORDER — DIPHENHYDRAMINE HYDROCHLORIDE 50 MG/ML
50 INJECTION INTRAMUSCULAR; INTRAVENOUS
Status: CANCELLED
Start: 2021-08-17

## 2021-08-01 RX ORDER — ALBUTEROL SULFATE 0.83 MG/ML
2.5 SOLUTION RESPIRATORY (INHALATION)
Status: CANCELLED | OUTPATIENT
Start: 2021-08-17

## 2021-08-01 RX ORDER — NALOXONE HYDROCHLORIDE 0.4 MG/ML
0.2 INJECTION, SOLUTION INTRAMUSCULAR; INTRAVENOUS; SUBCUTANEOUS
Status: CANCELLED | OUTPATIENT
Start: 2021-09-07

## 2021-08-01 RX ORDER — ALBUTEROL SULFATE 90 UG/1
1-2 AEROSOL, METERED RESPIRATORY (INHALATION)
Status: CANCELLED
Start: 2021-08-17

## 2021-08-01 RX ORDER — HEPARIN SODIUM (PORCINE) LOCK FLUSH IV SOLN 100 UNIT/ML 100 UNIT/ML
5 SOLUTION INTRAVENOUS
Status: CANCELLED | OUTPATIENT
Start: 2021-09-07

## 2021-08-01 RX ORDER — DIPHENHYDRAMINE HYDROCHLORIDE 50 MG/ML
50 INJECTION INTRAMUSCULAR; INTRAVENOUS
Status: CANCELLED
Start: 2021-09-07

## 2021-08-01 RX ORDER — DIPHENHYDRAMINE HCL 25 MG
50 CAPSULE ORAL
Status: CANCELLED
Start: 2021-09-07

## 2021-08-01 RX ORDER — ALBUTEROL SULFATE 0.83 MG/ML
2.5 SOLUTION RESPIRATORY (INHALATION)
Status: CANCELLED | OUTPATIENT
Start: 2021-09-07

## 2021-08-01 RX ORDER — HEPARIN SODIUM,PORCINE 10 UNIT/ML
5 VIAL (ML) INTRAVENOUS
Status: CANCELLED | OUTPATIENT
Start: 2021-08-17

## 2021-08-01 RX ORDER — ACETAMINOPHEN 325 MG/1
650 TABLET ORAL
Status: CANCELLED
Start: 2021-09-07

## 2021-08-01 RX ORDER — METHYLPREDNISOLONE SODIUM SUCCINATE 125 MG/2ML
125 INJECTION, POWDER, LYOPHILIZED, FOR SOLUTION INTRAMUSCULAR; INTRAVENOUS
Status: CANCELLED
Start: 2021-09-07

## 2021-08-02 ENCOUNTER — LAB (OUTPATIENT)
Dept: ONCOLOGY | Facility: CLINIC | Age: 76
End: 2021-08-02
Payer: COMMERCIAL

## 2021-08-02 DIAGNOSIS — Z17.0 MALIGNANT NEOPLASM OF OVERLAPPING SITES OF RIGHT BREAST IN FEMALE, ESTROGEN RECEPTOR POSITIVE (H): ICD-10-CM

## 2021-08-02 DIAGNOSIS — C50.811 MALIGNANT NEOPLASM OF OVERLAPPING SITES OF RIGHT BREAST IN FEMALE, ESTROGEN RECEPTOR POSITIVE (H): ICD-10-CM

## 2021-08-02 LAB
ALBUMIN SERPL-MCNC: 3.6 G/DL (ref 3.4–5)
ALP SERPL-CCNC: 67 U/L (ref 40–150)
ALT SERPL W P-5'-P-CCNC: 20 U/L (ref 0–50)
ANION GAP SERPL CALCULATED.3IONS-SCNC: 4 MMOL/L (ref 3–14)
AST SERPL W P-5'-P-CCNC: 19 U/L (ref 0–45)
BASOPHILS # BLD AUTO: 0.1 10E3/UL (ref 0–0.2)
BASOPHILS NFR BLD AUTO: 1 %
BILIRUB SERPL-MCNC: 0.5 MG/DL (ref 0.2–1.3)
BUN SERPL-MCNC: 12 MG/DL (ref 7–30)
CALCIUM SERPL-MCNC: 8.8 MG/DL (ref 8.5–10.1)
CHLORIDE BLD-SCNC: 109 MMOL/L (ref 94–109)
CO2 SERPL-SCNC: 27 MMOL/L (ref 20–32)
CREAT SERPL-MCNC: 0.91 MG/DL (ref 0.52–1.04)
EOSINOPHIL # BLD AUTO: 0.1 10E3/UL (ref 0–0.7)
EOSINOPHIL NFR BLD AUTO: 1 %
ERYTHROCYTE [DISTWIDTH] IN BLOOD BY AUTOMATED COUNT: 16.5 % (ref 10–15)
GFR SERPL CREATININE-BSD FRML MDRD: 61 ML/MIN/1.73M2
GLUCOSE BLD-MCNC: 98 MG/DL (ref 70–99)
HCT VFR BLD AUTO: 37.8 % (ref 35–47)
HGB BLD-MCNC: 12.6 G/DL (ref 11.7–15.7)
IMM GRANULOCYTES # BLD: 0 10E3/UL
IMM GRANULOCYTES NFR BLD: 0 %
LYMPHOCYTES # BLD AUTO: 1 10E3/UL (ref 0.8–5.3)
LYMPHOCYTES NFR BLD AUTO: 17 %
MCH RBC QN AUTO: 34.6 PG (ref 26.5–33)
MCHC RBC AUTO-ENTMCNC: 33.3 G/DL (ref 31.5–36.5)
MCV RBC AUTO: 104 FL (ref 78–100)
MONOCYTES # BLD AUTO: 0.5 10E3/UL (ref 0–1.3)
MONOCYTES NFR BLD AUTO: 9 %
NEUTROPHILS # BLD AUTO: 4.1 10E3/UL (ref 1.6–8.3)
NEUTROPHILS NFR BLD AUTO: 72 %
NRBC # BLD AUTO: 0 10E3/UL
NRBC BLD AUTO-RTO: 0 /100
PLATELET # BLD AUTO: 219 10E3/UL (ref 150–450)
POTASSIUM BLD-SCNC: 3.8 MMOL/L (ref 3.4–5.3)
PROT SERPL-MCNC: 6.6 G/DL (ref 6.8–8.8)
RBC # BLD AUTO: 3.64 10E6/UL (ref 3.8–5.2)
SODIUM SERPL-SCNC: 140 MMOL/L (ref 133–144)
WBC # BLD AUTO: 5.7 10E3/UL (ref 4–11)

## 2021-08-02 PROCEDURE — 85025 COMPLETE CBC W/AUTO DIFF WBC: CPT | Performed by: INTERNAL MEDICINE

## 2021-08-02 PROCEDURE — 36591 DRAW BLOOD OFF VENOUS DEVICE: CPT

## 2021-08-02 PROCEDURE — 80053 COMPREHEN METABOLIC PANEL: CPT | Performed by: INTERNAL MEDICINE

## 2021-08-02 RX ORDER — HEPARIN SODIUM (PORCINE) LOCK FLUSH IV SOLN 100 UNIT/ML 100 UNIT/ML
5 SOLUTION INTRAVENOUS EVERY 8 HOURS
Status: DISCONTINUED | OUTPATIENT
Start: 2021-08-02 | End: 2021-08-02 | Stop reason: HOSPADM

## 2021-08-02 RX ADMIN — HEPARIN SODIUM (PORCINE) LOCK FLUSH IV SOLN 100 UNIT/ML 5 ML: 100 SOLUTION at 10:43

## 2021-08-02 NOTE — PROGRESS NOTES
"Talya is a 76 year old who is being evaluated via a billable video visit.      How would you like to obtain your AVS? MyChart  If the video visit is dropped, the invitation should be resent by: Text to cell phone: [phone]  Will anyone else be joining your video visit? No     Vitals - Patient Reported  Weight (Patient Reported): 73.9 kg (163 lb)  Height (Patient Reported): 166.4 cm (5' 5.51\")  BMI (Based on Pt Reported Ht/Wt): 26.7  Pain Score: No Pain (0)    Video-Visit Details    Type of service:  Video Visit    45 minutes spent on the date of the encounter doing chart review, review of test results, interpretation of tests, patient visit and documentation     Originating Location (pt. Location): Home    Distant Location (provider location):  [facility]     Platform used for Video Visit: Kelly Enrique MA      Oncology Follow Up:  Date on this visit: 8/2/2021    Diagnosis:  right breast cancer, clinical stage T2N0M0.    Primary Physician: Cole Weston     History Of Present Illness:  Ms. Zuleta is a 76 year old female with right breast cancer.  Routine screening mammogram in 07/2020 showed bilateral breast asymmetries.  Diagnostic mammograms and ultrasound showed a 2.7 cm mass in the right breast at 12:00, 5 cm from the nipple with ductal extension including a 6 mm mass at 3 cm from the nipple.  In the left breast were benign appearing cysts.  Ultrasound of the right axilla was without lymphadenopathy.  Contrast enhanced mammogram showed the right breast mass, including extension, to measure 4.9 cm.  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  Estrogen receptor was moderate in 50% and progesterone receptor staining was negative.  HER-2 was negative by IHC; HER2 FISH showed approximately 10% of tumor cells to have 6.8 HER2 signals/nucleus and a HER2/CEN17 ratio of 1.6.  The HER2 positive cells were " "noted to be the large pleomorphic cells.  Multiple neutrophils were noted to be infiltrating through tumor stroma.    She received 12 weeks of neoadjuvant Taxol, Herceptin, and pertuzumab from 10/7/2020 - 12/22/2020.  She received 4 cycles of dose dense adriamycin and cyclophosphamide from 12/29/2020 - 2/9/2021.  She underwent right breast mastectomy and sentinel lymph node biopsy on 3/8/2021.  Pathology showed rare residual tumor cells with therapy related changes in the tumor bed.  Residual tumor is <1% of the tumor bed volume.  There was no lymphovascular invasion and surgical margins were negative.  A single sentinel lymph node was benign.  We discussed starting letrozole at our clinic visit on 3/30/2021, unfortunately she states she did not receive the prescription and so did not start it.  She was initiated on adjuvant HER2 targeted therapy 4/13/2021.  She received 4 cycles of adjuvant Phesgo, then discontinued due to diarrhea.  She has been on biosimilar trastuzumab (Trazimera) since 7/6/2021.      Interval History:  Ms. Zuleta was seen via video visit today for routine breast cancer follow up.  She initially was on adjuvant treatment with Phesgo. Due to diarrhea, this was later changed to the trastuzumab biosimilar, Traximera, on 7/6/2021.  Despite the change, she has continued to have quite bothersome diarrhea this past month.  The diarrhea comes and goes.  It is present most days of the week.  Seems to be worse when she lies down to go to sleep.  She will get sudden onset abdominal cramping and stool urgency.  Often, she will be up all night stooling, getting only 3-6 hours of sleep per night.  She has been taking imodium without relief.  She previously tried lomotil, but would have no bowel movement for a couple of days followed by \"a big blowout\".  She also reports after each infusion, she gets sores in her mouth and sores in the bilateral axilla.  She denies significant nausea.  She has no fevers, " chills, or infectious complaints.  She has no cough, shortness of breath, or chest pain.  She continues to have significant numbness in the bilateral hands.  This interferes with ability to perform fine motor activities.  The remainder of a complete 12 point review of systems was reviewed with the patient and was negative with the exception of that mentioned above.    Past Medical/Surgical History:  1.  Breast cancer as per HPI  2.  Hypertension  3.  Diabetes  4.  COPD  5.  Hypothyroidism  6.  Hypohidrosis    Allergies:  Allergies as of 08/03/2021 - Reviewed 07/27/2021   Allergen Reaction Noted     Bacitracin-neomycin-polymyxin  04/18/2003     Latex  09/30/2005     Current Medications:  Current Outpatient Medications   Medication Sig Dispense Refill     amLODIPine (NORVASC) 5 MG tablet Take 1 tablet (5 mg) by mouth 2 times daily 180 tablet 0     atorvastatin (LIPITOR) 40 MG tablet Take 20 mg by mouth 2 times daily        benazepril (LOTENSIN) 20 MG tablet TAKE 1 TABLET BY MOUTH TWO TIMES A DAY. 180 tablet 0     furosemide (LASIX) 80 MG tablet Take 1 tablet (80 mg) by mouth 2 times daily 180 tablet 0     loperamide (IMODIUM A-D) 1 MG/7.5ML Take 15 mLs (2 mg) by mouth 4 times daily as needed for diarrhea 237 mL 1     loperamide (IMODIUM A-D) 2 MG tablet Take 2 tablets with first loose stool then 1 tablet with each subsequent loose stool up to 8 tablets daily 100 tablet 3     ondansetron (ZOFRAN-ODT) 8 MG ODT tab Take 1 tablet (8 mg) by mouth every 8 hours as needed for nausea 90 tablet 1     potassium chloride ER (KLOR-CON M) 20 MEQ CR tablet Take 2 tablets (40 mEq) by mouth 3 times daily 180 tablet 0     pyridOXINE (VITAMIN B6) 100 MG TABS Take 100 mg by mouth daily       triamcinolone (KENALOG) 0.1 % external cream Apply topically 2 times daily as needed for irritation 80 g 0     Vitamin D, Cholecalciferol, 25 MCG (1000 UT) TABS Take 1,000 Units by mouth daily        Family and Social History:  Please see initial  consultation dated 9/21/2020 for further details.  Breast Actionable Panel on 10/1/2020 was negative for mutation.    Physical Exam:  General:  Well appearing adult female in NAD.  Wearing a wig.  Eyes:  No erythema or discharge  Respiratory:  Breathing comfortably on room air.  No wheezing or distress.  Musculoskeletal:  Full ROM of the bilateral upper extremities.  Skin:  No visible concerning skin rashes or lesions  Neuro:  No notable tremor and dyskinetic movements.  Psych:  Mood and affect appear normal.    The rest of a comprehensive physical examination is deferred due to PHE (public health emergency) video visit restrictions.    Laboratory/Imaging Studies  7/15/2021 Echocardiogram:  LVEF is >65%    8/2/2021 Labs:  Electrolytes, creatinine, and LFTs are wnl.  Blood counts are wnl.    ASSESSMENT/PLAN:  76 year old female with a history of HTN, dyslipidemia, COPD, diabetes, and OA with a newly diagnosed clinical stage, T2N0M0, right breast cancer, that is ER positive (50%), [place] negative, and HER2 positive.  She is s/p treatment with 12 weeks THP, 4 cycles ddAC, right breast mastectomy and right axillary SLNBx, and 4 months adjuvant HER2 therapy.    1.  Right breast cancer:  She is s/p completion of 6 of 13 planned cycles of adjuvant HER2 targeted therapy.  She received 4 cycles of adjuvant Phesgo, which was then changed to Trazimera due to diarrhea.  Unfortunately, after 2 cycles of Trazimera, she continues to have diarrhea refractory to imodium and lomotil.  Diarrhea is having a negative impact on quality of life.  She completed 3 months of neoadjuvant HER2 targeted therapy and 4 months in the adjuvant setting, therefore 7 months HER2 targeted therapy total.  There have been a number of trials which have evaluated 6 months vs 12 months of HER2 targeted therapy.  In a meta-analysis of the HORG, PHARE, and PERSEPHONE clinical trials, there was no significant difference in either disease free or overall survival  between 6 and 12 months of Herceptin in patients with estrogen receptor positive, HER2 positive breast cancer.  Given this and her intolerance of the treatment, my recommendation is to stop further treatment with the trastuzumab biosimilar at this time.  She is in agreement with this plan.    At our visit on 3/30/21, I had recommended starting adjuvant letrozole.  She states she never received the prescription and so did not start it.  We reviewed a 5 year course of letrozole in the treatment of ER positive breast cancer can reduce the future risk of recurrence by up to half.  We again reviewed the potential side effects of letrozole including myalgias, arthralgias, hot flashes, mood disorder and thinning of the bones.  She is agreeable to starting the medication at this time.  I have sent a one month supply to her local pharmacy, which she will fill right away.  I also sent a year prescription to the [facility] mail order pharmacy which she will fill through thereafter.    She is due for screening mammogram of the left breast at this time.  We will schedule this to be done at [facility].  I will see her back in clinic in 3 months.    2.  Bone Health/Osteopenia:  DEXA bone density scan 4/13/2021 with a lowest T-score of -1.2 in the right femoral neck c/w osteopenia.  We discussed today recommendation for treatment with Zometa 4 mg IV every 6 months x 3 years for both prevention of breast cancer bone metastases as well as prevention of bone loss on letrozole.  Given persistent diarrhea from trastuzumab and new start of letrozole, we agreed to hold off on starting at this time and will re-discuss at her next visit.      3.  Neuropathy:  Taxol induced.  Discussed any improvement is typically within the first year and that neuropathy is permanent in approximately 17% of patients treated with Taxol.  Recommend continuing vitamin B6 until 1 year out from completion of Taxol (12/2021).    4.  At risk for  cardiomyopathy:  2/2 h/o anthracycline and HER2 targeted therapy.  Echocardiogram performed on 7/15/2021 was reviewed and shows a retained LVEF.    5.  Follow Up:  Cancel future Herceptin appointments.  Left breast screening mammogram at [facility] within 1-2 weeks. In person visit with me in approximately 3 months.

## 2021-08-02 NOTE — PROGRESS NOTES
Port needle placed for lab draw access, Sterile technique performed and maintained. Patient tolerated procedure well. Tubes drawn in rainbow order: red, green, purple. Gauze dressing placed with use of paper tape.    RN advised patient to leave gauze in place for 20 mins.     Patient demonstrated verbal understanding.     Nisha Encarnacion RN  [facility] On[facility]

## 2021-08-03 ENCOUNTER — VIRTUAL VISIT (OUTPATIENT)
Dept: ONCOLOGY | Facility: CLINIC | Age: 76
End: 2021-08-03
Attending: INTERNAL MEDICINE
Payer: COMMERCIAL

## 2021-08-03 DIAGNOSIS — Z51.11 ENCOUNTER FOR ANTINEOPLASTIC CHEMOTHERAPY: ICD-10-CM

## 2021-08-03 DIAGNOSIS — C50.811 MALIGNANT NEOPLASM OF OVERLAPPING SITES OF RIGHT BREAST IN FEMALE, ESTROGEN RECEPTOR POSITIVE (H): Primary | ICD-10-CM

## 2021-08-03 DIAGNOSIS — Z12.31 VISIT FOR SCREENING MAMMOGRAM: ICD-10-CM

## 2021-08-03 DIAGNOSIS — Z17.0 MALIGNANT NEOPLASM OF OVERLAPPING SITES OF RIGHT BREAST IN FEMALE, ESTROGEN RECEPTOR POSITIVE (H): Primary | ICD-10-CM

## 2021-08-03 PROCEDURE — 99215 OFFICE O/P EST HI 40 MIN: CPT | Mod: 95 | Performed by: INTERNAL MEDICINE

## 2021-08-03 RX ORDER — LETROZOLE 2.5 MG/1
2.5 TABLET, FILM COATED ORAL DAILY
Qty: 90 TABLET | Refills: 3 | Status: SHIPPED | OUTPATIENT
Start: 2021-08-03 | End: 2021-11-15

## 2021-08-03 RX ORDER — LETROZOLE 2.5 MG/1
2.5 TABLET, FILM COATED ORAL DAILY
Qty: 30 TABLET | Refills: 0 | Status: SHIPPED | OUTPATIENT
Start: 2021-08-03 | End: 2021-08-31

## 2021-08-03 NOTE — LETTER
"    8/3/2021         RE: Talya Zuleta  [address] [address]        Dear Colleague,    Thank you for referring your patient, Talya Zuleta, to the [facility]. Please see a copy of my visit note below.    Talya is a 76 year old who is being evaluated via a billable video visit.      How would you like to obtain your AVS? MyChart  If the video visit is dropped, the invitation should be resent by: Text to cell phone: [phone]  Will anyone else be joining your video visit? No     Vitals - Patient Reported  Weight (Patient Reported): 73.9 kg (163 lb)  Height (Patient Reported): 166.4 cm (5' 5.51\")  BMI (Based on Pt Reported Ht/Wt): 26.7  Pain Score: No Pain (0)    Video-Visit Details    Type of service:  Video Visit    45 minutes spent on the date of the encounter doing chart review, review of test results, interpretation of tests, patient visit and documentation     Originating Location (pt. Location): Home    Distant Location (provider location):  [facility]     Platform used for Video Visit: Kelly Enrique MA      Oncology Follow Up:  Date on this visit: 8/2/2021    Diagnosis:  right breast cancer, clinical stage T2N0M0.    Primary Physician: Cole Weston     History Of Present Illness:  Ms. Zuleta is a 76 year old female with right breast cancer.  Routine screening mammogram in 07/2020 showed bilateral breast asymmetries.  Diagnostic mammograms and ultrasound showed a 2.7 cm mass in the right breast at 12:00, 5 cm from the nipple with ductal extension including a 6 mm mass at 3 cm from the nipple.  In the left breast were benign appearing cysts.  Ultrasound of the right axilla was without lymphadenopathy.  Contrast enhanced mammogram showed the right breast mass, including extension, to measure 4.9 cm.  Biopsy of the larger right breast mass showed grade 3 invasive carcinoma with anaplastic tumor giant " cells.  Estrogen receptor was moderate in 50% and progesterone receptor staining was negative.  HER-2 was negative by IHC; HER2 FISH showed approximately 10% of tumor cells to have 6.8 HER2 signals/nucleus and a HER2/CEN17 ratio of 1.6.  The HER2 positive cells were noted to be the large pleomorphic cells.  Multiple neutrophils were noted to be infiltrating through tumor stroma.    She received 12 weeks of neoadjuvant Taxol, Herceptin, and pertuzumab from 10/7/2020 - 12/22/2020.  She received 4 cycles of dose dense adriamycin and cyclophosphamide from 12/29/2020 - 2/9/2021.  She underwent right breast mastectomy and sentinel lymph node biopsy on 3/8/2021.  Pathology showed rare residual tumor cells with therapy related changes in the tumor bed.  Residual tumor is <1% of the tumor bed volume.  There was no lymphovascular invasion and surgical margins were negative.  A single sentinel lymph node was benign.  We discussed starting letrozole at our clinic visit on 3/30/2021, unfortunately she states she did not receive the prescription and so did not start it.  She was initiated on adjuvant HER2 targeted therapy 4/13/2021.  She received 4 cycles of adjuvant Phesgo, then discontinued due to diarrhea.  She has been on biosimilar trastuzumab (Trazimera) since 7/6/2021.      Interval History:  Ms. Zuleta was seen via video visit today for routine breast cancer follow up.  She initially was on adjuvant treatment with Phesgo. Due to diarrhea, this was later changed to the trastuzumab biosimilar, Traximera, on 7/6/2021.  Despite the change, she has continued to have quite bothersome diarrhea this past month.  The diarrhea comes and goes.  It is present most days of the week.  Seems to be worse when she lies down to go to sleep.  She will get sudden onset abdominal cramping and stool urgency.  Often, she will be up all night stooling, getting only 3-6 hours of sleep per night.  She has been taking imodium without relief.  She  "previously tried lomotil, but would have no bowel movement for a couple of days followed by \"a big blowout\".  She also reports after each infusion, she gets sores in her mouth and sores in the bilateral axilla.  She denies significant nausea.  She has no fevers, chills, or infectious complaints.  She has no cough, shortness of breath, or chest pain.  She continues to have significant numbness in the bilateral hands.  This interferes with ability to perform fine motor activities.  The remainder of a complete 12 point review of systems was reviewed with the patient and was negative with the exception of that mentioned above.    Past Medical/Surgical History:  1.  Breast cancer as per HPI  2.  Hypertension  3.  Diabetes  4.  COPD  5.  Hypothyroidism  6.  Hypohidrosis    Allergies:  Allergies as of 08/03/2021 - Reviewed 07/27/2021   Allergen Reaction Noted     Bacitracin-neomycin-polymyxin  04/18/2003     Latex  09/30/2005     Current Medications:  Current Outpatient Medications   Medication Sig Dispense Refill     amLODIPine (NORVASC) 5 MG tablet Take 1 tablet (5 mg) by mouth 2 times daily 180 tablet 0     atorvastatin (LIPITOR) 40 MG tablet Take 20 mg by mouth 2 times daily        benazepril (LOTENSIN) 20 MG tablet TAKE 1 TABLET BY MOUTH TWO TIMES A DAY. 180 tablet 0     furosemide (LASIX) 80 MG tablet Take 1 tablet (80 mg) by mouth 2 times daily 180 tablet 0     loperamide (IMODIUM A-D) 1 MG/7.5ML Take 15 mLs (2 mg) by mouth 4 times daily as needed for diarrhea 237 mL 1     loperamide (IMODIUM A-D) 2 MG tablet Take 2 tablets with first loose stool then 1 tablet with each subsequent loose stool up to 8 tablets daily 100 tablet 3     ondansetron (ZOFRAN-ODT) 8 MG ODT tab Take 1 tablet (8 mg) by mouth every 8 hours as needed for nausea 90 tablet 1     potassium chloride ER (KLOR-CON M) 20 MEQ CR tablet Take 2 tablets (40 mEq) by mouth 3 times daily 180 tablet 0     pyridOXINE (VITAMIN B6) 100 MG TABS Take 100 mg by " mouth daily       triamcinolone (KENALOG) 0.1 % external cream Apply topically 2 times daily as needed for irritation 80 g 0     Vitamin D, Cholecalciferol, 25 MCG (1000 UT) TABS Take 1,000 Units by mouth daily        Family and Social History:  Please see initial consultation dated 9/21/2020 for further details.  Breast Actionable Panel on 10/1/2020 was negative for mutation.    Physical Exam:  General:  Well appearing adult female in NAD.  Wearing a wig.  Eyes:  No erythema or discharge  Respiratory:  Breathing comfortably on room air.  No wheezing or distress.  Musculoskeletal:  Full ROM of the bilateral upper extremities.  Skin:  No visible concerning skin rashes or lesions  Neuro:  No notable tremor and dyskinetic movements.  Psych:  Mood and affect appear normal.    The rest of a comprehensive physical examination is deferred due to PHE (public health emergency) video visit restrictions.    Laboratory/Imaging Studies  7/15/2021 Echocardiogram:  LVEF is >65%    8/2/2021 Labs:  Electrolytes, creatinine, and LFTs are wnl.  Blood counts are wnl.    ASSESSMENT/PLAN:  76 year old female with a history of HTN, dyslipidemia, COPD, diabetes, and OA with a newly diagnosed clinical stage, T2N0M0, right breast cancer, that is ER positive (50%), [place] negative, and HER2 positive.  She is s/p treatment with 12 weeks THP, 4 cycles ddAC, right breast mastectomy and right axillary SLNBx, and 4 months adjuvant HER2 therapy.    1.  Right breast cancer:  She is s/p completion of 6 of 13 planned cycles of adjuvant HER2 targeted therapy.  She received 4 cycles of adjuvant Phesgo, which was then changed to Trazimera due to diarrhea.  Unfortunately, after 2 cycles of Trazimera, she continues to have diarrhea refractory to imodium and lomotil.  Diarrhea is having a negative impact on quality of life.  She completed 3 months of neoadjuvant HER2 targeted therapy and 4 months in the adjuvant setting, therefore 7 months HER2 targeted therapy  total.  There have been a number of trials which have evaluated 6 months vs 12 months of HER2 targeted therapy.  In a meta-analysis of the HORG, PHARE, and PERSEPHONE clinical trials, there was no significant difference in either disease free or overall survival between 6 and 12 months of Herceptin in patients with estrogen receptor positive, HER2 positive breast cancer.  Given this and her intolerance of the treatment, my recommendation is to stop further treatment with the trastuzumab biosimilar at this time.  She is in agreement with this plan.    At our visit on 3/30/21, I had recommended starting adjuvant letrozole.  She states she never received the prescription and so did not start it.  We reviewed a 5 year course of letrozole in the treatment of ER positive breast cancer can reduce the future risk of recurrence by up to half.  We again reviewed the potential side effects of letrozole including myalgias, arthralgias, hot flashes, mood disorder and thinning of the bones.  She is agreeable to starting the medication at this time.  I have sent a one month supply to her local pharmacy, which she will fill right away.  I also sent a year prescription to the [facility] mail order pharmacy which she will fill through thereafter.    She is due for screening mammogram of the left breast at this time.  We will schedule this to be done at [facility].  I will see her back in clinic in 3 months.    2.  Bone Health/Osteopenia:  DEXA bone density scan 4/13/2021 with a lowest T-score of -1.2 in the right femoral neck c/w osteopenia.  We discussed today recommendation for treatment with Zometa 4 mg IV every 6 months x 3 years for both prevention of breast cancer bone metastases as well as prevention of bone loss on letrozole.  Given persistent diarrhea from trastuzumab and new start of letrozole, we agreed to hold off on starting at this time and will re-discuss at her next visit.      3.  Neuropathy:  Taxol  induced.  Discussed any improvement is typically within the first year and that neuropathy is permanent in approximately 17% of patients treated with Taxol.  Recommend continuing vitamin B6 until 1 year out from completion of Taxol (12/2021).    4.  At risk for cardiomyopathy:  2/2 h/o anthracycline and HER2 targeted therapy.  Echocardiogram performed on 7/15/2021 was reviewed and shows a retained LVEF.    5.  Follow Up:  Cancel future Herceptin appointments.  Left breast screening mammogram at [facility] within 1-2 weeks. In person visit with me in approximately 3 months.      Again, thank you for allowing me to participate in the care of your patient.        Sincerely,        Perlita Alvarez MD

## 2021-08-05 ENCOUNTER — MYC MEDICAL ADVICE (OUTPATIENT)
Dept: ONCOLOGY | Facility: CLINIC | Age: 76
End: 2021-08-05

## 2021-08-12 ENCOUNTER — MYC MEDICAL ADVICE (OUTPATIENT)
Dept: ONCOLOGY | Facility: CLINIC | Age: 76
End: 2021-08-12

## 2021-08-16 ENCOUNTER — NURSE TRIAGE (OUTPATIENT)
Dept: NURSING | Facility: CLINIC | Age: 76
End: 2021-08-16

## 2021-08-16 NOTE — TELEPHONE ENCOUNTER
Patient calling. She is reporting she   Is a BREAST CA patientf, just finishing up her CA infusions.  She is asking if she can get a a 3rd vaccine.    She has an appointment for Mammogram today , and wants to get her Booster for compromised immune system.    Advise her to go to  Pharmacy at   [place], and ask for vaccine .    Angelita Dueñas, RN RN  [facility] Triage/refill nurse    Additional Information    Information only question and nurse able to answer    Protocols used: INFORMATION ONLY CALL - NO TRIAGE-A-[place]

## 2021-08-17 ENCOUNTER — ANCILLARY PROCEDURE (OUTPATIENT)
Dept: MAMMOGRAPHY | Facility: CLINIC | Age: 76
End: 2021-08-17
Attending: INTERNAL MEDICINE
Payer: COMMERCIAL

## 2021-08-17 DIAGNOSIS — Z12.31 VISIT FOR SCREENING MAMMOGRAM: ICD-10-CM

## 2021-08-17 PROCEDURE — 77067 SCR MAMMO BI INCL CAD: CPT | Mod: TC | Performed by: RADIOLOGY

## 2021-08-26 ENCOUNTER — MYC MEDICAL ADVICE (OUTPATIENT)
Dept: ONCOLOGY | Facility: CLINIC | Age: 76
End: 2021-08-26

## 2021-08-26 DIAGNOSIS — Z17.0 MALIGNANT NEOPLASM OF OVERLAPPING SITES OF RIGHT BREAST IN FEMALE, ESTROGEN RECEPTOR POSITIVE (H): ICD-10-CM

## 2021-08-26 DIAGNOSIS — C50.811 MALIGNANT NEOPLASM OF OVERLAPPING SITES OF RIGHT BREAST IN FEMALE, ESTROGEN RECEPTOR POSITIVE (H): ICD-10-CM

## 2021-08-26 RX ORDER — LETROZOLE 2.5 MG/1
TABLET, FILM COATED ORAL
Qty: 30 TABLET | Refills: 0 | OUTPATIENT
Start: 2021-08-26

## 2021-08-26 NOTE — TELEPHONE ENCOUNTER
1143 am - Spoke to Talya.  The refill was sent on 8/3/21 to [facility] - [facility].  90 tabs and 3 refills.  Patient will contact her pharmacy.

## 2021-08-31 ENCOUNTER — PATIENT OUTREACH (OUTPATIENT)
Dept: ONCOLOGY | Facility: CLINIC | Age: 76
End: 2021-08-31

## 2021-08-31 DIAGNOSIS — Z17.0 MALIGNANT NEOPLASM OF OVERLAPPING SITES OF RIGHT BREAST IN FEMALE, ESTROGEN RECEPTOR POSITIVE (H): Primary | ICD-10-CM

## 2021-08-31 DIAGNOSIS — C50.811 MALIGNANT NEOPLASM OF OVERLAPPING SITES OF RIGHT BREAST IN FEMALE, ESTROGEN RECEPTOR POSITIVE (H): Primary | ICD-10-CM

## 2021-08-31 RX ORDER — LETROZOLE 2.5 MG/1
2.5 TABLET, FILM COATED ORAL DAILY
Qty: 30 TABLET | Refills: 0 | Status: SHIPPED | OUTPATIENT
Start: 2021-08-31 | End: 2021-11-15

## 2021-08-31 RX ORDER — ANASTROZOLE 1 MG/1
1 TABLET ORAL DAILY
Qty: 90 TABLET | Refills: 3
Start: 2021-08-31 | End: 2022-06-08

## 2021-08-31 NOTE — PROGRESS NOTES
Called prescription for anastrozole to [facility] pharmacy. Pharmacist cannot send it in the mail until tomorrow, he cannot send it overnight. He reports the mail is taking at least a week to deliver the meds to patients.    A refill of Letrozole was sent to [facility], patient instructed to continue Letrozole one more month and then switch to anastrozole.   Reason medication was changed is due to her ability to get the anastrozole for free through [facility] pharmacy.

## 2021-09-01 DIAGNOSIS — I10 HYPERTENSION, UNSPECIFIED TYPE: ICD-10-CM

## 2021-09-01 DIAGNOSIS — L30.9 DERMATITIS: ICD-10-CM

## 2021-09-05 RX ORDER — AMLODIPINE BESYLATE 5 MG/1
5 TABLET ORAL 2 TIMES DAILY
Qty: 180 TABLET | Refills: 0 | Status: SHIPPED | OUTPATIENT
Start: 2021-09-05 | End: 2021-12-01

## 2021-09-05 RX ORDER — POTASSIUM CHLORIDE 1500 MG/1
40 TABLET, EXTENDED RELEASE ORAL 3 TIMES DAILY
Qty: 180 TABLET | Refills: 0 | Status: SHIPPED | OUTPATIENT
Start: 2021-09-05 | End: 2021-11-19

## 2021-09-05 RX ORDER — TRIAMCINOLONE ACETONIDE 1 MG/G
CREAM TOPICAL 2 TIMES DAILY PRN
Qty: 80 G | Refills: 0 | Status: SHIPPED | OUTPATIENT
Start: 2021-09-05 | End: 2022-03-04

## 2021-09-05 RX ORDER — BENAZEPRIL HYDROCHLORIDE 20 MG/1
TABLET ORAL
Qty: 180 TABLET | Refills: 0 | Status: SHIPPED | OUTPATIENT
Start: 2021-09-05 | End: 2021-12-01

## 2021-09-05 RX ORDER — FUROSEMIDE 80 MG
80 TABLET ORAL 2 TIMES DAILY
Qty: 180 TABLET | Refills: 0 | Status: SHIPPED | OUTPATIENT
Start: 2021-09-05 | End: 2021-12-01

## 2021-09-05 NOTE — TELEPHONE ENCOUNTER
Medication is being filled for 1 time refill only due to:  Patient needs to be seen because need wellness exam.   Noelle Zavala RN

## 2021-09-06 RX ORDER — LETROZOLE 2.5 MG/1
TABLET, FILM COATED ORAL
Qty: 30 TABLET | OUTPATIENT
Start: 2021-09-06

## 2021-09-23 DIAGNOSIS — Z17.0 MALIGNANT NEOPLASM OF OVERLAPPING SITES OF RIGHT BREAST IN FEMALE, ESTROGEN RECEPTOR POSITIVE (H): ICD-10-CM

## 2021-09-23 DIAGNOSIS — C50.811 MALIGNANT NEOPLASM OF OVERLAPPING SITES OF RIGHT BREAST IN FEMALE, ESTROGEN RECEPTOR POSITIVE (H): ICD-10-CM

## 2021-09-24 RX ORDER — LETROZOLE 2.5 MG/1
TABLET, FILM COATED ORAL
Qty: 30 TABLET | Refills: 0 | OUTPATIENT
Start: 2021-09-24

## 2021-09-26 ENCOUNTER — HEALTH MAINTENANCE LETTER (OUTPATIENT)
Age: 76
End: 2021-09-26

## 2021-11-14 NOTE — PROGRESS NOTES
Oncology Follow Up:  Date on this visit: 11/15/2021    Diagnosis:  ER positive, HER-2 positive right breast cancer, clinical stage T2N0M0 s/p THP-ddAC, right breast mastectomy ([id number]), SLN biopsy, and 4 months adjuvant HP.  She continues on letrozole.    Primary Physician: Cole Weston     History Of Present Illness:  Ms. Zuleta is a 76 year old female with right breast cancer.  Routine screening mammogram in 07/2020 showed right breast asymmetry.  Diagnostic mammograms and ultrasound showed a 2.7 cm mass in the right breast at 12:00, 5 cm from the nipple with ductal extension including a 6 mm mass at 3 cm from the nipple.  Contrast enhanced mammogram showed the right breast mass, including extension, to measure 4.9 cm.  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  Estrogen receptor was moderate in 50% and progesterone receptor staining was negative.  HER-2 was negative by IHC; HER2 FISH showed approximately 10% of tumor cells to have 6.8 HER2 signals/nucleus and a HER2/CEN17 ratio of 1.6.  The HER2 positive cells were noted to be the large pleomorphic cells.  Multiple neutrophils were noted to be infiltrating through tumor stroma.    She received 12 weeks of neoadjuvant Taxol, Herceptin, and pertuzumab from 10/7/2020 - 12/22/2020.  She received 4 cycles of dose dense adriamycin and cyclophosphamide from 12/29/2020 - 2/9/2021.  She underwent right breast mastectomy and sentinel lymph node biopsy on 3/8/2021.  Pathology showed rare residual tumor cells with therapy related changes in the tumor bed.  Residual tumor is <1% of the tumor bed volume.  There was no lymphovascular invasion and surgical margins were negative.  A single sentinel lymph node was benign.  We discussed starting letrozole at our clinic visit on 3/30/2021, unfortunately she did not receive the prescription and so did not start it.  She was initiated on adjuvant HER2 targeted  therapy 4/13/2021.  She received 4 cycles of adjuvant Phesgo, then discontinued due to diarrhea.  She was then on biosimilar trastuzumab (Trazimera) since 7/6/2021, however, continued to have diarrhea refractory to antidiarrheals, therefore all HER2 targeted therapy was abandoned on 8/3/2021.  Of note, she had received 7 months total HER2 treatment (3 months neoadjuvant + 4 months adjuvant).      Interval History:  Ms. Zuleta comes into clinic today for routine breast cancer follow-up.  Despite stopping trastuzumab biosimilar, Trazimera, approximately 3 months ago she continues to have diarrhea.  She is having approximately 5 loose and very thin stools per day.  She reports associated abdominal cramping.  It interferes with daily functionality.  She has been trying to increase walking, however is not able to go very far from home, due to urgently needing a bathroom.  She has had no nausea or vomiting.  The last couple of weeks she has noticed a change in her voice.  She will wake up in the morning with a normal voice, will then have a congested cough, and her voice will then be squeaky the rest of the day.  She feels that she has thick mucus that is accumulating at the base of her throat when she awakens.  She has difficulty coughing it up.  She denies shortness of breath or chest pain.  She denies concerning breast lumps or masses.  She reports dryness of the skin of her bilateral lower extremities as well as brownish discoloration.  She is wrapping both of the lower extremities.  She has lost the hair in her nose and is wondering if this will grow back.  At times she develops a sore in the nose.  The remainder of a complete 12 point review of systems is reviewed with patient was negative with exception that mentioned above.    Past Medical/Surgical History:  1.  Breast cancer as per HPI  2.  Hypertension  3.  Diabetes  4.  COPD  5.  Hypothyroidism  6.  Hypohidrosis ectodermal dysplasia    Allergies:  Allergies  as of 11/15/2021 - Reviewed 08/03/2021   Allergen Reaction Noted     Bacitracin-neomycin-polymyxin  04/18/2003     Latex  09/30/2005     Current Medications:  Current Outpatient Medications   Medication Sig Dispense Refill     amLODIPine (NORVASC) 5 MG tablet TAKE 1 TABLET (5 MG) BY MOUTH 2 TIMES DAILY 180 tablet 0     anastrozole (ARIMIDEX) 1 MG tablet Take 1 tablet (1 mg) by mouth daily 90 tablet 3     atorvastatin (LIPITOR) 40 MG tablet Take 20 mg by mouth 2 times daily        benazepril (LOTENSIN) 20 MG tablet TAKE 1 TABLET BY MOUTH TWO TIMES A DAY. 180 tablet 0     furosemide (LASIX) 80 MG tablet TAKE 1 TABLET (80 MG) BY MOUTH 2 TIMES DAILY 180 tablet 0     letrozole (FEMARA) 2.5 MG tablet Take 1 tablet (2.5 mg) by mouth daily 30 tablet 0     letrozole (FEMARA) 2.5 MG tablet Take 1 tablet (2.5 mg) by mouth daily 90 tablet 3     loperamide (IMODIUM A-D) 1 MG/7.5ML Take 15 mLs (2 mg) by mouth 4 times daily as needed for diarrhea 237 mL 1     loperamide (IMODIUM A-D) 2 MG tablet Take 2 tablets with first loose stool then 1 tablet with each subsequent loose stool up to 8 tablets daily 100 tablet 3     ondansetron (ZOFRAN-ODT) 8 MG ODT tab Take 1 tablet (8 mg) by mouth every 8 hours as needed for nausea 90 tablet 1     potassium chloride ER (KLOR-CON M) 20 MEQ CR tablet TAKE 2 TABLETS (40 MEQ) BY MOUTH 3 TIMES DAILY 180 tablet 0     pyridOXINE (VITAMIN B6) 100 MG TABS Take 100 mg by mouth daily       triamcinolone (KENALOG) 0.1 % external cream APPLY TOPICALLY 2 TIMES DAILY AS NEEDED FOR IRRITATION 80 g 0     Vitamin D, Cholecalciferol, 25 MCG (1000 UT) TABS Take 1,000 Units by mouth daily        Family and Social History:  Please see initial consultation dated 9/21/2020 for further details.  Breast Actionable Panel on 10/1/2020 was negative for mutation.    Physical Exam:  General:  Well appearing adult female in NAD.  Alert and oriented x 3  HEENT:  Normocephalic.  Sclera anicteric.  MMM.  No lesions of the  oropharynx.  (+) alopecia, wearing a wig.  Lymph:  No palpable cervical, supraclavicular, or axillary LAD.  Chest:  CTA bilaterally.  No wheezes or crackles.  CV:  RRR.  Nl S1 and S2.    Breast:  Right breast mastectomy.  There are no discretely palpable masses of either the right chest wall or within the left breast.  Left nipple is everted.  Abd:  Soft/ND.    Ext:  No pitting edema of the bilateral lower extremities.   Musculo:  Full ROM of the bilateral upper extremities.  Neuro:  Cranial nerves grossly intact.  Psych:  Mood and affect appear normal.  Skin:  Thickened, pocked skin in the bilateral axilla, left > right and now along the left lateral chest wall.    Laboratory/Imaging Studies  8/17/2021 Left breast screening mammogram:  Scattered fibroglandular densities.  No change from prior exam.    8/2/2021 Labs:  Electrolytes, creatinine and LFTs are wnl.  GFR is low at 61 mL/min  WBC, hemoglobin, and platelets are wnl.  MCV is elevated at 104    ASSESSMENT/PLAN:  76 year old female with a history of hypohidrosis ectodermal dysplasia, HTN, dyslipidemia, COPD, diabetes, and OA with a h/o T2N0M0, right breast cancer, that is ER positive (50%), CA negative, and HER2 positive.  She is s/p treatment with 12 weeks THP, 4 cycles ddAC, right breast mastectomy ([id number]), right axillary SLNBx, and 4 months adjuvant HER2 therapy.    1.  Right breast cancer:   Ms. Zuleta is approximately 8 months out from excision of a right breast cancer.  At last visit, we elected to stop Herceptin due to intolerable GI toxicity.  She is therefore currently on treatment with letrozole alone.  She is tolerating the medication well.  Plan is to complete a minimum of 5 years of letrozole.    She is asymptomatic of disease recurrence on history taken today.  Left breast screening mammogram performed on 8/21/2021 was reviewed and is without concerning findings.  Will schedule return visit in 3 months.    2.  Hypohidrotic ectodermal  dysplasia:  Prior to treatment, skin most affected was in the bilateral axillae.  On exam today, this has spread down the lateral chest wall.  Not clear whether this may be due to immune reconstitution following completion of chemotherapy.  She has not seen a dermatologist in some time.  I have placed a referral for dermatology visit.    3.  Voice changes:  This too may be due to hypohidrotic ectodermal dysplasia as it can involve the mucous membranes of the respiratory and GI tract. Will refer to ENT for further evaluation.  Recommend mucolytic such as Mucinex in the meantime.  Can also try an antihistamine such as Zyrtec or Allegra and a PPI.    4.  Bone Health/Osteopenia:  DEXA bone density scan 4/13/2021 with a lowest T-score of -1.2 in the right femoral neck c/w osteopenia.  I recommend starting treatment with Zometa 4 mg IV every 6 months x 2 years for both prevention of breast cancer bone metastases as well as prevention of bone loss on letrozole.  We reviewed the potential side effects of Zometa including myalgias, arthralgias, fevers, chills, and risk of osteonecrosis of the jaw.     She is agreeable to treatment with Zometa.  She has not seen a dentist since starting her breast cancer treatment.  I will refer to dentistry for clearance for Zometa.  She will contact us once she has seen her dentist so that we may scheduled first Zometa infusion.    5.  Neuropathy:  Stable to improved since last visit.  Okay to stop vitamin B6 supplement at this time.    6.  At risk for cardiomyopathy:  2/2 h/o anthracycline and HER2 targeted therapy as well as personal history of hypertension, hyperlipidemia, and diabetes.  Post-treatment echocardiogram performed on 7/15/2021 was reviewed and shows a retained LVEF.  I encouraged follow up with her PCP for management of cardiac risk factors.     7.  H/o anthracycline exposure:  Will need a CBC once every 6 months until 5 years out.  CBC drawn in [facility] was reviewed and was  without concerning findings.    8.  Diarrhea:  Recommend starting a fiber supplement.  Continue imodium and lomotil.    9.  Insomnia:  Prescription for ambien was refilled today.    10.  Follow Up:  Visits with dentistry, dermatology and ENT within 1-2 months.  Labs and visit with me in 3 months.    50 minutes spent on the date of the encounter doing chart review, review of test results, interpretation of tests, patient visit, documentation and discussion with other provider(s).

## 2021-11-15 ENCOUNTER — ONCOLOGY VISIT (OUTPATIENT)
Dept: ONCOLOGY | Facility: CLINIC | Age: 76
End: 2021-11-15
Attending: INTERNAL MEDICINE
Payer: COMMERCIAL

## 2021-11-15 DIAGNOSIS — T45.1X5A CHEMOTHERAPY INDUCED DIARRHEA: ICD-10-CM

## 2021-11-15 DIAGNOSIS — K52.1 CHEMOTHERAPY INDUCED DIARRHEA: ICD-10-CM

## 2021-11-15 DIAGNOSIS — Z92.21 STATUS POST CHEMOTHERAPY: ICD-10-CM

## 2021-11-15 DIAGNOSIS — M85.851 OSTEOPENIA OF NECK OF RIGHT FEMUR: ICD-10-CM

## 2021-11-15 DIAGNOSIS — Z01.20 ENCOUNTER FOR ROUTINE DENTAL EXAMINATION: ICD-10-CM

## 2021-11-15 DIAGNOSIS — Q82.4: ICD-10-CM

## 2021-11-15 DIAGNOSIS — C50.811 MALIGNANT NEOPLASM OF OVERLAPPING SITES OF RIGHT BREAST IN FEMALE, ESTROGEN RECEPTOR POSITIVE (H): Primary | ICD-10-CM

## 2021-11-15 DIAGNOSIS — F19.982 DRUG-INDUCED INSOMNIA (H): ICD-10-CM

## 2021-11-15 DIAGNOSIS — R49.9 CHANGE IN VOICE: ICD-10-CM

## 2021-11-15 DIAGNOSIS — Z17.0 MALIGNANT NEOPLASM OF OVERLAPPING SITES OF RIGHT BREAST IN FEMALE, ESTROGEN RECEPTOR POSITIVE (H): Primary | ICD-10-CM

## 2021-11-15 PROCEDURE — 999N000102 HC STATISTIC NO CHARGE CLINIC VISIT

## 2021-11-15 PROCEDURE — 99215 OFFICE O/P EST HI 40 MIN: CPT | Performed by: INTERNAL MEDICINE

## 2021-11-15 RX ORDER — ZOLPIDEM TARTRATE 5 MG/1
5 TABLET ORAL
Qty: 30 TABLET | Refills: 1 | Status: SHIPPED | OUTPATIENT
Start: 2021-11-15 | End: 2021-12-07

## 2021-11-15 NOTE — LETTER
11/15/2021         RE: Talya Zuleta  [address] [facility] [address]        Dear Colleague,    Thank you for referring your patient, Talya Zuleta, to the [facility]. Please see a copy of my visit note below.      Oncology Follow Up:  Date on this visit: 11/15/2021    Diagnosis:  ER positive, HER-2 positive right breast cancer, clinical stage T2N0M0 s/p THP-ddAC, right breast mastectomy ([id number]), SLN biopsy, and 4 months adjuvant HP.  She continues on letrozole.    Primary Physician: Cole Weston     History Of Present Illness:  Ms. Zuleta is a 76 year old female with right breast cancer.  Routine screening mammogram in 07/2020 showed right breast asymmetry.  Diagnostic mammograms and ultrasound showed a 2.7 cm mass in the right breast at 12:00, 5 cm from the nipple with ductal extension including a 6 mm mass at 3 cm from the nipple.  Contrast enhanced mammogram showed the right breast mass, including extension, to measure 4.9 cm.  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  Estrogen receptor was moderate in 50% and progesterone receptor staining was negative.  HER-2 was negative by IHC; HER2 FISH showed approximately 10% of tumor cells to have 6.8 HER2 signals/nucleus and a HER2/CEN17 ratio of 1.6.  The HER2 positive cells were noted to be the large pleomorphic cells.  Multiple neutrophils were noted to be infiltrating through tumor stroma.    She received 12 weeks of neoadjuvant Taxol, Herceptin, and pertuzumab from 10/7/2020 - 12/22/2020.  She received 4 cycles of dose dense adriamycin and cyclophosphamide from 12/29/2020 - 2/9/2021.  She underwent right breast mastectomy and sentinel lymph node biopsy on 3/8/2021.  Pathology showed rare residual tumor cells with therapy related changes in the tumor bed.  Residual tumor is <1% of the tumor bed volume.  There was no lymphovascular invasion  and surgical margins were negative.  A single sentinel lymph node was benign.  We discussed starting letrozole at our clinic visit on 3/30/2021, unfortunately she did not receive the prescription and so did not start it.  She was initiated on adjuvant HER2 targeted therapy 4/13/2021.  She received 4 cycles of adjuvant Phesgo, then discontinued due to diarrhea.  She was then on biosimilar trastuzumab (Trazimera) since 7/6/2021, however, continued to have diarrhea refractory to antidiarrheals, therefore all HER2 targeted therapy was abandoned on 8/3/2021.  Of note, she had received 7 months total HER2 treatment (3 months neoadjuvant + 4 months adjuvant).      Interval History:  Ms. Zuleta comes into clinic today for routine breast cancer follow-up.  Despite stopping trastuzumab biosimilar, Trazimera, approximately 3 months ago she continues to have diarrhea.  She is having approximately 5 loose and very thin stools per day.  She reports associated abdominal cramping.  It interferes with daily functionality.  She has been trying to increase walking, however is not able to go very far from home, due to urgently needing a bathroom.  She has had no nausea or vomiting.  The last couple of weeks she has noticed a change in her voice.  She will wake up in the morning with a normal voice, will then have a congested cough, and her voice will then be squeaky the rest of the day.  She feels that she has thick mucus that is accumulating at the base of her throat when she awakens.  She has difficulty coughing it up.  She denies shortness of breath or chest pain.  She denies concerning breast lumps or masses.  She reports dryness of the skin of her bilateral lower extremities as well as brownish discoloration.  She is wrapping both of the lower extremities.  She has lost the hair in her nose and is wondering if this will grow back.  At times she develops a sore in the nose.  The remainder of a complete 12 point review of systems  is reviewed with patient was negative with exception that mentioned above.    Past Medical/Surgical History:  1.  Breast cancer as per HPI  2.  Hypertension  3.  Diabetes  4.  COPD  5.  Hypothyroidism  6.  Hypohidrosis ectodermal dysplasia    Allergies:  Allergies as of 11/15/2021 - Reviewed 08/03/2021   Allergen Reaction Noted     Bacitracin-neomycin-polymyxin  04/18/2003     Latex  09/30/2005     Current Medications:  Current Outpatient Medications   Medication Sig Dispense Refill     amLODIPine (NORVASC) 5 MG tablet TAKE 1 TABLET (5 MG) BY MOUTH 2 TIMES DAILY 180 tablet 0     anastrozole (ARIMIDEX) 1 MG tablet Take 1 tablet (1 mg) by mouth daily 90 tablet 3     atorvastatin (LIPITOR) 40 MG tablet Take 20 mg by mouth 2 times daily        benazepril (LOTENSIN) 20 MG tablet TAKE 1 TABLET BY MOUTH TWO TIMES A DAY. 180 tablet 0     furosemide (LASIX) 80 MG tablet TAKE 1 TABLET (80 MG) BY MOUTH 2 TIMES DAILY 180 tablet 0     letrozole (FEMARA) 2.5 MG tablet Take 1 tablet (2.5 mg) by mouth daily 30 tablet 0     letrozole (FEMARA) 2.5 MG tablet Take 1 tablet (2.5 mg) by mouth daily 90 tablet 3     loperamide (IMODIUM A-D) 1 MG/7.5ML Take 15 mLs (2 mg) by mouth 4 times daily as needed for diarrhea 237 mL 1     loperamide (IMODIUM A-D) 2 MG tablet Take 2 tablets with first loose stool then 1 tablet with each subsequent loose stool up to 8 tablets daily 100 tablet 3     ondansetron (ZOFRAN-ODT) 8 MG ODT tab Take 1 tablet (8 mg) by mouth every 8 hours as needed for nausea 90 tablet 1     potassium chloride ER (KLOR-CON M) 20 MEQ CR tablet TAKE 2 TABLETS (40 MEQ) BY MOUTH 3 TIMES DAILY 180 tablet 0     pyridOXINE (VITAMIN B6) 100 MG TABS Take 100 mg by mouth daily       triamcinolone (KENALOG) 0.1 % external cream APPLY TOPICALLY 2 TIMES DAILY AS NEEDED FOR IRRITATION 80 g 0     Vitamin D, Cholecalciferol, 25 MCG (1000 UT) TABS Take 1,000 Units by mouth daily        Family and Social History:  Please see initial consultation  dated 9/21/2020 for further details.  Breast Actionable Panel on 10/1/2020 was negative for mutation.    Physical Exam:  General:  Well appearing adult female in NAD.  Alert and oriented x 3  HEENT:  Normocephalic.  Sclera anicteric.  MMM.  No lesions of the oropharynx.  (+) alopecia, wearing a wig.  Lymph:  No palpable cervical, supraclavicular, or axillary LAD.  Chest:  CTA bilaterally.  No wheezes or crackles.  CV:  RRR.  Nl S1 and S2.    Breast:  Right breast mastectomy.  There are no discretely palpable masses of either the right chest wall or within the left breast.  Left nipple is everted.  Abd:  Soft/ND.    Ext:  No pitting edema of the bilateral lower extremities.   Musculo:  Full ROM of the bilateral upper extremities.  Neuro:  Cranial nerves grossly intact.  Psych:  Mood and affect appear normal.  Skin:  Thickened, pocked skin in the bilateral axilla, left > right and now along the left lateral chest wall.    Laboratory/Imaging Studies  8/17/2021 Left breast screening mammogram:  Scattered fibroglandular densities.  No change from prior exam.    8/2/2021 Labs:  Electrolytes, creatinine and LFTs are wnl.  GFR is low at 61 mL/min  WBC, hemoglobin, and platelets are wnl.  MCV is elevated at 104    ASSESSMENT/PLAN:  76 year old female with a history of hypohidrosis ectodermal dysplasia, HTN, dyslipidemia, COPD, diabetes, and OA with a h/o T2N0M0, right breast cancer, that is ER positive (50%), NM negative, and HER2 positive.  She is s/p treatment with 12 weeks THP, 4 cycles ddAC, right breast mastectomy ([id number]), right axillary SLNBx, and 4 months adjuvant HER2 therapy.    1.  Right breast cancer:   Ms. Zuleta is approximately 8 months out from excision of a right breast cancer.  At last visit, we elected to stop Herceptin due to intolerable GI toxicity.  She is therefore currently on treatment with letrozole alone.  She is tolerating the medication well.  Plan is to complete a minimum of 5 years of  letrozole.    She is asymptomatic of disease recurrence on history taken today.  Left breast screening mammogram performed on 8/21/2021 was reviewed and is without concerning findings.  Will schedule return visit in 3 months.    2.  Hypohidrotic ectodermal dysplasia:  Prior to treatment, skin most affected was in the bilateral axillae.  On exam today, this has spread down the lateral chest wall.  Not clear whether this may be due to immune reconstitution following completion of chemotherapy.  She has not seen a dermatologist in some time.  I have placed a referral for dermatology visit.    3.  Voice changes:  This too may be due to hypohidrotic ectodermal dysplasia as it can involve the mucous membranes of the respiratory and GI tract. Will refer to ENT for further evaluation.  Recommend mucolytic such as Mucinex in the meantime.  Can also try an antihistamine such as Zyrtec or Allegra and a PPI.    4.  Bone Health/Osteopenia:  DEXA bone density scan 4/13/2021 with a lowest T-score of -1.2 in the right femoral neck c/w osteopenia.  I recommend starting treatment with Zometa 4 mg IV every 6 months x 2 years for both prevention of breast cancer bone metastases as well as prevention of bone loss on letrozole.  We reviewed the potential side effects of Zometa including myalgias, arthralgias, fevers, chills, and risk of osteonecrosis of the jaw.     She is agreeable to treatment with Zometa.  She has not seen a dentist since starting her breast cancer treatment.  I will refer to dentistry for clearance for Zometa.  She will contact us once she has seen her dentist so that we may scheduled first Zometa infusion.    5.  Neuropathy:  Stable to improved since last visit.  Okay to stop vitamin B6 supplement at this time.    6.  At risk for cardiomyopathy:  2/2 h/o anthracycline and HER2 targeted therapy as well as personal history of hypertension, hyperlipidemia, and diabetes.  Post-treatment echocardiogram performed on  7/15/2021 was reviewed and shows a retained LVEF.  I encouraged follow up with her PCP for management of cardiac risk factors.     7.  H/o anthracycline exposure:  Will need a CBC once every 6 months until 5 years out.  CBC drawn in [facility] was reviewed and was without concerning findings.    8.  Diarrhea:  Recommend starting a fiber supplement.  Continue imodium and lomotil.    9.  Insomnia:  Prescription for ambien was refilled today.    10.  Follow Up:  Visits with dentistry, dermatology and ENT within 1-2 months.  Labs and visit with me in 3 months.    50 minutes spent on the date of the encounter doing chart review, review of test results, interpretation of tests, patient visit, documentation and discussion with other provider(s).      Perlita Alvarez MD

## 2021-11-19 ENCOUNTER — PATIENT OUTREACH (OUTPATIENT)
Dept: CARE COORDINATION | Facility: CLINIC | Age: 76
End: 2021-11-19
Payer: COMMERCIAL

## 2021-11-19 DIAGNOSIS — E78.5 HYPERLIPIDEMIA LDL GOAL <100: Primary | ICD-10-CM

## 2021-11-19 DIAGNOSIS — I10 HYPERTENSION, UNSPECIFIED TYPE: ICD-10-CM

## 2021-11-19 RX ORDER — ATORVASTATIN CALCIUM 20 MG/1
20 TABLET, FILM COATED ORAL DAILY
Qty: 90 TABLET | Refills: 1 | Status: SHIPPED | OUTPATIENT
Start: 2021-11-19 | End: 2021-12-07

## 2021-11-19 RX ORDER — POTASSIUM CHLORIDE 1500 MG/1
40 TABLET, EXTENDED RELEASE ORAL 3 TIMES DAILY
Qty: 180 TABLET | Refills: 0 | Status: SHIPPED | OUTPATIENT
Start: 2021-11-19 | End: 2021-12-07

## 2021-11-19 NOTE — TELEPHONE ENCOUNTER
"Routing refill request to provider for review/approval because:  Labs not current:  LDL  A break in medication?    Requested Prescriptions   Pending Prescriptions Disp Refills     potassium chloride ER (KLOR-CON M) 20 MEQ CR tablet    Prescription approved per [facility] Refill Protocol.  Sejal Alas RN on 11/19/2021 at 3:03 PM   180 tablet 0     Sig: Take 2 tablets (40 mEq) by mouth 3 times daily       Potassium Supplements Protocol Passed - 11/19/2021  1:35 PM        Passed - Recent (12 mo) or future (30 days) visit within the authorizing provider's department     Patient has had an office visit with the authorizing provider or a provider within the authorizing providers department within the previous 12 mos or has a future within next 30 days. See \"Patient Info\" tab in inbasket, or \"Choose Columns\" in Meds & Orders section of the refill encounter.              Passed - Medication is active on med list        Passed - Patient is age 18 or older        Passed - Normal serum potassium in past 12 months     Recent Labs   Lab Test 08/02/21  1031   POTASSIUM 3.8               atorvastatin (LIPITOR) 20 MG tablet 30 tablet 0     Sig: Take 1 tablet (20 mg) by mouth daily       Statins Protocol Failed - 11/19/2021  1:35 PM        Failed - LDL on file in past 12 months     Recent Labs   Lab Test 09/28/20  1058   LDL 69           Passed - No abnormal creatine kinase in past 12 months     No lab results found.           Passed - Recent (12 mo) or future (30 days) visit within the authorizing provider's specialty     Patient has had an office visit with the authorizing provider or a provider within the authorizing providers department within the previous 12 mos or has a future within next 30 days. See \"Patient Info\" tab in inbasket, or \"Choose Columns\" in Meds & Orders section of the refill encounter.            Passed - Medication is active on med list        Passed - Patient is age 18 or older        Passed - No active " pregnancy on record        Passed - No positive pregnancy test in past 12 months           Sejal Alas RN on 11/19/2021 at 3:03 PM

## 2021-11-19 NOTE — TELEPHONE ENCOUNTER
Patient called the clinic requesting refills for the atorvastatin (LIPITOR) 40 MG tablet (completely out)  and potassium chloride ER (KLOR-CON M) 20 MEQ CR tablet (has a few doses left).      Patient has schedule an appointment for 12/7/21

## 2021-11-24 ENCOUNTER — TELEPHONE (OUTPATIENT)
Dept: OTOLARYNGOLOGY | Facility: CLINIC | Age: 76
End: 2021-11-24
Payer: COMMERCIAL

## 2021-11-24 NOTE — TELEPHONE ENCOUNTER
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

## 2021-11-26 NOTE — TELEPHONE ENCOUNTER
FUTURE VISIT INFORMATION      FUTURE VISIT INFORMATION:    Date: 2/18/22    Time: 1 PM    Location: [facility]  REFERRAL INFORMATION:    Referring provider: Dr. 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 Alvarez Epic

## 2021-11-29 DIAGNOSIS — I10 HYPERTENSION, UNSPECIFIED TYPE: ICD-10-CM

## 2021-12-01 RX ORDER — FUROSEMIDE 80 MG
TABLET ORAL
Qty: 180 TABLET | Refills: 0 | Status: SHIPPED | OUTPATIENT
Start: 2021-12-01 | End: 2021-12-07

## 2021-12-01 RX ORDER — AMLODIPINE BESYLATE 5 MG/1
TABLET ORAL
Qty: 180 TABLET | Refills: 0 | Status: SHIPPED | OUTPATIENT
Start: 2021-12-01 | End: 2021-12-07

## 2021-12-01 RX ORDER — BENAZEPRIL HYDROCHLORIDE 20 MG/1
TABLET ORAL
Qty: 180 TABLET | Refills: 0 | Status: SHIPPED | OUTPATIENT
Start: 2021-12-01 | End: 2021-12-07

## 2021-12-01 NOTE — TELEPHONE ENCOUNTER
Medication is being filled for 1 time refill only due to:  Patient needs to be seen because need annual exam.   Pt has upcoming appt.  Noelle Zavala RN

## 2021-12-01 NOTE — PROGRESS NOTES
Social Work Follow-Up Encounter Visit  Oncology Clinic    Data/Intervention:  Patient Name:  Talya Zuleta  /Age:  1945 (76 year old)    Reason for Follow-Up: assistance with metro mobility application     Collaborated With:    -patient        Assessment:  SW called following up on referral from care team about patient's request for assistance with metro mobility application. SW intended to speak with patient about any additional resources or questions they may have regarding application process for metro mobility or their other needed supports. SW called, no answer vm left with SW's contact information as well as the phone number and website for metro mobility.      Plan:  Previously provided patient/family with writer's contact information and availability.   SW will await patient's return call and remain available as needed.      Gi CABA, LAURA  - Oncology  Phone : [phone]  Pager: [phone]

## 2021-12-07 ENCOUNTER — OFFICE VISIT (OUTPATIENT)
Dept: FAMILY MEDICINE | Facility: CLINIC | Age: 76
End: 2021-12-07
Payer: COMMERCIAL

## 2021-12-07 VITALS
OXYGEN SATURATION: 98 % | HEART RATE: 91 BPM | TEMPERATURE: 97.5 F | BODY MASS INDEX: 26.54 KG/M2 | SYSTOLIC BLOOD PRESSURE: 114 MMHG | HEIGHT: 66 IN | DIASTOLIC BLOOD PRESSURE: 74 MMHG | WEIGHT: 165.13 LBS

## 2021-12-07 DIAGNOSIS — E78.5 HYPERLIPIDEMIA LDL GOAL <100: ICD-10-CM

## 2021-12-07 DIAGNOSIS — Z00.00 ROUTINE GENERAL MEDICAL EXAMINATION AT A HEALTH CARE FACILITY: ICD-10-CM

## 2021-12-07 DIAGNOSIS — Z11.59 NEED FOR HEPATITIS C SCREENING TEST: ICD-10-CM

## 2021-12-07 DIAGNOSIS — F19.982 DRUG-INDUCED INSOMNIA (H): ICD-10-CM

## 2021-12-07 DIAGNOSIS — Z72.0 TOBACCO ABUSE: ICD-10-CM

## 2021-12-07 DIAGNOSIS — I10 HYPERTENSION, UNSPECIFIED TYPE: ICD-10-CM

## 2021-12-07 DIAGNOSIS — R49.9 CHANGE IN VOICE: ICD-10-CM

## 2021-12-07 DIAGNOSIS — Z23 ENCOUNTER FOR IMMUNIZATION: ICD-10-CM

## 2021-12-07 DIAGNOSIS — J34.89 NASAL OBSTRUCTION: ICD-10-CM

## 2021-12-07 DIAGNOSIS — R53.83 FATIGUE, UNSPECIFIED TYPE: ICD-10-CM

## 2021-12-07 LAB
CHOLEST SERPL-MCNC: 166 MG/DL
FASTING STATUS PATIENT QL REPORTED: YES
HDLC SERPL-MCNC: 82 MG/DL
LDLC SERPL CALC-MCNC: 54 MG/DL
NONHDLC SERPL-MCNC: 84 MG/DL
TRIGL SERPL-MCNC: 149 MG/DL
TSH SERPL DL<=0.005 MIU/L-ACNC: 1.36 MU/L (ref 0.4–4)

## 2021-12-07 PROCEDURE — 99213 OFFICE O/P EST LOW 20 MIN: CPT | Mod: 25 | Performed by: FAMILY MEDICINE

## 2021-12-07 PROCEDURE — 0004A COVID-19,PF,PFIZER (12+ YRS): CPT | Performed by: FAMILY MEDICINE

## 2021-12-07 PROCEDURE — 36415 COLL VENOUS BLD VENIPUNCTURE: CPT | Performed by: FAMILY MEDICINE

## 2021-12-07 PROCEDURE — 84443 ASSAY THYROID STIM HORMONE: CPT | Performed by: FAMILY MEDICINE

## 2021-12-07 PROCEDURE — 80061 LIPID PANEL: CPT | Performed by: FAMILY MEDICINE

## 2021-12-07 PROCEDURE — 99397 PER PM REEVAL EST PAT 65+ YR: CPT | Performed by: FAMILY MEDICINE

## 2021-12-07 PROCEDURE — 91300 COVID-19,PF,PFIZER (12+ YRS): CPT | Performed by: FAMILY MEDICINE

## 2021-12-07 PROCEDURE — 86803 HEPATITIS C AB TEST: CPT | Performed by: FAMILY MEDICINE

## 2021-12-07 RX ORDER — VARENICLINE TARTRATE 1 MG/1
1 TABLET, FILM COATED ORAL 2 TIMES DAILY
Qty: 180 TABLET | Refills: 1 | Status: SHIPPED | OUTPATIENT
Start: 2021-12-07 | End: 2021-12-30

## 2021-12-07 RX ORDER — POTASSIUM CHLORIDE 1500 MG/1
40 TABLET, EXTENDED RELEASE ORAL 3 TIMES DAILY
Qty: 180 TABLET | Refills: 3 | Status: SHIPPED | OUTPATIENT
Start: 2021-12-07 | End: 2022-02-22

## 2021-12-07 RX ORDER — BENAZEPRIL HYDROCHLORIDE 20 MG/1
TABLET ORAL
Qty: 180 TABLET | Refills: 3 | Status: SHIPPED | OUTPATIENT
Start: 2021-12-07 | End: 2023-05-19

## 2021-12-07 RX ORDER — AMLODIPINE BESYLATE 5 MG/1
TABLET ORAL
Qty: 180 TABLET | Refills: 3 | Status: SHIPPED | OUTPATIENT
Start: 2021-12-07 | End: 2023-05-19

## 2021-12-07 RX ORDER — FUROSEMIDE 80 MG
TABLET ORAL
Qty: 180 TABLET | Refills: 3 | Status: SHIPPED | OUTPATIENT
Start: 2021-12-07 | End: 2023-05-19

## 2021-12-07 RX ORDER — ATORVASTATIN CALCIUM 20 MG/1
20 TABLET, FILM COATED ORAL DAILY
Qty: 90 TABLET | Refills: 3 | Status: SHIPPED | OUTPATIENT
Start: 2021-12-07 | End: 2023-05-19

## 2021-12-07 RX ORDER — ZOLPIDEM TARTRATE 5 MG/1
5 TABLET ORAL
Qty: 30 TABLET | Refills: 1 | Status: SHIPPED | OUTPATIENT
Start: 2021-12-07 | End: 2023-02-24

## 2021-12-07 ASSESSMENT — ENCOUNTER SYMPTOMS
CHILLS: 1
HEMATURIA: 0
PARESTHESIAS: 1
HEMATOCHEZIA: 0
COUGH: 1
ABDOMINAL PAIN: 1
DIZZINESS: 0
EYE PAIN: 0
SHORTNESS OF BREATH: 0
BREAST MASS: 0
FREQUENCY: 0
JOINT SWELLING: 1
MYALGIAS: 1
FEVER: 0
NAUSEA: 0
HEADACHES: 0
CONSTIPATION: 0
DYSURIA: 0
DIARRHEA: 1
SORE THROAT: 0
WEAKNESS: 1
PALPITATIONS: 0
HEARTBURN: 0
ARTHRALGIAS: 1

## 2021-12-07 ASSESSMENT — PAIN SCALES - GENERAL: PAINLEVEL: NO PAIN (0)

## 2021-12-07 ASSESSMENT — ACTIVITIES OF DAILY LIVING (ADL): CURRENT_FUNCTION: TRANSPORTATION REQUIRES ASSISTANCE

## 2021-12-07 ASSESSMENT — MIFFLIN-ST. JEOR: SCORE: 1242.97

## 2021-12-07 NOTE — PROGRESS NOTES
"SUBJECTIVE:   Talya Zuleta is a 76 year old female who presents for Preventive Visit.       Patient has been advised of split billing requirements and indicates understanding: Yes   Are you in the first 12 months of your Medicare coverage?  No    Healthy Habits:    In general, how would you rate your overall health?  Good    Frequency of exercise:  None    Do you usually eat at least 4 servings of fruit and vegetables a day, include whole grains    & fiber and avoid regularly eating high fat or \"junk\" foods?  Yes    Taking medications regularly:  Yes    Medication side effects:  Not applicable    Ability to successfully perform activities of daily living:  Transportation requires assistance    Home Safety:  No safety concerns identified    Hearing Impairment:  No hearing concerns    In the past 6 months, have you been bothered by leaking of urine? Yes    In general, how would you rate your overall mental or emotional health?  Good      PHQ-2 Total Score:    Additional concerns today:  No    Do you feel safe in your environment? Yes    Have you ever done Advance Care Planning? (For example, a Health Directive, POLST, or a discussion with a medical provider or your loved ones about your wishes): No, advance care planning information given to patient to review.  Patient declined advance care planning discussion at this time.       Fall risk  Fallen 2 or more times in the past year?: No  Any fall with injury in the past year?: No    Cognitive Screening   1) Repeat 3 items (Leader, Season, Table)     2) Clock draw:  NORMAL  3) 3 item recall:  Recalls 3 objects  Results: NORMAL clock, 1-2 items recalled: COGNITIVE IMPAIRMENT LESS LIKELY    Mini-CogTM Copyright CURTIS Rodriguez. Licensed by the author for use in [facility]; reprinted with permission (bertha@.[facility]). All rights reserved.      Do you have sleep apnea, excessive snoring or daytime drowsiness?: no    Reviewed and updated as needed this visit by " clinical staff  Tobacco  Allergies  Meds   Med Hx  Surg Hx  Fam Hx  Soc Hx       Reviewed and updated as needed this visit by Provider               Social History     Tobacco Use     Smoking status: Current Every Day Smoker     Packs/day: 0.25     Years: 66.00     Pack years: 16.50     Types: Cigarettes     Smokeless tobacco: Current User   Substance Use Topics     Alcohol use: Yes     Comment: scotch & water 2-3 classes a day     If you drink alcohol do you typically have >3 drinks per day or >7 drinks per week? Yes      Alcohol Use 12/7/2021   Prescreen: >3 drinks/day or >7 drinks/week? No   Prescreen: >3 drinks/day or >7 drinks/week? -   No flowsheet data found.             Current providers sharing in care for this patient include:    Patient Care Team:  Darrell Douglas MD as PCP - General (Family Medicine)  Darrell Douglas MD as Assigned PCP  Perlita Alvarez MD as MD (Breast Oncology)  Karmen Valles, RN as Specialty Care Coordinator (Breast Oncology)  Laci Newsome MD as Assigned Surgical Provider  Perlita Alvarez MD as Assigned Cancer Care Provider    The following health maintenance items are reviewed in Epic and correct as of today:  Health Maintenance Due   Topic Date Due     ANNUAL REVIEW OF HM ORDERS  Never done     HEPATITIS C SCREENING  Never done     ZOSTER IMMUNIZATION (1 of 2) Never done     COVID-19 Vaccine (3 - Pfizer risk 4-dose series) 05/04/2021     LUNG CANCER SCREENING  09/18/2021              pt had tx for cancer   Pertinent mammograms are reviewed under the imaging tab.    Review of Systems   Constitutional: Positive for chills. Negative for fever.   HENT: Positive for congestion. Negative for ear pain, hearing loss and sore throat.    Eyes: Negative for pain and visual disturbance.   Respiratory: Positive for cough. Negative for shortness of breath.    Cardiovascular: Positive for peripheral edema. Negative for chest pain and palpitations.  "  Gastrointestinal: Positive for abdominal pain and diarrhea. Negative for constipation, heartburn, hematochezia and nausea.   Breasts:  Negative for tenderness, breast mass and discharge.   Genitourinary: Positive for genital sores. Negative for dysuria, frequency, hematuria, pelvic pain, urgency, vaginal bleeding and vaginal discharge.   Musculoskeletal: Positive for arthralgias, joint swelling and myalgias.   Skin: Positive for rash.   Neurological: Positive for weakness and paresthesias. Negative for dizziness and headaches.   Psychiatric/Behavioral: Negative for mood changes.      now on pill for breast cancer for 5 years    Voice squeaky, to see ENT    Also dentist    Stopped vit B    ambien     Not napping much during day    Smoking 1/2 ppd currently  chantix helped, needs longer prescription    Has treadmill          OBJECTIVE:   /74 (BP Location: Right arm, Patient Position: Chair, Cuff Size: Adult Regular)   Pulse 91   Temp 97.5  F (36.4  C) (Temporal)   Ht 1.664 m (5' 5.51\")   Wt 74.9 kg (165 lb 2 oz)   SpO2 98%   Breastfeeding No   BMI 27.05 kg/m   Estimated body mass index is 27.05 kg/m  as calculated from the following:    Height as of this encounter: 1.664 m (5' 5.51\").    Weight as of this encounter: 74.9 kg (165 lb 2 oz).  Physical Exam  GENERAL: healthy, alert and no distress  EYES: Eyes grossly normal to inspection, PERRL and conjunctivae and sclerae normal  HENT: ear canals and TM's normal, nose and mouth without ulcers or lesions  NECK: no adenopathy, no asymmetry, masses, or scars and thyroid normal to palpation  RESP: lungs clear to auscultation - no rales, rhonchi or wheezes  CV: regular rate and rhythm, normal S1 S2, no S3 or S4, no murmur, click or rub, no peripheral edema and peripheral pulses strong  ABDOMEN: soft, nontender, no hepatosplenomegaly, no masses and bowel sounds normal  MS: no gross musculoskeletal defects noted, mild edema  SKIN: no suspicious lesions or " rashes  NEURO: Normal strength and tone, mentation intact and speech normal  PSYCH: mentation appears normal, affect normal/bright    Diagnostic Test Results:  Labs reviewed in Epic    ASSESSMENT / PLAN:   Talya was seen today for wellness visit and health maintenance.    Diagnoses and all orders for this visit:    Need for hepatitis C screening test  -     Hepatitis C Screen Reflex to HCV RNA Quant and Genotype; Future  -     Hepatitis C Screen Reflex to HCV RNA Quant and Genotype    Hypertension, unspecified type  -     amLODIPine (NORVASC) 5 MG tablet; TAKE ONE TABLET BY MOUTH TWICE DAILY *NEED TO SCHEDULE WELLNESS EXAM  -     benazepril (LOTENSIN) 20 MG tablet; TAKE ONE TABLET BY MOUTH TWICE DAILY *PLEASE SCHEDULE WELLNESS EXAM  -     furosemide (LASIX) 80 MG tablet; TAKE ONE TABLET BY MOUTH TWICE DAILY  -     potassium chloride ER (KLOR-CON M) 20 MEQ CR tablet; Take 2 tablets (40 mEq) by mouth 3 times daily    Hyperlipidemia LDL goal <100  -     atorvastatin (LIPITOR) 20 MG tablet; Take 1 tablet (20 mg) by mouth daily  -     Lipid panel reflex to direct LDL Fasting; Future  -     Lipid panel reflex to direct LDL Fasting    Tobacco abuse  -     varenicline (CHANTIX) 1 MG tablet; Take 1 tablet (1 mg) by mouth 2 times daily    Drug-induced insomnia (H)  -     zolpidem (AMBIEN) 5 MG tablet; Take 1 tablet (5 mg) by mouth nightly as needed for sleep    Fatigue, unspecified type  -     TSH with free T4 reflex; Future  -     TSH with free T4 reflex    Change in voice  -     Otolaryngology Referral; Future    Nasal obstruction  -     Otolaryngology Referral; Future    Encounter for immunization  -     COVID-19,PF,PFIZER (12+ Yrs PURPLE LABEL)    Other orders  -     REVIEW OF HEALTH MAINTENANCE PROTOCOL ORDERS    Discussed several things with patient  Overall doing okay  Sees oncology regularly for the breast cancer   Booster covid shot given here  Check labs  Refill meds  Patient to schedule with ENT  Chantix to help  "smoking cessation  Blood pressure okay, stay on same blood pressure meds   Refill chol med, furosemide, and potassium also  Did refill zolpidem but urged patient to try to not use every day and max dose is 5 mg     Patient has been advised of split billing requirements and indicates understanding: Yes  COUNSELING:  Reviewed preventive health counseling, as reflected in patient instructions       Regular exercise       Healthy diet/nutrition       Vision screening       Dental care    Estimated body mass index is 27.05 kg/m  as calculated from the following:    Height as of this encounter: 1.664 m (5' 5.51\").    Weight as of this encounter: 74.9 kg (165 lb 2 oz).    Weight management plan: Discussed healthy diet and exercise guidelines    She reports that she has been smoking cigarettes. She has a 16.50 pack-year smoking history. She uses smokeless tobacco.  Tobacco Cessation Action Plan:   Pharmacotherapies : Chantix      Appropriate preventive services were discussed with this patient, including applicable screening as appropriate for cardiovascular disease, diabetes, osteopenia/osteoporosis, and glaucoma.  As appropriate for age/gender, discussed screening for colorectal cancer, prostate cancer, breast cancer, and cervical cancer. Checklist reviewing preventive services available has been given to the patient.    Reviewed patients plan of care and provided an AVS. The Basic Care Plan (routine screening as documented in Health Maintenance) for Talya meets the Care Plan requirement. This Care Plan has been established and reviewed with the Patient.    Counseling Resources:  ATP IV Guidelines  Pooled Cohorts Equation Calculator  Breast Cancer Risk Calculator  Breast Cancer: Medication to Reduce Risk  FRAX Risk Assessment  ICSI Preventive Guidelines  Dietary Guidelines for Americans, 2010  Hepregen's MyPlate  ASA Prophylaxis  Lung CA Screening    Darrell Douglas MD  [facility] " Risks:

## 2021-12-08 LAB — HCV AB SERPL QL IA: NONREACTIVE

## 2022-01-05 ENCOUNTER — VIRTUAL VISIT (OUTPATIENT)
Dept: DERMATOLOGY | Facility: CLINIC | Age: 77
End: 2022-01-05
Payer: COMMERCIAL

## 2022-01-05 DIAGNOSIS — D69.2 SENILE PURPURA (H): ICD-10-CM

## 2022-01-05 DIAGNOSIS — Z17.0 MALIGNANT NEOPLASM OF OVERLAPPING SITES OF RIGHT BREAST IN FEMALE, ESTROGEN RECEPTOR POSITIVE (H): ICD-10-CM

## 2022-01-05 DIAGNOSIS — Q82.4: ICD-10-CM

## 2022-01-05 DIAGNOSIS — L30.9 DERMATITIS: Primary | ICD-10-CM

## 2022-01-05 DIAGNOSIS — C50.811 MALIGNANT NEOPLASM OF OVERLAPPING SITES OF RIGHT BREAST IN FEMALE, ESTROGEN RECEPTOR POSITIVE (H): ICD-10-CM

## 2022-01-05 PROCEDURE — 99203 OFFICE O/P NEW LOW 30 MIN: CPT | Mod: 95 | Performed by: DERMATOLOGY

## 2022-01-05 ASSESSMENT — PAIN SCALES - GENERAL: PAINLEVEL: NO PAIN (0)

## 2022-01-05 NOTE — PROGRESS NOTES
[facility] Dermatology Note  Encounter Date: Jan 5, 2022  Store-and-Forward and video ([phone] ). Location of teledermatologist: [facility] [place].  Start time: 1:22. End time: 1:42.    Dermatology Problem List:  1. Hypohidrotic ectodermal dysplasia  2. ER positive, HER-2 positive right breast cancer, clinical stage T2N0M0 s/p THP-ddAC, right breast mastectomy ([id number]), SLN biopsy, and 4 months adjuvant HP. Currently on letrozole.  3. 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 Actinic purpura  ____________________________________________    Assessment & Plan:     1. 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 Recommend ointment when able given increased absorption over cream and liberal use of a bland moisturizer.   - triamcinolone cream/ointment  - OTC Eucerin    2. Actinic purpura: discussed pathophysiology and use of OTC vitamin K cream to reduce duration of purpura if desired.    3. Mucus buildup in nose in context of anagen effluvium of nasal hairs: has upcoming ENT appointment for concomitant vocal changes. Continue Mucinex and nasal saline.    4. Dry skin buildup in ears: in context of HED and chemotherapy; has upcoming ENT appointment - recommend extraction of wax/dead skin in ear canals per ENT. Recommend against aggressive Qtip use.    Procedures Performed:    None    Follow-up: as needed    Staff:     Dany Tran MD, FAAD   of Dermatology  Department of Dermatology  [facility] [employer] [facility]    ____________________________________________    CC: Derm Problem (Talya, is here for an appt)    HPI:  Ms. Talya Zuleta is a(n) 76 year old female who presents today as a new patient for rash    Had  rash under armpits - using triamcinolone - uses cream > ointment  - other sores are getting better  - bruises appear on the forearms - no clear inciting trauma   - no hair in nose - lots of mucus   - has buildup of dead skin in ear  - thinning skin - bruises   - voice changes - has ENT appointment coming up  - infusion next month    Patient is otherwise feeling well, without additional skin concerns.    Labs Reviewed:  N/A    Physical Exam:  Vitals: There were no vitals taken for this visit.  SKIN: video images were reviewed; image quality and interpretability: acceptable. Image date: n/a.  - no active rash  - purpuric patches on the forearms  - alopecia with hair prosthetic in place  - No other lesions of concern on areas examined.     Medications:  Current Outpatient Medications   Medication     amLODIPine (NORVASC) 5 MG tablet     anastrozole (ARIMIDEX) 1 MG tablet     atorvastatin (LIPITOR) 20 MG tablet     benazepril (LOTENSIN) 20 MG tablet     furosemide (LASIX) 80 MG tablet     potassium chloride ER (KLOR-CON M) 20 MEQ CR tablet     triamcinolone (KENALOG) 0.1 % external cream     varenicline (CHANTIX) 1 MG tablet     Vitamin D, Cholecalciferol, 25 MCG (1000 UT) TABS     zolpidem (AMBIEN) 5 MG tablet     No current facility-administered medications for this visit.     Facility-Administered Medications Ordered in Other Visits   Medication     sodium chloride (PF) 0.9% PF flush 30 mL     sodium chloride (PF) 0.9% PF flush 30 mL      Past Medical/Surgical History:   Patient Active Problem List   Diagnosis     Malignant neoplasm of overlapping sites of right breast in female, estrogen receptor positive (H)     Breast cancer (H)     Chemotherapy-induced neutropenia (H)     Past Medical History:   Diagnosis Date     Anemia      Breast cancer (H)      Cellulitis      HTN (hypertension)      Neuropathy      Personal history of tobacco use, presenting hazards to health      Sleep apnea        CC Darrell Douglas,  MD  [address] [facility] [place] [id number] on close of this encounter.

## 2022-01-05 NOTE — NURSING NOTE
Chief Complaint   Patient presents with     Derm Problem     Talya, is here for an appt    Suhail Grossman EMT

## 2022-01-05 NOTE — LETTER
1/5/2022       RE: Talya Zuleta  [address] [address]     Dear Colleague,    Thank you for referring your patient, Talya Zuleta, to the [facility] [place] at [facility]. Please see a copy of my visit note below.    [facility] Dermatology Note  Encounter Date: Jan 5, 2022  Store-and-Forward and video ([phone] ). Location of teledermatologist: [facility] [place].  Start time: 1:22. End time: 1:42.    Dermatology Problem List:  1. Hypohidrotic ectodermal dysplasia  2. ER positive, HER-2 positive right breast cancer, clinical stage T2N0M0 s/p THP-ddAC, right breast mastectomy ([id number]), SLN biopsy, and 4 months adjuvant HP. Currently on letrozole.  3. 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 Actinic purpura  ____________________________________________    Assessment & Plan:     1. 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 Recommend ointment when able given increased absorption over cream and liberal use of a bland moisturizer.   - triamcinolone cream/ointment  - OTC Eucerin    2. Actinic purpura: discussed pathophysiology and use of OTC vitamin K cream to reduce duration of purpura if desired.    3. Mucus buildup in nose in context of anagen effluvium of nasal hairs: has upcoming ENT appointment for concomitant vocal changes. Continue Mucinex and nasal saline.    4. Dry skin buildup in ears: in context of HED and chemotherapy; has upcoming ENT appointment - recommend extraction of wax/dead skin in ear canals per ENT. Recommend against aggressive Qtip use.    Procedures Performed:    None    Follow-up: as needed    Staff:     Dany SANCHEZ  MD [place], FAAD   of Dermatology  Department of Dermatology  [facility]    ____________________________________________    CC: Derm Problem (Talya, is here for an appt)    HPI:  Ms. Talya Zuleta is a(n) 76 year old female who presents today as a new patient for rash    Had rash under armpits - using triamcinolone - uses cream > ointment  - other sores are getting better  - bruises appear on the forearms - no clear inciting trauma   - no hair in nose - lots of mucus   - has buildup of dead skin in ear  - thinning skin - bruises   - voice changes - has ENT appointment coming up  - infusion next month    Patient is otherwise feeling well, without additional skin concerns.    Labs Reviewed:  N/A    Physical Exam:  Vitals: There were no vitals taken for this visit.  SKIN: video images were reviewed; image quality and interpretability: acceptable. Image date: n/a.  - no active rash  - purpuric patches on the forearms  - alopecia with hair prosthetic in place  - No other lesions of concern on areas examined.     Medications:  Current Outpatient Medications   Medication     amLODIPine (NORVASC) 5 MG tablet     anastrozole (ARIMIDEX) 1 MG tablet     atorvastatin (LIPITOR) 20 MG tablet     benazepril (LOTENSIN) 20 MG tablet     furosemide (LASIX) 80 MG tablet     potassium chloride ER (KLOR-CON M) 20 MEQ CR tablet     triamcinolone (KENALOG) 0.1 % external cream     varenicline (CHANTIX) 1 MG tablet     Vitamin D, Cholecalciferol, 25 MCG (1000 UT) TABS     zolpidem (AMBIEN) 5 MG tablet     No current facility-administered medications for this visit.     Facility-Administered Medications Ordered in Other Visits   Medication     sodium chloride (PF) 0.9% PF flush 30 mL     sodium chloride (PF) 0.9% PF flush 30 mL      Past Medical/Surgical History:   Patient Active Problem List   Diagnosis     Malignant neoplasm of overlapping sites of right breast in female, estrogen  receptor positive (H)     Breast cancer (H)     Chemotherapy-induced neutropenia (H)     Past Medical History:   Diagnosis Date     Anemia      Breast cancer (H)      Cellulitis      HTN (hypertension)      Neuropathy      Personal history of tobacco use, presenting hazards to health      Sleep apnea        CC Darrell Douglas MD  [address] [facility]  [place] [id number] on close of this encounter.

## 2022-01-11 ENCOUNTER — PATIENT OUTREACH (OUTPATIENT)
Dept: ONCOLOGY | Facility: CLINIC | Age: 77
End: 2022-01-11
Payer: COMMERCIAL

## 2022-01-11 NOTE — PROGRESS NOTES
RN CARE COORDINATION NOTE    Called Talya to discuss her upcoming dental work. We need to cancel her Zometa infusion on 2/22 and wait until 3 months post dental work to reschedule.   She agrees with plan and will plan to keep appointment with Dr Alvarez on 2/22 as scheduled.   She will notify clinic once she has completed all dental work.       Tatiana Valles MSN, RN, OCN  RN Care Coordinator  [facility]  [phone]

## 2022-02-14 ENCOUNTER — TELEPHONE (OUTPATIENT)
Dept: OTOLARYNGOLOGY | Facility: CLINIC | Age: 77
End: 2022-02-14
Payer: COMMERCIAL

## 2022-02-14 NOTE — TELEPHONE ENCOUNTER
Left  message on mobile number in regards to rescheduling patients appointment currently scheduled on 2/18 as provider is not available for a video visit on that day. Options for rescheduling were provided on . Writers call back number provided on .

## 2022-02-18 ENCOUNTER — PRE VISIT (OUTPATIENT)
Dept: OTOLARYNGOLOGY | Facility: CLINIC | Age: 77
End: 2022-02-18
Payer: COMMERCIAL

## 2022-02-21 NOTE — PROGRESS NOTES
Oncology Follow Up:  Date on this visit: 2/22/2022    Diagnosis:  ER positive, HER-2 positive right breast cancer, clinical stage T2N0M0 s/p THP-ddAC, right breast mastectomy ([id number]), SLN biopsy, and 4 months adjuvant HP.  She continues on letrozole.    Primary Physician: Cole Weston     History Of Present Illness:  Ms. Zuleta is a 76 year old female with right breast cancer.  Routine screening mammogram in 07/2020 showed right breast asymmetry.  Diagnostic mammograms and ultrasound showed a 2.7 cm mass in the right breast at 12:00, 5 cm from the nipple with ductal extension including a 6 mm mass at 3 cm from the nipple.  Contrast enhanced mammogram showed the right breast mass, including extension, to measure 4.9 cm.  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  Estrogen receptor was moderate in 50% and progesterone receptor staining was negative.  HER-2 was negative by IHC; HER2 FISH showed approximately 10% of tumor cells to have 6.8 HER2 signals/nucleus and a HER2/CEN17 ratio of 1.6.  The HER2 positive cells were noted to be the large pleomorphic cells.  Multiple neutrophils were noted to be infiltrating through tumor stroma.    She received 12 weeks of neoadjuvant Taxol, Herceptin, and pertuzumab from 10/7/2020 - 12/22/2020.  She received 4 cycles of dose dense adriamycin and cyclophosphamide from 12/29/2020 - 2/9/2021.  She underwent right breast mastectomy and sentinel lymph node biopsy on 3/8/2021.  Pathology showed rare residual tumor cells with therapy related changes in the tumor bed.  Residual tumor is <1% of the tumor bed volume.  There was no lymphovascular invasion and surgical margins were negative.  A single sentinel lymph node was benign.  We discussed starting letrozole at our clinic visit on 3/30/2021, unfortunately she did not receive the prescription and so did not start it.  She was initiated on adjuvant HER2 targeted  therapy 4/13/2021.  She received 4 cycles of adjuvant Phesgo, then discontinued due to diarrhea.  She was then on biosimilar trastuzumab (Trazimera) since 7/6/2021, however, continued to have diarrhea refractory to antidiarrheals, therefore all HER2 targeted therapy was abandoned on 8/3/2021.  Of note, she received 7 months total HER2 treatment (3 months neoadjuvant + 4 months adjuvant).      Interval History:  Ms. Zuleta comes into clinic for routine breast cancer follow up.  She has a number of medical issues that she would like to discuss today.  She continues to have a lack of taste.  This has been present since initial chemotherapy.  She is wondering if it will improve.  She has significant neuropathy.  It is most bothersome in her bilateral hands.  Numbness extends throughout the fingertips and involves almost the entirety of the palm of the bilateral hands.  Neuropathy does affect ability to do buttons and also affects her handwriting.  She reports thin, dry skin.  She developed a large bruise above her right elbow after bumping herself and on healing, it almost left an area of scar.  She continues to have voice changes.  She has not yet seen ENT, but reports she has an upcoming appointment.  She feels she has thick mucous in her throat and if she is able to clear this, throat is temporarily better.  She also has ongoing diarrhea.  She reports 3-4 urgent stools per day.  No BRBPR.  She is adamantly opposed to colonoscopy.  She feels stools decreased in frequency after starting a fiber supplement.  She reports right knee and ankle pain.  She has swelling of her bilateral lower extremities and due to neuropathy in the hands is not able to wear compression stockings.  She reports difficulty sleeping despite taking ambien.  After taking the medication, she will still be awake another 3-4 hours.  She denies anxiety.  She feels skin changes of the axillae have improved with triamcinolone cream.  The remainder of  a complete 12 point review of systems was reviewed with the patient and was negative with the exception of that mentioned above.    Past Medical/Surgical History:  1.  Breast cancer as per HPI  2.  Hypertension  3.  Diabetes  4.  COPD  5.  Hypothyroidism  6.  Hypohidrosis ectodermal dysplasia    Allergies:  Allergies as of 02/22/2022 - Reviewed 01/05/2022   Allergen Reaction Noted     Bacitracin-neomycin-polymyxin  04/18/2003     Latex  09/30/2005     Current Medications:  Current Outpatient Medications   Medication Sig Dispense Refill     amLODIPine (NORVASC) 5 MG tablet TAKE ONE TABLET BY MOUTH TWICE DAILY *NEED TO SCHEDULE WELLNESS EXAM 180 tablet 3     anastrozole (ARIMIDEX) 1 MG tablet Take 1 tablet (1 mg) by mouth daily 90 tablet 3     atorvastatin (LIPITOR) 20 MG tablet Take 1 tablet (20 mg) by mouth daily 90 tablet 3     benazepril (LOTENSIN) 20 MG tablet TAKE ONE TABLET BY MOUTH TWICE DAILY *PLEASE SCHEDULE WELLNESS EXAM 180 tablet 3     furosemide (LASIX) 80 MG tablet TAKE ONE TABLET BY MOUTH TWICE DAILY 180 tablet 3     potassium chloride ER (KLOR-CON M) 20 MEQ CR tablet Take 2 tablets (40 mEq) by mouth 3 times daily 180 tablet 3     triamcinolone (KENALOG) 0.1 % external cream APPLY TOPICALLY 2 TIMES DAILY AS NEEDED FOR IRRITATION 80 g 0     varenicline (CHANTIX) 1 MG tablet Take 1 tablet (1 mg) by mouth 2 times daily 180 tablet 1     Vitamin D, Cholecalciferol, 25 MCG (1000 UT) TABS Take 1,000 Units by mouth daily       zolpidem (AMBIEN) 5 MG tablet Take 1 tablet (5 mg) by mouth nightly as needed for sleep 30 tablet 1      Family and Social History:  Please see initial consultation dated 9/21/2020 for further details.  Breast Actionable Panel on 10/1/2020 was negative for mutation.    Physical Exam:  /79 (BP Location: Right arm, Patient Position: Sitting, Cuff Size: Adult Regular)   Pulse 82   Temp 98.9  F (37.2  C) (Oral)   Resp 16   Wt 73.3 kg (161 lb 8 oz)   SpO2 98%   BMI 26.46 kg/m     General:  Well appearing adult female in NAD.  Alert and oriented x 3  HEENT:  Normocephalic.  Sclera anicteric.  MMM.  No lesions of the oropharynx.  (+) alopecia, wearing a wig.  Lymph:  No palpable cervical, supraclavicular, or axillary LAD.  Chest:  CTA bilaterally.  No wheezes or crackles.  CV:  RRR.  Nl S1 and S2.    Breast:  Right breast mastectomy.  There are no discretely palpable masses of either the right chest wall or within the left breast.  Left nipple is everted.  Abd:  Soft/ND.    Ext:  No pitting edema of the bilateral lower extremities.   Musculo:  Full ROM of the bilateral upper extremities.  Left chest port-a-cath.  Neuro:  Cranial nerves grossly intact.  Psych:  Mood and affect appear normal.  Skin:  Thickened, pocked skin in the bilateral axilla.  Area of involvement is decreased from prior exam.    Laboratory/Imaging Studies  2/22/2022 Labs:  Electrolyes, kidney, and liver function are wnl.  Blood counts are within normal limits.  MCV is elevated at 101    CRP <2.9    ASSESSMENT/PLAN:  76 year old female with a history of hypohidrosis ectodermal dysplasia, HTN, dyslipidemia, COPD, diabetes, and OA with a h/o T2N0M0, right breast cancer, that is ER positive (50%), [place] negative, and HER2 positive.  She is s/p treatment with 12 weeks THP, 4 cycles ddAC, right breast mastectomy ([id number]), right axillary SLNBx, and 4 months adjuvant HER2 therapy.    1.  Right breast cancer:   Ms. Zuleta is nearly 1 year out from excision of a right breast cancer.  She is on treatment with letrozole.  She is tolerating the medication well.  Plan is to complete a minimum of 5 years of letrozole.    She is asymptomatic of disease recurrence on history taken today and clinical exam is without concern.  Will schedule return visit in 3 months.  Next mammogram of the left breast will be due around 8/17/2022.    2.  Hypohidrotic ectodermal dysplasia:  Worse following completion of chemotherapy.  Improved with  application of triamcinolone ointment.  She has now also established with Dr. Tran of dermatology.    3.  Voice changes/thickened mucous:  This may be due to worsening of hypohidrotic ectodermal dysplasia as it can involve the mucous membranes of the respiratory and GI tract. She has an appointment with ENT scheduled for 3/1/2022.  Previously recommended a trial Mucinex and an antihistamine such as Zyrtec or Allegra.  She has not done this.    4.  Dysgeusia:  Recommend trial of zinc supplement or miracle berry.  She states it is not bothersome enough to try these things.    5.  Chemotherapy induced peripheral neuropathy:  Discussed there is nothing that has been shown to treat neuropathy once chemotherapy is complete.  As she is >1 year out from completion of chemotherapy, I suspect the neuropathy is permanent.  We also discussed it would be unusual for neuropathy to become worse at this point, therefore would recommend discussing other causes of neuropathy with PCP.    6.  Bone Health/Osteopenia:  DEXA 4/13/2021 with a lowest T-score of -1.2 in the right femoral neck c/w osteopenia.  At last visit we discussed treatment with Zometa 4 mg IV every 6 months x 2 years has been shown to prevent breast cancer bone metastases.  She tells me today she had a deep root cleaning on 2/8/2022.  Will therefore postpone starting Zometa until next visit.    7.  At risk for cardiomyopathy:  2/2 h/o anthracycline and HER2 targeted therapy as well as personal history of hypertension, hyperlipidemia, and diabetes.  Post-treatment echocardiogram performed on 7/15/2021 was reviewed and shows a retained LVEF. Follow up with her PCP for management of cardiac risk factors.     8.  Diarrhea:  Continue fiber supplement.  She declines taking imodium.  Diarrhea this far out from completion of treatment is not usual.  I recommended colonoscopy with biopsies to evaluate for other causes of colitis such as autoimmune colitis.  She is adamantly  opposed.    9.  Insomnia:  Persistent despite ambien.  She will trial melatonin 3 mg at bedtime.  May increase up to 10 mg as needed.    10.  Follow Up: Labs and port flush in 6 weeks.  Labs, visit with me, and Zometa in 3 months.    45 minutes spent on the date of the encounter doing chart review, review of test results, interpretation of tests, patient visit and documentation.

## 2022-02-22 ENCOUNTER — ONCOLOGY VISIT (OUTPATIENT)
Dept: ONCOLOGY | Facility: CLINIC | Age: 77
End: 2022-02-22
Attending: INTERNAL MEDICINE
Payer: COMMERCIAL

## 2022-02-22 ENCOUNTER — APPOINTMENT (OUTPATIENT)
Dept: LAB | Facility: CLINIC | Age: 77
End: 2022-02-22
Attending: INTERNAL MEDICINE
Payer: COMMERCIAL

## 2022-02-22 VITALS
DIASTOLIC BLOOD PRESSURE: 79 MMHG | WEIGHT: 161.5 LBS | HEART RATE: 82 BPM | BODY MASS INDEX: 26.46 KG/M2 | OXYGEN SATURATION: 98 % | SYSTOLIC BLOOD PRESSURE: 135 MMHG | RESPIRATION RATE: 16 BRPM | TEMPERATURE: 98.9 F

## 2022-02-22 DIAGNOSIS — Z17.0 MALIGNANT NEOPLASM OF OVERLAPPING SITES OF RIGHT BREAST IN FEMALE, ESTROGEN RECEPTOR POSITIVE (H): Primary | ICD-10-CM

## 2022-02-22 DIAGNOSIS — R19.7 DIARRHEA, UNSPECIFIED TYPE: ICD-10-CM

## 2022-02-22 DIAGNOSIS — R21 RASH: ICD-10-CM

## 2022-02-22 DIAGNOSIS — C50.811 MALIGNANT NEOPLASM OF OVERLAPPING SITES OF RIGHT BREAST IN FEMALE, ESTROGEN RECEPTOR POSITIVE (H): Primary | ICD-10-CM

## 2022-02-22 DIAGNOSIS — M85.851 OSTEOPENIA OF NECK OF RIGHT FEMUR: ICD-10-CM

## 2022-02-22 DIAGNOSIS — E87.6 HYPOKALEMIA: ICD-10-CM

## 2022-02-22 DIAGNOSIS — D75.89 MACROCYTOSIS: ICD-10-CM

## 2022-02-22 DIAGNOSIS — I10 HYPERTENSION, UNSPECIFIED TYPE: ICD-10-CM

## 2022-02-22 DIAGNOSIS — T45.1X5A CHEMOTHERAPY-INDUCED PERIPHERAL NEUROPATHY (H): ICD-10-CM

## 2022-02-22 DIAGNOSIS — Z92.21 STATUS POST CHEMOTHERAPY: ICD-10-CM

## 2022-02-22 DIAGNOSIS — G47.00 INSOMNIA, UNSPECIFIED TYPE: ICD-10-CM

## 2022-02-22 DIAGNOSIS — G62.0 CHEMOTHERAPY-INDUCED PERIPHERAL NEUROPATHY (H): ICD-10-CM

## 2022-02-22 DIAGNOSIS — R73.01 IMPAIRED FASTING GLUCOSE: ICD-10-CM

## 2022-02-22 LAB
ALBUMIN SERPL-MCNC: 3.5 G/DL (ref 3.4–5)
ALP SERPL-CCNC: 57 U/L (ref 40–150)
ALT SERPL W P-5'-P-CCNC: 18 U/L (ref 0–50)
ANION GAP SERPL CALCULATED.3IONS-SCNC: 9 MMOL/L (ref 3–14)
AST SERPL W P-5'-P-CCNC: 22 U/L (ref 0–45)
BASOPHILS # BLD AUTO: 0 10E3/UL (ref 0–0.2)
BASOPHILS NFR BLD AUTO: 1 %
BILIRUB SERPL-MCNC: 0.5 MG/DL (ref 0.2–1.3)
BUN SERPL-MCNC: 14 MG/DL (ref 7–30)
CALCIUM SERPL-MCNC: 9 MG/DL (ref 8.5–10.1)
CHLORIDE BLD-SCNC: 108 MMOL/L (ref 94–109)
CO2 SERPL-SCNC: 25 MMOL/L (ref 20–32)
CREAT SERPL-MCNC: 0.79 MG/DL (ref 0.52–1.04)
CRP SERPL-MCNC: <2.9 MG/L (ref 0–8)
EOSINOPHIL # BLD AUTO: 0.1 10E3/UL (ref 0–0.7)
EOSINOPHIL NFR BLD AUTO: 1 %
ERYTHROCYTE [DISTWIDTH] IN BLOOD BY AUTOMATED COUNT: 14.2 % (ref 10–15)
FOLATE SERPL-MCNC: >20 NG/ML
GFR SERPL CREATININE-BSD FRML MDRD: 77 ML/MIN/1.73M2
GLUCOSE BLD-MCNC: 97 MG/DL (ref 70–99)
HBA1C MFR BLD: 5.9 % (ref 0–5.6)
HCT VFR BLD AUTO: 39.6 % (ref 35–47)
HGB BLD-MCNC: 13.7 G/DL (ref 11.7–15.7)
IMM GRANULOCYTES # BLD: 0 10E3/UL
IMM GRANULOCYTES NFR BLD: 0 %
LYMPHOCYTES # BLD AUTO: 0.9 10E3/UL (ref 0.8–5.3)
LYMPHOCYTES NFR BLD AUTO: 13 %
MCH RBC QN AUTO: 34.9 PG (ref 26.5–33)
MCHC RBC AUTO-ENTMCNC: 34.6 G/DL (ref 31.5–36.5)
MCV RBC AUTO: 101 FL (ref 78–100)
MONOCYTES # BLD AUTO: 0.5 10E3/UL (ref 0–1.3)
MONOCYTES NFR BLD AUTO: 8 %
NEUTROPHILS # BLD AUTO: 5.4 10E3/UL (ref 1.6–8.3)
NEUTROPHILS NFR BLD AUTO: 77 %
NRBC # BLD AUTO: 0 10E3/UL
NRBC BLD AUTO-RTO: 0 /100
PLATELET # BLD AUTO: 237 10E3/UL (ref 150–450)
POTASSIUM BLD-SCNC: 3.8 MMOL/L (ref 3.4–5.3)
PROT SERPL-MCNC: 7 G/DL (ref 6.8–8.8)
RBC # BLD AUTO: 3.93 10E6/UL (ref 3.8–5.2)
SODIUM SERPL-SCNC: 142 MMOL/L (ref 133–144)
VIT B12 SERPL-MCNC: 793 PG/ML (ref 193–986)
WBC # BLD AUTO: 6.9 10E3/UL (ref 4–11)

## 2022-02-22 PROCEDURE — 83036 HEMOGLOBIN GLYCOSYLATED A1C: CPT | Performed by: INTERNAL MEDICINE

## 2022-02-22 PROCEDURE — 99215 OFFICE O/P EST HI 40 MIN: CPT | Performed by: INTERNAL MEDICINE

## 2022-02-22 PROCEDURE — 36591 DRAW BLOOD OFF VENOUS DEVICE: CPT | Performed by: INTERNAL MEDICINE

## 2022-02-22 PROCEDURE — 85014 HEMATOCRIT: CPT | Performed by: INTERNAL MEDICINE

## 2022-02-22 PROCEDURE — 82607 VITAMIN B-12: CPT | Performed by: INTERNAL MEDICINE

## 2022-02-22 PROCEDURE — 250N000011 HC RX IP 250 OP 636: Performed by: INTERNAL MEDICINE

## 2022-02-22 PROCEDURE — 82746 ASSAY OF FOLIC ACID SERUM: CPT | Performed by: INTERNAL MEDICINE

## 2022-02-22 PROCEDURE — 86140 C-REACTIVE PROTEIN: CPT | Performed by: INTERNAL MEDICINE

## 2022-02-22 PROCEDURE — 80053 COMPREHEN METABOLIC PANEL: CPT | Performed by: INTERNAL MEDICINE

## 2022-02-22 PROCEDURE — G0463 HOSPITAL OUTPT CLINIC VISIT: HCPCS

## 2022-02-22 RX ORDER — POTASSIUM CHLORIDE 1500 MG/1
40 TABLET, EXTENDED RELEASE ORAL 3 TIMES DAILY
Qty: 180 TABLET | Refills: 3 | Status: SHIPPED | OUTPATIENT
Start: 2022-02-22 | End: 2022-10-07

## 2022-02-22 RX ORDER — HEPARIN SODIUM (PORCINE) LOCK FLUSH IV SOLN 100 UNIT/ML 100 UNIT/ML
5 SOLUTION INTRAVENOUS
Status: COMPLETED | OUTPATIENT
Start: 2022-02-22 | End: 2022-02-22

## 2022-02-22 RX ADMIN — Medication 5 ML: at 09:56

## 2022-02-22 ASSESSMENT — PAIN SCALES - GENERAL: PAINLEVEL: SEVERE PAIN (6)

## 2022-02-22 NOTE — PATIENT INSTRUCTIONS
Try melatonin 3 mg PO daily to sleep.  If doesn't work take up to 10 mg.    I recommend a colonoscopy to evaluate for autoimmune colitis.    Continue fiber supplement    Can try zinc supplement for taste    ENT visit as scheduled for evaluation of voice changes.

## 2022-02-22 NOTE — LETTER
2/22/2022     RE: Talya Zuleta  [address] [address]    Dear Colleague,    Thank you for referring your patient, Talya Zuleta, to the [facility]. Please see a copy of my visit note below.      Oncology Follow Up:  Date on this visit: 2/22/2022    Diagnosis:  ER positive, HER-2 positive right breast cancer, clinical stage T2N0M0 s/p THP-ddAC, right breast mastectomy ([id number]), SLN biopsy, and 4 months adjuvant HP.  She continues on letrozole.    Primary Physician: Cole Weston     History Of Present Illness:  Ms. Zuleta is a 76 year old female with right breast cancer.  Routine screening mammogram in 07/2020 showed right breast asymmetry.  Diagnostic mammograms and ultrasound showed a 2.7 cm mass in the right breast at 12:00, 5 cm from the nipple with ductal extension including a 6 mm mass at 3 cm from the nipple.  Contrast enhanced mammogram showed the right breast mass, including extension, to measure 4.9 cm.  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  Estrogen receptor was moderate in 50% and progesterone receptor staining was negative.  HER-2 was negative by IHC; HER2 FISH showed approximately 10% of tumor cells to have 6.8 HER2 signals/nucleus and a HER2/CEN17 ratio of 1.6.  The HER2 positive cells were noted to be the large pleomorphic cells.  Multiple neutrophils were noted to be infiltrating through tumor stroma.    She received 12 weeks of neoadjuvant Taxol, Herceptin, and pertuzumab from 10/7/2020 - 12/22/2020.  She received 4 cycles of dose dense adriamycin and cyclophosphamide from 12/29/2020 - 2/9/2021.  She underwent right breast mastectomy and sentinel lymph node biopsy on 3/8/2021.  Pathology showed rare residual tumor cells with therapy related changes in the tumor bed.  Residual tumor is <1% of the tumor bed volume.  There was no lymphovascular invasion and  surgical margins were negative.  A single sentinel lymph node was benign.  We discussed starting letrozole at our clinic visit on 3/30/2021, unfortunately she did not receive the prescription and so did not start it.  She was initiated on adjuvant HER2 targeted therapy 4/13/2021.  She received 4 cycles of adjuvant Phesgo, then discontinued due to diarrhea.  She was then on biosimilar trastuzumab (Trazimera) since 7/6/2021, however, continued to have diarrhea refractory to antidiarrheals, therefore all HER2 targeted therapy was abandoned on 8/3/2021.  Of note, she received 7 months total HER2 treatment (3 months neoadjuvant + 4 months adjuvant).      Interval History:  Ms. Zuleta comes into clinic for routine breast cancer follow up.  She has a number of medical issues that she would like to discuss today.  She continues to have a lack of taste.  This has been present since initial chemotherapy.  She is wondering if it will improve.  She has significant neuropathy.  It is most bothersome in her bilateral hands.  Numbness extends throughout the fingertips and involves almost the entirety of the palm of the bilateral hands.  Neuropathy does affect ability to do buttons and also affects her handwriting.  She reports thin, dry skin.  She developed a large bruise above her right elbow after bumping herself and on healing, it almost left an area of scar.  She continues to have voice changes.  She has not yet seen ENT, but reports she has an upcoming appointment.  She feels she has thick mucous in her throat and if she is able to clear this, throat is temporarily better.  She also has ongoing diarrhea.  She reports 3-4 urgent stools per day.  No BRBPR.  She is adamantly opposed to colonoscopy.  She feels stools decreased in frequency after starting a fiber supplement.  She reports right knee and ankle pain.  She has swelling of her bilateral lower extremities and due to neuropathy in the hands is not able to wear  compression stockings.  She reports difficulty sleeping despite taking ambien.  After taking the medication, she will still be awake another 3-4 hours.  She denies anxiety.  She feels skin changes of the axillae have improved with triamcinolone cream.  The remainder of a complete 12 point review of systems was reviewed with the patient and was negative with the exception of that mentioned above.    Past Medical/Surgical History:  1.  Breast cancer as per HPI  2.  Hypertension  3.  Diabetes  4.  COPD  5.  Hypothyroidism  6.  Hypohidrosis ectodermal dysplasia    Allergies:  Allergies as of 02/22/2022 - Reviewed 01/05/2022   Allergen Reaction Noted     Bacitracin-neomycin-polymyxin  04/18/2003     Latex  09/30/2005     Current Medications:  Current Outpatient Medications   Medication Sig Dispense Refill     amLODIPine (NORVASC) 5 MG tablet TAKE ONE TABLET BY MOUTH TWICE DAILY *NEED TO SCHEDULE WELLNESS EXAM 180 tablet 3     anastrozole (ARIMIDEX) 1 MG tablet Take 1 tablet (1 mg) by mouth daily 90 tablet 3     atorvastatin (LIPITOR) 20 MG tablet Take 1 tablet (20 mg) by mouth daily 90 tablet 3     benazepril (LOTENSIN) 20 MG tablet TAKE ONE TABLET BY MOUTH TWICE DAILY *PLEASE SCHEDULE WELLNESS EXAM 180 tablet 3     furosemide (LASIX) 80 MG tablet TAKE ONE TABLET BY MOUTH TWICE DAILY 180 tablet 3     potassium chloride ER (KLOR-CON M) 20 MEQ CR tablet Take 2 tablets (40 mEq) by mouth 3 times daily 180 tablet 3     triamcinolone (KENALOG) 0.1 % external cream APPLY TOPICALLY 2 TIMES DAILY AS NEEDED FOR IRRITATION 80 g 0     varenicline (CHANTIX) 1 MG tablet Take 1 tablet (1 mg) by mouth 2 times daily 180 tablet 1     Vitamin D, Cholecalciferol, 25 MCG (1000 UT) TABS Take 1,000 Units by mouth daily       zolpidem (AMBIEN) 5 MG tablet Take 1 tablet (5 mg) by mouth nightly as needed for sleep 30 tablet 1      Family and Social History:  Please see initial consultation dated 9/21/2020 for further details.  Breast Actionable  Panel on 10/1/2020 was negative for mutation.    Physical Exam:  /79 (BP Location: Right arm, Patient Position: Sitting, Cuff Size: Adult Regular)   Pulse 82   Temp 98.9  F (37.2  C) (Oral)   Resp 16   Wt 73.3 kg (161 lb 8 oz)   SpO2 98%   BMI 26.46 kg/m    General:  Well appearing adult female in NAD.  Alert and oriented x 3  HEENT:  Normocephalic.  Sclera anicteric.  MMM.  No lesions of the oropharynx.  (+) alopecia, wearing a wig.  Lymph:  No palpable cervical, supraclavicular, or axillary LAD.  Chest:  CTA bilaterally.  No wheezes or crackles.  CV:  RRR.  Nl S1 and S2.    Breast:  Right breast mastectomy.  There are no discretely palpable masses of either the right chest wall or within the left breast.  Left nipple is everted.  Abd:  Soft/ND.    Ext:  No pitting edema of the bilateral lower extremities.   Musculo:  Full ROM of the bilateral upper extremities.  Left chest port-a-cath.  Neuro:  Cranial nerves grossly intact.  Psych:  Mood and affect appear normal.  Skin:  Thickened, pocked skin in the bilateral axilla.  Area of involvement is decreased from prior exam.    Laboratory/Imaging Studies  2/22/2022 Labs:  Electrolyes, kidney, and liver function are wnl.  Blood counts are within normal limits.  MCV is elevated at 101    CRP <2.9    ASSESSMENT/PLAN:  76 year old female with a history of hypohidrosis ectodermal dysplasia, HTN, dyslipidemia, COPD, diabetes, and OA with a h/o T2N0M0, right breast cancer, that is ER positive (50%), [place] negative, and HER2 positive.  She is s/p treatment with 12 weeks THP, 4 cycles ddAC, right breast mastectomy ([id number]), right axillary SLNBx, and 4 months adjuvant HER2 therapy.    1.  Right breast cancer:   Ms. Zuleta is nearly 1 year out from excision of a right breast cancer.  She is on treatment with letrozole.  She is tolerating the medication well.  Plan is to complete a minimum of 5 years of letrozole.    She is asymptomatic of disease recurrence on history  taken today and clinical exam is without concern.  Will schedule return visit in 3 months.  Next mammogram of the left breast will be due around 8/17/2022.    2.  Hypohidrotic ectodermal dysplasia:  Worse following completion of chemotherapy.  Improved with application of triamcinolone ointment.  She has now also established with Dr. Tran of dermatology.    3.  Voice changes/thickened mucous:  This may be due to worsening of hypohidrotic ectodermal dysplasia as it can involve the mucous membranes of the respiratory and GI tract. She has an appointment with ENT scheduled for 3/1/2022.  Previously recommended a trial Mucinex and an antihistamine such as Zyrtec or Allegra.  She has not done this.    4.  Dysgeusia:  Recommend trial of zinc supplement or miracle berry.  She states it is not bothersome enough to try these things.    5.  Chemotherapy induced peripheral neuropathy:  Discussed there is nothing that has been shown to treat neuropathy once chemotherapy is complete.  As she is >1 year out from completion of chemotherapy, I suspect the neuropathy is permanent.  We also discussed it would be unusual for neuropathy to become worse at this point, therefore would recommend discussing other causes of neuropathy with PCP.    6.  Bone Health/Osteopenia:  DEXA 4/13/2021 with a lowest T-score of -1.2 in the right femoral neck c/w osteopenia.  At last visit we discussed treatment with Zometa 4 mg IV every 6 months x 2 years has been shown to prevent breast cancer bone metastases.  She tells me today she had a deep root cleaning on 2/8/2022.  Will therefore postpone starting Zometa until next visit.    7.  At risk for cardiomyopathy:  2/2 h/o anthracycline and HER2 targeted therapy as well as personal history of hypertension, hyperlipidemia, and diabetes.  Post-treatment echocardiogram performed on 7/15/2021 was reviewed and shows a retained LVEF. Follow up with her PCP for management of cardiac risk factors.     8.   Diarrhea:  Continue fiber supplement.  She declines taking imodium.  Diarrhea this far out from completion of treatment is not usual.  I recommended colonoscopy with biopsies to evaluate for other causes of colitis such as autoimmune colitis.  She is adamantly opposed.    9.  Insomnia:  Persistent despite ambien.  She will trial melatonin 3 mg at bedtime.  May increase up to 10 mg as needed.    10.  Follow Up: Labs and port flush in 6 weeks.  Labs, visit with me, and Zometa in 3 months.    45 minutes spent on the date of the encounter doing chart review, review of test results, interpretation of tests, patient visit and documentation.     Again, thank you for allowing me to participate in the care of your patient.      Sincerely,    Perlita Alvarez MD

## 2022-02-22 NOTE — NURSING NOTE
"Chief Complaint   Patient presents with     Oncology Clinic Visit     breast cancer     Port Draw     labs drawn from port by rn.  vs taken     Port accessed with 20 gauge 3/4\" gripper needle  and labs drawn by rn.  Port flushed with NS and heparin then de-accessed.  Pt tolerated well.  VS taken.  Pt checked in for next appt.    Nubia Rosario RN      "

## 2022-02-22 NOTE — NURSING NOTE
"Oncology Rooming Note    February 22, 2022 10:05 AM   Talya Zuleta is a 76 year old female who presents for:    Chief Complaint   Patient presents with     Oncology Clinic Visit     breast cancer     Initial Vitals: /79 (BP Location: Right arm, Patient Position: Sitting, Cuff Size: Adult Regular)   Pulse 82   Temp 98.9  F (37.2  C) (Oral)   Resp 16   Wt 73.3 kg (161 lb 8 oz)   SpO2 98%   BMI 26.46 kg/m   Estimated body mass index is 26.46 kg/m  as calculated from the following:    Height as of 12/7/21: 1.664 m (5' 5.51\").    Weight as of this encounter: 73.3 kg (161 lb 8 oz). Body surface area is 1.84 meters squared.  Severe Pain (6) Comment: Data Unavailable   No LMP recorded. Patient is postmenopausal.  Allergies reviewed: Yes  Medications reviewed: Yes    Medications: MEDICATION REFILLS NEEDED TODAY. Provider was notified. Potassium needs refill - would like 90 day script like she used to get    Pharmacy name entered into EPIC: [facility] - [place] - [id number]    Clinical concerns: questions about taste, or lack there of      Marisol Francisco CMA            "

## 2022-02-23 NOTE — RESULT ENCOUNTER NOTE
"The hemoglobin a1c is into the \"prediabetes\" range.  No medications needed for this.  Just keep working on healthy diet/exercise.     Other labs here are fine.    Darrell Douglas MD  "

## 2022-02-28 ENCOUNTER — TELEPHONE (OUTPATIENT)
Dept: OTOLARYNGOLOGY | Facility: CLINIC | Age: 77
End: 2022-02-28
Payer: COMMERCIAL

## 2022-02-28 NOTE — TELEPHONE ENCOUNTER
Writer attempted to contact patient to confirm appointment with Dr. Deluna 3/1/22 8:00 AM. Writer left a detailed voicemail and MyChart message for the Patient.    Rickey Sullivan, EMT

## 2022-02-28 NOTE — PROGRESS NOTES
[facility]   at the [facility]   Otolaryngology Clinic     Patient: Talya Zuleta    MRN: [id number]    : 1945    Age/Gender: 76 year old female  Date of Service: 3/1/2022  Rendering Provider:   Evelyn Deluna MD     Referring Provider   PCP: Darrell Douglas  Referring Physician: Dr. Perlita Alvarez  Reason for Consultation   Hypohidrotic ectodermal dysplasia syndrome  Dysphonia  History   HISTORY OF PRESENT ILLNESS: I was asked to consult on Talya Zuleta, by Dr. 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 Ms. Zuleta is a 76 year old female who presents to us today for consultation.      Of note, the patient has history of right breast cancer s/p chemotherapy (10/2020 - 2021), s/p right breast mastectomy 3/8/21, and s/p Trazimera infusions completed 21.    she presents today for evaluation. she reports:    Dysphonia  - started on infusions after chemotherapy  - quality is her main concern  - people ask her to repeat herself  - voice gets squeaky  - has to talk louder so others can talk to her  - today is a good voice day  - most days are a bad voice day  - got up today at 5 AM and is still good  - can get squeaky after 30 minutes of being up  - squeakiness can happen whether she talks or not  - does not hurt her  - high voice use  -  is retired so they are both home together  - does not hurt when she has to project  - does not sing  - never happened before    Dysphagia  - eating is hard due to hand numbness and coordination  - denies dysphagia  - drinks a lot of coffee during the day  - tries to drink at least 1 cup of water per hour per day  - drinks Pedialyte during the day    Dyspnea  - denies inhalers    Throat clearing/cough  - has a lot of mucus but cannot cough it up  - lost sense of taste since chemo  - mucinex can help her  - puts wet toilet paper in her nose to get rid of crusts  - has had more  nosebleeds since infusions  - only throat clears a few times per day  - not associated during or after meals  - no cough with eat  - does not throat clear a lot    GERD/LPRD   - very occasionally  - used to have this a lot  - sleeping on her left side helps her  - no sore throat in the mornin    Nose  - started on infusions after chemotherapy  - less hair in her nose since chemotherapy  - has an ointment that helps her nose  - has used Netti pot before   - but has not used Netti Pot in awhile    - feels that her right ear is plugged    PAST MEDICAL HISTORY:   Past Medical History:   Diagnosis Date     Anemia      Breast cancer ([facility])      Cellulitis      HTN (hypertension)      Neuropathy      Personal history of tobacco use, presenting hazards to health      Sleep apnea      PAST SURGICAL HISTORY:   Past Surgical History:   Procedure Laterality Date     BLEPHAROPLASTY       BUNIONECTOMY       CHOLECYSTECTOMY       HYSTERECTOMY  1976     INSERT PORT VASCULAR ACCESS N/A 10/02/2020    Procedure: PORT PLACEMENT;  Surgeon: Hawa Lowery MD;  Location: [facility] OR     MASTECTOMY SIMPLE Right 3/8/2021    Procedure: right simple mastectomy, right sentinel node biopsy;  Surgeon: Laci Newsome MD;  Location: [facility] OR     THYROIDECTOMY       partial     CURRENT MEDICATIONS:   Current Outpatient Medications:      amLODIPine (NORVASC) 5 MG tablet, TAKE ONE TABLET BY MOUTH TWICE DAILY *NEED TO SCHEDULE WELLNESS EXAM, Disp: 180 tablet, Rfl: 3     anastrozole (ARIMIDEX) 1 MG tablet, Take 1 tablet (1 mg) by mouth daily, Disp: 90 tablet, Rfl: 3     atorvastatin (LIPITOR) 20 MG tablet, Take 1 tablet (20 mg) by mouth daily, Disp: 90 tablet, Rfl: 3     benazepril (LOTENSIN) 20 MG tablet, TAKE ONE TABLET BY MOUTH TWICE DAILY *PLEASE SCHEDULE WELLNESS EXAM, Disp: 180 tablet, Rfl: 3     furosemide (LASIX) 80 MG tablet, TAKE ONE TABLET BY MOUTH TWICE DAILY, Disp: 180 tablet, Rfl: 3     potassium chloride ER (KLOR-CON M) 20 MEQ CR tablet, Take  2 tablets (40 mEq) by mouth 3 times daily, Disp: 180 tablet, Rfl: 3     triamcinolone (KENALOG) 0.1 % external cream, APPLY TOPICALLY 2 TIMES DAILY AS NEEDED FOR IRRITATION, Disp: 80 g, Rfl: 0     varenicline (CHANTIX) 1 MG tablet, Take 1 tablet (1 mg) by mouth 2 times daily, Disp: 180 tablet, Rfl: 1     Vitamin D, Cholecalciferol, 25 MCG (1000 UT) TABS, Take 1,000 Units by mouth daily, Disp: , Rfl:      zolpidem (AMBIEN) 5 MG tablet, Take 1 tablet (5 mg) by mouth nightly as needed for sleep, Disp: 30 tablet, Rfl: 1  No current facility-administered medications for this visit.    Facility-Administered Medications Ordered in Other Visits:      sodium chloride (PF) 0.9% PF flush 30 mL, 30 mL, Intracatheter, Once, Hoda Mcintosh PA-C     sodium chloride (PF) 0.9% PF flush 30 mL, 30 mL, Intracatheter, Once, Perlita Alvarez MD    ALLERGIES: Bacitracin-neomycin-polymyxin and Latex    SOCIAL HISTORY:    Social History     Socioeconomic History     Marital status:      Spouse name: Not on file     Number of children: Not on file     Years of education: Not on file     Highest education level: Not on file   Occupational History     Not on file   Tobacco Use     Smoking status: Current Every Day Smoker     Packs/day: 0.25     Years: 66.00     Pack years: 16.50     Types: Cigarettes     Smokeless tobacco: Current User   Substance and Sexual Activity     Alcohol use: Yes     Comment: scotch & water 2-3 classes a day     Drug use: Never     Sexual activity: Not Currently     Partners: Male   Other Topics Concern     Not on file   Social History Narrative     Not on file     Social Determinants of Health     Financial Resource Strain: Not on file   Food Insecurity: Not on file   Transportation Needs: Not on file   Physical Activity: Not on file   Stress: Not on file   Social Connections: Not on file   Intimate Partner Violence: Not At Risk     Fear of Current or Ex-Partner: No     Emotionally Abused:  No     Physically Abused: No     Sexually Abused: No   Housing Stability: Not on file       FAMILY HISTORY:   Family History   Problem Relation Age of Onset     No Known Problems Mother      No Known Problems Father      No Known Problems Maternal Grandmother      No Known Problems Maternal Grandfather      No Known Problems Paternal Grandmother      Non-contributory for problems with anesthesia    REVIEW OF SYSTEMS:   The patient was asked a 14 point review of systems regarding constitutional symptoms, eye symptoms, ears, nose, mouth, throat symptoms, cardiovascular symptoms, respiratory symptoms, gastrointestinal symptoms, genitourinary symptoms, musculoskeletal symptoms, integumentary symptoms, neurological symptoms, psychiatric symptoms, endocrine symptoms, hematologic/lymphatic symptoms, and allergic/ immunologic symptoms.   The pertinent factors have been included in the HPI and below.  Patient Supplied Answers to Review of Systems  [facility] ROS 2/24/2022   Constitutional Problems with sleep   Neurology Numbness, Unexplained weakness   Ears, Nose, Throat Ringing/noise in ears, Nasal congestion or drainage, Hoarseness   Gastrointestinal/Genitourinary Diarrhea   Musculoskeletal Swollen legs/feet   Allergy/Immunology Rash   Hematologic Easy bruising     Physical Examination   The patient underwent a physical examination as described below. The pertinent positive and negative findings are summarized after the description of the examination.  Constitutional: The patient's developmental and nutritional status was assessed. The patient's voice quality was assessed.  Head and Face: The head and face were inspected for deformities. The sinuses were palpated. The salivary glands were palpated. Facial muscle strength was assessed bilaterally.  Eyes: Extraocular movements and primary gaze alignment were assessed.  Ears, Nose, Mouth and Throat: The ears and nose were examined for deformities.   The nasal septum, mucosa, and  turbinates were inspected by anterior rhinoscopy. The lips, teeth, and gums were examined for abnormalities. The oral mucosa, tongue, palate, tonsils, lateral and posterior pharynx were inspected for the presence of asymmetry or mucosal lesions.    Neck: The tracheal position was noted, and the neck mass palpated to determine if there were any asymmetries, abnormal neck masses, thyromegally, or thyroid nodules.  Respiratory: The nature of the breathing and chest expansion/symmetry was observed.  Cardiovascular: The patient was examined to determine the presence of any edema or jugular venous distension.  Abdomen: The contour of the abdomen was noted.  Lymphatic: The patient was examined for infraclavicular lymphadenopathy.  Musculoskeletal: The patient was inspected for the presence of skeletal deformities.  Extremities: The extremities were examined for any clubbing or cyanosis.  Skin: The skin was examined for inflammatory or neoplastic conditions.  Neurologic: The patient's orientation, mood, and affect were noted. The cranial nerve  functions were examined.  Other pertinent positive and negative findings on physical examination:      OC/OP: no lesions, uvula midline, soft palate elevates symmetrically  Neck: neck incision over the sternal notch well healed, right TH tenderness to palpation, no need to cough with TH pressure  - Left: TM and EAC intact  - Right: cerumen impaction in the EAC  All other physical examination findings were within normal limits and noncontributory.  Procedures   Video Laryngoscopy with Stroboscopy (CPT 45660) and Behavioral & Qualitative Evaluation of Voice and Resonence     Preoperative Diagnosis:  Dysphonia and throat symptoms  Postoperative Diagnosis: Dysphonia and throat symptoms  Indication:  Patient has new or persistent dysphonia and throat symptoms.  This requires evaluation by stroboscopy to fully delineate the laryngeal functioning; especially mucosal wave assessment,  ultrasharp visualization of lesions on the vocal folds, and overall functioning of the larynx.  Details of Procedure: A 70 degree rigid telescopic laryngoscope or a distal chip flexible scope was used. The lighting was obtained via a light cable connected to a stroboscopic unit. The telescope was inserted either transorally or transnasally until the vocal folds could be visualized. The patient was instructed to sustain the vowel  ee  at a comfortable pitch and loudness as the voice was monitored by a microphone connected to a fundamental frequency tracker. This circuit tracked vocal periodicity, allowing the light to flash in synchrony with the glottal cycles. A setting on the stroboscope was set to change the phase of light strobing with relation to the vocal fundamental frequency, producing an image of 1 to 2 glottal cycles every second. The video images were recorded for analysis. Use of the variable speed, slow and stop scan allowed careful study of pertinent segments of laryngeal function to increase accuracy of clinical assessments of function and dysfunction.  In particular, features of glottal closure, mucosal wave on the vocal fold cover and laryngeal symmetry were analyzed. Lastly, the patient was asked to phonate speech samples and auditory/perceptual evaluation of voice and resonance were performed.  The vocal quality was carefully evaluated for hoarseness, breathiness, loudness, phrase length and intelligibility to determine the source of dysphonia.    Findings:   A. BEHAVIORAL & QUALITATIVE EVALUATION OF VOICE AND RESONENCE   Comments: F0 170 Hz MPT: 20 seconds  Vocal Quality: mild strain    Pitch Range:  Normal 420 Hz  Phrase Length:  Normal  Vocal Loudness: moderately decreased   Dysarthria: No    B. LARYNGOVIDEOSTROBOSCOPY   Anatomic/Physiological Deviations:  LNC, dry nasal crusting, mild mid edge inflammation, supraglotic hyperfunction   Mucosal wave: Right:  No restriction     Left: No  restriction  Bilateral Vocal Fold Vibration: Symmetric  Vocal Process: Right: No restriction    Left:  No restriction  Vocal Fold closure: Complete glottal closure    Complication(s): None  Disposition: Patient tolerated the procedure well                    Review of Relevant Clinical Data   I personally reviewed:  Notes: Dr. Alvarez Perlita of Oncology 11/15/21 and 2/22/22    Radiology: US Thyroid 7/6/2005  IMPRESSION:  Bilateral thyroid nodules. The largest on the left measures 24 mm in diameter and is mixed echogenicity and with some blood flow present within it.  The largest lesion on the right measures 11 mm and also demonstrates some blood flow within it. Recommend endocrinology consultation with consideration for biopsy particularly of the larger lesion on the left.   Labs:  Lab Results   Component Value Date    TSH 1.36 12/07/2021     Lab Results   Component Value Date     02/22/2022    CO2 25 02/22/2022    BUN 14 02/22/2022    CREAT 0.8 08/21/2020     Lab Results   Component Value Date    WBC 6.9 02/22/2022    HGB 13.7 02/22/2022    HCT 39.6 02/22/2022     (H) 02/22/2022     02/22/2022     No results found for: PT, PTT, INR  No results found for: JEAN  No components found for: RHEUMATOIDFACTOR,  RF  Lab Results   Component Value Date    CRP <2.9 02/22/2022     No components found for: CKTOT, URICACID  No components found for: C3, C4, DSDNAAB, NDNAABIFA  No results found for: MPOAB    Patient reported Quality of Life (QOL) Measures   Patient Supplied Answers To VHI Questionnaire  Voice Handicap Index (VHI-10) 2/24/2022   My voice makes it difficult for people to hear me 2   People have difficulty understanding me in a noisy room 2   My voice difficulties restrict my personal and social life.  1   I feel left out of conversations because of my voice 0   My voice problem causes me to lose income 0   I feel as though I have to strain to produce voice 1   The clarity of my voice is  "unpredictable 2   My voice problem upsets me 0   My voice makes me feel handicapped 0   People ask, \"What's wrong with your voice?\" 2   VHI-10 10     Patient Supplied Answers To EAT Questionnaire  Eating Assessment Tool (EAT-10) 2022   My swallowing problem has caused me to lose weight 0   My swallowing problem interferes with my ability to go out for meals 0   Swallowing liquids takes extra effort 0   Swallowing solids takes extra effort 0   Swallowing pills takes extra effort 0   Swallowing is painful 0   The pleasure of eating is affected by my swallowing 0   When I swallow food sticks in my throat 0   I cough when I eat 0   Swallowing is stressful 0   EAT-10 0     Patient Supplied Answers To CSI Questionnaire  Cough Severity Index (CSI) 2022   My cough is worse when I lie down 1   My coughing problem causes me to restrict my personal and social life 0   I tend to avoid places because of my cough problem 0   I feel embarrassed because of my coughing problem 0   People ask, ''What's wrong?'' because I cough a lot 0   I run out of air when I cough 0   My coughing problem affects my voice 0   My coughing problem limits my physical activity 0   My coughing problem upsets me 0   People ask me if I am sick because I cough a lot 0   CSI Score 1     Patient Supplied Answers to Dyspnea Index Questionnaire:  No flowsheet data found.    Impression & Plan     IMPRESSION: Ms. Zuleta is a 76 year old female who is being seen for the followin. 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 Right ear congestion  - exam with right ear cerumen impaction  Plan  - referral for microscopy with debridement    RETURN VISIT: as needed after therapy      Scribe Disclosure:  I, Hawa Melendez, am serving as a scribe to document services personally performed by Evelyn Deluna MD at this visit, based upon the provider's statements to me. All documentation has been reviewed by the aforementioned provider prior to being entered into the official medical record.       Thank you for the kind referral and for allowing me to share in the care of Ms. Zuleta. If you have any questions, please do not hesitate to contact me.    Evelyn Deluna MD    Laryngology    [facility]  Department of  Otolaryngology - Head and Neck Surgery  [facility] [place] [address]  Appointment line: [phone]  Fax: [phone]  [email or web address]

## 2022-03-01 ENCOUNTER — OFFICE VISIT (OUTPATIENT)
Dept: OTOLARYNGOLOGY | Facility: CLINIC | Age: 77
End: 2022-03-01
Payer: COMMERCIAL

## 2022-03-01 ENCOUNTER — THERAPY VISIT (OUTPATIENT)
Dept: SPEECH THERAPY | Facility: CLINIC | Age: 77
End: 2022-03-01
Payer: COMMERCIAL

## 2022-03-01 VITALS
HEIGHT: 65 IN | HEART RATE: 81 BPM | TEMPERATURE: 98.6 F | BODY MASS INDEX: 26.66 KG/M2 | DIASTOLIC BLOOD PRESSURE: 66 MMHG | SYSTOLIC BLOOD PRESSURE: 137 MMHG | WEIGHT: 160 LBS | OXYGEN SATURATION: 97 %

## 2022-03-01 DIAGNOSIS — C50.811 MALIGNANT NEOPLASM OF OVERLAPPING SITES OF RIGHT BREAST IN FEMALE, ESTROGEN RECEPTOR POSITIVE (H): ICD-10-CM

## 2022-03-01 DIAGNOSIS — R49.0 DYSPHONIA: Primary | ICD-10-CM

## 2022-03-01 DIAGNOSIS — Z17.0 MALIGNANT NEOPLASM OF OVERLAPPING SITES OF RIGHT BREAST IN FEMALE, ESTROGEN RECEPTOR POSITIVE (H): ICD-10-CM

## 2022-03-01 PROCEDURE — 92524 BEHAVRAL QUALIT ANALYS VOICE: CPT | Mod: GN | Performed by: SPEECH-LANGUAGE PATHOLOGIST

## 2022-03-01 PROCEDURE — 31579 LARYNGOSCOPY TELESCOPIC: CPT | Performed by: OTOLARYNGOLOGY

## 2022-03-01 PROCEDURE — 99204 OFFICE O/P NEW MOD 45 MIN: CPT | Mod: 25 | Performed by: OTOLARYNGOLOGY

## 2022-03-01 ASSESSMENT — PAIN SCALES - GENERAL: PAINLEVEL: NO PAIN (0)

## 2022-03-01 NOTE — NURSING NOTE
"Chief Complaint   Patient presents with     Consult     New patient       Blood pressure 137/66, pulse 81, temperature 98.6  F (37  C), temperature source Temporal, height 1.651 m (5' 5\"), weight 72.6 kg (160 lb), SpO2 97 %, not currently breastfeeding.    Rickey Sullivan, EMT  "

## 2022-03-01 NOTE — LETTER
3/1/2022       RE: Talya Zuleta  [address] [facility] [address]     Dear Colleague,    Thank you for referring your patient, Talya Zuleta, to the [facility] EAR NOSE AND THROAT CLINIC [place] at [facility]. Please see a copy of my visit note below.        [facility]   at the [facility]   Otolaryngology Clinic     Patient: Talya Zuleta    MRN: [id number]    : 1945    Age/Gender: 76 year old female  Date of Service: 3/1/2022  Rendering Provider:   Evelyn Deluna MD     Referring Provider   PCP: Darrlel Douglas  Referring Physician: Dr. Perlita Alvarez  Reason for Consultation   Hypohidrotic ectodermal dysplasia syndrome  Dysphonia  History   HISTORY OF PRESENT ILLNESS: I was asked to consult on Talya Zuleta, by Dr. 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 Ms. Zuleta is a 76 year old female who presents to us today for consultation.      Of note, the patient has history of right breast cancer s/p chemotherapy (10/2020 - 2021), s/p right breast mastectomy 3/8/21, and s/p Trazimera infusions completed 21.    she presents today for evaluation. she reports:    Dysphonia  - started on infusions after chemotherapy  - quality is her main concern  - people ask her to repeat herself  - voice gets squeaky  - has to talk louder so others can talk to her  - today is a good voice day  - most days are a bad voice day  - got up today at 5 AM and is still good  - can get squeaky after 30 minutes of being up  - squeakiness can happen whether she talks or not  - does not hurt her  - high voice use  -  is retired so they are both home together  - does not hurt when she has to project  - does not sing  - never happened before    Dysphagia  - eating is hard due to hand numbness and coordination  - denies dysphagia  - drinks a lot of coffee during the  day  - tries to drink at least 1 cup of water per hour per day  - drinks Pedialyte during the day    Dyspnea  - denies inhalers    Throat clearing/cough  - has a lot of mucus but cannot cough it up  - lost sense of taste since chemo  - mucinex can help her  - puts wet toilet paper in her nose to get rid of crusts  - has had more nosebleeds since infusions  - only throat clears a few times per day  - not associated during or after meals  - no cough with eat  - does not throat clear a lot    GERD/LPRD   - very occasionally  - used to have this a lot  - sleeping on her left side helps her  - no sore throat in the mornin    Nose  - started on infusions after chemotherapy  - less hair in her nose since chemotherapy  - has an ointment that helps her nose  - has used Netti pot before   - but has not used Netti Pot in awhile    - feels that her right ear is plugged    PAST MEDICAL HISTORY:   Past Medical History:   Diagnosis Date     Anemia      Breast cancer (H)      Cellulitis      HTN (hypertension)      Neuropathy      Personal history of tobacco use, presenting hazards to health      Sleep apnea      PAST SURGICAL HISTORY:   Past Surgical History:   Procedure Laterality Date     BLEPHAROPLASTY       BUNIONECTOMY       CHOLECYSTECTOMY       HYSTERECTOMY  1976     INSERT PORT VASCULAR ACCESS N/A 10/02/2020    Procedure: PORT PLACEMENT;  Surgeon: Hawa Lowery MD;  Location: [facility] OR     MASTECTOMY SIMPLE Right 3/8/2021    Procedure: right simple mastectomy, right sentinel node biopsy;  Surgeon: Laci Newsome MD;  Location: [facility] OR     THYROIDECTOMY       partial     CURRENT MEDICATIONS:   Current Outpatient Medications:      amLODIPine (NORVASC) 5 MG tablet, TAKE ONE TABLET BY MOUTH TWICE DAILY *NEED TO SCHEDULE WELLNESS EXAM, Disp: 180 tablet, Rfl: 3     anastrozole (ARIMIDEX) 1 MG tablet, Take 1 tablet (1 mg) by mouth daily, Disp: 90 tablet, Rfl: 3     atorvastatin (LIPITOR) 20 MG tablet, Take 1 tablet (20 mg) by  mouth daily, Disp: 90 tablet, Rfl: 3     benazepril (LOTENSIN) 20 MG tablet, TAKE ONE TABLET BY MOUTH TWICE DAILY *PLEASE SCHEDULE WELLNESS EXAM, Disp: 180 tablet, Rfl: 3     furosemide (LASIX) 80 MG tablet, TAKE ONE TABLET BY MOUTH TWICE DAILY, Disp: 180 tablet, Rfl: 3     potassium chloride ER (KLOR-CON M) 20 MEQ CR tablet, Take 2 tablets (40 mEq) by mouth 3 times daily, Disp: 180 tablet, Rfl: 3     triamcinolone (KENALOG) 0.1 % external cream, APPLY TOPICALLY 2 TIMES DAILY AS NEEDED FOR IRRITATION, Disp: 80 g, Rfl: 0     varenicline (CHANTIX) 1 MG tablet, Take 1 tablet (1 mg) by mouth 2 times daily, Disp: 180 tablet, Rfl: 1     Vitamin D, Cholecalciferol, 25 MCG (1000 UT) TABS, Take 1,000 Units by mouth daily, Disp: , Rfl:      zolpidem (AMBIEN) 5 MG tablet, Take 1 tablet (5 mg) by mouth nightly as needed for sleep, Disp: 30 tablet, Rfl: 1  No current facility-administered medications for this visit.    Facility-Administered Medications Ordered in Other Visits:      sodium chloride (PF) 0.9% PF flush 30 mL, 30 mL, Intracatheter, Once, Hoda Mcintosh PA-C     sodium chloride (PF) 0.9% PF flush 30 mL, 30 mL, Intracatheter, Once, Perlita Alvarez MD    ALLERGIES: Bacitracin-neomycin-polymyxin and Latex    SOCIAL HISTORY:    Social History     Socioeconomic History     Marital status:      Spouse name: Not on file     Number of children: Not on file     Years of education: Not on file     Highest education level: Not on file   Occupational History     Not on file   Tobacco Use     Smoking status: Current Every Day Smoker     Packs/day: 0.25     Years: 66.00     Pack years: 16.50     Types: Cigarettes     Smokeless tobacco: Current User   Substance and Sexual Activity     Alcohol use: Yes     Comment: scotch & water 2-3 classes a day     Drug use: Never     Sexual activity: Not Currently     Partners: Male   Other Topics Concern     Not on file   Social History Narrative     Not on file      Social Determinants of Health     Financial Resource Strain: Not on file   Food Insecurity: Not on file   Transportation Needs: Not on file   Physical Activity: Not on file   Stress: Not on file   Social Connections: Not on file   Intimate Partner Violence: Not At Risk     Fear of Current or Ex-Partner: No     Emotionally Abused: No     Physically Abused: No     Sexually Abused: No   Housing Stability: Not on file       FAMILY HISTORY:   Family History   Problem Relation Age of Onset     No Known Problems Mother      No Known Problems Father      No Known Problems Maternal Grandmother      No Known Problems Maternal Grandfather      No Known Problems Paternal Grandmother      Non-contributory for problems with anesthesia    REVIEW OF SYSTEMS:   The patient was asked a 14 point review of systems regarding constitutional symptoms, eye symptoms, ears, nose, mouth, throat symptoms, cardiovascular symptoms, respiratory symptoms, gastrointestinal symptoms, genitourinary symptoms, musculoskeletal symptoms, integumentary symptoms, neurological symptoms, psychiatric symptoms, endocrine symptoms, hematologic/lymphatic symptoms, and allergic/ immunologic symptoms.   The pertinent factors have been included in the HPI and below.  Patient Supplied Answers to Review of Systems  [facility] ROS 2/24/2022   Constitutional Problems with sleep   Neurology Numbness, Unexplained weakness   Ears, Nose, Throat Ringing/noise in ears, Nasal congestion or drainage, Hoarseness   Gastrointestinal/Genitourinary Diarrhea   Musculoskeletal Swollen legs/feet   Allergy/Immunology Rash   Hematologic Easy bruising     Physical Examination   The patient underwent a physical examination as described below. The pertinent positive and negative findings are summarized after the description of the examination.  Constitutional: The patient's developmental and nutritional status was assessed. The patient's voice quality was assessed.  Head and Face: The head  and face were inspected for deformities. The sinuses were palpated. The salivary glands were palpated. Facial muscle strength was assessed bilaterally.  Eyes: Extraocular movements and primary gaze alignment were assessed.  Ears, Nose, Mouth and Throat: The ears and nose were examined for deformities.   The nasal septum, mucosa, and turbinates were inspected by anterior rhinoscopy. The lips, teeth, and gums were examined for abnormalities. The oral mucosa, tongue, palate, tonsils, lateral and posterior pharynx were inspected for the presence of asymmetry or mucosal lesions.    Neck: The tracheal position was noted, and the neck mass palpated to determine if there were any asymmetries, abnormal neck masses, thyromegally, or thyroid nodules.  Respiratory: The nature of the breathing and chest expansion/symmetry was observed.  Cardiovascular: The patient was examined to determine the presence of any edema or jugular venous distension.  Abdomen: The contour of the abdomen was noted.  Lymphatic: The patient was examined for infraclavicular lymphadenopathy.  Musculoskeletal: The patient was inspected for the presence of skeletal deformities.  Extremities: The extremities were examined for any clubbing or cyanosis.  Skin: The skin was examined for inflammatory or neoplastic conditions.  Neurologic: The patient's orientation, mood, and affect were noted. The cranial nerve  functions were examined.  Other pertinent positive and negative findings on physical examination:      OC/OP: no lesions, uvula midline, soft palate elevates symmetrically  Neck: neck incision over the sternal notch well healed, right TH tenderness to palpation, no need to cough with TH pressure  - Left: TM and EAC intact  - Right: cerumen impaction in the EAC  All other physical examination findings were within normal limits and noncontributory.  Procedures   Video Laryngoscopy with Stroboscopy (CPT 90072) and Behavioral & Qualitative Evaluation of Voice  and Resonence     Preoperative Diagnosis:  Dysphonia and throat symptoms  Postoperative Diagnosis: Dysphonia and throat symptoms  Indication:  Patient has new or persistent dysphonia and throat symptoms.  This requires evaluation by stroboscopy to fully delineate the laryngeal functioning; especially mucosal wave assessment, ultrasharp visualization of lesions on the vocal folds, and overall functioning of the larynx.  Details of Procedure: A 70 degree rigid telescopic laryngoscope or a distal chip flexible scope was used. The lighting was obtained via a light cable connected to a stroboscopic unit. The telescope was inserted either transorally or transnasally until the vocal folds could be visualized. The patient was instructed to sustain the vowel  ee  at a comfortable pitch and loudness as the voice was monitored by a microphone connected to a fundamental frequency tracker. This circuit tracked vocal periodicity, allowing the light to flash in synchrony with the glottal cycles. A setting on the stroboscope was set to change the phase of light strobing with relation to the vocal fundamental frequency, producing an image of 1 to 2 glottal cycles every second. The video images were recorded for analysis. Use of the variable speed, slow and stop scan allowed careful study of pertinent segments of laryngeal function to increase accuracy of clinical assessments of function and dysfunction.  In particular, features of glottal closure, mucosal wave on the vocal fold cover and laryngeal symmetry were analyzed. Lastly, the patient was asked to phonate speech samples and auditory/perceptual evaluation of voice and resonance were performed.  The vocal quality was carefully evaluated for hoarseness, breathiness, loudness, phrase length and intelligibility to determine the source of dysphonia.    Findings:   A. BEHAVIORAL & QUALITATIVE EVALUATION OF VOICE AND RESONENCE   Comments: F0 170 Hz MPT: 20 seconds  Vocal Quality: mild  strain    Pitch Range:  Normal 420 Hz  Phrase Length:  Normal  Vocal Loudness: moderately decreased   Dysarthria: No    B. LARYNGOVIDEOSTROBOSCOPY   Anatomic/Physiological Deviations:  LNC, dry nasal crusting, mild mid edge inflammation, supraglotic hyperfunction   Mucosal wave: Right:  No restriction     Left: No restriction  Bilateral Vocal Fold Vibration: Symmetric  Vocal Process: Right: No restriction    Left:  No restriction  Vocal Fold closure: Complete glottal closure    Complication(s): None  Disposition: Patient tolerated the procedure well                    Review of Relevant Clinical Data   I personally reviewed:  Notes: Perlita Marcelo of Oncology 11/15/21 and 2/22/22    Radiology: US Thyroid 7/6/2005  IMPRESSION:  Bilateral thyroid nodules. The largest on the left measures 24 mm in diameter and is mixed echogenicity and with some blood flow present within it.  The largest lesion on the right measures 11 mm and also demonstrates some blood flow within it. Recommend endocrinology consultation with consideration for biopsy particularly of the larger lesion on the left.   Labs:  Lab Results   Component Value Date    TSH 1.36 12/07/2021     Lab Results   Component Value Date     02/22/2022    CO2 25 02/22/2022    BUN 14 02/22/2022    CREAT 0.8 08/21/2020     Lab Results   Component Value Date    WBC 6.9 02/22/2022    HGB 13.7 02/22/2022    HCT 39.6 02/22/2022     (H) 02/22/2022     02/22/2022     No results found for: PT, PTT, INR  No results found for: JEAN  No components found for: RHEUMATOIDFACTOR,  RF  Lab Results   Component Value Date    CRP <2.9 02/22/2022     No components found for: CKTOT, URICACID  No components found for: C3, C4, DSDNAAB, NDNAABIFA  No results found for: MPOAB    Patient reported Quality of Life (QOL) Measures   Patient Supplied Answers To VHI Questionnaire  Voice Handicap Index (VHI-10) 2/24/2022   My voice makes it difficult for people to hear me 2  "  People have difficulty understanding me in a noisy room 2   My voice difficulties restrict my personal and social life.  1   I feel left out of conversations because of my voice 0   My voice problem causes me to lose income 0   I feel as though I have to strain to produce voice 1   The clarity of my voice is unpredictable 2   My voice problem upsets me 0   My voice makes me feel handicapped 0   People ask, \"What's wrong with your voice?\" 2   VHI-10 10     Patient Supplied Answers To EAT Questionnaire  Eating Assessment Tool (EAT-10) 2022   My swallowing problem has caused me to lose weight 0   My swallowing problem interferes with my ability to go out for meals 0   Swallowing liquids takes extra effort 0   Swallowing solids takes extra effort 0   Swallowing pills takes extra effort 0   Swallowing is painful 0   The pleasure of eating is affected by my swallowing 0   When I swallow food sticks in my throat 0   I cough when I eat 0   Swallowing is stressful 0   EAT-10 0     Patient Supplied Answers To CSI Questionnaire  Cough Severity Index (CSI) 2022   My cough is worse when I lie down 1   My coughing problem causes me to restrict my personal and social life 0   I tend to avoid places because of my cough problem 0   I feel embarrassed because of my coughing problem 0   People ask, ''What's wrong?'' because I cough a lot 0   I run out of air when I cough 0   My coughing problem affects my voice 0   My coughing problem limits my physical activity 0   My coughing problem upsets me 0   People ask me if I am sick because I cough a lot 0   CSI Score 1     Patient Supplied Answers to Dyspnea Index Questionnaire:  No flowsheet data found.    Impression & Plan     IMPRESSION: Ms. Zuleta is a 76 year old female who is being seen for the followin. 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 Right ear congestion  - exam with right ear cerumen impaction  Plan  - referral for microscopy with debridement    RETURN VISIT: as needed after therapy      Scribe Disclosure:  I, Hawa Melendez, am serving as a scribe to document services personally performed by Evelyn Deluna MD at this visit, based upon the provider's statements to me. All documentation has been reviewed by the aforementioned provider prior to being entered into the official medical record.       Thank you for the kind referral and for allowing me to share in the care of Ms. Zuleta. If you have any questions, please do not hesitate to contact me.      Evelyn Deluna MD    Laryngology    [facility]  Department of  Otolaryngology - Head and Neck Surgery  Clinics & Surgery Center  [address] [place] [id number]  Appointment line: [phone]  Fax: [phone]  [email or web address]

## 2022-03-02 DIAGNOSIS — L30.9 DERMATITIS: ICD-10-CM

## 2022-03-03 ENCOUNTER — OFFICE VISIT (OUTPATIENT)
Dept: OTOLARYNGOLOGY | Facility: CLINIC | Age: 77
End: 2022-03-03
Payer: COMMERCIAL

## 2022-03-03 VITALS
HEART RATE: 66 BPM | TEMPERATURE: 98.3 F | HEIGHT: 65 IN | WEIGHT: 159 LBS | SYSTOLIC BLOOD PRESSURE: 109 MMHG | DIASTOLIC BLOOD PRESSURE: 71 MMHG | BODY MASS INDEX: 26.49 KG/M2

## 2022-03-03 DIAGNOSIS — H93.11 TINNITUS, RIGHT: ICD-10-CM

## 2022-03-03 DIAGNOSIS — H61.21 IMPACTED CERUMEN OF RIGHT EAR: Primary | ICD-10-CM

## 2022-03-03 PROCEDURE — 69210 REMOVE IMPACTED EAR WAX UNI: CPT | Performed by: REGISTERED NURSE

## 2022-03-03 ASSESSMENT — PAIN SCALES - GENERAL: PAINLEVEL: NO PAIN (0)

## 2022-03-03 NOTE — NURSING NOTE
"Chief Complaint   Patient presents with     RECHECK     ear cleaning      Blood pressure 109/71, pulse 66, temperature 98.3  F (36.8  C), height 1.651 m (5' 5\"), weight 72.1 kg (159 lb), not currently breastfeeding.    Virgilio Aguilar LPN    "

## 2022-03-03 NOTE — PROGRESS NOTES
Otolaryngology Clinic  March 3, 2022    HPI:  Talya Zuleta is here for a right ear cleaning. Patient was seen by Dr. Deluna earlier this week for dysphonia evaluation. During clinic exam, Dr. Deluna noted right cerumen impaction, recommending ear cleaning under microscopy. Left ear clear. Patient states that right ear feels more plugged with tinnitus.     Patient denies any otalgia, otorrhea, dizziness, history of frequent ear infections, or ear surgeries.      Otologic microscope exam:    Biocular Microscopy exam is needed due to deep impaction of cerumen of bilateral ears, requiring direct visualization for use of cleaning instruments.    Right ear was examined under the microscope.  Cerumen impaction noted. It was cleaned with right angle hook and alligator forceps. Once cleaned, TM visualized under microscope. Normal appearing TM, nicely aerated middle ear space.     Left ear was also examined under the microscope. Normal appearing TM, nicely aerated middle ear space.     The patient noted improvement of symptoms.    Assessment and Plan:  1. Impacted cerumen of right ear  Patient presents with cerumen impaction of right ears.  The patient's ears are cleaned today. Ears appear healthy on today's exam. Patient should return for an audiogram if tinnitus in right ear persists after cleaning.    Return as needed for cleaning.     Pastora Ny DNP, APRN, CNP  Otolaryngology  Head & Neck Surgery  [phone]    30 minutes spent on the date of the encounter doing chart review, history and exam, documentation and further activities per the note

## 2022-03-03 NOTE — PROGRESS NOTES
[facility]      OUTPATIENT SPEECH LANGUAGE PATHOLOGY VOICE EVALUATION  PLAN OF TREATMENT FOR OUTPATIENT REHABILITATION  (COMPLETE FOR INITIAL CLAIMS ONLY)    Patient's Last Name, First Name, M.I.  YOB: 1945  Talya Zuleta                        Provider s Name: [facility] Medical Record No.  [id number]     Onset Date:      Start of Care Date:     Type:     ___PT  __OT   _X_SLP    Medical Diagnosis: Dysphonia   Speech Language Pathology Diagnosis:  Dysphonia in the context of non-optimal coordination of respiration and phonation    Visits from SOC: 1      _________________________________________________________________________________  Plan of Treatment/Functional Goals:  Voice  Pt will initiate therapy with the [facility]      Goals     1. Goal Identifier: Vocal Hygiene       Goal Description: 1. Pt will be able to independently list key factors in maintenance of good vocal hygiene with 80% accuracy, and report on their use outside the therapy room.       Target Date: 05/30/22   2. Goal Identifier: Glottal Coup Exercises       Goal Description: 2. Pt will demonstrate ability to use glottal coup exercise to reset fundamental frequency to WFL in 4/5 opportunities independently.       Target Date: 05/30/22   3. Goal Identifier: Breathing       Goal Description: 3. Pt will demonstate optimal coordination of respiration and phonation with optimal respiratory mechanics in 80% of opportunities.       Target Date: 05/30/22   4. Goal Identifier: Target vs Habitual Voice       Goal Description: 4. Pt will demonstrate independent ability to identify target vs habitual voice in 4/5 opportunities.       Target Date: 05/30/22   5. Goal Identifier: LTG        Goal Description: 5. Pt will report a week of typical activities in which dysphonia does not exceed a level  of 2/10, 80% of the time.        Target Date: 05/30/22      Frequency and Duration: 2x/month for 3-4 months   Dolly Soto, SLP       I CERTIFY THE NEED FOR THESE SERVICES FURNISHED UNDER        THIS PLAN OF TREATMENT AND WHILE UNDER MY CARE     (Physician attestation of this document indicates review and certification of the therapy plan).                Certification Date From:  03/01/22  Certification Date To:  05/30/22                 Initial Assessment        See Epic Evaluation Start of Care

## 2022-03-03 NOTE — LETTER
3/3/2022       RE: Talya Zuleta  [address] [facility] [address]     Dear Colleague,    Thank you for referring your patient, Talya Zuleta, to the [facility] EAR NOSE AND THROAT CLINIC [place] at [facility]. Please see a copy of my visit note below.      Otolaryngology Clinic  March 3, 2022    HPI:  Talya Zuleta is here for a right ear cleaning. Patient was seen by Dr. Deluna earlier this week for dysphonia evaluation. During clinic exam, Dr. Deluna noted right cerumen impaction, recommending ear cleaning under microscopy. Left ear clear. Patient states that right ear feels more plugged with tinnitus.     Patient denies any otalgia, otorrhea, dizziness, history of frequent ear infections, or ear surgeries.      Otologic microscope exam:    Biocular Microscopy exam is needed due to deep impaction of cerumen of bilateral ears, requiring direct visualization for use of cleaning instruments.    Right ear was examined under the microscope.  Cerumen impaction noted. It was cleaned with right angle hook and alligator forceps. Once cleaned, TM visualized under microscope. Normal appearing TM, nicely aerated middle ear space.     Left ear was also examined under the microscope. Normal appearing TM, nicely aerated middle ear space.     The patient noted improvement of symptoms.    Assessment and Plan:  1. Impacted cerumen of right ear  Patient presents with cerumen impaction of right ears.  The patient's ears are cleaned today. Ears appear healthy on today's exam. Patient should return for an audiogram if tinnitus in right ear persists after cleaning.    Return as needed for cleaning.       Pastora Ny DNP, APRN, CNP  Otolaryngology  Head & Neck Surgery  [phone]      30 minutes spent on the date of the encounter doing chart review, history and exam, documentation and further activities per the note

## 2022-03-03 NOTE — PROGRESS NOTES
"     Speech-Language Pathology Department   EVALUATION  [facility]  Voice Evaluation with Laryngoscopy Completed by MD    03/01/22 0800   General Information   Type Of Visit Initial   Start Of Care Date 03/01/22   Referring Physician Dr. 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 Reports dysphonia began after her chemotherapy. She is most bothered by the quality of her voice describing it as \"squeaky\". Thinks her voice is higher in pitch than it normally is. States others have to ask her to repeat herself and when she tries to project her vioce gest even worse. Most days are bad voice days, however, today happens to be ok. Her voice gets \"squeaky\" within 30 minutes of being awake whether she is talking or not. Denies pain with speaking. She does not sing. Denies dysphagia and dyspnea. Endorses throat clearing/coughing because she feels she has a lot of mucus but cannot cough it up. She feels it is primarily coming from her nose because after chemo she no longer has hair in her nose.    Prior Level of Functioning No previous problems.   [facility]   General Observations Pt is highly pleasant and cooperative throughout evaluation   Patient/family Goals To fix her voice   Personal Rating / Voice Use Rating   Describe the amount of " "typical non-work voice use Moderate   Describe the intensity of typical non-work voice use Moderate   Comments Pt reports her  is retired and they speak frequently throughout the day   Evaluation Results   Voice Observations Right thyrohyoid space tenderness   Voice Profile during conversation, 1 min monologue and paragraph reading   Voice Quality Breathy;Rough   Voice quality comments Minimal breathiness and roughness   Voice quality severity rating continuum (1=Severe, 7=WNL) 6   Breath control Weak;Irregular   Breath Control comments Poor coordination of respiration and phonation with clavicular pattern of breathing   Breath control severity rating continuum (1=Severe, 7=WNL) 3-4   Voice Use / Effort comments Minimal strain   Fundamental frequency (Hz) 170 Hz   Pitch /Frequency Description Too high   Pitch / Frequency comments With prolonged talking gets \"stuck\" in a higher pitch and cannot bring it back down towards her F0   Volume comments Slightly reduced   Neuromuscular Control WNL   Resonance severity rating continuum (1=Severe, 7=WNL) Backward focus of resonance   Vibratory Function of Vocal Folds   Prolonged 'ah' at mid pitch (sec) 20   Videostroboscopy / Endoscopy   Tissue description Smooth pink larynx tissues;White vocal fold tissues   Anatomical description Normal   Laryngeal movements Adduction / abduction full range of motion (ROM)  (Difficulty with lengthening/shortening)   Vibratory characteristics Reduced vibrations right;Reduced vibrations left  (minimal reduction)   Vocal fold edges   (mild thickening on the vibratory margin of the true vocal folds bilaterally)   Glottal closure characteristics   (Complete)   General Therapy Interventions   Planned Therapy Interventions Voice   Voice Voice quality/pitch or volume tasks;Resonant voice techniques;Breath flow to sound flow  (coordination of respiration/phonation)   Intervention Comments Pt will [facility] with [facility] " "  Impressions and Recommendations   Communication Diagnosis Dysphonia in the context of non-optimal coordination of respiration and phonation   Summary Pt presents today with Dysphonia in the context of non-optimal coordination of respiration and phonation.  At the beginning of evaluation patient reports her voice is \"near normal\" with a fundamental frequency of 170 Hz.  However throughout the evaluation with prolonged voice use she demonstrates a mildly breathy and rough, abnormally high pitched voice.  She reports this is what happens with her voice and then she gets stuck in his voice.  Laryngoscopy evaluation by MD reveals smooth, pink pharyngeal tissues.  The true vocal folds have mild thickening on the vibratory margin with a corresponding minimal reduction mucosal wave.  Patient gets complete glottic closure.  She demonstrates significant difficulty voluntarily raising or lowering her pitch.  Attempts at increased projection her voice raises significantly impaired she becomes quite breathy.  She demonstrates significant difficulty coordinating respiration with phonation and demonstrates a clavicular pattern of breathing.  This coordination is much improved during singing.  With connected speech she demonstrates minimal to mild anterior posterior supraglottic hyperfunction.  Patient demonstrates modest response to flow phonation and resonant voice tasks.  Best therapeutic stimuli response was with glottal coup exercises aimed at a lower pitch.  This helped her reset her voice to her normal fundamental frequency.  She benefited from beginning with the glottal coup and transitioning this into a counting task.   Recommendations Pt would benefit from a short course of voice therapy to better coordinate voice and breathing and help optimize voice quality    Frequency and Duration 2x/month for 3-4 months    Prognosis  Good with intervention   Risks and Benefits of Treatment have been explained. Yes   Patient & /or " Caregiver  in agreement with plan of care Yes   Educational Assessment   Barriers to Learning No barriers   Voice Goals   Voice Goals 1;2;3;4;5   Voice Goal 1   Goal Identifier Vocal Hygiene   Goal Description 1. Pt will be able to independently list key factors in maintenance of good vocal hygiene with 80% accuracy, and report on their use outside the therapy room.   Target Date 05/30/22   Voice Goal 2   Goal Identifier Glottal Coup Exercises   Goal Description 2. Pt will demonstrate ability to use glottal coup exercise to reset fundamental frequency to WFL in 4/5 opportunities independently.   Target Date 05/30/22   Voice Goal 3   Goal Identifier Breathing   Goal Description 3. Pt will demonstate optimal coordination of respiration and phonation with optimal respiratory mechanics in 80% of opportunities.   Target Date 05/30/22   Voice Goal 4   Goal Identifier Target vs Habitual Voice   Goal Description 4. Pt will demonstrate independent ability to identify target vs habitual voice in 4/5 opportunities.   Target Date 05/30/22   Voice Goal 5   Goal Identifier LTG    Goal Description 5. Pt will report a week of typical activities in which dysphonia does not exceed a level of 2/10, 80% of the time.    Target Date 05/30/22   Total Session Time   Voice Minutes ([id number]) 35   Total Evaluation Time 35   Therapy Certification   Certification date from 03/01/22   Certification date to 05/30/22   Medical Diagnosis Dysphonia   Certification I certify the need for these services furnished under this plan of treatment and while under my care.  (Physician co-signature of this document indicates review and certification of the therapy plan).     Thank you for the referral of Talya Zuleta. If you have any questions about this report, please contact me using the information below.     AISHA Hobbs. (ALEXIS durham, CCC-SLP   Speech Language Pathologist  [facility] Trained Vocologist   [facility]   Dept. of Otolaryngology  Department of Rehabilitation Services  [address] [place] [address]  Email: [email or web address]   Pronouns: she/her/hers

## 2022-03-04 RX ORDER — TRIAMCINOLONE ACETONIDE 1 MG/G
CREAM TOPICAL 2 TIMES DAILY PRN
Qty: 80 G | Refills: 1 | Status: SHIPPED | OUTPATIENT
Start: 2022-03-04 | End: 2022-10-07

## 2022-03-04 NOTE — TELEPHONE ENCOUNTER
Prescription approved per [facility] Refill Protocol.    Shona Landeros RN

## 2022-03-16 ENCOUNTER — VIRTUAL VISIT (OUTPATIENT)
Dept: OTOLARYNGOLOGY | Facility: CLINIC | Age: 77
End: 2022-03-16
Payer: COMMERCIAL

## 2022-03-16 DIAGNOSIS — R49.0 DYSPHONIA: Primary | ICD-10-CM

## 2022-03-16 PROCEDURE — 92507 TX SP LANG VOICE COMM INDIV: CPT | Mod: GN | Performed by: SPEECH-LANGUAGE PATHOLOGIST

## 2022-03-16 NOTE — PROGRESS NOTES
"Talya Zuleta is a 76 year old female who is being cared for via a billable virtual visit.        The patient has been notified and verbally consented to the following statements:     This video visit will be conducted between you and your provider.    Patient has opted to conduct today's video visit vs an in-person appointment, and is not able to attend due to possible exposure to COVID-19.      If during the course of the call the provider feels a video visit is not appropriate, you will not be charged for this service.    Provider has received verbal consent for billable virtual visit from the patient? Yes    Preferred method for receiving information: [employer]     Call initiated at: 9:05 AM  Platform used to conduct today's virtual appointment: AM Well Video  Location of provider: Residence  Location of patient: [facility]  THERAPY NOTE (CPT 45983)  Patient: Talya Zuleta  Date of Service: 3/16/2022  Referring physician: Dr. Evelyn Deluna  Impressions from most recent voice evaluation by Ms. Dolly Soto (3/1/22):  \"Pt presents today with Dysphonia in the context of non-optimal coordination of respiration and phonation.  At the beginning of evaluation patient reports her voice is \"near normal\" with a fundamental frequency of 170 Hz.  However throughout the evaluation with prolonged voice use she demonstrates a mildly breathy and rough, abnormally high pitched voice.  She reports this is what happens with her voice and then she gets stuck in his voice.  Laryngoscopy evaluation by MD reveals smooth, pink pharyngeal tissues.  The true vocal folds have mild thickening on the vibratory margin with a corresponding minimal reduction mucosal wave.  Patient gets complete glottic closure.  She demonstrates significant difficulty voluntarily raising or lowering her pitch.  Attempts at increased projection her voice raises significantly impaired she becomes quite breathy.  She demonstrates " "significant difficulty coordinating respiration with phonation and demonstrates a clavicular pattern of breathing.  This coordination is much improved during singing.  With connected speech she demonstrates minimal to mild anterior posterior supraglottic hyperfunction.  Patient demonstrates modest response to flow phonation and resonant voice tasks.  Best therapeutic stimuli response was with glottal coup exercises aimed at a lower pitch.  This helped her reset her voice to her normal fundamental frequency.  She benefited from beginning with the glottal coup and transitioning this into a counting task.\"    SUBJECTIVE:  Since the last appointment, Ms. Zuleta reports the following:     Overall she reports that symptoms are stable; this is her initial therapy appointment.    OBJECTIVE:  Ms. Zuleta presents today with the following:  Voice quality:    Speaking voice:  o F0/ Habitual pitch: 170 Hz  o Breathy and rough  o moderate asthenia    PATIENT REPORTED MEASURES:  Patient Supplied Answers To SLP QOL Questionnaire  No flowsheet data found.  Speech follow up as discussed with patient:  Dysponia SLP Goals 3/16/2022   How would you rate your speaking voice quality, if 0 is worst voice quality, and 10 is best voice? 4   How much effort is it to speak, if 0 is no extra effort and 10 is maximum effort? 0   How much does your voice problem bother you? A little bit       THERAPEUTIC ACTIVITIES    Semi-Occluded Vocal Tract (SOVT) exercises instructed to reduce laryngeal tension, promote vocal fold pliability, and coordinate respiration and phonation    Straw phonation with water resistance was found to be most facilitating     Sustained phonation, and voice vs. voiceless productions used to promote easy voicing and raise awareness of laryngeal tension    Ascending and descending glides utilized to promote vocal fold pliability    \"Messa di voce\", gradual crescendo and decrescendo to vary medial compression was also " utilized to promote vocal fold pliability.    Instructed on the benefits of using these exercises for improved coordination of breath flow with phonation and tissue mobilization.    Instructed on the importance of using these exercises as a warm-up / cool down,  and to re-calibrate the voice throughout the day.    Exercises in techniques for improved airflow during phonation    Speech material with aspirate onsets was facilitating at the word level.    Progressed to easy onset/ flow phrases    Instructed while working lower in her register; this was helpful.    [place] techniques to reduce glottal malik and improve breath flow; negative practice improved awareness today.    Counseling and Education:    Asked many questions about the nature of her symptoms, and I answered all of these thoroughly.    A revised regimen for home practice was instructed.    I provided an AVS of today's therapeutic activities to facilitate practice.    ASSESSMENT/PLAN  PROGRESS TOWARD LONG TERM GOALS:   Adequate progress; please see above    IMPRESSIONS: Dysphonia (R49.0). Ms. Zuleta demonstrated good learning today and improvement of her voice quality with improved coordination of breath flow and phonation tasks that included SOVTEs with straw phonation and water resistance, and flow phonation using aspirate initial words and loaded phrases.  Glottic coups were minimally beneficial, and therefore not recommended for regular practice.       PLAN: I will see Ms. Zuleta in May to continue therapy (due to scheduling). I will contact her if a sooner appointment is possible.   For practice goals see AVS.     Certification:   Certification Date From:  03/01/22  Certification Date To:  05/30/22    TOTAL SERVICE TIME:   Call Initiated at: 9:05 AM  Call Ended at: 10am           CPT Billing Codes:   TREATMENT (11955)  NO CHARGE FACILITY FEE (78661)    Noelle Man M.M. (voice), M.A., CCC/SLP  Speech-Language Pathologist  [facility] Trained  Vocologist  [facility]  [phone]  [email or web address]  Pronouns: she/her      *this report was created in part through the use of computerized dictation software, and though reviewed following completion, some typographic errors may persist.  If there is confusion regarding any of this notes contents, please contact me for clarification

## 2022-03-16 NOTE — LETTER
"3/16/2022       RE: Talya Zuleta  [address] [facility] [address]     Dear Colleague,    Thank you for referring your patient, Talya Zuleta, to the [facility]ICE [facility] [place] at [facility]. Please see a copy of my visit note below.    Talya Zuleta is a 76 year old female who is being cared for via a billable virtual visit.        The patient has been notified and verbally consented to the following statements:     This video visit will be conducted between you and your provider.    Patient has opted to conduct today's video visit vs an in-person appointment, and is not able to attend due to possible exposure to COVID-19.      If during the course of the call the provider feels a video visit is not appropriate, you will not be charged for this service.    Provider has received verbal consent for billable virtual visit from the patient? Yes    Preferred method for receiving information: [employer]     Call initiated at: 9:05 AM  Platform used to conduct today's virtual appointment: AM Well Video  Location of provider: Residence  Location of patient: [facility] VOICE [facility]  THERAPY NOTE (CPT 04185)  Patient: Talya Zuleta  Date of Service: 3/16/2022  Referring physician: Dr. Evelyn Deluna  Impressions from most recent voice evaluation by Ms. Dolly Soto (3/1/22):  \"Pt presents today with Dysphonia in the context of non-optimal coordination of respiration and phonation.  At the beginning of evaluation patient reports her voice is \"near normal\" with a fundamental frequency of 170 Hz.  However throughout the evaluation with prolonged voice use she demonstrates a mildly breathy and rough, abnormally high pitched voice.  She reports this is what happens with her voice and then she gets stuck in his voice.  Laryngoscopy evaluation by MD reveals smooth, pink pharyngeal tissues.  The true vocal folds have mild thickening on the " "vibratory margin with a corresponding minimal reduction mucosal wave.  Patient gets complete glottic closure.  She demonstrates significant difficulty voluntarily raising or lowering her pitch.  Attempts at increased projection her voice raises significantly impaired she becomes quite breathy.  She demonstrates significant difficulty coordinating respiration with phonation and demonstrates a clavicular pattern of breathing.  This coordination is much improved during singing.  With connected speech she demonstrates minimal to mild anterior posterior supraglottic hyperfunction.  Patient demonstrates modest response to flow phonation and resonant voice tasks.  Best therapeutic stimuli response was with glottal coup exercises aimed at a lower pitch.  This helped her reset her voice to her normal fundamental frequency.  She benefited from beginning with the glottal coup and transitioning this into a counting task.\"    SUBJECTIVE:  Since the last appointment, Ms. Zuleta reports the following:     Overall she reports that symptoms are stable; this is her initial therapy appointment.    OBJECTIVE:  Ms. Zuleta presents today with the following:  Voice quality:    Speaking voice:  o F0/ Habitual pitch: 170 Hz  o Breathy and rough  o moderate asthenia    PATIENT REPORTED MEASURES:  Patient Supplied Answers To SLP QOL Questionnaire  No flowsheet data found.  Speech follow up as discussed with patient:  Dysponia SLP Goals 3/16/2022   How would you rate your speaking voice quality, if 0 is worst voice quality, and 10 is best voice? 4   How much effort is it to speak, if 0 is no extra effort and 10 is maximum effort? 0   How much does your voice problem bother you? A little bit       THERAPEUTIC ACTIVITIES    Semi-Occluded Vocal Tract (SOVT) exercises instructed to reduce laryngeal tension, promote vocal fold pliability, and coordinate respiration and phonation    Straw phonation with water resistance was found to be most " "facilitating     Sustained phonation, and voice vs. voiceless productions used to promote easy voicing and raise awareness of laryngeal tension    Ascending and descending glides utilized to promote vocal fold pliability    \"Messa di voce\", gradual crescendo and decrescendo to vary medial compression was also utilized to promote vocal fold pliability.    Instructed on the benefits of using these exercises for improved coordination of breath flow with phonation and tissue mobilization.    Instructed on the importance of using these exercises as a warm-up / cool down,  and to re-calibrate the voice throughout the day.    Exercises in techniques for improved airflow during phonation    Speech material with aspirate onsets was facilitating at the word level.    Progressed to easy onset/ flow phrases    Instructed while working lower in her register; this was helpful.    [place] techniques to reduce glottal malik and improve breath flow; negative practice improved awareness today.    Counseling and Education:    Asked many questions about the nature of her symptoms, and I answered all of these thoroughly.    A revised regimen for home practice was instructed.    I provided an AVS of today's therapeutic activities to facilitate practice.    ASSESSMENT/PLAN  PROGRESS TOWARD LONG TERM GOALS:   Adequate progress; please see above    IMPRESSIONS: Dysphonia (R49.0). Ms. Zuleta demonstrated good learning today and improvement of her voice quality with improved coordination of breath flow and phonation tasks that included SOVTEs with straw phonation and water resistance, and flow phonation using aspirate initial words and loaded phrases.  Glottic coups were minimally beneficial, and therefore not recommended for regular practice.       PLAN: I will see Ms. Zuleta in May to continue therapy (due to scheduling). I will contact her if a sooner appointment is possible.   For practice goals see AVS.     Certification: "   Certification Date From:  03/01/22  Certification Date To:  05/30/22    TOTAL SERVICE TIME:   Call Initiated at: 9:05 AM  Call Ended at: 10am           CPT Billing Codes:   TREATMENT (99436)  NO CHARGE FACILITY FEE (16589)    Noelle Man M.M. (voice), M.A., CCC/SLP  Speech-Language Pathologist  [place] Trained Vocologist  [facility]  [phone]  [email or web address]  Pronouns: she/her      *this report was created in part through the use of computerized dictation software, and though reviewed following completion, some typographic errors may persist.  If there is confusion regarding any of this notes contents, please contact me for clarification

## 2022-03-16 NOTE — PATIENT INSTRUCTIONS
"After Visit Summary    Patient: Talya Zuleta  Date of Visit: 3/16/2022    These notes are also available in your MyChart. Please take a few moments to find them under \"Past Appointments\" in the Dropcam system, as Noelle will start to phase out e-mail communications.    Order of today's appointment:    Bubbles    Cup and Bubble Exercises   WHY: Though these exercises seems (and feels) silly they are helpful for a number of reasons. First, they make you use your air generously and consistently, helping you to coordinate your breath and your voice. Second, they lengthen and narrow the vocal tract with the straw. This narrowing (or semi-occlusion in scientific terms) creates back pressure in your throat which has been shown to help the vocal folds vibrate more easily and reduce how hard some of the other muscles are squeezing.   HOW:   With Water - Fill the cup (or bottle) about 2 inches full of water. Blow bubbles through the straw while keeping your voice on. (kind of like making an \"ooooo\" sound through straw).Keep the water bubbling the whole time. That means your air is moving consistently.   Without Water - make sound through the straw (like it's a kazoo). Make sure you are using your air freely. You can hold your hand in front of the straw to test, and you should be able to feel the air moving.   Use the \"w\" sound without the straw. Let your cheeks puff up slightly (like Anish Quiles). Maintain the relaxed feeling in your throat while focusing on a sense of buzz at your lips.   After easy sounds listed below speak through the straw (with or without water) using every day phrases and counting to help bridge between the exercises and everyday voice use.   Feel how open and relaxed your throat feels when you practice these sounds. If the bubbling or air stops or it gets tight, don't sweat it. Just breath, relax, and start again. Across all the exercises make sure you are getting a nice low breath and feeling " the steady inward motion of low abdominal muscles when making sound.   Practice 5 times a day for no more than 3 minutes, and whenever you feel fatigued.   Aren't sure if you are doing it right? Ask yourself these three questions:   1. Does it feel easy?   2. Are the bubbles and voice consistent?   3. Do you feel that forward buzz?     What sounds to make:   Single pitches - use any comfortable pitch and sustain the note for as long as it feels free and easy, and your lips or tongue are bubbling.   Sighs - glide from high to low like you are sitting down in a comfortable chair after a long day of work   [place] - Start on a medium pitch and glide up just a bit and back down   On and off - use a comfortable pitch near where you speak. Keep the bubbles moving consistently while turning the voice on and off. Recognize how little work you need to do to get the voice working.           (Tried a G phrases, but are going to hold off for now)    3-4 times per day for no more than 2 minutes,  Complete the following  Focus on easy voice onset (continued respiratory-phonatory coordination) and shortened vocal duration in a single-syllable context  he  hoe  hey  hi  who  half  hook  house  hoof  hick  Chase  hutch  hate  hoop  hitch  hoist  hope  Easy voice onset using double-syllable words that are a mix of voiceless and voiced  holy  hobo  handy  healer  happy  husky  hammer  hanky  henhouse  hundred  hailstorm  headache  horses  hiccups  hero  Expand to phrases  Hunt on the hill  Horses make holes  Hit with a hammer  Hysterical hiccups  Hooves of horses  H[place]ky [place]  Half a halibut  High school hero  Hocus pocus  Hug his hand  Heat in the henhouse  Hunt at the [place]  [place] hounds      THINK LOW, and try these additional words and phrases    OK!    The boss!    Hey! This is Talya       All exercises a minimum of 4x/day for 2 min    Noelle Man M.M. (voice), MCELIO., CCC/SLP  Speech-Language Pathologist  [facility] Certified  Vocologist  [facility]  [email or web address]  she/her

## 2022-04-05 ENCOUNTER — LAB (OUTPATIENT)
Dept: ONCOLOGY | Facility: CLINIC | Age: 77
End: 2022-04-05
Payer: COMMERCIAL

## 2022-04-05 DIAGNOSIS — C50.811 MALIGNANT NEOPLASM OF OVERLAPPING SITES OF RIGHT BREAST IN FEMALE, ESTROGEN RECEPTOR POSITIVE (H): Primary | ICD-10-CM

## 2022-04-05 DIAGNOSIS — Z17.0 MALIGNANT NEOPLASM OF OVERLAPPING SITES OF RIGHT BREAST IN FEMALE, ESTROGEN RECEPTOR POSITIVE (H): Primary | ICD-10-CM

## 2022-04-05 PROCEDURE — 96523 IRRIG DRUG DELIVERY DEVICE: CPT

## 2022-04-05 RX ORDER — HEPARIN SODIUM (PORCINE) LOCK FLUSH IV SOLN 100 UNIT/ML 100 UNIT/ML
500 SOLUTION INTRAVENOUS
Status: DISCONTINUED | OUTPATIENT
Start: 2022-04-05 | End: 2022-04-05 | Stop reason: HOSPADM

## 2022-04-05 RX ADMIN — HEPARIN SODIUM (PORCINE) LOCK FLUSH IV SOLN 100 UNIT/ML 500 UNITS: 100 SOLUTION at 11:35

## 2022-04-05 NOTE — PROGRESS NOTES
Port needle placed for port flush access. Sterile technique performed and maintained. Patient tolerated procedure well.     Flushed with 10cc sterile saline with a positive blood return, flushed with 20cc normal saline and 5cc (100USP UNITS/ML) of heparin. Gauze dressing placed with use of paper tape.    RN advised patient to leave gauze in place for 20 mins.     Patient demonstrated verbal understanding.     Susi Price RN  [facility]

## 2022-05-19 DIAGNOSIS — Z79.811 LONG TERM (CURRENT) USE OF AROMATASE INHIBITORS: ICD-10-CM

## 2022-05-19 DIAGNOSIS — Z17.0 MALIGNANT NEOPLASM OF OVERLAPPING SITES OF RIGHT BREAST IN FEMALE, ESTROGEN RECEPTOR POSITIVE (H): ICD-10-CM

## 2022-05-19 DIAGNOSIS — C50.811 MALIGNANT NEOPLASM OF OVERLAPPING SITES OF RIGHT BREAST IN FEMALE, ESTROGEN RECEPTOR POSITIVE (H): ICD-10-CM

## 2022-05-19 DIAGNOSIS — T50.905A ADVERSE EFFECT OF DRUG, INITIAL ENCOUNTER: ICD-10-CM

## 2022-05-19 DIAGNOSIS — M85.851 OSTEOPENIA OF NECK OF RIGHT FEMUR: Primary | ICD-10-CM

## 2022-05-19 PROBLEM — D70.1 CHEMOTHERAPY-INDUCED NEUTROPENIA (H): Status: RESOLVED | Noted: 2020-12-28 | Resolved: 2022-05-19

## 2022-05-19 PROBLEM — T45.1X5A CHEMOTHERAPY-INDUCED NEUTROPENIA (H): Status: RESOLVED | Noted: 2020-12-28 | Resolved: 2022-05-19

## 2022-05-19 RX ORDER — ZOLEDRONIC ACID 0.04 MG/ML
4 INJECTION, SOLUTION INTRAVENOUS ONCE
Status: CANCELLED | OUTPATIENT
Start: 2022-05-23 | End: 2022-05-23

## 2022-05-19 RX ORDER — HEPARIN SODIUM,PORCINE 10 UNIT/ML
5 VIAL (ML) INTRAVENOUS
Status: CANCELLED | OUTPATIENT
Start: 2022-05-23

## 2022-05-19 RX ORDER — HEPARIN SODIUM (PORCINE) LOCK FLUSH IV SOLN 100 UNIT/ML 100 UNIT/ML
5 SOLUTION INTRAVENOUS
Status: CANCELLED | OUTPATIENT
Start: 2022-05-23

## 2022-05-23 ENCOUNTER — INFUSION THERAPY VISIT (OUTPATIENT)
Dept: ONCOLOGY | Facility: CLINIC | Age: 77
End: 2022-05-23
Attending: INTERNAL MEDICINE
Payer: COMMERCIAL

## 2022-05-23 ENCOUNTER — LAB (OUTPATIENT)
Dept: LAB | Facility: CLINIC | Age: 77
End: 2022-05-23
Attending: INTERNAL MEDICINE
Payer: COMMERCIAL

## 2022-05-23 VITALS
BODY MASS INDEX: 27.44 KG/M2 | OXYGEN SATURATION: 96 % | SYSTOLIC BLOOD PRESSURE: 125 MMHG | DIASTOLIC BLOOD PRESSURE: 64 MMHG | WEIGHT: 164.9 LBS | TEMPERATURE: 97.5 F | HEART RATE: 71 BPM

## 2022-05-23 DIAGNOSIS — Z28.311 NEED FOR SECOND DOSE OF COVID-19 VACCINE: ICD-10-CM

## 2022-05-23 DIAGNOSIS — D75.89 MACROCYTOSIS WITHOUT ANEMIA: ICD-10-CM

## 2022-05-23 DIAGNOSIS — Z17.0 MALIGNANT NEOPLASM OF OVERLAPPING SITES OF RIGHT BREAST IN FEMALE, ESTROGEN RECEPTOR POSITIVE (H): ICD-10-CM

## 2022-05-23 DIAGNOSIS — Z12.31 VISIT FOR SCREENING MAMMOGRAM: ICD-10-CM

## 2022-05-23 DIAGNOSIS — Z95.828 PORT-A-CATH IN PLACE: ICD-10-CM

## 2022-05-23 DIAGNOSIS — R79.89 ABNORMAL CBC: ICD-10-CM

## 2022-05-23 DIAGNOSIS — C50.811 MALIGNANT NEOPLASM OF OVERLAPPING SITES OF RIGHT BREAST IN FEMALE, ESTROGEN RECEPTOR POSITIVE (H): ICD-10-CM

## 2022-05-23 DIAGNOSIS — T50.905A ADVERSE EFFECT OF DRUG, INITIAL ENCOUNTER: ICD-10-CM

## 2022-05-23 DIAGNOSIS — Z92.21 STATUS POST CHEMOTHERAPY: ICD-10-CM

## 2022-05-23 DIAGNOSIS — Z79.811 LONG TERM (CURRENT) USE OF AROMATASE INHIBITORS: ICD-10-CM

## 2022-05-23 DIAGNOSIS — M85.851 OSTEOPENIA OF NECK OF RIGHT FEMUR: ICD-10-CM

## 2022-05-23 DIAGNOSIS — M85.851 OSTEOPENIA OF NECK OF RIGHT FEMUR: Primary | ICD-10-CM

## 2022-05-23 DIAGNOSIS — T45.1X5A CHEMOTHERAPY INDUCED DIARRHEA: ICD-10-CM

## 2022-05-23 DIAGNOSIS — Z17.0 MALIGNANT NEOPLASM OF OVERLAPPING SITES OF RIGHT BREAST IN FEMALE, ESTROGEN RECEPTOR POSITIVE (H): Primary | ICD-10-CM

## 2022-05-23 DIAGNOSIS — C50.811 MALIGNANT NEOPLASM OF OVERLAPPING SITES OF RIGHT BREAST IN FEMALE, ESTROGEN RECEPTOR POSITIVE (H): Primary | ICD-10-CM

## 2022-05-23 DIAGNOSIS — K52.1 CHEMOTHERAPY INDUCED DIARRHEA: ICD-10-CM

## 2022-05-23 DIAGNOSIS — Z23 NEED FOR SECOND DOSE OF COVID-19 VACCINE: ICD-10-CM

## 2022-05-23 LAB
ALBUMIN SERPL-MCNC: 3.3 G/DL (ref 3.4–5)
ALP SERPL-CCNC: 62 U/L (ref 40–150)
ALT SERPL W P-5'-P-CCNC: 8 U/L (ref 0–50)
ANION GAP SERPL CALCULATED.3IONS-SCNC: 8 MMOL/L (ref 3–14)
AST SERPL W P-5'-P-CCNC: 17 U/L (ref 0–45)
BASOPHILS # BLD AUTO: 0 10E3/UL (ref 0–0.2)
BASOPHILS # BLD AUTO: 0.1 10E3/UL (ref 0–0.2)
BASOPHILS NFR BLD AUTO: 1 %
BASOPHILS NFR BLD AUTO: 1 %
BILIRUB SERPL-MCNC: 0.5 MG/DL (ref 0.2–1.3)
BUN SERPL-MCNC: 13 MG/DL (ref 7–30)
CALCIUM SERPL-MCNC: 9.1 MG/DL (ref 8.5–10.1)
CALCIUM SERPL-MCNC: 9.1 MG/DL (ref 8.5–10.1)
CHLORIDE BLD-SCNC: 104 MMOL/L (ref 94–109)
CO2 SERPL-SCNC: 27 MMOL/L (ref 20–32)
CREAT SERPL-MCNC: 0.87 MG/DL (ref 0.52–1.04)
EOSINOPHIL # BLD AUTO: 0 10E3/UL (ref 0–0.7)
EOSINOPHIL # BLD AUTO: 0 10E3/UL (ref 0–0.7)
EOSINOPHIL NFR BLD AUTO: 1 %
EOSINOPHIL NFR BLD AUTO: 1 %
ERYTHROCYTE [DISTWIDTH] IN BLOOD BY AUTOMATED COUNT: 13.9 % (ref 10–15)
ERYTHROCYTE [DISTWIDTH] IN BLOOD BY AUTOMATED COUNT: 14 % (ref 10–15)
GFR SERPL CREATININE-BSD FRML MDRD: 68 ML/MIN/1.73M2
GLUCOSE BLD-MCNC: 100 MG/DL (ref 70–99)
HCT VFR BLD AUTO: 37 % (ref 35–47)
HCT VFR BLD AUTO: 38.7 % (ref 35–47)
HGB BLD-MCNC: 12.8 G/DL (ref 11.7–15.7)
HGB BLD-MCNC: 13 G/DL (ref 11.7–15.7)
IMM GRANULOCYTES # BLD: 0 10E3/UL
IMM GRANULOCYTES # BLD: 0 10E3/UL
IMM GRANULOCYTES NFR BLD: 0 %
IMM GRANULOCYTES NFR BLD: 1 %
LYMPHOCYTES # BLD AUTO: 0.8 10E3/UL (ref 0.8–5.3)
LYMPHOCYTES # BLD AUTO: 1 10E3/UL (ref 0.8–5.3)
LYMPHOCYTES NFR BLD AUTO: 10 %
LYMPHOCYTES NFR BLD AUTO: 12 %
MCH RBC QN AUTO: 34.5 PG (ref 26.5–33)
MCH RBC QN AUTO: 35.5 PG (ref 26.5–33)
MCHC RBC AUTO-ENTMCNC: 33.6 G/DL (ref 31.5–36.5)
MCHC RBC AUTO-ENTMCNC: 34.6 G/DL (ref 31.5–36.5)
MCV RBC AUTO: 103 FL (ref 78–100)
MCV RBC AUTO: 103 FL (ref 78–100)
MONOCYTES # BLD AUTO: 0.6 10E3/UL (ref 0–1.3)
MONOCYTES # BLD AUTO: 0.6 10E3/UL (ref 0–1.3)
MONOCYTES NFR BLD AUTO: 7 %
MONOCYTES NFR BLD AUTO: 7 %
NEUTROPHILS # BLD AUTO: 6.4 10E3/UL (ref 1.6–8.3)
NEUTROPHILS # BLD AUTO: 6.7 10E3/UL (ref 1.6–8.3)
NEUTROPHILS NFR BLD AUTO: 79 %
NEUTROPHILS NFR BLD AUTO: 80 %
NRBC # BLD AUTO: 0 10E3/UL
NRBC # BLD AUTO: 0 10E3/UL
NRBC BLD AUTO-RTO: 0 /100
NRBC BLD AUTO-RTO: 0 /100
PLATELET # BLD AUTO: 267 10E3/UL (ref 150–450)
PLATELET # BLD AUTO: 271 10E3/UL (ref 150–450)
POTASSIUM BLD-SCNC: 3.7 MMOL/L (ref 3.4–5.3)
PROT SERPL-MCNC: 7.2 G/DL (ref 6.8–8.8)
RBC # BLD AUTO: 3.61 10E6/UL (ref 3.8–5.2)
RBC # BLD AUTO: 3.77 10E6/UL (ref 3.8–5.2)
RETICS # AUTO: 0.06 10E6/UL (ref 0.03–0.1)
RETICS/RBC NFR AUTO: 1.5 % (ref 0.5–2)
SODIUM SERPL-SCNC: 139 MMOL/L (ref 133–144)
WBC # BLD AUTO: 8 10E3/UL (ref 4–11)
WBC # BLD AUTO: 8.2 10E3/UL (ref 4–11)

## 2022-05-23 PROCEDURE — 85025 COMPLETE CBC W/AUTO DIFF WBC: CPT

## 2022-05-23 PROCEDURE — 258N000003 HC RX IP 258 OP 636: Performed by: INTERNAL MEDICINE

## 2022-05-23 PROCEDURE — 85060 BLOOD SMEAR INTERPRETATION: CPT | Performed by: PATHOLOGY

## 2022-05-23 PROCEDURE — 85004 AUTOMATED DIFF WBC COUNT: CPT

## 2022-05-23 PROCEDURE — 36415 COLL VENOUS BLD VENIPUNCTURE: CPT

## 2022-05-23 PROCEDURE — 82040 ASSAY OF SERUM ALBUMIN: CPT

## 2022-05-23 PROCEDURE — 96365 THER/PROPH/DIAG IV INF INIT: CPT

## 2022-05-23 PROCEDURE — 250N000011 HC RX IP 250 OP 636: Performed by: INTERNAL MEDICINE

## 2022-05-23 PROCEDURE — G0463 HOSPITAL OUTPT CLINIC VISIT: HCPCS

## 2022-05-23 PROCEDURE — 36591 DRAW BLOOD OFF VENOUS DEVICE: CPT

## 2022-05-23 PROCEDURE — 99215 OFFICE O/P EST HI 40 MIN: CPT | Performed by: INTERNAL MEDICINE

## 2022-05-23 PROCEDURE — 80053 COMPREHEN METABOLIC PANEL: CPT

## 2022-05-23 PROCEDURE — 82310 ASSAY OF CALCIUM: CPT | Performed by: INTERNAL MEDICINE

## 2022-05-23 PROCEDURE — 85045 AUTOMATED RETICULOCYTE COUNT: CPT

## 2022-05-23 RX ORDER — HEPARIN SODIUM (PORCINE) LOCK FLUSH IV SOLN 100 UNIT/ML 100 UNIT/ML
5 SOLUTION INTRAVENOUS ONCE
Status: COMPLETED | OUTPATIENT
Start: 2022-05-23 | End: 2022-05-23

## 2022-05-23 RX ORDER — ZOLEDRONIC ACID 0.04 MG/ML
4 INJECTION, SOLUTION INTRAVENOUS ONCE
Status: COMPLETED | OUTPATIENT
Start: 2022-05-23 | End: 2022-05-23

## 2022-05-23 RX ORDER — HEPARIN SODIUM (PORCINE) LOCK FLUSH IV SOLN 100 UNIT/ML 100 UNIT/ML
5 SOLUTION INTRAVENOUS
Status: DISCONTINUED | OUTPATIENT
Start: 2022-05-23 | End: 2022-05-23 | Stop reason: HOSPADM

## 2022-05-23 RX ADMIN — ZOLEDRONIC ACID 4 MG: 0.04 INJECTION, SOLUTION INTRAVENOUS at 10:35

## 2022-05-23 RX ADMIN — Medication 5 ML: at 09:08

## 2022-05-23 RX ADMIN — Medication 5 ML: at 10:54

## 2022-05-23 RX ADMIN — SODIUM CHLORIDE 250 ML: 9 INJECTION, SOLUTION INTRAVENOUS at 10:35

## 2022-05-23 ASSESSMENT — PAIN SCALES - GENERAL: PAINLEVEL: NO PAIN (0)

## 2022-05-23 NOTE — LETTER
5/23/2022         RE: Talya Zuleta  [address] [address]        Dear Colleague,    Thank you for referring your patient, Talya Zuleta, to the [facility]. Please see a copy of my visit note below.      Oncology Follow Up:  Date on this visit: 5/23/2022    Diagnosis:  ER positive, HER-2 positive right breast cancer, clinical stage T2N0M0 s/p THP-ddAC, right breast mastectomy ([id number]), SLN biopsy, and 4 months adjuvant HP.  She continues on letrozole.    Primary Physician: Cole eWston     History Of Present Illness:  Ms. Zuleta is a 77 year old female with right breast cancer.  Routine screening mammogram in 07/2020 showed right breast asymmetry.  Diagnostic mammograms and ultrasound showed a 2.7 cm mass in the right breast at 12:00, 5 cm from the nipple with ductal extension including a 6 mm mass at 3 cm from the nipple.  Contrast enhanced mammogram showed the right breast mass, including extension, to measure 4.9 cm.  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  Estrogen receptor was moderate in 50% and progesterone receptor staining was negative.  HER-2 was negative by IHC; HER2 FISH showed approximately 10% of tumor cells to have 6.8 HER2 signals/nucleus and a HER2/CEN17 ratio of 1.6.  The HER2 positive cells were noted to be the large pleomorphic cells.  Multiple neutrophils were noted to be infiltrating through tumor stroma.    She received 12 weeks of neoadjuvant Taxol, Herceptin, and pertuzumab from 10/7/2020 - 12/22/2020.  She received 4 cycles of dose dense adriamycin and cyclophosphamide from 12/29/2020 - 2/9/2021.  She underwent right breast mastectomy and sentinel lymph node biopsy on 3/8/2021.  Pathology showed rare residual tumor cells with therapy related changes in the tumor bed.  Residual tumor is <1% of the tumor bed volume.  There was no lymphovascular invasion  and surgical margins were negative.  A single sentinel lymph node was benign.  We discussed starting letrozole at our clinic visit on 3/30/2021, unfortunately she did not receive the prescription and so did not start it.  She was initiated on adjuvant HER2 targeted therapy 4/13/2021.  She received 4 cycles of adjuvant Phesgo, then discontinued due to diarrhea.  She was then on biosimilar trastuzumab (Trazimera) since 7/6/2021, however, continued to have diarrhea refractory to antidiarrheals, therefore all HER2 targeted therapy was abandoned on 8/3/2021.  Of note, she received 7 months total HER2 treatment (3 months neoadjuvant + 4 months adjuvant).      Interval History:  Ms. Zuleta comes into clinic today for routine breast cancer follow-up.  She is on treatment letrozole.  Most bothersome to her at this time, is ongoing diarrhea.  She states she takes her medication in the morning.  In the subsequent hours she has numerous bouts of diarrhea.  She tries not to eat anything until approximately 10 AM, as this too will induce diarrhea.  The diarrhea seems to let up during the day, but then resumes at night.  It consists of both loose and watery stools.  She has not been taking imodium, because when she does, than she has constipation and abdominal pain.  She has had no blood in the stool or bright red blood per rectum.  She has had no episodes of stool incontinence.  She has not had any lightheadedness or dizziness.  She has been able to maintain her weight.  She denies nausea or abdominal pain.      She denies concerning lumps, pain, or swelling of the chest.  She has no new bone or joint aches or pains.  The neuropathy in her hands and feet continues to be quite significant.  Left hand neuropathy is worse than the right.  Her skin has overall been stable.  Her voice is improved since she has been trying to thin the mucus in her chest.  She denies fevers, chills, or infectious complaints.  She has no current  shortness of breath or chest pain.  The remainder of a complete 12 point review of systems was reviewed with patient was negative with exception that mentioned above.    Past Medical/Surgical History:  1.  Breast cancer as per HPI  2.  Hypertension  3.  Diabetes  4.  COPD  5.  Hypothyroidism  6.  Hypohidrosis ectodermal dysplasia    Allergies:  Allergies as of 05/23/2022 - Reviewed 03/03/2022   Allergen Reaction Noted     Bacitracin-neomycin-polymyxin  04/18/2003     Latex  09/30/2005     Current Medications:  Current Outpatient Medications   Medication Sig Dispense Refill     amLODIPine (NORVASC) 5 MG tablet TAKE ONE TABLET BY MOUTH TWICE DAILY *NEED TO SCHEDULE WELLNESS EXAM 180 tablet 3     anastrozole (ARIMIDEX) 1 MG tablet Take 1 tablet (1 mg) by mouth daily 90 tablet 3     atorvastatin (LIPITOR) 20 MG tablet Take 1 tablet (20 mg) by mouth daily 90 tablet 3     benazepril (LOTENSIN) 20 MG tablet TAKE ONE TABLET BY MOUTH TWICE DAILY *PLEASE SCHEDULE WELLNESS EXAM 180 tablet 3     furosemide (LASIX) 80 MG tablet TAKE ONE TABLET BY MOUTH TWICE DAILY 180 tablet 3     potassium chloride ER (KLOR-CON M) 20 MEQ CR tablet Take 2 tablets (40 mEq) by mouth 3 times daily 180 tablet 3     triamcinolone (KENALOG) 0.1 % external cream APPLY TOPICALLY 2 TIMES DAILY AS NEEDED FOR IRRITATION 80 g 1     varenicline (CHANTIX) 1 MG tablet Take 1 tablet (1 mg) by mouth 2 times daily 180 tablet 1     Vitamin D, Cholecalciferol, 25 MCG (1000 UT) TABS Take 1,000 Units by mouth daily       zolpidem (AMBIEN) 5 MG tablet Take 1 tablet (5 mg) by mouth nightly as needed for sleep 30 tablet 1      Family and Social History:  Please see initial consultation dated 9/21/2020 for further details.  Breast Actionable Panel on 10/1/2020 was negative for mutation.    Physical Exam:  /64 (BP Location: Right arm, Patient Position: Sitting, Cuff Size: Adult Regular)   Pulse 71   Temp 97.5  F (36.4  C) (Oral)   Wt 74.8 kg (164 lb 14.4 oz)    SpO2 96%   BMI 27.44 kg/m    General:  Well appearing adult female in NAD.  Alert and oriented x 3  HEENT:  Normocephalic.  Sclera anicteric.  MMM.  No lesions of the oropharynx.  (+) alopecia, wearing a wig.  Lymph:  No palpable cervical, supraclavicular, or axillary LAD.  Chest:  CTA bilaterally.  No wheezes or crackles.  Left chest port-a-cath in place.  CV:  RRR.  Nl S1 and S2.    Breast:  Right breast mastectomy.  There are no discretely palpable masses of either the right chest wall or within the left breast.  Left nipple is everted.  Abd:  Soft/ND.    Ext:  1+ pitting edema of the bilateral lower extremities to just below the knees.  Musculo:  Full ROM of the bilateral upper extremities.  Left chest port-a-cath.  Neuro:  Cranial nerves grossly intact.  Psych:  Mood and affect appear normal.  Skin:  Thickened, pocked skin in the bilateral axilla.  Small area lower left chest wall as well.  Skin over the right chest wall is very dry.    Laboratory/Imaging Studies  Electrolytes and kidney function are wnl.  Albumin is low at 3.3 g/dL  LFTs are wnl.    Blood counts are wnl, with the exception of elevated MCV of 103  1% immature granulocytes seen on the count    ASSESSMENT/PLAN:  77 year old female with a history of hypohidrosis ectodermal dysplasia, HTN, dyslipidemia, COPD, diabetes, and OA with a h/o T2N0M0, right breast cancer, that is ER positive (50%), CA negative, and HER2 positive.  She is s/p treatment with 12 weeks THP, 4 cycles ddAC, right breast mastectomy ([id number]), right axillary SLNBx, and 4 months adjuvant HER2 therapy (7 months total HER2 therapy), stopped due to diarrhea.    1.  Right breast cancer:   Ms. Zuleta is 1 year, 2.5 months out from excision of a right breast cancer.  She is on treatment with letrozole.  She has had diarrhea since starting the medication and it persists today.  We discussed plan to stop letrozole for two weeks.  If diarrhea improves off letrozole, will trial  exemestane.  I have previously recommended a colonoscopy evaluation, which she has declined.  If diarrhea does not improve off of letrozole, we discussed colonoscopy would again be recommended.  Ideally, we would complete a minimum of 5 years of letrozole.    She is asymptomatic of disease recurrence on history taken today and clinical exam is without concern.  Will schedule mammogram of the left breast and return visit in 3 months.    2.  Diarrhea:  No improvement after stopping HER2 therapy.  We are going to trial stopping letrozole as above.  She declines taking imodium.  Colonoscopy recommended at last visit; she declined.    3.  Hypohidrotic ectodermal dysplasia:  Largely involving the bilateral axillae with a small patch on the left lower lateral chest wall.  She is using triamcinolone ointment.  She is following with Dr. Tran of dermatology.    4.  Voice changes/thickened mucous:  She was seen by ENT on 3/1/2022 and voice therapy and vocal hygiene was recommended.  Salt water gel, netti pot, and nasal saline spray also recommended.  She has noted improvement in her voice since implementing these interventions.    5.  Chemotherapy induced peripheral neuropathy:  There is nothing that has been shown to treat neuropathy once chemotherapy is complete.  As she is >1 year out from completion of chemotherapy, I suspect the neuropathy is permanent.  She understands this.    6.  Bone Health/Osteopenia:  DEXA 4/13/2021 with a lowest T-score of -1.2 in the right femoral neck c/w osteopenia.  Plan to start Zometa 4 mg IV every 6 months x 2 years for prevention of breast cancer bone metastases today.  We again reviewed the potential side effects including myalgias, arthralgias, fevers, chills, and osteonecrosis of the jaw.    7.  At risk for cardiomyopathy:  2/2 h/o anthracycline and HER2 targeted therapy as well as personal history of hypertension, hyperlipidemia, and diabetes.  Post-treatment echocardiogram performed  showed a retained LVEF. Follow up with her PCP for management of cardiac risk factors.     8.  Macrocytosis, 1% immature granulocytes:  Given history of anthracycline exposure, will obtain a peripheral blood smear to evaluate for signs of MDS.    9.  Routine Health Maintenance:  I encouraged her to receive a second COVID vaccine.  She confirms she is > 3 months out from the last.  She will schedule it at least 48 hours after today's Zometa infusion.  We also discussed increased risk of shingles.  I encouraged her to receive the Shingles vaccine as well.    10.  Follow Up: Port removal by IR within 2-4 weeks.  Left breast screening mammogram and visit with me in 3 months.    45 minutes spent on the date of the encounter doing chart review, review of test results, interpretation of tests, patient visit, documentation and discussion with family.           Again, thank you for allowing me to participate in the care of your patient.      Sincerely,    Perlita Alvarez MD

## 2022-05-23 NOTE — PROGRESS NOTES
Infusion Nursing Note:  Talya uZleta presents today for Cycle 1 Day 1 Zometa.    Patient seen by provider today: Yes: Dr. Antonio SORIA   present during visit today: Not Applicable.    Note: Additional purple tubes drawn per Dr. Alvarez in infusion, written handout given on Zometa and side effects reviewed with patient. Instructed her to start calcium with vitamin D supplements, dental clearance completed per Dr. Alvarez.      Intravenous Access:  Implanted Port.    Treatment Conditions:  Lab Results   Component Value Date    HGB 12.8 05/23/2022    WBC 8.0 05/23/2022    ANEU 3.7 07/06/2021    ANEUTAUTO 6.4 05/23/2022     05/23/2022      Lab Results   Component Value Date     05/23/2022    POTASSIUM 3.7 05/23/2022    MAG 2.3 07/06/2021    CR 0.87 05/23/2022    JIMBO 9.1 05/23/2022    JIMBO 9.1 05/23/2022    BILITOTAL 0.5 05/23/2022    ALBUMIN 3.3 (L) 05/23/2022    ALT 8 05/23/2022    AST 17 05/23/2022     Results reviewed, labs MET treatment parameters, ok to proceed with treatment.      Post Infusion Assessment:  Patient tolerated infusion without incident.  Blood return noted pre and post infusion.  Access discontinued per protocol.       Discharge Plan:   Patient declined prescription refills.  AVS to patient via [employer].  Patient will return in 3 months for next appointment. Zometa in 6 mo IB sent to scheduling.  Patient discharged in stable condition accompanied by: self.  Departure Mode: Ambulatory.  Face to Face time: 0.      Diamond Forte RN

## 2022-05-23 NOTE — PROGRESS NOTES
Oncology Follow Up:  Date on this visit: 5/23/2022    Diagnosis:  ER positive, HER-2 positive right breast cancer, clinical stage T2N0M0 s/p THP-ddAC, right breast mastectomy ([id number]), SLN biopsy, and 4 months adjuvant HP.  She continues on letrozole.    Primary Physician: Cole Weston     History Of Present Illness:  Ms. Zuleta is a 77 year old female with right breast cancer.  Routine screening mammogram in 07/2020 showed right breast asymmetry.  Diagnostic mammograms and ultrasound showed a 2.7 cm mass in the right breast at 12:00, 5 cm from the nipple with ductal extension including a 6 mm mass at 3 cm from the nipple.  Contrast enhanced mammogram showed the right breast mass, including extension, to measure 4.9 cm.  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  Estrogen receptor was moderate in 50% and progesterone receptor staining was negative.  HER-2 was negative by IHC; HER2 FISH showed approximately 10% of tumor cells to have 6.8 HER2 signals/nucleus and a HER2/CEN17 ratio of 1.6.  The HER2 positive cells were noted to be the large pleomorphic cells.  Multiple neutrophils were noted to be infiltrating through tumor stroma.    She received 12 weeks of neoadjuvant Taxol, Herceptin, and pertuzumab from 10/7/2020 - 12/22/2020.  She received 4 cycles of dose dense adriamycin and cyclophosphamide from 12/29/2020 - 2/9/2021.  She underwent right breast mastectomy and sentinel lymph node biopsy on 3/8/2021.  Pathology showed rare residual tumor cells with therapy related changes in the tumor bed.  Residual tumor is <1% of the tumor bed volume.  There was no lymphovascular invasion and surgical margins were negative.  A single sentinel lymph node was benign.  We discussed starting letrozole at our clinic visit on 3/30/2021, unfortunately she did not receive the prescription and so did not start it.  She was initiated on adjuvant HER2 targeted  therapy 4/13/2021.  She received 4 cycles of adjuvant Phesgo, then discontinued due to diarrhea.  She was then on biosimilar trastuzumab (Trazimera) since 7/6/2021, however, continued to have diarrhea refractory to antidiarrheals, therefore all HER2 targeted therapy was abandoned on 8/3/2021.  Of note, she received 7 months total HER2 treatment (3 months neoadjuvant + 4 months adjuvant).      Interval History:  Ms. Zuleta comes into clinic today for routine breast cancer follow-up.  She is on treatment letrozole.  Most bothersome to her at this time, is ongoing diarrhea.  She states she takes her medication in the morning.  In the subsequent hours she has numerous bouts of diarrhea.  She tries not to eat anything until approximately 10 AM, as this too will induce diarrhea.  The diarrhea seems to let up during the day, but then resumes at night.  It consists of both loose and watery stools.  She has not been taking imodium, because when she does, than she has constipation and abdominal pain.  She has had no blood in the stool or bright red blood per rectum.  She has had no episodes of stool incontinence.  She has not had any lightheadedness or dizziness.  She has been able to maintain her weight.  She denies nausea or abdominal pain.      She denies concerning lumps, pain, or swelling of the chest.  She has no new bone or joint aches or pains.  The neuropathy in her hands and feet continues to be quite significant.  Left hand neuropathy is worse than the right.  Her skin has overall been stable.  Her voice is improved since she has been trying to thin the mucus in her chest.  She denies fevers, chills, or infectious complaints.  She has no current shortness of breath or chest pain.  The remainder of a complete 12 point review of systems was reviewed with patient was negative with exception that mentioned above.    Past Medical/Surgical History:  1.  Breast cancer as per HPI  2.  Hypertension  3.  Diabetes  4.   COPD  5.  Hypothyroidism  6.  Hypohidrosis ectodermal dysplasia    Allergies:  Allergies as of 05/23/2022 - Reviewed 03/03/2022   Allergen Reaction Noted     Bacitracin-neomycin-polymyxin  04/18/2003     Latex  09/30/2005     Current Medications:  Current Outpatient Medications   Medication Sig Dispense Refill     amLODIPine (NORVASC) 5 MG tablet TAKE ONE TABLET BY MOUTH TWICE DAILY *NEED TO SCHEDULE WELLNESS EXAM 180 tablet 3     anastrozole (ARIMIDEX) 1 MG tablet Take 1 tablet (1 mg) by mouth daily 90 tablet 3     atorvastatin (LIPITOR) 20 MG tablet Take 1 tablet (20 mg) by mouth daily 90 tablet 3     benazepril (LOTENSIN) 20 MG tablet TAKE ONE TABLET BY MOUTH TWICE DAILY *PLEASE SCHEDULE WELLNESS EXAM 180 tablet 3     furosemide (LASIX) 80 MG tablet TAKE ONE TABLET BY MOUTH TWICE DAILY 180 tablet 3     potassium chloride ER (KLOR-CON M) 20 MEQ CR tablet Take 2 tablets (40 mEq) by mouth 3 times daily 180 tablet 3     triamcinolone (KENALOG) 0.1 % external cream APPLY TOPICALLY 2 TIMES DAILY AS NEEDED FOR IRRITATION 80 g 1     varenicline (CHANTIX) 1 MG tablet Take 1 tablet (1 mg) by mouth 2 times daily 180 tablet 1     Vitamin D, Cholecalciferol, 25 MCG (1000 UT) TABS Take 1,000 Units by mouth daily       zolpidem (AMBIEN) 5 MG tablet Take 1 tablet (5 mg) by mouth nightly as needed for sleep 30 tablet 1      Family and Social History:  Please see initial consultation dated 9/21/2020 for further details.  Breast Actionable Panel on 10/1/2020 was negative for mutation.    Physical Exam:  /64 (BP Location: Right arm, Patient Position: Sitting, Cuff Size: Adult Regular)   Pulse 71   Temp 97.5  F (36.4  C) (Oral)   Wt 74.8 kg (164 lb 14.4 oz)   SpO2 96%   BMI 27.44 kg/m    General:  Well appearing adult female in NAD.  Alert and oriented x 3  HEENT:  Normocephalic.  Sclera anicteric.  MMM.  No lesions of the oropharynx.  (+) alopecia, wearing a wig.  Lymph:  No palpable cervical, supraclavicular, or axillary  LAD.  Chest:  CTA bilaterally.  No wheezes or crackles.  Left chest port-a-cath in place.  CV:  RRR.  Nl S1 and S2.    Breast:  Right breast mastectomy.  There are no discretely palpable masses of either the right chest wall or within the left breast.  Left nipple is everted.  Abd:  Soft/ND.    Ext:  1+ pitting edema of the bilateral lower extremities to just below the knees.  Musculo:  Full ROM of the bilateral upper extremities.  Left chest port-a-cath.  Neuro:  Cranial nerves grossly intact.  Psych:  Mood and affect appear normal.  Skin:  Thickened, pocked skin in the bilateral axilla.  Small area lower left chest wall as well.  Skin over the right chest wall is very dry.    Laboratory/Imaging Studies  Electrolytes and kidney function are wnl.  Albumin is low at 3.3 g/dL  LFTs are wnl.    Blood counts are wnl, with the exception of elevated MCV of 103  1% immature granulocytes seen on the count    ASSESSMENT/PLAN:  77 year old female with a history of hypohidrosis ectodermal dysplasia, HTN, dyslipidemia, COPD, diabetes, and OA with a h/o T2N0M0, right breast cancer, that is ER positive (50%), TX negative, and HER2 positive.  She is s/p treatment with 12 weeks THP, 4 cycles ddAC, right breast mastectomy ([id number]), right axillary SLNBx, and 4 months adjuvant HER2 therapy (7 months total HER2 therapy), stopped due to diarrhea.    1.  Right breast cancer:   Ms. Zuleta is 1 year, 2.5 months out from excision of a right breast cancer.  She is on treatment with letrozole.  She has had diarrhea since starting the medication and it persists today.  We discussed plan to stop letrozole for two weeks.  If diarrhea improves off letrozole, will trial exemestane.  I have previously recommended a colonoscopy evaluation, which she has declined.  If diarrhea does not improve off of letrozole, we discussed colonoscopy would again be recommended.  Ideally, we would complete a minimum of 5 years of letrozole.    She is asymptomatic  of disease recurrence on history taken today and clinical exam is without concern.  Will schedule mammogram of the left breast and return visit in 3 months.    2.  Diarrhea:  No improvement after stopping HER2 therapy.  We are going to trial stopping letrozole as above.  She declines taking imodium.  Colonoscopy recommended at last visit; she declined.    3.  Hypohidrotic ectodermal dysplasia:  Largely involving the bilateral axillae with a small patch on the left lower lateral chest wall.  She is using triamcinolone ointment.  She is following with Dr. Tran of dermatology.    4.  Voice changes/thickened mucous:  She was seen by ENT on 3/1/2022 and voice therapy and vocal hygiene was recommended.  Salt water gel, netti pot, and nasal saline spray also recommended.  She has noted improvement in her voice since implementing these interventions.    5.  Chemotherapy induced peripheral neuropathy:  There is nothing that has been shown to treat neuropathy once chemotherapy is complete.  As she is >1 year out from completion of chemotherapy, I suspect the neuropathy is permanent.  She understands this.    6.  Bone Health/Osteopenia:  DEXA 4/13/2021 with a lowest T-score of -1.2 in the right femoral neck c/w osteopenia.  Plan to start Zometa 4 mg IV every 6 months x 2 years for prevention of breast cancer bone metastases today.  We again reviewed the potential side effects including myalgias, arthralgias, fevers, chills, and osteonecrosis of the jaw.    7.  At risk for cardiomyopathy:  2/2 h/o anthracycline and HER2 targeted therapy as well as personal history of hypertension, hyperlipidemia, and diabetes.  Post-treatment echocardiogram performed showed a retained LVEF. Follow up with her PCP for management of cardiac risk factors.     8.  Macrocytosis, 1% immature granulocytes:  Given history of anthracycline exposure, will obtain a peripheral blood smear to evaluate for signs of MDS.    9.  Routine Health Maintenance:   I encouraged her to receive a second COVID vaccine.  She confirms she is > 3 months out from the last.  She will schedule it at least 48 hours after today's Zometa infusion.  We also discussed increased risk of shingles.  I encouraged her to receive the Shingles vaccine as well.    10.  Follow Up: Port removal by IR within 2-4 weeks.  Left breast screening mammogram and visit with me in 3 months.    45 minutes spent on the date of the encounter doing chart review, review of test results, interpretation of tests, patient visit, documentation and discussion with family.

## 2022-05-23 NOTE — NURSING NOTE
"Chief Complaint   Patient presents with     Oncology Clinic Visit     Malignant neoplasm of overlapping sites of right breast in female, estrogen receptor positive      Port Draw     Labs drawn via port by RN. Vitals taken.     Labs drawn via port by RN. Port accessed with 20G 3/4\" gripper needle. Flushed with NS and heparin. Pt tolerated well. Vitals taken. Pt checked in for next appointment.    Ruba Mahoney RN    "

## 2022-05-23 NOTE — NURSING NOTE
"Oncology Rooming Note    May 23, 2022 9:23 AM   Talya Zuleta is a 77 year old female who presents for:    Chief Complaint   Patient presents with     Oncology Clinic Visit     Malignant neoplasm of overlapping sites of right breast in female, estrogen receptor positive      Port Draw     Labs drawn via port by RN. Vitals taken.     Initial Vitals: /64 (BP Location: Right arm, Patient Position: Sitting, Cuff Size: Adult Regular)   Pulse 71   Temp 97.5  F (36.4  C) (Oral)   Wt 74.8 kg (164 lb 14.4 oz)   SpO2 96%   BMI 27.44 kg/m   Estimated body mass index is 27.44 kg/m  as calculated from the following:    Height as of 3/3/22: 1.651 m (5' 5\").    Weight as of this encounter: 74.8 kg (164 lb 14.4 oz). Body surface area is 1.85 meters squared.  No Pain (0) Comment: Data Unavailable   No LMP recorded. Patient is postmenopausal.  Allergies reviewed: Yes  Medications reviewed: Yes    Medications: Medication refills not needed today.  Pharmacy name entered into EPIC:    [facility] - [place], [place] - [address]  [id number] - [place], [place] - [address] AT [address] OF [facility]    Clinical concerns: pt has had constant diarrhea since starting chemo      Jose Scales            "

## 2022-05-24 LAB
PATH REPORT.COMMENTS IMP SPEC: NORMAL
PATH REPORT.FINAL DX SPEC: NORMAL
PATH REPORT.MICROSCOPIC SPEC OTHER STN: NORMAL
PATH REPORT.MICROSCOPIC SPEC OTHER STN: NORMAL
PATH REPORT.RELEVANT HX SPEC: NORMAL

## 2022-06-07 ENCOUNTER — PATIENT OUTREACH (OUTPATIENT)
Dept: ONCOLOGY | Facility: CLINIC | Age: 77
End: 2022-06-07
Payer: COMMERCIAL

## 2022-06-07 NOTE — PROGRESS NOTES
[facility]: Cancer Care                                                                                          Duplicate, please disregard     Signature:  Karmen Valles RN

## 2022-06-08 ENCOUNTER — PATIENT OUTREACH (OUTPATIENT)
Dept: ONCOLOGY | Facility: CLINIC | Age: 77
End: 2022-06-08
Payer: COMMERCIAL

## 2022-06-08 DIAGNOSIS — C50.811 MALIGNANT NEOPLASM OF OVERLAPPING SITES OF RIGHT BREAST IN FEMALE, ESTROGEN RECEPTOR POSITIVE (H): Primary | ICD-10-CM

## 2022-06-08 DIAGNOSIS — Z17.0 MALIGNANT NEOPLASM OF OVERLAPPING SITES OF RIGHT BREAST IN FEMALE, ESTROGEN RECEPTOR POSITIVE (H): Primary | ICD-10-CM

## 2022-06-08 RX ORDER — EXEMESTANE 25 MG/1
25 TABLET ORAL DAILY
Qty: 30 TABLET | Refills: 11 | Status: SHIPPED | OUTPATIENT
Start: 2022-06-08 | End: 2023-08-21

## 2022-06-08 NOTE — PROGRESS NOTES
[facility]: Cancer Care                                                                                          Voicemail message from patient with an update on diarrhea. She held anastrozole as directed for 2 weeks, diarrhea and nausea have improved greatly. She would like to know if she should restart anastrozole or will she start a new medication?    Message sent to Dr Alvarez for recommendations.     Signature:  Karmen Valles RN

## 2022-06-08 NOTE — PROGRESS NOTES
[facility]: Cancer Care                                                                                          Called patient to update her that Dr. Alvarez has ordered exemestane.   Answered questions to her stated satisfaction.       Signature:  Karmen Valles RN

## 2022-07-03 DIAGNOSIS — Z17.0 MALIGNANT NEOPLASM OF OVERLAPPING SITES OF RIGHT BREAST IN FEMALE, ESTROGEN RECEPTOR POSITIVE (H): ICD-10-CM

## 2022-07-03 DIAGNOSIS — C50.811 MALIGNANT NEOPLASM OF OVERLAPPING SITES OF RIGHT BREAST IN FEMALE, ESTROGEN RECEPTOR POSITIVE (H): ICD-10-CM

## 2022-07-05 RX ORDER — EXEMESTANE 25 MG/1
TABLET ORAL
Qty: 30 TABLET | Refills: 11 | OUTPATIENT
Start: 2022-07-05

## 2022-07-05 NOTE — TELEPHONE ENCOUNTER
Medication: Exemestane  Last prescribing provider:Dr. Alvarez on 6/8/22, 30tablets (30days) with 11 refills on file.     Refused as pt should have refills on file with [facility] [address]

## 2022-07-22 ENCOUNTER — NURSE TRIAGE (OUTPATIENT)
Dept: NURSING | Facility: CLINIC | Age: 77
End: 2022-07-22

## 2022-07-22 NOTE — TELEPHONE ENCOUNTER
Pt called about getting her port flush. Pt is an oncology patient. Assisted pt in getting transferred to the oncology clinic to schedule a port flush.    IDALIA MATOS RN      Additional Information    Information only question and nurse able to answer    Protocols used: INFORMATION ONLY CALL - NO TRIAGE-A-[place]

## 2022-07-25 ENCOUNTER — ALLIED HEALTH/NURSE VISIT (OUTPATIENT)
Dept: ONCOLOGY | Facility: CLINIC | Age: 77
End: 2022-07-25
Payer: COMMERCIAL

## 2022-07-25 DIAGNOSIS — C50.811 MALIGNANT NEOPLASM OF OVERLAPPING SITES OF RIGHT BREAST IN FEMALE, ESTROGEN RECEPTOR POSITIVE (H): Primary | ICD-10-CM

## 2022-07-25 DIAGNOSIS — Z17.0 MALIGNANT NEOPLASM OF OVERLAPPING SITES OF RIGHT BREAST IN FEMALE, ESTROGEN RECEPTOR POSITIVE (H): Primary | ICD-10-CM

## 2022-07-25 PROCEDURE — 96523 IRRIG DRUG DELIVERY DEVICE: CPT

## 2022-07-25 RX ORDER — HEPARIN SODIUM (PORCINE) LOCK FLUSH IV SOLN 100 UNIT/ML 100 UNIT/ML
500 SOLUTION INTRAVENOUS ONCE
Status: COMPLETED | OUTPATIENT
Start: 2022-07-25 | End: 2022-07-25

## 2022-07-25 RX ADMIN — HEPARIN SODIUM (PORCINE) LOCK FLUSH IV SOLN 100 UNIT/ML 500 UNITS: 100 SOLUTION at 09:22

## 2022-07-25 NOTE — PROGRESS NOTES
Port accessed with no incident. Blood return noted. Port flushed with saline and heparin. Patient tolerated port flush well. Port was de-accessed.     Sho Muñoz RN on 7/25/2022 at 9:21 AM

## 2022-08-22 NOTE — PROGRESS NOTES
Oncology Follow Up:  Date on this visit: 8/23/2022    Diagnosis:  ER positive, HER-2 positive right breast cancer, clinical stage T2N0M0 s/p THP-ddAC, right breast mastectomy ([id number]), SLN biopsy, and 4 months adjuvant HP.  On endocrine therapy.    Primary Physician: Cole Weston     History Of Present Illness:  Ms. Zuleta is a 77 year old female with right breast cancer.  Routine screening mammogram in 07/2020 showed right breast asymmetry.  Diagnostic mammograms and ultrasound showed a 2.7 cm mass in the right breast at 12:00, 5 cm from the nipple with ductal extension including a 6 mm mass at 3 cm from the nipple.  Contrast enhanced mammogram showed the right breast mass, including extension, to measure 4.9 cm.  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  Estrogen receptor was moderate in 50% and progesterone receptor staining was negative.  HER-2 was negative by IHC; HER2 FISH showed approximately 10% of tumor cells to have 6.8 HER2 signals/nucleus and a HER2/CEN17 ratio of 1.6.  The HER2 positive cells were noted to be the large pleomorphic cells.  Multiple neutrophils were noted to be infiltrating through tumor stroma.    She received 12 weeks of neoadjuvant Taxol, Herceptin, and pertuzumab from 10/7/2020 - 12/22/2020.  She received 4 cycles of dose dense adriamycin and cyclophosphamide from 12/29/2020 - 2/9/2021.  She underwent right breast mastectomy and sentinel lymph node biopsy on 3/8/2021.  Pathology showed rare residual tumor cells with therapy related changes in the tumor bed.  Residual tumor is <1% of the tumor bed volume.  There was no lymphovascular invasion and surgical margins were negative.  A single sentinel lymph node was benign.  We discussed starting letrozole at our clinic visit on 3/30/2021, unfortunately she did not receive the prescription and so did not start it.  She was initiated on adjuvant HER2 targeted therapy  "4/13/2021.  She received 4 cycles of adjuvant Phesgo, then discontinued due to diarrhea.  She was then on biosimilar trastuzumab (Trazimera) since 7/6/2021, however, continued to have diarrhea refractory to antidiarrheals, therefore all HER2 targeted therapy was abandoned on 8/3/2021. Of note, she received 7 months total HER2 treatment (3 months neoadjuvant + 4 months adjuvant).  She started letrozole on 8/3/2021.  She continued to have diarrhea, which was attributed to letrozole.  On 6/8/2022, treatment was changed to exemestane.    Interval History:  Ms. Zuleta comes into clinic today for routine breast cancer follow-up.  She states she stopped taking exemestane 1 month ago as diarrhea was even worse on exemestane than it was on letrozole.  While on exemestane she \"could not walk 10 feet without having diarrhea\".  The diarrhea was also quite bothersome on letrozole.  Diarrhea was so bad on both medications that she developed sores on her backside.  These have largely healed, however, she notes sores on other areas of her body.  She specifically references a lesion on her right cheek.  Since stopping exemestane diarrhea has improved.  She is currently having 2 diarrheal bowel movements in the morning which start approximately 15 minutes after she eats for the first time of the day.  She has an additional diarrheal bowel movement prior to bed.  While on exemestane, she also had significant nausea.  This has resolved.  She continues to have poor appetite and is eating small amounts throughout the day.  She has been able to maintain her weight.      She also notes easy bruising especially prevalent on her arms and legs.  She denies bleeding.  She reports increased cramping of her ankles for the last 1-1/2 to 2 months.  This did not improve off of exemestane.  She reports an infected cyst alongside her left labia.  She has not had fevers or chills.  She denies current cough, shortness of breath, or chest pain.  She " "has no new bone or joint aches or pains.  She smokes a 1/2 ppd of cigarettes.  She previously tried Zyban, which \"was awful\".  She later tried Chantix, but states that this was \"recalled?\".  Her  reports she has had \"balance issues\" since completion of her breast cancer treatment.  She reports persistent neuropathy in the hands and feet.  The remainder of a complete 12 point review of systems was reviewed with the patient was negative with the exception of that mentioned above.    Past Medical/Surgical History:  1.  Breast cancer as per HPI  2.  Hypertension  3.  Diabetes  4.  COPD  5.  Hypothyroidism  6.  Hypohidrosis ectodermal dysplasia    Allergies:  Allergies as of 08/23/2022 - Reviewed 05/23/2022   Allergen Reaction Noted     Bacitracin-neomycin-polymyxin  04/18/2003     Latex  09/30/2005     Current Medications:  Current Outpatient Medications   Medication Sig Dispense Refill     amLODIPine (NORVASC) 5 MG tablet TAKE ONE TABLET BY MOUTH TWICE DAILY *NEED TO SCHEDULE WELLNESS EXAM 180 tablet 3     atorvastatin (LIPITOR) 20 MG tablet Take 1 tablet (20 mg) by mouth daily 90 tablet 3     benazepril (LOTENSIN) 20 MG tablet TAKE ONE TABLET BY MOUTH TWICE DAILY *PLEASE SCHEDULE WELLNESS EXAM 180 tablet 3     exemestane (AROMASIN) 25 MG tablet Take 1 tablet (25 mg) by mouth daily 30 tablet 11     furosemide (LASIX) 80 MG tablet TAKE ONE TABLET BY MOUTH TWICE DAILY 180 tablet 3     potassium chloride ER (KLOR-CON M) 20 MEQ CR tablet Take 2 tablets (40 mEq) by mouth 3 times daily 180 tablet 3     triamcinolone (KENALOG) 0.1 % external cream APPLY TOPICALLY 2 TIMES DAILY AS NEEDED FOR IRRITATION 80 g 1     varenicline (CHANTIX) 1 MG tablet Take 1 tablet (1 mg) by mouth 2 times daily 180 tablet 1     Vitamin D, Cholecalciferol, 25 MCG (1000 UT) TABS Take 1,000 Units by mouth daily       zolpidem (AMBIEN) 5 MG tablet Take 1 tablet (5 mg) by mouth nightly as needed for sleep 30 tablet 1      Family and Social " History:  Please see initial consultation dated 9/21/2020 for further details.  Breast Actionable Panel on 10/1/2020 was negative for mutation.    Physical Exam:  BP (!) 145/77   Pulse 84   Temp 98.2  F (36.8  C) (Oral)   Wt 75.3 kg (166 lb)   SpO2 98%   BMI 27.62 kg/m    General:  Well appearing adult female in NAD.  Alert and oriented x 3  HEENT:  Normocephalic.  Sclera anicteric.  MMM.  No lesions of the oropharynx.  (+) alopecia, wearing a wig.  Lymph:  No palpable cervical, supraclavicular, or axillary LAD.  Chest:  CTA bilaterally.  No wheezes or crackles.  Left chest port-a-cath in place.  CV:  RRR.  Nl S1 and S2.    Breast:  Right breast mastectomy.  There are no discretely palpable masses of either the right chest wall or within the left breast.  Left nipple is everted.  Abd:  Soft/ND.    Ext:  1+ pitting edema of the bilateral lower extremities to just below the knees.  Musculo:  Full ROM of the bilateral upper extremities.    Neuro:  Cranial nerves grossly intact.  Psych:  Mood and affect appear normal.  Skin:  Thickened, pocked skin in the bilateral axilla.  Small area lower left chest wall as well.  Skin over the right chest wall is very dry.  There is a crusted, erythematous lesion right cheek, just lateral to the mouth.    Laboratory/Imaging Studies  8/23/2022 Left breast screening mammogram:  I personally reviewed the images of left breast screening mammogram.  Benign appearing calcifications behind the nipple are unchanged from prior.  No new masses or areas of architectural distortion when compared to one year prior.  Radiology read is pending.    Vitamin B12 is wnl at 612  WBC is wnl.  Hemoglobin is slightly low at 11.6 g/dL  Platelets are wnl.  INR is wnl.  aPTT is wnl.    ASSESSMENT/PLAN:  77 year old female with a history of hypohidrosis ectodermal dysplasia, HTN, dyslipidemia, COPD, diabetes, and OA with a h/o T2N0M0, right breast cancer, ER positive (50%), MI negative, and HER2 positive.   She is s/p treatment with 12 weeks THP, 4 cycles ddAC, right breast mastectomy ([id number]), right axillary SLNBx, and 4 months adjuvant HER2 therapy (7 months total HER2 therapy), stopped due to diarrhea.  On endocrine therapy from 8/3/2021 - 07/2022.    1.  Right breast cancer:   Ms. Zuleta is 1 year, 5.5 months out from excision of a right breast cancer.  She did not tolerate either letrozole or exemestane due to profuse diarrhea on both medications.  She stopped exemestane one month ago.  We discussed option for trial of tamoxifen.  Of note, the aromatase inhibitors have an 8% frequency of diarrhea, tamoxifen still 7%, therefore diarrhea remains a risk.  We reviewed the potential side effects of tamoxifen.  On further questioning, she confirms she is still smoking.  Due to increase risk of blood clots with tamoxifen and smoking, I do not recommend tamoxifen at this time.  She will make an effort to stop smoking in the next three months and we will re-discuss tamoxifen at the time of next visit.  Ideally, would like to complete a total of 5 years of endocrine therapy.    She is asymptomatic of disease recurrence on history taken today and clinical exam is without concern.  I personally reviewed the images of the left breast screening mammogram performed today which is without significant change from prior.  I will see her back in clinic in 3 months.    2.  Diarrhea:  No improvement after stopping HER2 therapy.  She has noticed improvement since stopping endocrine therapy.  Now with approximately 3 stools per day.  She declines taking imodium.  Colonoscopy was previously recommended, she declined.    3.  Hypohidrotic ectodermal dysplasia/right cheek sore:  Largely involving the bilateral axillae with a small patch on the left lower lateral chest wall.  She is using triamcinolone ointment.  She is following with Dr. Tran of dermatology.  I recommend she follow up with Dr. Tran for further evaluation of the  right cheek lesion.    4.  Chemotherapy induced peripheral neuropathy/balance issues:  We again reviewed neuropathy is likely permanent at this point.  I offered a referral to occupational therapy.  She declines at this time, but will let us know if she changes her mind.    5.  Bone Health/Osteopenia:  DEXA 4/13/2021 with a lowest T-score of -1.2 in the right femoral neck c/w osteopenia.  Zometa 4 mg IV every 6 months x 3 years for prevention of breast cancer bone metastases was initiated on 5/23/2022.  Next dose is due around 11/19/2022.    6.  At risk for cardiomyopathy:  2/2 h/o anthracycline and HER2 targeted therapy as well as personal history of hypertension, hyperlipidemia, and diabetes.  Post-treatment echocardiogram performed showed a retained LVEF. Recommend strict management of cardiac risk factors under the guidance of her PCP.    7.  Macrocytosis:  Vitamin B12 checked today is wnl.  Peripheral smear in 05/2022 was without evidence of MDS.  Will continue to monitor.    8.  Labial cyst:  She is concerned it may be infected.  Recommend she contact her PCP today.  We discussed may need an antibiotic vs I&D.      9.  Easy bruising:  Platelets are wnl.  INR and aPTT are wnl.      10.  Follow Up:   - Patient will schedule follow up with dermatology for right cheek skin lesion.  - Patient will schedule follow up with PCP for evaluation of possible furuncle/infected cyst left labia/groin area  - Labs and RUPA visit in 3 months.    - Labs and visit with me in 6 months.    45 minutes spent on the date of the encounter doing chart review, review of test results, interpretation of tests, patient visit and documentation.

## 2022-08-23 ENCOUNTER — ONCOLOGY VISIT (OUTPATIENT)
Dept: ONCOLOGY | Facility: CLINIC | Age: 77
End: 2022-08-23
Attending: INTERNAL MEDICINE
Payer: COMMERCIAL

## 2022-08-23 ENCOUNTER — NURSE TRIAGE (OUTPATIENT)
Dept: FAMILY MEDICINE | Facility: CLINIC | Age: 77
End: 2022-08-23

## 2022-08-23 ENCOUNTER — OFFICE VISIT (OUTPATIENT)
Dept: URGENT CARE | Facility: URGENT CARE | Age: 77
End: 2022-08-23
Payer: COMMERCIAL

## 2022-08-23 ENCOUNTER — ANCILLARY PROCEDURE (OUTPATIENT)
Dept: MAMMOGRAPHY | Facility: CLINIC | Age: 77
End: 2022-08-23
Attending: INTERNAL MEDICINE
Payer: COMMERCIAL

## 2022-08-23 VITALS
SYSTOLIC BLOOD PRESSURE: 137 MMHG | WEIGHT: 163 LBS | BODY MASS INDEX: 27.12 KG/M2 | OXYGEN SATURATION: 96 % | HEART RATE: 84 BPM | TEMPERATURE: 98.5 F | DIASTOLIC BLOOD PRESSURE: 73 MMHG

## 2022-08-23 VITALS
TEMPERATURE: 98.2 F | HEART RATE: 84 BPM | BODY MASS INDEX: 27.62 KG/M2 | DIASTOLIC BLOOD PRESSURE: 77 MMHG | OXYGEN SATURATION: 98 % | SYSTOLIC BLOOD PRESSURE: 145 MMHG | WEIGHT: 166 LBS

## 2022-08-23 DIAGNOSIS — R19.7 DIARRHEA, UNSPECIFIED TYPE: ICD-10-CM

## 2022-08-23 DIAGNOSIS — D84.9 IMMUNOCOMPROMISED PATIENT (H): ICD-10-CM

## 2022-08-23 DIAGNOSIS — R26.89 BALANCE PROBLEM: ICD-10-CM

## 2022-08-23 DIAGNOSIS — R23.3 EASY BRUISING: ICD-10-CM

## 2022-08-23 DIAGNOSIS — D75.89 MACROCYTOSIS: ICD-10-CM

## 2022-08-23 DIAGNOSIS — C50.811 MALIGNANT NEOPLASM OF OVERLAPPING SITES OF RIGHT BREAST IN FEMALE, ESTROGEN RECEPTOR POSITIVE (H): Primary | ICD-10-CM

## 2022-08-23 DIAGNOSIS — L02.419 CELLULITIS AND ABSCESS OF LEG: Primary | ICD-10-CM

## 2022-08-23 DIAGNOSIS — Z92.21 STATUS POST CHEMOTHERAPY: ICD-10-CM

## 2022-08-23 DIAGNOSIS — L03.119 CELLULITIS AND ABSCESS OF LEG: Primary | ICD-10-CM

## 2022-08-23 DIAGNOSIS — Z17.0 MALIGNANT NEOPLASM OF OVERLAPPING SITES OF RIGHT BREAST IN FEMALE, ESTROGEN RECEPTOR POSITIVE (H): Primary | ICD-10-CM

## 2022-08-23 DIAGNOSIS — Z12.31 VISIT FOR SCREENING MAMMOGRAM: ICD-10-CM

## 2022-08-23 DIAGNOSIS — R79.1 ABNORMAL COAGULATION PROFILE: ICD-10-CM

## 2022-08-23 LAB
ALBUMIN SERPL-MCNC: 3 G/DL (ref 3.4–5)
ALP SERPL-CCNC: 56 U/L (ref 40–150)
ALT SERPL W P-5'-P-CCNC: 9 U/L (ref 0–50)
ANION GAP SERPL CALCULATED.3IONS-SCNC: 8 MMOL/L (ref 3–14)
APTT PPP: 31 SECONDS (ref 22–38)
AST SERPL W P-5'-P-CCNC: 12 U/L (ref 0–45)
BASOPHILS # BLD AUTO: 0.1 10E3/UL (ref 0–0.2)
BASOPHILS NFR BLD AUTO: 1 %
BILIRUB SERPL-MCNC: 0.3 MG/DL (ref 0.2–1.3)
BUN SERPL-MCNC: 8 MG/DL (ref 7–30)
CALCIUM SERPL-MCNC: 8.3 MG/DL (ref 8.5–10.1)
CHLORIDE BLD-SCNC: 111 MMOL/L (ref 94–109)
CO2 SERPL-SCNC: 25 MMOL/L (ref 20–32)
CREAT SERPL-MCNC: 0.62 MG/DL (ref 0.52–1.04)
EOSINOPHIL # BLD AUTO: 0.1 10E3/UL (ref 0–0.7)
EOSINOPHIL NFR BLD AUTO: 1 %
ERYTHROCYTE [DISTWIDTH] IN BLOOD BY AUTOMATED COUNT: 14.9 % (ref 10–15)
GFR SERPL CREATININE-BSD FRML MDRD: >90 ML/MIN/1.73M2
GLUCOSE BLD-MCNC: 80 MG/DL (ref 70–99)
HCT VFR BLD AUTO: 34.9 % (ref 35–47)
HGB BLD-MCNC: 11.6 G/DL (ref 11.7–15.7)
IMM GRANULOCYTES # BLD: 0 10E3/UL
IMM GRANULOCYTES NFR BLD: 0 %
INR PPP: 1.03 (ref 0.85–1.15)
LYMPHOCYTES # BLD AUTO: 1 10E3/UL (ref 0.8–5.3)
LYMPHOCYTES NFR BLD AUTO: 13 %
MCH RBC QN AUTO: 33.5 PG (ref 26.5–33)
MCHC RBC AUTO-ENTMCNC: 33.2 G/DL (ref 31.5–36.5)
MCV RBC AUTO: 101 FL (ref 78–100)
MONOCYTES # BLD AUTO: 0.6 10E3/UL (ref 0–1.3)
MONOCYTES NFR BLD AUTO: 7 %
NEUTROPHILS # BLD AUTO: 6.1 10E3/UL (ref 1.6–8.3)
NEUTROPHILS NFR BLD AUTO: 78 %
NRBC # BLD AUTO: 0 10E3/UL
NRBC BLD AUTO-RTO: 0 /100
PLATELET # BLD AUTO: 322 10E3/UL (ref 150–450)
POTASSIUM BLD-SCNC: 3.7 MMOL/L (ref 3.4–5.3)
PROT SERPL-MCNC: 6.8 G/DL (ref 6.8–8.8)
RBC # BLD AUTO: 3.46 10E6/UL (ref 3.8–5.2)
SODIUM SERPL-SCNC: 144 MMOL/L (ref 133–144)
VIT B12 SERPL-MCNC: 612 PG/ML (ref 193–986)
WBC # BLD AUTO: 7.9 10E3/UL (ref 4–11)

## 2022-08-23 PROCEDURE — 36415 COLL VENOUS BLD VENIPUNCTURE: CPT | Performed by: INTERNAL MEDICINE

## 2022-08-23 PROCEDURE — 99214 OFFICE O/P EST MOD 30 MIN: CPT | Performed by: PHYSICIAN ASSISTANT

## 2022-08-23 PROCEDURE — 36591 DRAW BLOOD OFF VENOUS DEVICE: CPT | Performed by: INTERNAL MEDICINE

## 2022-08-23 PROCEDURE — 80053 COMPREHEN METABOLIC PANEL: CPT | Performed by: INTERNAL MEDICINE

## 2022-08-23 PROCEDURE — 99215 OFFICE O/P EST HI 40 MIN: CPT | Performed by: INTERNAL MEDICINE

## 2022-08-23 PROCEDURE — 82040 ASSAY OF SERUM ALBUMIN: CPT | Performed by: INTERNAL MEDICINE

## 2022-08-23 PROCEDURE — 85025 COMPLETE CBC W/AUTO DIFF WBC: CPT | Performed by: INTERNAL MEDICINE

## 2022-08-23 PROCEDURE — 82607 VITAMIN B-12: CPT | Performed by: INTERNAL MEDICINE

## 2022-08-23 PROCEDURE — 77067 SCR MAMMO BI INCL CAD: CPT | Mod: 52 | Performed by: STUDENT IN AN ORGANIZED HEALTH CARE EDUCATION/TRAINING PROGRAM

## 2022-08-23 PROCEDURE — 85730 THROMBOPLASTIN TIME PARTIAL: CPT | Performed by: INTERNAL MEDICINE

## 2022-08-23 PROCEDURE — 250N000011 HC RX IP 250 OP 636: Performed by: INTERNAL MEDICINE

## 2022-08-23 PROCEDURE — 85610 PROTHROMBIN TIME: CPT | Performed by: INTERNAL MEDICINE

## 2022-08-23 PROCEDURE — G0463 HOSPITAL OUTPT CLINIC VISIT: HCPCS

## 2022-08-23 RX ORDER — HEPARIN SODIUM (PORCINE) LOCK FLUSH IV SOLN 100 UNIT/ML 100 UNIT/ML
500 SOLUTION INTRAVENOUS ONCE
Status: COMPLETED | OUTPATIENT
Start: 2022-08-23 | End: 2022-08-23

## 2022-08-23 RX ADMIN — Medication 500 UNITS: at 13:10

## 2022-08-23 ASSESSMENT — ENCOUNTER SYMPTOMS
CARDIOVASCULAR NEGATIVE: 1
WOUND: 0
FEVER: 0
COLOR CHANGE: 1
COUGH: 0
FATIGUE: 0
CHILLS: 0
SHORTNESS OF BREATH: 0
RESPIRATORY NEGATIVE: 1
WHEEZING: 0
CHEST TIGHTNESS: 0
CONSTITUTIONAL NEGATIVE: 1
PALPITATIONS: 0

## 2022-08-23 ASSESSMENT — PAIN SCALES - GENERAL
PAINLEVEL: NO PAIN (0)
PAINLEVEL: MODERATE PAIN (5)

## 2022-08-23 NOTE — PROGRESS NOTES
Beatriz Babin is a 77 year old, presenting for the following health issues:  Derm Problem    HPI   Rash  Onset/Duration: 2weeks  Description  Location: L upper leg  Character: red, swollen, drainage, boil  Itching: no  Intensity:  moderate  Progression of Symptoms:  worsening  Accompanying signs and symptoms:   Fever: No  Body aches or joint pain: No  Sore throat symptoms: No  Recent cold symptoms: No  History:           Previous episodes of similar rash: None  New exposures:  None  Recent travel: No  Exposure to similar rash: No  Precipitating or alleviating factors: none  Therapies tried and outcome: triamcinolone cream and dressings with minimal relief    Patient Active Problem List   Diagnosis     Malignant neoplasm of overlapping sites of right breast in female, estrogen receptor positive (H)     Osteopenia of neck of right femur     Long term (current) use of aromatase inhibitors     Adverse drug effect     Current Outpatient Medications   Medication     amLODIPine (NORVASC) 5 MG tablet     atorvastatin (LIPITOR) 20 MG tablet     benazepril (LOTENSIN) 20 MG tablet     furosemide (LASIX) 80 MG tablet     potassium chloride ER (KLOR-CON M) 20 MEQ CR tablet     triamcinolone (KENALOG) 0.1 % external cream     varenicline (CHANTIX) 1 MG tablet     Vitamin D, Cholecalciferol, 25 MCG (1000 UT) TABS     zolpidem (AMBIEN) 5 MG tablet     exemestane (AROMASIN) 25 MG tablet     No current facility-administered medications for this visit.     Facility-Administered Medications Ordered in Other Visits   Medication     sodium chloride (PF) 0.9% PF flush 30 mL     sodium chloride (PF) 0.9% PF flush 30 mL        Allergies   Allergen Reactions     Bacitracin-Neomycin-Polymyxin      PN: LW Reaction: Unknown Reaction     Latex        Review of Systems   Constitutional: Negative.  Negative for chills, fatigue and fever.   Respiratory: Negative.  Negative for cough, chest tightness, shortness of breath and wheezing.     Cardiovascular: Negative.  Negative for chest pain, palpitations and peripheral edema.   Skin: Positive for color change and rash. Negative for pallor and wound.   All other systems reviewed and are negative.           Objective    /73 (BP Location: Left arm, Patient Position: Chair, Cuff Size: Adult Regular)   Pulse 84   Temp 98.5  F (36.9  C) (Tympanic)   Wt 73.9 kg (163 lb)   SpO2 96%   Breastfeeding No   BMI 27.12 kg/m    Body mass index is 27.12 kg/m .  Physical Exam  Vitals and nursing note reviewed.   Constitutional:       General: She is not in acute distress.     Appearance: Normal appearance. She is well-developed and normal weight. She is not ill-appearing.   Skin:     General: Skin is warm and dry.      Findings: Abscess (3eaN1vx tender, fluctuant mass present over the medial L thigh with purulent discharge), erythema and wound present. No abrasion, acne, bruising, burn, ecchymosis, signs of injury, laceration, lesion, petechiae or rash.   Neurological:      Mental Status: She is alert and oriented to person, place, and time.   Psychiatric:         Mood and Affect: Mood normal.         Behavior: Behavior normal.         Thought Content: Thought content normal.         Judgment: Judgment normal.          Assessment/Plan:  Cellulitis and abscess of leg:  Along with immunocompromised status.  Due to the size and severity of this abscess, recommend further evaluation and management in the ER.  Will most likely need I&D, cultures and IV antibiotics.  Patient has declined transportation via ambulance and will have family drive her/him.  Understands risks and benefits of ambulance transfer and s/he has declined.  Call 911 if worsening symptoms.  S/he plans to go to [facility].  S/he left in stable condition with AVS in hand.  F/u with PCP after ER visit.     Immunocompromised patient (H)        Quyen See CHERELLE Salinas  ..

## 2022-08-23 NOTE — NURSING NOTE
"Oncology Rooming Note    August 23, 2022 12:11 PM   Talya Zuleta is a 77 year old female who presents for:    Chief Complaint   Patient presents with     Oncology Clinic Visit     Rtn for breast cancer     Initial Vitals: BP (!) 145/77   Pulse 84   Temp 98.2  F (36.8  C) (Oral)   Wt 75.3 kg (166 lb)   SpO2 98%   BMI 27.62 kg/m   Estimated body mass index is 27.62 kg/m  as calculated from the following:    Height as of 3/3/22: 1.651 m (5' 5\").    Weight as of this encounter: 75.3 kg (166 lb). Body surface area is 1.86 meters squared.  No Pain (0) Comment: Data Unavailable   No LMP recorded. Patient is postmenopausal.  Allergies reviewed: Yes  Medications reviewed: Yes    Medications: Medication refills not needed today.  Pharmacy name entered into EPIC:    [facility] - [place], [place] - [address]  [id number] - [place], [place] - [address]    Clinical concerns: Pt is concerned about a sight on right upper, inner leg. Would like provider to look and discuss what should be done.     Julianna Fisher, EMT            "

## 2022-08-23 NOTE — TELEPHONE ENCOUNTER
"Received call from patient. She states that L thigh has an area that she believes to be infected. It is red/raised and swollen. She states it is draining pus/blood and has been worsening for the last 1.5 weeks. She was triaged for these symptoms. See below for further details. She was advised to be seen today for evaluation. She is going to go to [facility].    Reason for Disposition    Looks like a boil, infected sore, deep ulcer, or other infected rash (spreading redness, pus)    Additional Information    Negative: Sounds like a life-threatening emergency to the triager    Negative: Athlete's Foot suspected (i.e., itchy rash between the toes)    Negative: Insect bite(s) suspected    Negative: Jock Itch suspected (i.e., itchy rash on inner thighs near genital area)    Negative: Localized lump (or swelling) without redness or rash    Negative: Poison ivy, oak, or sumac contact suspected    Negative: Rash of female genital area (vulva)    Negative: Rash of male genital area (penis or scrotum)    Negative: Redness of immunization site    Negative: Shingles suspected (i.e., painful rash, multiple small blisters grouped together in one area of body; dermatomal distribution)    Negative: Small spot, skin growth, or mole    Negative: Wound infection suspected (i.e., pain, spreading redness, or pus; in a cut, puncture, scrape or sutured wound)    Negative: Fever and localized purple or blood-colored spots or dots that are not from injury or friction    Negative: Fever and localized rash is very painful    Negative: Patient sounds very sick or weak to the triager    Answer Assessment - Initial Assessment Questions  1. APPEARANCE of RASH: \"Describe the rash.\"       L thigh - area about baseball size red/raised area. Swelling.   2. LOCATION: \"Where is the rash located?\"       L thigh.   3. NUMBER: \"How many spots are there?\"       One spot on the thigh.   4. SIZE: \"How big are the spots?\" (Inches, centimeters or compare to size of " "a coin)       Baseball size.   5. ONSET: \"When did the rash start?\"       1.5 week ago.   6. ITCHING: \"Does the rash itch?\" If Yes, ask: \"How bad is the itch?\"  (Scale 0-10; or none, mild, moderate, severe)      No.   7. PAIN: \"Does the rash hurt?\" If Yes, ask: \"How bad is the pain?\"  (Scale 0-10; or none, mild, moderate, severe)     - NONE (0): no pain     - MILD (1-3): doesn't interfere with normal activities      - MODERATE (4-7): interferes with normal activities or awakens from sleep      - SEVERE (8-10): excruciating pain, unable to do any normal activities      Sore, 8/10.  8. OTHER SYMPTOMS: \"Do you have any other symptoms?\" (e.g., fever)      No.  9. PREGNANCY: \"Is there any chance you are pregnant?\" \"When was your last menstrual period?\"      No.    Protocols used: RASH OR REDNESS - [id number]    OTILIA Ledbetter RN  [facility]  Primary Care  "

## 2022-08-23 NOTE — LETTER
8/23/2022         RE: Talya Zuleta  [address] [facility] [address]        Dear Colleague,    Thank you for referring your patient, Talya Zuleta, to the [facility]. Please see a copy of my visit note below.    Oncology Follow Up:  Date on this visit: 8/23/2022    Diagnosis:  ER positive, HER-2 positive right breast cancer, clinical stage T2N0M0 s/p THP-ddAC, right breast mastectomy ([id number]), SLN biopsy, and 4 months adjuvant HP.  On endocrine therapy.    Primary Physician: Cole Weston     History Of Present Illness:  Ms. Zuleta is a 77 year old female with right breast cancer.  Routine screening mammogram in 07/2020 showed right breast asymmetry.  Diagnostic mammograms and ultrasound showed a 2.7 cm mass in the right breast at 12:00, 5 cm from the nipple with ductal extension including a 6 mm mass at 3 cm from the nipple.  Contrast enhanced mammogram showed the right breast mass, including extension, to measure 4.9 cm.  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  Estrogen receptor was moderate in 50% and progesterone receptor staining was negative.  HER-2 was negative by IHC; HER2 FISH showed approximately 10% of tumor cells to have 6.8 HER2 signals/nucleus and a HER2/CEN17 ratio of 1.6.  The HER2 positive cells were noted to be the large pleomorphic cells.  Multiple neutrophils were noted to be infiltrating through tumor stroma.    She received 12 weeks of neoadjuvant Taxol, Herceptin, and pertuzumab from 10/7/2020 - 12/22/2020.  She received 4 cycles of dose dense adriamycin and cyclophosphamide from 12/29/2020 - 2/9/2021.  She underwent right breast mastectomy and sentinel lymph node biopsy on 3/8/2021.  Pathology showed rare residual tumor cells with therapy related changes in the tumor bed.  Residual tumor is <1% of the tumor bed volume.  There was no lymphovascular invasion and  "surgical margins were negative.  A single sentinel lymph node was benign.  We discussed starting letrozole at our clinic visit on 3/30/2021, unfortunately she did not receive the prescription and so did not start it.  She was initiated on adjuvant HER2 targeted therapy 4/13/2021.  She received 4 cycles of adjuvant Phesgo, then discontinued due to diarrhea.  She was then on biosimilar trastuzumab (Trazimera) since 7/6/2021, however, continued to have diarrhea refractory to antidiarrheals, therefore all HER2 targeted therapy was abandoned on 8/3/2021. Of note, she received 7 months total HER2 treatment (3 months neoadjuvant + 4 months adjuvant).  She started letrozole on 8/3/2021.  She continued to have diarrhea, which was attributed to letrozole.  On 6/8/2022, treatment was changed to exemestane.    Interval History:  Ms. Zuleta comes into clinic today for routine breast cancer follow-up.  She states she stopped taking exemestane 1 month ago as diarrhea was even worse on exemestane than it was on letrozole.  While on exemestane she \"could not walk 10 feet without having diarrhea\".  The diarrhea was also quite bothersome on letrozole.  Diarrhea was so bad on both medications that she developed sores on her backside.  These have largely healed, however, she notes sores on other areas of her body.  She specifically references a lesion on her right cheek.  Since stopping exemestane diarrhea has improved.  She is currently having 2 diarrheal bowel movements in the morning which start approximately 15 minutes after she eats for the first time of the day.  She has an additional diarrheal bowel movement prior to bed.  While on exemestane, she also had significant nausea.  This has resolved.  She continues to have poor appetite and is eating small amounts throughout the day.  She has been able to maintain her weight.      She also notes easy bruising especially prevalent on her arms and legs.  She denies bleeding.  She " "reports increased cramping of her ankles for the last 1-1/2 to 2 months.  This did not improve off of exemestane.  She reports an infected cyst alongside her left labia.  She has not had fevers or chills.  She denies current cough, shortness of breath, or chest pain.  She has no new bone or joint aches or pains.  She smokes a 1/2 ppd of cigarettes.  She previously tried Zyban, which \"was awful\".  She later tried Chantix, but states that this was \"recalled?\".  Her  reports she has had \"balance issues\" since completion of her breast cancer treatment.  She reports persistent neuropathy in the hands and feet.  The remainder of a complete 12 point review of systems was reviewed with the patient was negative with the exception of that mentioned above.    Past Medical/Surgical History:  1.  Breast cancer as per HPI  2.  Hypertension  3.  Diabetes  4.  COPD  5.  Hypothyroidism  6.  Hypohidrosis ectodermal dysplasia    Allergies:  Allergies as of 08/23/2022 - Reviewed 05/23/2022   Allergen Reaction Noted     Bacitracin-neomycin-polymyxin  04/18/2003     Latex  09/30/2005     Current Medications:  Current Outpatient Medications   Medication Sig Dispense Refill     amLODIPine (NORVASC) 5 MG tablet TAKE ONE TABLET BY MOUTH TWICE DAILY *NEED TO SCHEDULE WELLNESS EXAM 180 tablet 3     atorvastatin (LIPITOR) 20 MG tablet Take 1 tablet (20 mg) by mouth daily 90 tablet 3     benazepril (LOTENSIN) 20 MG tablet TAKE ONE TABLET BY MOUTH TWICE DAILY *PLEASE SCHEDULE WELLNESS EXAM 180 tablet 3     exemestane (AROMASIN) 25 MG tablet Take 1 tablet (25 mg) by mouth daily 30 tablet 11     furosemide (LASIX) 80 MG tablet TAKE ONE TABLET BY MOUTH TWICE DAILY 180 tablet 3     potassium chloride ER (KLOR-CON M) 20 MEQ CR tablet Take 2 tablets (40 mEq) by mouth 3 times daily 180 tablet 3     triamcinolone (KENALOG) 0.1 % external cream APPLY TOPICALLY 2 TIMES DAILY AS NEEDED FOR IRRITATION 80 g 1     varenicline (CHANTIX) 1 MG tablet Take " 1 tablet (1 mg) by mouth 2 times daily 180 tablet 1     Vitamin D, Cholecalciferol, 25 MCG (1000 UT) TABS Take 1,000 Units by mouth daily       zolpidem (AMBIEN) 5 MG tablet Take 1 tablet (5 mg) by mouth nightly as needed for sleep 30 tablet 1      Family and Social History:  Please see initial consultation dated 9/21/2020 for further details.  Breast Actionable Panel on 10/1/2020 was negative for mutation.    Physical Exam:  BP (!) 145/77   Pulse 84   Temp 98.2  F (36.8  C) (Oral)   Wt 75.3 kg (166 lb)   SpO2 98%   BMI 27.62 kg/m    General:  Well appearing adult female in NAD.  Alert and oriented x 3  HEENT:  Normocephalic.  Sclera anicteric.  MMM.  No lesions of the oropharynx.  (+) alopecia, wearing a wig.  Lymph:  No palpable cervical, supraclavicular, or axillary LAD.  Chest:  CTA bilaterally.  No wheezes or crackles.  Left chest port-a-cath in place.  CV:  RRR.  Nl S1 and S2.    Breast:  Right breast mastectomy.  There are no discretely palpable masses of either the right chest wall or within the left breast.  Left nipple is everted.  Abd:  Soft/ND.    Ext:  1+ pitting edema of the bilateral lower extremities to just below the knees.  Musculo:  Full ROM of the bilateral upper extremities.    Neuro:  Cranial nerves grossly intact.  Psych:  Mood and affect appear normal.  Skin:  Thickened, pocked skin in the bilateral axilla.  Small area lower left chest wall as well.  Skin over the right chest wall is very dry.  There is a crusted, erythematous lesion right cheek, just lateral to the mouth.    Laboratory/Imaging Studies  8/23/2022 Left breast screening mammogram:  I personally reviewed the images of left breast screening mammogram.  Benign appearing calcifications behind the nipple are unchanged from prior.  No new masses or areas of architectural distortion when compared to one year prior.  Radiology read is pending.    Vitamin B12 is wnl at 612  WBC is wnl.  Hemoglobin is slightly low at 11.6  g/dL  Platelets are wnl.  INR is wnl.  aPTT is wnl.    ASSESSMENT/PLAN:  77 year old female with a history of hypohidrosis ectodermal dysplasia, HTN, dyslipidemia, COPD, diabetes, and OA with a h/o T2N0M0, right breast cancer, ER positive (50%), PA negative, and HER2 positive.  She is s/p treatment with 12 weeks THP, 4 cycles ddAC, right breast mastectomy ([id number]), right axillary SLNBx, and 4 months adjuvant HER2 therapy (7 months total HER2 therapy), stopped due to diarrhea.  On endocrine therapy from 8/3/2021 - 07/2022.    1.  Right breast cancer:   Ms. Zuleta is 1 year, 5.5 months out from excision of a right breast cancer.  She did not tolerate either letrozole or exemestane due to profuse diarrhea on both medications.  She stopped exemestane one month ago.  We discussed option for trial of tamoxifen.  Of note, the aromatase inhibitors have an 8% frequency of diarrhea, tamoxifen still 7%, therefore diarrhea remains a risk.  We reviewed the potential side effects of tamoxifen.  On further questioning, she confirms she is still smoking.  Due to increase risk of blood clots with tamoxifen and smoking, I do not recommend tamoxifen at this time.  She will make an effort to stop smoking in the next three months and we will re-discuss tamoxifen at the time of next visit.  Ideally, would like to complete a total of 5 years of endocrine therapy.    She is asymptomatic of disease recurrence on history taken today and clinical exam is without concern.  I personally reviewed the images of the left breast screening mammogram performed today which is without significant change from prior.  I will see her back in clinic in 3 months.    2.  Diarrhea:  No improvement after stopping HER2 therapy.  She has noticed improvement since stopping endocrine therapy.  Now with approximately 3 stools per day.  She declines taking imodium.  Colonoscopy was previously recommended, she declined.    3.  Hypohidrotic ectodermal  dysplasia/right cheek sore:  Largely involving the bilateral axillae with a small patch on the left lower lateral chest wall.  She is using triamcinolone ointment.  She is following with Dr. Tran of dermatology.  I recommend she follow up with Dr. Tran for further evaluation of the right cheek lesion.    4.  Chemotherapy induced peripheral neuropathy/balance issues:  We again reviewed neuropathy is likely permanent at this point.  I offered a referral to occupational therapy.  She declines at this time, but will let us know if she changes her mind.    5.  Bone Health/Osteopenia:  DEXA 4/13/2021 with a lowest T-score of -1.2 in the right femoral neck c/w osteopenia.  Zometa 4 mg IV every 6 months x 3 years for prevention of breast cancer bone metastases was initiated on 5/23/2022.  Next dose is due around 11/19/2022.    6.  At risk for cardiomyopathy:  2/2 h/o anthracycline and HER2 targeted therapy as well as personal history of hypertension, hyperlipidemia, and diabetes.  Post-treatment echocardiogram performed showed a retained LVEF. Recommend strict management of cardiac risk factors under the guidance of her PCP.    7.  Macrocytosis:  Vitamin B12 checked today is wnl.  Peripheral smear in 05/2022 was without evidence of MDS.  Will continue to monitor.    8.  Labial cyst:  She is concerned it may be infected.  Recommend she contact her PCP today.  We discussed may need an antibiotic vs I&D.      9.  Easy bruising:  Platelets are wnl.  INR and aPTT are wnl.      10.  Follow Up:   - Patient will schedule follow up with dermatology for right cheek skin lesion.  - Patient will schedule follow up with PCP for evaluation of possible furuncle/infected cyst left labia/groin area  - Labs and RUPA visit in 3 months.    - Labs and visit with me in 6 months.    45 minutes spent on the date of the encounter doing chart review, review of test results, interpretation of tests, patient visit and documentation.          Sincerely,    Perlita Alvarez MD

## 2022-08-23 NOTE — NURSING NOTE
"Chief Complaint   Patient presents with     Oncology Clinic Visit     Rtn for breast cancer     Port Draw     Labs drawn via port by RN.     Port accessed with 20G 3/4\" flat needle by RN, labs collected, line flushed with saline and heparin.      Norma Sanchez RN    "

## 2022-09-02 ENCOUNTER — OFFICE VISIT (OUTPATIENT)
Dept: FAMILY MEDICINE | Facility: CLINIC | Age: 77
End: 2022-09-02
Payer: COMMERCIAL

## 2022-09-02 VITALS
DIASTOLIC BLOOD PRESSURE: 52 MMHG | SYSTOLIC BLOOD PRESSURE: 114 MMHG | BODY MASS INDEX: 26.49 KG/M2 | OXYGEN SATURATION: 97 % | WEIGHT: 159 LBS | TEMPERATURE: 98.3 F | RESPIRATION RATE: 16 BRPM | HEART RATE: 85 BPM | HEIGHT: 65 IN

## 2022-09-02 DIAGNOSIS — C50.811 MALIGNANT NEOPLASM OF OVERLAPPING SITES OF RIGHT BREAST IN FEMALE, ESTROGEN RECEPTOR POSITIVE (H): ICD-10-CM

## 2022-09-02 DIAGNOSIS — R19.7 DIARRHEA, UNSPECIFIED TYPE: ICD-10-CM

## 2022-09-02 DIAGNOSIS — L03.119 CELLULITIS OF THIGH: Primary | ICD-10-CM

## 2022-09-02 DIAGNOSIS — Z17.0 MALIGNANT NEOPLASM OF OVERLAPPING SITES OF RIGHT BREAST IN FEMALE, ESTROGEN RECEPTOR POSITIVE (H): ICD-10-CM

## 2022-09-02 PROCEDURE — 99213 OFFICE O/P EST LOW 20 MIN: CPT | Performed by: FAMILY MEDICINE

## 2022-09-02 RX ORDER — DOXYCYCLINE 100 MG/1
100 CAPSULE ORAL 2 TIMES DAILY
Qty: 20 CAPSULE | Refills: 0 | Status: SHIPPED | OUTPATIENT
Start: 2022-09-02 | End: 2022-09-12

## 2022-09-02 NOTE — PROGRESS NOTES
Beatriz Babin is a 77 year old accompanied by her none, presenting for the following health issues:  ED/FU      HPI     ED/UC Followup:    Facility:  [facility]  Date of visit: 8/23/2022  Reason for visit: Cellulitis and abscess of left leg   Current Status:  Patient finished her full course today of Doxycyline 100 MG twice a day for 10 days           Review of Systems    reviewed emergency room summary in detail  Packing came out 4 days ago    Not as swollen  Draining some, not as much  Had chronic diarrhea    Irritated from pads    Not much pain anymore    Did before          Objective    There were no vitals taken for this visit.  There is no height or weight on file to calculate BMI.  Physical Exam         Full physical not done     Mentation and affect are fine    No tremor of speech or extremity    On upper inner thigh patient has an area of redness and induration that has an open area inferiorly. Not currently draining but there is a bit of drainage on the gauze under the coban wrap.  The coban is irritating to the skin she states.    Not warm.  Not particularly tender.     ASSESSMENT / PLAN:  (L03.119) Cellulitis of thigh  (primary encounter diagnosis)  Comment: prudent to extend antibiotics. Keep bandaged.  I advised some antibiotic ointment on open area.  We put on gauze and ace bandage also.   Plan: doxycycline hyclate (VIBRAMYCIN) 100 MG capsule             (C50.811,  Z17.0) Malignant neoplasm of overlapping sites of right breast in female, estrogen receptor positive (H)  Comment: patient getting a break from chemo.    Plan: should be okay to get cataract done during this break. See us for preop.    (R19.7) Diarrhea, unspecified type  Comment: should improve off chemo and give the skin in genital area/ upper thigh a break.   Plan: as above       I reviewed the patient's medications, allergies, medical history, family history, and social history.    Darrell Douglas MD

## 2022-09-02 NOTE — PATIENT INSTRUCTIONS
Antibiotic ointment over the counter 2x daily to affected area     Keep area covered    One more round of antibiotics    See us for preop physical a week before cataract procedure

## 2022-09-20 ENCOUNTER — OFFICE VISIT (OUTPATIENT)
Dept: FAMILY MEDICINE | Facility: CLINIC | Age: 77
End: 2022-09-20
Payer: COMMERCIAL

## 2022-09-20 VITALS
TEMPERATURE: 98.3 F | SYSTOLIC BLOOD PRESSURE: 112 MMHG | BODY MASS INDEX: 26.33 KG/M2 | OXYGEN SATURATION: 98 % | DIASTOLIC BLOOD PRESSURE: 70 MMHG | HEART RATE: 90 BPM | WEIGHT: 158.25 LBS

## 2022-09-20 DIAGNOSIS — Z01.818 PREOP GENERAL PHYSICAL EXAM: Primary | ICD-10-CM

## 2022-09-20 DIAGNOSIS — H26.9 CATARACT OF LEFT EYE, UNSPECIFIED CATARACT TYPE: ICD-10-CM

## 2022-09-20 PROCEDURE — 99214 OFFICE O/P EST MOD 30 MIN: CPT | Performed by: FAMILY MEDICINE

## 2022-09-20 ASSESSMENT — PAIN SCALES - GENERAL: PAINLEVEL: NO PAIN (0)

## 2022-09-20 NOTE — PROGRESS NOTES
[facility]  [address] [place] [address]  [place] [address]  Phone: [phone]  Primary Provider: Thony Alberts  Pre-op Performing Provider: THONY ALBERTS        PREOPERATIVE EVALUATION:  Today's date: 9/20/2022    Talya Zuleta is a 77 year old female who presents for a preoperative evaluation.    Surgical Information:  Surgery/Procedure: Cataract  Surgery Location: [facility]  Surgeon: Reyes  Surgery Date: 09/23/2022  Time of Surgery: TBD  Where patient plans to recover: At home with family  Fax number for surgical facility: [phone]    Type of Anesthesia Anticipated: Local        Subjective     HPI related to upcoming procedure: 77 year old female to have cataract procedure later this week left eye      Preop Questions 9/20/2022   1. Have you ever had a heart attack or stroke? No   2. Have you ever had surgery on your heart or blood vessels, such as a stent placement, a coronary artery bypass, or surgery on an artery in your head, neck, heart, or legs? No   3. Do you have chest pain with activity? No   4. Do you have a history of  heart failure? No   5. Do you currently have a cold, bronchitis or symptoms of other infection? No   6. Do you have a cough, shortness of breath, or wheezing? No   7. Do you or anyone in your family have previous history of blood clots? No   8. Do you or does anyone in your family have a serious bleeding problem such as prolonged bleeding following surgeries or cuts? No   9. Have you ever had problems with anemia or been told to take iron pills? YES -not on iron pills currently   10. Have you had any abnormal blood loss such as black, tarry or bloody stools, or abnormal vaginal bleeding? No   11. Have you ever had a blood transfusion? No   12. Are you willing to have a blood transfusion if it is medically needed before, during, or after your surgery? Yes   13. Have you or any of your relatives ever had problems with anesthesia? No   14. Do you  have sleep apnea, excessive snoring or daytime drowsiness? No   14a. Do you have a CPAP machine? -   15. Do you have any artifical heart valves or other implanted medical devices like a pacemaker, defibrillator, or continuous glucose monitor? No   16. Do you have artificial joints? No   17. Are you allergic to latex? YES:   18. Is there any chance that you may be pregnant? -       Health Care Directive:  Patient does have a Health Care Directive or Living Will:     Preoperative Review of :  Only controlled substance currently is zolpidem, which is only used rarely           Review of Systems  Constitutional, neuro, ENT, endocrine, pulmonary, cardiac, gastrointestinal, genitourinary, musculoskeletal, integument and psychiatric systems are negative, except as otherwise noted.    No recent illnesses    Patient had breast cancer a couple years ago , treated with surgery and chemo/ infusions    Followed by oncology regularly     Smoking occasionally ; trying to quit    No history of anesthesia problems    No bleeding or clotting problems    No cardiac history     No chest pain or breathing problems      Patient Active Problem List    Diagnosis Date Noted     Osteopenia of neck of right femur 05/19/2022     Priority: Medium     Long term (current) use of aromatase inhibitors 05/19/2022     Priority: Medium     Adverse drug effect 05/19/2022     Priority: Medium     Malignant neoplasm of overlapping sites of right breast in female, estrogen receptor positive (H) 09/21/2020     Priority: Medium     Complete prior to 10.22 [place]        Past Medical History:   Diagnosis Date     Anemia      Breast cancer (H)      Cellulitis      HTN (hypertension)      Neuropathy      Personal history of tobacco use, presenting hazards to health      Reduced vision 1949     Skin disease excema 1945     Sleep apnea      Past Surgical History:   Procedure Laterality Date     BLEPHAROPLASTY       BUNIONECTOMY       CHOLECYSTECTOMY        GYN SURGERY  1976     HYSTERECTOMY  1976     INSERT PORT VASCULAR ACCESS N/A 10/02/2020    Procedure: PORT PLACEMENT;  Surgeon: Hawa Lowery MD;  Location: [facility] OR     MASTECTOMY SIMPLE Right 3/8/2021    Procedure: right simple mastectomy, right sentinel node biopsy;  Surgeon: Laci Newsome MD;  Location: [facility] OR     THYROIDECTOMY       partial     Current Outpatient Medications   Medication Sig Dispense Refill     amLODIPine (NORVASC) 5 MG tablet TAKE ONE TABLET BY MOUTH TWICE DAILY *NEED TO SCHEDULE WELLNESS EXAM 180 tablet 3     atorvastatin (LIPITOR) 20 MG tablet Take 1 tablet (20 mg) by mouth daily 90 tablet 3     benazepril (LOTENSIN) 20 MG tablet TAKE ONE TABLET BY MOUTH TWICE DAILY *PLEASE SCHEDULE WELLNESS EXAM 180 tablet 3     exemestane (AROMASIN) 25 MG tablet Take 1 tablet (25 mg) by mouth daily 30 tablet 11     furosemide (LASIX) 80 MG tablet TAKE ONE TABLET BY MOUTH TWICE DAILY 180 tablet 3     potassium chloride ER (KLOR-CON M) 20 MEQ CR tablet Take 2 tablets (40 mEq) by mouth 3 times daily 180 tablet 3     triamcinolone (KENALOG) 0.1 % external cream APPLY TOPICALLY 2 TIMES DAILY AS NEEDED FOR IRRITATION 80 g 1     varenicline (CHANTIX) 1 MG tablet Take 1 tablet (1 mg) by mouth 2 times daily 180 tablet 1     Vitamin D, Cholecalciferol, 25 MCG (1000 UT) TABS Take 1,000 Units by mouth daily       zolpidem (AMBIEN) 5 MG tablet Take 1 tablet (5 mg) by mouth nightly as needed for sleep 30 tablet 1       Allergies   Allergen Reactions     Bacitracin-Neomycin-Polymyxin      PN: LW Reaction: Unknown Reaction     Doxycycline      Rash/hives on face, leg pain, diarrhea     Latex         Social History     Tobacco Use     Smoking status: Current Every Day Smoker     Packs/day: 0.25     Years: 66.00     Pack years: 16.50     Types: Cigarettes     Smokeless tobacco: Never Used     Tobacco comment: 4 cigs per day   Substance Use Topics     Alcohol use: Yes     Comment: scotch & water 2-3 glasses a day         History   Drug Use Unknown         Objective     /70 (BP Location: Left arm, Patient Position: Chair, Cuff Size: Adult Regular)   Pulse 90   Temp 98.3  F (36.8  C) (Temporal)   Wt 71.8 kg (158 lb 4 oz)   LMP  (LMP Unknown)   SpO2 98%   Breastfeeding No   BMI 26.33 kg/m      Physical Exam    GENERAL APPEARANCE: healthy, alert and no distress     EYES: EOMI, PERRL     HENT: ear canals and TM's normal and nose and mouth without ulcers or lesions     NECK: no adenopathy, no asymmetry, masses, or scars and thyroid normal to palpation     RESP: lungs clear to auscultation - no rales, rhonchi or wheezes     CV: regular rates and rhythm, normal S1 S2, no S3 or S4 and no murmur, click or rub     ABDOMEN:  soft, nontender, no HSM or masses and bowel sounds normal     MS: extremities normal- no gross deformities noted, no evidence of inflammation in joints, FROM in all extremities.     SKIN: no suspicious lesions or rashes     NEURO: Normal strength and tone, sensory exam grossly normal, mentation intact and speech normal     PSYCH: mentation appears normal. and affect normal/bright     LYMPHATICS: No cervical adenopathy    Recent Labs   Lab Test 08/23/22  1310 05/23/22  1031 05/23/22  0915 02/22/22  1001 10/07/20  0844 09/28/20  1058   HGB 11.6* 12.8 13.0 13.7   < > 14.4    267 271 237   < > 291   INR 1.03  --   --   --   --   --      --  139 142   < > 138   POTASSIUM 3.7  --  3.7 3.8   < > 4.1   CR 0.62  --  0.87 0.79   < > 0.87   A1C  --   --   --  5.9*  --  5.7*    < > = values in this interval not displayed.        Diagnostics:      No ekg needed for cataract  Of note, patient had echo done 7-21, normal    See labs from August 23 this year.  Hemoglobin only slightly low.  Potassium and creatinine normal.        ASSESSMENT / PLAN:  (Z01.818) Preop general physical exam  (primary encounter diagnosis)  Comment: overall patient stable.  Okay for procedure.  Plan: patient to hold all her medications  the am of procedure.  Blood pressure fairly low here.    (H26.9) Cataract of left eye, unspecified cataract type  Comment: as above   Plan: as above      Patient not on any blood thinners.      I reviewed the patient's medications, allergies, medical history, family history, and social history.    Darrell Douglas MD            Revised Cardiac Risk Index (RCRI):  The patient has the following serious cardiovascular risks for perioperative complications:   - No serious cardiac risks = 0 points     RCRI Interpretation: 0 points: Class I (very low risk - 0.4% complication rate)           Signed Electronically by: Darrell Douglas MD  Copy of this evaluation report is provided to requesting physician.

## 2022-09-20 NOTE — PATIENT INSTRUCTIONS
Hold all meds the morning of procedure    Call with problems/ questions    No eating  the day of surgery prior to procedure    Usually okay to have water up until 4 hours prior    Try to quit smoking

## 2022-10-06 DIAGNOSIS — I10 HYPERTENSION, UNSPECIFIED TYPE: ICD-10-CM

## 2022-10-06 DIAGNOSIS — L30.9 DERMATITIS: ICD-10-CM

## 2022-10-07 DIAGNOSIS — I10 HYPERTENSION, UNSPECIFIED TYPE: ICD-10-CM

## 2022-10-07 RX ORDER — TRIAMCINOLONE ACETONIDE 1 MG/G
CREAM TOPICAL 2 TIMES DAILY PRN
Qty: 80 G | Refills: 0 | Status: SHIPPED | OUTPATIENT
Start: 2022-10-07 | End: 2023-05-19

## 2022-10-07 RX ORDER — POTASSIUM CHLORIDE 1500 MG/1
40 TABLET, EXTENDED RELEASE ORAL 3 TIMES DAILY
Qty: 180 TABLET | Refills: 2 | Status: SHIPPED | OUTPATIENT
Start: 2022-10-07 | End: 2022-10-07

## 2022-10-07 NOTE — TELEPHONE ENCOUNTER
Patient requesting refill from PCP clinic d/t refill sent by oncologist was only for 30 days and patient takes 6 tablets per order every day.     Order pended and routed to refill pool.     Lizabeth Alfaro RN  [facility]

## 2022-10-07 NOTE — TELEPHONE ENCOUNTER
potassium chloride ER (KLOR-CON M)  Refill   Last prescribing provider: Dr Alvarez     Last clinic visit date: 8/23/22 DR Alvarez     Any missed appointments or no-shows since last clinic visit?: no     Recommendations for requested medication (if none, N/A): Copied from chart note 8/23/22 DR Alvarez     potassium chloride ER (KLOR-CON M) 20 MEQ CR tablet Take 2 tablets (40 mEq) by mouth 3 times daily 180 tablet 3      Next clinic visit date: 11/22/22 Hoda Mcintosh     Any other pertinent information (if none, N/A): N/A

## 2022-10-08 RX ORDER — POTASSIUM CHLORIDE 1500 MG/1
40 TABLET, EXTENDED RELEASE ORAL 3 TIMES DAILY
Qty: 540 TABLET | Refills: 1 | Status: SHIPPED | OUTPATIENT
Start: 2022-10-08 | End: 2023-08-21

## 2022-10-10 ENCOUNTER — TELEPHONE (OUTPATIENT)
Dept: FAMILY MEDICINE | Facility: CLINIC | Age: 77
End: 2022-10-10

## 2022-10-10 NOTE — TELEPHONE ENCOUNTER
Patient's pharmacy only has K chloride 20meq tablets; order is for the crystals. Pt okay with taking tablet form.     Ok for tablet form?    Pharmacy number: [phone]     Lizabeth Alfaro RN  [facility]

## 2022-10-10 NOTE — TELEPHONE ENCOUNTER
Spoke with pharmacist. Relayed provider's message as written.    Sandeep Lo RN  [facility]

## 2022-11-21 NOTE — PROGRESS NOTES
Oncology Follow Up:  Date on this visit: Nov 22, 2022    Diagnosis:  ER positive, HER-2 positive right breast cancer, clinical stage T2N0M0 s/p THP-ddAC, right breast mastectomy ([id number]), SLN biopsy, and 4 months adjuvant HP.  On endocrine therapy.    Primary Physician: Cole Weston     History Of Present Illness:  Ms. Zuleta is a 77 year old female with right breast cancer.  Routine screening mammogram in 07/2020 showed right breast asymmetry.  Diagnostic mammograms and ultrasound showed a 2.7 cm mass in the right breast at 12:00, 5 cm from the nipple with ductal extension including a 6 mm mass at 3 cm from the nipple.  Contrast enhanced mammogram showed the right breast mass, including extension, to measure 4.9 cm.  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  Estrogen receptor was moderate in 50% and progesterone receptor staining was negative.  HER-2 was negative by IHC; HER2 FISH showed approximately 10% of tumor cells to have 6.8 HER2 signals/nucleus and a HER2/CEN17 ratio of 1.6.  The HER2 positive cells were noted to be the large pleomorphic cells.  Multiple neutrophils were noted to be infiltrating through tumor stroma.    She received 12 weeks of neoadjuvant Taxol, Herceptin, and pertuzumab from 10/7/2020 - 12/22/2020.  She received 4 cycles of dose dense adriamycin and cyclophosphamide from 12/29/2020 - 2/9/2021.  She underwent right breast mastectomy and sentinel lymph node biopsy on 3/8/2021.  Pathology showed rare residual tumor cells with therapy related changes in the tumor bed.  Residual tumor is <1% of the tumor bed volume.  There was no lymphovascular invasion and surgical margins were negative.  A single sentinel lymph node was benign.  We discussed starting letrozole at our clinic visit on 3/30/2021, unfortunately she did not receive the prescription and so did not start it.  She was initiated on adjuvant HER2 targeted therapy  4/13/2021.  She received 4 cycles of adjuvant Phesgo, then discontinued due to diarrhea.  She was then on biosimilar trastuzumab (Trazimera) since 7/6/2021, however, continued to have diarrhea refractory to antidiarrheals, therefore all HER2 targeted therapy was abandoned on 8/3/2021. Of note, she received 7 months total HER2 treatment (3 months neoadjuvant + 4 months adjuvant).  She started letrozole on 8/3/2021.  She continued to have diarrhea, which was attributed to letrozole.  On 6/8/2022, treatment was changed to exemestane.    Interval History: Talya is feeling well overall. She had cataract surgery a few months ago on her L eye. Vision has improved since then. She is not sure if she needs to wear her contact in her L eye anymore so is going to follow-up with her eye doctor on this. She has not seen Dermatology again yet but has intermittent itching of bilateral axilla and then notes subsequent drainage. Then itching/tenderness improves. No recent fevers/chills or infectious. No persistent cough or SOB. She continues to smoke 3-4 cigarettes per day. She does want to continue to cut back. Appetite is a little better. Diarrhea is much better and occurring about 1-2x per week now. No new lumps/bumps. No new or different pain.        Past Medical/Surgical History:  1.  Breast cancer as per HPI  2.  Hypertension  3.  Diabetes  4.  COPD  5.  Hypothyroidism  6.  Hypohidrosis ectodermal dysplasia    Allergies:  Allergies as of 11/22/2022 - Reviewed 11/22/2022   Allergen Reaction Noted     Bacitracin-neomycin-polymyxin  04/18/2003     Doxycycline  09/20/2022     Latex  09/30/2005     Current Medications:  Current Outpatient Medications   Medication Sig Dispense Refill     amLODIPine (NORVASC) 5 MG tablet TAKE ONE TABLET BY MOUTH TWICE DAILY *NEED TO SCHEDULE WELLNESS EXAM 180 tablet 3     atorvastatin (LIPITOR) 20 MG tablet Take 1 tablet (20 mg) by mouth daily 90 tablet 3     benazepril (LOTENSIN) 20 MG tablet TAKE  ONE TABLET BY MOUTH TWICE DAILY *PLEASE SCHEDULE WELLNESS EXAM 180 tablet 3     exemestane (AROMASIN) 25 MG tablet Take 1 tablet (25 mg) by mouth daily 30 tablet 11     furosemide (LASIX) 80 MG tablet TAKE ONE TABLET BY MOUTH TWICE DAILY 180 tablet 3     potassium chloride ER (KLOR-CON M) 20 MEQ CR tablet Take 2 tablets (40 mEq) by mouth 3 times daily 540 tablet 1     triamcinolone (KENALOG) 0.1 % external cream APPLY TOPICALLY 2 TIMES DAILY AS NEEDED FOR IRRITATION 80 g 0     varenicline (CHANTIX) 1 MG tablet Take 1 tablet (1 mg) by mouth 2 times daily 180 tablet 1     Vitamin D, Cholecalciferol, 25 MCG (1000 UT) TABS Take 1,000 Units by mouth daily       zolpidem (AMBIEN) 5 MG tablet Take 1 tablet (5 mg) by mouth nightly as needed for sleep 30 tablet 1      Genetics Breast Actionable Panel on 10/1/2020 was negative for mutation.    Physical Exam:  /74   Pulse 75   Temp 98  F (36.7  C) (Oral)   Resp 16   Wt 74.4 kg (164 lb 0.4 oz)   LMP  (LMP Unknown)   SpO2 100%   BMI 27.29 kg/m    General:  Well appearing adult female in NAD.  Alert and oriented x 3  HEENT:  Normocephalic.  Sclera anicteric.  (+) alopecia, wearing a wig.  Lymph:  No palpable cervical, supraclavicular, or axillary LAD.  Chest:  CTA bilaterally.  No wheezes or crackles.  Left chest port-a-cath in place.  CV:  RRR.   Breast:  Right breast mastectomy.  There are no discretely palpable masses of either the right chest wall or within the left breast. Left nipple is everted.  Abd:  Soft/ND/NT +BS   Ext: Trace edema bilaterally   Neuro:  Cranial nerves grossly intact.  Psych:  Mood and affect appear normal.  Skin: Hyperpigmented, chronically inflamed skin with tracks in bilateral axilla. No signs of cellulitis or active drainage.     Laboratory/Imaging Studies   11/22/22 08:45   Sodium 139   Potassium 3.8   Chloride 102   Carbon Dioxide (CO2) 26   Urea Nitrogen 15.1   Creatinine 0.81   GFR Estimate 74   Calcium 8.8   Anion Gap 11   Albumin  3.8   Protein Total 6.9   Alkaline Phosphatase 60   ALT 6 (L)   AST 16   Bilirubin Total 0.3   Glucose 110 (H)   WBC 8.0   Hemoglobin 11.9   Hematocrit 35.6   Platelet Count 317   RBC Count 3.57 (L)      MCH 33.3 (H)   MCHC 33.4   RDW 15.1 (H)   % Neutrophils 78   % Lymphocytes 11   % Monocytes 8   % Eosinophils 2   % Basophils 1   Absolute Basophils 0.1   Absolute Eosinophils 0.1   Absolute Immature Granulocytes 0.0   Absolute Lymphocytes 0.9   Absolute Monocytes 0.6   % Immature Granulocytes 0   Absolute Neutrophils 6.3   Absolute NRBCs 0.0   NRBCs per 100 WBC 0       ASSESSMENT/PLAN:  77 year old female with a history of hypohidrosis ectodermal dysplasia, HTN, dyslipidemia, COPD, diabetes, and OA with a h/o T2N0M0, right breast cancer, ER positive (50%), CA negative, and HER2 positive.  She is s/p treatment with 12 weeks THP, 4 cycles ddAC, right breast mastectomy ([id number]), right axillary SLNBx, and 4 months adjuvant HER2 therapy (7 months total HER2 therapy), stopped due to diarrhea.  On endocrine therapy from 8/3/2021 - 07/2022.    1.  Right breast cancer:   Ms. Zuleta is 1 year, 8.5 months out from excision of a right breast cancer.  She did not tolerate either letrozole or exemestane due to profuse diarrhea on both medications. Next recommended treatment is tamoxifen but she is an active smoker so we cannot start tamoxifen until she stops smoking. She is motivated to do this. Will follow-up again at next visit. Ideally, would like to complete a total of 5 years of endocrine therapy.    I have no concerns for disease recurrence on H&P today. Follow-up in 3 months with Dr. Alvarez. Mammogram UTD from August.     2.  Diarrhea: Much improved and only occurring 1-2x per week.     3.  Hypohidrotic ectodermal dysplasia: Largely involving the bilateral axillae with a small patch on the left lower lateral chest wall.  She is using triamcinolone ointment. Encouraged follow-up with Dermatology.     4.   Chemotherapy induced peripheral neuropathy/balance issues: I reviewed again neuropathy is likely permanent now. Declined PT/OT.     5.  Bone Health/Osteopenia:  DEXA 4/13/2021 with a lowest T-score of -1.2 in the right femoral neck c/w osteopenia.  Zometa 4 mg IV every 6 months x 3 years for prevention of breast cancer bone metastases was initiated on 5/23/2022.  Next dose will be given today.     6.  At risk for cardiomyopathy:  2/2 h/o anthracycline and HER2 targeted therapy as well as personal history of hypertension, hyperlipidemia, and diabetes.  Post-treatment echocardiogram performed showed a retained LVEF. Recommend strict management of cardiac risk factors under the guidance of her PCP.    7. Tobacco cessation: Trial of nicorette lozenges PRN. She will continue to work on cutting back.     8. RHM: COVID bivalent booster today.     Hoda Mcintosh PA-C

## 2022-11-22 ENCOUNTER — INFUSION THERAPY VISIT (OUTPATIENT)
Dept: ONCOLOGY | Facility: CLINIC | Age: 77
End: 2022-11-22
Attending: PHYSICIAN ASSISTANT
Payer: COMMERCIAL

## 2022-11-22 ENCOUNTER — APPOINTMENT (OUTPATIENT)
Dept: LAB | Facility: CLINIC | Age: 77
End: 2022-11-22
Attending: PHYSICIAN ASSISTANT
Payer: COMMERCIAL

## 2022-11-22 VITALS
OXYGEN SATURATION: 100 % | WEIGHT: 164.02 LBS | BODY MASS INDEX: 27.29 KG/M2 | DIASTOLIC BLOOD PRESSURE: 74 MMHG | SYSTOLIC BLOOD PRESSURE: 117 MMHG | TEMPERATURE: 98 F | HEART RATE: 75 BPM | RESPIRATION RATE: 16 BRPM

## 2022-11-22 DIAGNOSIS — Z71.6 ENCOUNTER FOR TOBACCO USE CESSATION COUNSELING: Primary | ICD-10-CM

## 2022-11-22 DIAGNOSIS — C50.811 MALIGNANT NEOPLASM OF OVERLAPPING SITES OF RIGHT BREAST IN FEMALE, ESTROGEN RECEPTOR POSITIVE (H): ICD-10-CM

## 2022-11-22 DIAGNOSIS — Z92.21 STATUS POST CHEMOTHERAPY: ICD-10-CM

## 2022-11-22 DIAGNOSIS — T50.905A ADVERSE EFFECT OF DRUG, INITIAL ENCOUNTER: ICD-10-CM

## 2022-11-22 DIAGNOSIS — M85.851 OSTEOPENIA OF NECK OF RIGHT FEMUR: Primary | ICD-10-CM

## 2022-11-22 DIAGNOSIS — Z17.0 MALIGNANT NEOPLASM OF OVERLAPPING SITES OF RIGHT BREAST IN FEMALE, ESTROGEN RECEPTOR POSITIVE (H): ICD-10-CM

## 2022-11-22 DIAGNOSIS — Z79.811 LONG TERM (CURRENT) USE OF AROMATASE INHIBITORS: ICD-10-CM

## 2022-11-22 LAB
ALBUMIN SERPL BCG-MCNC: 3.8 G/DL (ref 3.5–5.2)
ALP SERPL-CCNC: 60 U/L (ref 35–104)
ALT SERPL W P-5'-P-CCNC: 6 U/L (ref 10–35)
ANION GAP SERPL CALCULATED.3IONS-SCNC: 11 MMOL/L (ref 7–15)
AST SERPL W P-5'-P-CCNC: 16 U/L (ref 10–35)
BASOPHILS # BLD AUTO: 0.1 10E3/UL (ref 0–0.2)
BASOPHILS NFR BLD AUTO: 1 %
BILIRUB SERPL-MCNC: 0.3 MG/DL
BUN SERPL-MCNC: 15.1 MG/DL (ref 8–23)
CALCIUM SERPL-MCNC: 8.8 MG/DL (ref 8.8–10.2)
CHLORIDE SERPL-SCNC: 102 MMOL/L (ref 98–107)
CREAT SERPL-MCNC: 0.81 MG/DL (ref 0.51–0.95)
DEPRECATED HCO3 PLAS-SCNC: 26 MMOL/L (ref 22–29)
EOSINOPHIL # BLD AUTO: 0.1 10E3/UL (ref 0–0.7)
EOSINOPHIL NFR BLD AUTO: 2 %
ERYTHROCYTE [DISTWIDTH] IN BLOOD BY AUTOMATED COUNT: 15.1 % (ref 10–15)
GFR SERPL CREATININE-BSD FRML MDRD: 74 ML/MIN/1.73M2
GLUCOSE SERPL-MCNC: 110 MG/DL (ref 70–99)
HCT VFR BLD AUTO: 35.6 % (ref 35–47)
HGB BLD-MCNC: 11.9 G/DL (ref 11.7–15.7)
IMM GRANULOCYTES # BLD: 0 10E3/UL
IMM GRANULOCYTES NFR BLD: 0 %
LYMPHOCYTES # BLD AUTO: 0.9 10E3/UL (ref 0.8–5.3)
LYMPHOCYTES NFR BLD AUTO: 11 %
MCH RBC QN AUTO: 33.3 PG (ref 26.5–33)
MCHC RBC AUTO-ENTMCNC: 33.4 G/DL (ref 31.5–36.5)
MCV RBC AUTO: 100 FL (ref 78–100)
MONOCYTES # BLD AUTO: 0.6 10E3/UL (ref 0–1.3)
MONOCYTES NFR BLD AUTO: 8 %
NEUTROPHILS # BLD AUTO: 6.3 10E3/UL (ref 1.6–8.3)
NEUTROPHILS NFR BLD AUTO: 78 %
NRBC # BLD AUTO: 0 10E3/UL
NRBC BLD AUTO-RTO: 0 /100
PLATELET # BLD AUTO: 317 10E3/UL (ref 150–450)
POTASSIUM SERPL-SCNC: 3.8 MMOL/L (ref 3.4–5.3)
PROT SERPL-MCNC: 6.9 G/DL (ref 6.4–8.3)
RBC # BLD AUTO: 3.57 10E6/UL (ref 3.8–5.2)
SODIUM SERPL-SCNC: 139 MMOL/L (ref 136–145)
WBC # BLD AUTO: 8 10E3/UL (ref 4–11)

## 2022-11-22 PROCEDURE — 0124A HC ADMIN COVID VAC PFIZER 12+ BIVAL ADDITIONAL: CPT | Performed by: PHYSICIAN ASSISTANT

## 2022-11-22 PROCEDURE — 82040 ASSAY OF SERUM ALBUMIN: CPT

## 2022-11-22 PROCEDURE — 85025 COMPLETE CBC W/AUTO DIFF WBC: CPT

## 2022-11-22 PROCEDURE — 36591 DRAW BLOOD OFF VENOUS DEVICE: CPT

## 2022-11-22 PROCEDURE — 258N000003 HC RX IP 258 OP 636: Performed by: INTERNAL MEDICINE

## 2022-11-22 PROCEDURE — 250N000011 HC RX IP 250 OP 636: Performed by: INTERNAL MEDICINE

## 2022-11-22 PROCEDURE — 99214 OFFICE O/P EST MOD 30 MIN: CPT | Performed by: PHYSICIAN ASSISTANT

## 2022-11-22 PROCEDURE — G0463 HOSPITAL OUTPT CLINIC VISIT: HCPCS

## 2022-11-22 PROCEDURE — 250N000011 HC RX IP 250 OP 636: Performed by: PHYSICIAN ASSISTANT

## 2022-11-22 PROCEDURE — 80053 COMPREHEN METABOLIC PANEL: CPT

## 2022-11-22 PROCEDURE — 96365 THER/PROPH/DIAG IV INF INIT: CPT

## 2022-11-22 PROCEDURE — 91312 HC RX IP 250 OP 636: CPT | Performed by: PHYSICIAN ASSISTANT

## 2022-11-22 RX ORDER — HEPARIN SODIUM (PORCINE) LOCK FLUSH IV SOLN 100 UNIT/ML 100 UNIT/ML
5 SOLUTION INTRAVENOUS
Status: DISCONTINUED | OUTPATIENT
Start: 2022-11-22 | End: 2022-11-22 | Stop reason: HOSPADM

## 2022-11-22 RX ORDER — ZOLEDRONIC ACID 0.04 MG/ML
4 INJECTION, SOLUTION INTRAVENOUS ONCE
Status: COMPLETED | OUTPATIENT
Start: 2022-11-22 | End: 2022-11-22

## 2022-11-22 RX ORDER — POLYETHYLENE GLYCOL 3350 17 G
2 POWDER IN PACKET (EA) ORAL
Qty: 90 LOZENGE | Refills: 1 | Status: SHIPPED | OUTPATIENT
Start: 2022-11-22 | End: 2023-08-28

## 2022-11-22 RX ADMIN — Medication 5 ML: at 10:34

## 2022-11-22 RX ADMIN — Medication 5 ML: at 08:35

## 2022-11-22 RX ADMIN — SODIUM CHLORIDE 250 ML: 9 INJECTION, SOLUTION INTRAVENOUS at 10:13

## 2022-11-22 RX ADMIN — BNT162B2 ORIGINAL AND OMICRON BA.4/BA.5 30 MCG: .1125; .1125 INJECTION, SUSPENSION INTRAMUSCULAR at 10:24

## 2022-11-22 RX ADMIN — ZOLEDRONIC ACID 4 MG: 0.04 INJECTION, SOLUTION INTRAVENOUS at 10:13

## 2022-11-22 ASSESSMENT — PAIN SCALES - GENERAL: PAINLEVEL: NO PAIN (0)

## 2022-11-22 NOTE — PROGRESS NOTES
Infusion Nursing Note:  Talya Zuleta presents today for zometa, COVID booster.    Patient seen by provider today: Yes: RICHARD Gomes   present during visit today: Not Applicable.    Note: N/A.    Intravenous Access:  Implanted Port.    Treatment Conditions:  Lab Results   Component Value Date    HGB 11.9 11/22/2022    WBC 8.0 11/22/2022    ANEU 3.7 07/06/2021    ANEUTAUTO 6.3 11/22/2022     11/22/2022      Lab Results   Component Value Date     11/22/2022    POTASSIUM 3.8 11/22/2022    MAG 2.3 07/06/2021    CR 0.81 11/22/2022    JIMBO 8.8 11/22/2022    BILITOTAL 0.3 11/22/2022    ALBUMIN 3.8 11/22/2022    ALT 6 (L) 11/22/2022    AST 16 11/22/2022     Results reviewed, labs MET treatment parameters, ok to proceed with treatment.    Post Infusion Assessment:  Patient tolerated infusion without incident.  Patient tolerated injection without incident. Covid vaccine administered by Evelin Cheung,EMT  Patient observed for 15  minutes post vaccination per protocol.  Blood return noted pre and post infusion.  Site patent and intact, free from redness, edema or discomfort.  No evidence of extravasations.  Access discontinued per protocol.     Discharge Plan:   Patient declined prescription refills.  Discharge instructions reviewed with: Patient.  Patient and/or family verbalized understanding of discharge instructions and all questions answered.  AVS to patient via [employer].  Patient will return 2/21/22 for next appointment.   Patient discharged in stable condition accompanied by: self.  Departure Mode: Ambulatory.    Bruna Daily, SONIA

## 2022-11-22 NOTE — NURSING NOTE
"Oncology Rooming Note    November 22, 2022 9:04 AM   Talya Zuleta is a 77 year old female who presents for:    Chief Complaint   Patient presents with     Port Draw     Labs drawn by RN in Lab from Left Chest Port-a-Cath. Line flushed with Saline and Heparin.      Oncology Clinic Visit     Malignant neoplasm of overlapping sites of right breast in female     Initial Vitals: /74   Pulse 75   Temp 98  F (36.7  C) (Oral)   Resp 16   Wt 74.4 kg (164 lb 0.4 oz)   LMP  (LMP Unknown)   SpO2 100%   BMI 27.29 kg/m   Estimated body mass index is 27.29 kg/m  as calculated from the following:    Height as of 9/2/22: 1.651 m (5' 5\").    Weight as of this encounter: 74.4 kg (164 lb 0.4 oz). Body surface area is 1.85 meters squared.  No Pain (0) Comment: Data Unavailable   No LMP recorded (lmp unknown). Patient is postmenopausal.  Allergies reviewed: Yes  Medications reviewed: Yes    Medications: MEDICATION REFILLS NEEDED TODAY. Provider was notified.  Pharmacy name entered into EPIC:    [facility] - [place], [place] - [address]  [id number] - [place], [place] - [address] AT [address] [facility]    Clinical concerns: Needs refill for sleeping pills.       Taiwo Mahoney            "

## 2022-11-22 NOTE — PATIENT INSTRUCTIONS
[facility] Triage and after hours / weekends / holidays:  [phone]    Please call the triage or after hours line if you experience a temperature greater than or equal to 100.4, shaking chills, have uncontrolled nausea, vomiting and/or diarrhea, dizziness, shortness of breath, chest pain, bleeding, unexplained bruising, or if you have any other new/concerning symptoms, questions or concerns.      If you are having any concerning symptoms or wish to speak to a provider before your next infusion visit, please call your care coordinator or triage to notify them so we can adequately serve you.     If you need a refill on a narcotic prescription or other medication, please call before your infusion appointment.               November 2022 Sunday Monday Tuesday Wednesday Thursday Friday Saturday             1     2     3     4     5       6     7     8     9     10     11     12       13     14     15     16     17     18     19       20     21     22    LAB CENTRAL   8:30 AM   (15 min.)   [facility] LAB DRAW   [facility]    RETURN   8:45 AM   (45 min.)   Hoda Mcintosh PA-C   [facility]    ONC INFUSION 0.5 HR (30 MIN)  10:00 AM   (30 min.)    ONC INFUSION NURSE   [facility] 23     24     25     26       27     28     29     30                                  December 2022 Sunday Monday Tuesday Wednesday Thursday Friday Saturday                       1     2     3       4     5     6     7     8     9     10       11     12     13     14     15     16     17       18     19     20     21     22     23     24       25     26     27     28     29     30     31                        Recent Results (from the past 24 hour(s))   Comprehensive metabolic panel    Collection Time: 11/22/22  8:45 AM   Result Value Ref Range    Sodium 139 136 - 145 mmol/L    Potassium 3.8 3.4 - 5.3 mmol/L    Chloride 102 98 - 107 mmol/L     Carbon Dioxide (CO2) 26 22 - 29 mmol/L    Anion Gap 11 7 - 15 mmol/L    Urea Nitrogen 15.1 8.0 - 23.0 mg/dL    Creatinine 0.81 0.51 - 0.95 mg/dL    Calcium 8.8 8.8 - 10.2 mg/dL    Glucose 110 (H) 70 - 99 mg/dL    Alkaline Phosphatase 60 35 - 104 U/L    AST 16 10 - 35 U/L    ALT 6 (L) 10 - 35 U/L    Protein Total 6.9 6.4 - 8.3 g/dL    Albumin 3.8 3.5 - 5.2 g/dL    Bilirubin Total 0.3 <=1.2 mg/dL    GFR Estimate 74 >60 mL/min/1.73m2   CBC with platelets and differential    Collection Time: 11/22/22  8:45 AM   Result Value Ref Range    WBC Count 8.0 4.0 - 11.0 10e3/uL    RBC Count 3.57 (L) 3.80 - 5.20 10e6/uL    Hemoglobin 11.9 11.7 - 15.7 g/dL    Hematocrit 35.6 35.0 - 47.0 %     78 - 100 fL    MCH 33.3 (H) 26.5 - 33.0 pg    MCHC 33.4 31.5 - 36.5 g/dL    RDW 15.1 (H) 10.0 - 15.0 %    Platelet Count 317 150 - 450 10e3/uL    % Neutrophils 78 %    % Lymphocytes 11 %    % Monocytes 8 %    % Eosinophils 2 %    % Basophils 1 %    % Immature Granulocytes 0 %    NRBCs per 100 WBC 0 <1 /100    Absolute Neutrophils 6.3 1.6 - 8.3 10e3/uL    Absolute Lymphocytes 0.9 0.8 - 5.3 10e3/uL    Absolute Monocytes 0.6 0.0 - 1.3 10e3/uL    Absolute Eosinophils 0.1 0.0 - 0.7 10e3/uL    Absolute Basophils 0.1 0.0 - 0.2 10e3/uL    Absolute Immature Granulocytes 0.0 <=0.4 10e3/uL    Absolute NRBCs 0.0 10e3/uL

## 2022-11-22 NOTE — LETTER
11/22/2022         RE: Talya Zuleta  [address] [facility] [place] [address]      Oncology Follow Up:  Date on this visit: Nov 22, 2022    Diagnosis:  ER positive, HER-2 positive right breast cancer, clinical stage T2N0M0 s/p THP-ddAC, right breast mastectomy ([id number]), SLN biopsy, and 4 months adjuvant HP.  On endocrine therapy.    Primary Physician: Cole Weston     History Of Present Illness:  Ms. Zuleta is a 77 year old female with right breast cancer.  Routine screening mammogram in 07/2020 showed right breast asymmetry.  Diagnostic mammograms and ultrasound showed a 2.7 cm mass in the right breast at 12:00, 5 cm from the nipple with ductal extension including a 6 mm mass at 3 cm from the nipple.  Contrast enhanced mammogram showed the right breast mass, including extension, to measure 4.9 cm.  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  Estrogen receptor was moderate in 50% and progesterone receptor staining was negative.  HER-2 was negative by IHC; HER2 FISH showed approximately 10% of tumor cells to have 6.8 HER2 signals/nucleus and a HER2/CEN17 ratio of 1.6.  The HER2 positive cells were noted to be the large pleomorphic cells.  Multiple neutrophils were noted to be infiltrating through tumor stroma.    She received 12 weeks of neoadjuvant Taxol, Herceptin, and pertuzumab from 10/7/2020 - 12/22/2020.  She received 4 cycles of dose dense adriamycin and cyclophosphamide from 12/29/2020 - 2/9/2021.  She underwent right breast mastectomy and sentinel lymph node biopsy on 3/8/2021.  Pathology showed rare residual tumor cells with therapy related changes in the tumor bed.  Residual tumor is <1% of the tumor bed volume.  There was no lymphovascular invasion and surgical margins were negative.  A single sentinel lymph node was benign.  We discussed starting letrozole at our clinic visit on 3/30/2021, unfortunately she did not  receive the prescription and so did not start it.  She was initiated on adjuvant HER2 targeted therapy 4/13/2021.  She received 4 cycles of adjuvant Phesgo, then discontinued due to diarrhea.  She was then on biosimilar trastuzumab (Trazimera) since 7/6/2021, however, continued to have diarrhea refractory to antidiarrheals, therefore all HER2 targeted therapy was abandoned on 8/3/2021. Of note, she received 7 months total HER2 treatment (3 months neoadjuvant + 4 months adjuvant).  She started letrozole on 8/3/2021.  She continued to have diarrhea, which was attributed to letrozole.  On 6/8/2022, treatment was changed to exemestane.    Interval History: Talya is feeling well overall. She had cataract surgery a few months ago on her L eye. Vision has improved since then. She is not sure if she needs to wear her contact in her L eye anymore so is going to follow-up with her eye doctor on this. She has not seen Dermatology again yet but has intermittent itching of bilateral axilla and then notes subsequent drainage. Then itching/tenderness improves. No recent fevers/chills or infectious. No persistent cough or SOB. She continues to smoke 3-4 cigarettes per day. She does want to continue to cut back. Appetite is a little better. Diarrhea is much better and occurring about 1-2x per week now. No new lumps/bumps. No new or different pain.        Past Medical/Surgical History:  1.  Breast cancer as per HPI  2.  Hypertension  3.  Diabetes  4.  COPD  5.  Hypothyroidism  6.  Hypohidrosis ectodermal dysplasia    Allergies:  Allergies as of 11/22/2022 - Reviewed 11/22/2022   Allergen Reaction Noted     Bacitracin-neomycin-polymyxin  04/18/2003     Doxycycline  09/20/2022     Latex  09/30/2005     Current Medications:  Current Outpatient Medications   Medication Sig Dispense Refill     amLODIPine (NORVASC) 5 MG tablet TAKE ONE TABLET BY MOUTH TWICE DAILY *NEED TO SCHEDULE WELLNESS EXAM 180 tablet 3     atorvastatin (LIPITOR) 20  MG tablet Take 1 tablet (20 mg) by mouth daily 90 tablet 3     benazepril (LOTENSIN) 20 MG tablet TAKE ONE TABLET BY MOUTH TWICE DAILY *PLEASE SCHEDULE WELLNESS EXAM 180 tablet 3     exemestane (AROMASIN) 25 MG tablet Take 1 tablet (25 mg) by mouth daily 30 tablet 11     furosemide (LASIX) 80 MG tablet TAKE ONE TABLET BY MOUTH TWICE DAILY 180 tablet 3     potassium chloride ER (KLOR-CON M) 20 MEQ CR tablet Take 2 tablets (40 mEq) by mouth 3 times daily 540 tablet 1     triamcinolone (KENALOG) 0.1 % external cream APPLY TOPICALLY 2 TIMES DAILY AS NEEDED FOR IRRITATION 80 g 0     varenicline (CHANTIX) 1 MG tablet Take 1 tablet (1 mg) by mouth 2 times daily 180 tablet 1     Vitamin D, Cholecalciferol, 25 MCG (1000 UT) TABS Take 1,000 Units by mouth daily       zolpidem (AMBIEN) 5 MG tablet Take 1 tablet (5 mg) by mouth nightly as needed for sleep 30 tablet 1      Genetics Breast Actionable Panel on 10/1/2020 was negative for mutation.    Physical Exam:  /74   Pulse 75   Temp 98  F (36.7  C) (Oral)   Resp 16   Wt 74.4 kg (164 lb 0.4 oz)   LMP  (LMP Unknown)   SpO2 100%   BMI 27.29 kg/m    General:  Well appearing adult female in NAD.  Alert and oriented x 3  HEENT:  Normocephalic.  Sclera anicteric.  (+) alopecia, wearing a wig.  Lymph:  No palpable cervical, supraclavicular, or axillary LAD.  Chest:  CTA bilaterally.  No wheezes or crackles.  Left chest port-a-cath in place.  CV:  RRR.   Breast:  Right breast mastectomy.  There are no discretely palpable masses of either the right chest wall or within the left breast. Left nipple is everted.  Abd:  Soft/ND/NT +BS   Ext: Trace edema bilaterally   Neuro:  Cranial nerves grossly intact.  Psych:  Mood and affect appear normal.  Skin: Hyperpigmented, chronically inflamed skin with tracks in bilateral axilla. No signs of cellulitis or active drainage.     Laboratory/Imaging Studies   11/22/22 08:45   Sodium 139   Potassium 3.8   Chloride 102   Carbon Dioxide  (CO2) 26   Urea Nitrogen 15.1   Creatinine 0.81   GFR Estimate 74   Calcium 8.8   Anion Gap 11   Albumin 3.8   Protein Total 6.9   Alkaline Phosphatase 60   ALT 6 (L)   AST 16   Bilirubin Total 0.3   Glucose 110 (H)   WBC 8.0   Hemoglobin 11.9   Hematocrit 35.6   Platelet Count 317   RBC Count 3.57 (L)      MCH 33.3 (H)   MCHC 33.4   RDW 15.1 (H)   % Neutrophils 78   % Lymphocytes 11   % Monocytes 8   % Eosinophils 2   % Basophils 1   Absolute Basophils 0.1   Absolute Eosinophils 0.1   Absolute Immature Granulocytes 0.0   Absolute Lymphocytes 0.9   Absolute Monocytes 0.6   % Immature Granulocytes 0   Absolute Neutrophils 6.3   Absolute NRBCs 0.0   NRBCs per 100 WBC 0       ASSESSMENT/PLAN:  77 year old female with a history of hypohidrosis ectodermal dysplasia, HTN, dyslipidemia, COPD, diabetes, and OA with a h/o T2N0M0, right breast cancer, ER positive (50%), ND negative, and HER2 positive.  She is s/p treatment with 12 weeks THP, 4 cycles ddAC, right breast mastectomy ([id number]), right axillary SLNBx, and 4 months adjuvant HER2 therapy (7 months total HER2 therapy), stopped due to diarrhea.  On endocrine therapy from 8/3/2021 - 07/2022.    1.  Right breast cancer:   Ms. Zuleta is 1 year, 8.5 months out from excision of a right breast cancer.  She did not tolerate either letrozole or exemestane due to profuse diarrhea on both medications. Next recommended treatment is tamoxifen but she is an active smoker so we cannot start tamoxifen until she stops smoking. She is motivated to do this. Will follow-up again at next visit. Ideally, would like to complete a total of 5 years of endocrine therapy.    I have no concerns for disease recurrence on H&P today. Follow-up in 3 months with Dr. Alvarez. Mammogram UTD from August.     2.  Diarrhea: Much improved and only occurring 1-2x per week.     3.  Hypohidrotic ectodermal dysplasia: Largely involving the bilateral axillae with a small patch on the left lower  lateral chest wall.  She is using triamcinolone ointment. Encouraged follow-up with Dermatology.     4.  Chemotherapy induced peripheral neuropathy/balance issues: I reviewed again neuropathy is likely permanent now. Declined PT/OT.     5.  Bone Health/Osteopenia:  DEXA 4/13/2021 with a lowest T-score of -1.2 in the right femoral neck c/w osteopenia.  Zometa 4 mg IV every 6 months x 3 years for prevention of breast cancer bone metastases was initiated on 5/23/2022.  Next dose will be given today.     6.  At risk for cardiomyopathy:  2/2 h/o anthracycline and HER2 targeted therapy as well as personal history of hypertension, hyperlipidemia, and diabetes.  Post-treatment echocardiogram performed showed a retained LVEF. Recommend strict management of cardiac risk factors under the guidance of her PCP.    7. Tobacco cessation: Trial of nicorette lozenges PRN. She will continue to work on cutting back.     8. RHM: COVID bivalent booster today.     CHERELLE Gomes PA-C

## 2022-11-22 NOTE — NURSING NOTE
Pt with numbness in her feet which affects her gait at times. This RN encouraged Pt to be using her cane.     Chief Complaint   Patient presents with     Port Draw     Labs drawn by RN in Lab from Left Chest Port-a-Cath. Line flushed with Saline and Heparin.      Debby Dempsey RN

## 2023-02-21 DIAGNOSIS — F19.982 DRUG-INDUCED INSOMNIA (H): ICD-10-CM

## 2023-02-21 RX ORDER — ZOLPIDEM TARTRATE 5 MG/1
5 TABLET ORAL
Qty: 30 TABLET | OUTPATIENT
Start: 2023-02-21

## 2023-03-31 ENCOUNTER — TELEPHONE (OUTPATIENT)
Dept: FAMILY MEDICINE | Facility: CLINIC | Age: 78
End: 2023-03-31

## 2023-03-31 NOTE — TELEPHONE ENCOUNTER
Oncology RNs/Team,    Patient called requesting appt with RN for a port flush. We do not have protocol in place at primary care to do this.      Patient can be reached at , ok for detailed vm.     Thanks,  SONIA Avendaño  [facility]

## 2023-04-03 ENCOUNTER — LAB (OUTPATIENT)
Dept: LAB | Facility: CLINIC | Age: 78
End: 2023-04-03
Attending: INTERNAL MEDICINE
Payer: COMMERCIAL

## 2023-04-03 PROCEDURE — 250N000011 HC RX IP 250 OP 636: Performed by: INTERNAL MEDICINE

## 2023-04-03 PROCEDURE — 96523 IRRIG DRUG DELIVERY DEVICE: CPT

## 2023-04-03 RX ORDER — HEPARIN SODIUM (PORCINE) LOCK FLUSH IV SOLN 100 UNIT/ML 100 UNIT/ML
5 SOLUTION INTRAVENOUS ONCE
Status: COMPLETED | OUTPATIENT
Start: 2023-04-03 | End: 2023-04-03

## 2023-04-03 RX ADMIN — Medication 5 ML: at 12:12

## 2023-04-03 NOTE — NURSING NOTE
"Chief Complaint   Patient presents with     Port Flush     Port flushed with saline and heparin by RN.      Port accessed with 20G 3/4\" power needle. Flushed with NS and heparin. De-accessed. Pt tolerated well.     Ruba Mahoney RN  "

## 2023-04-23 ENCOUNTER — HEALTH MAINTENANCE LETTER (OUTPATIENT)
Age: 78
End: 2023-04-23

## 2023-05-15 NOTE — PROGRESS NOTES
Oncology Follow Up:  Date on this visit: May 16, 2023    Diagnosis:  ER positive, HER-2 positive right breast cancer, clinical stage T2N0M0 s/p THP-ddAC, right breast mastectomy ([id number]), SLN biopsy, and 4 months adjuvant HP.  On endocrine therapy.    Primary Physician: Darrell Douglas    History Of Present Illness:  Ms. Zuleta is a 78 year old female with right breast cancer.  Routine screening mammogram in 07/2020 showed right breast asymmetry.  Diagnostic mammograms and ultrasound showed a 2.7 cm mass in the right breast at 12:00, 5 cm from the nipple with ductal extension including a 6 mm mass at 3 cm from the nipple.  Contrast enhanced mammogram showed the right breast mass, including extension, to measure 4.9 cm.  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  Estrogen receptor was moderate in 50% and progesterone receptor staining was negative.  HER-2 was negative by IHC; HER2 FISH showed approximately 10% of tumor cells to have 6.8 HER2 signals/nucleus and a HER2/CEN17 ratio of 1.6.  The HER2 positive cells were noted to be the large pleomorphic cells.  Multiple neutrophils were noted to be infiltrating through tumor stroma.    She received 12 weeks of neoadjuvant Taxol, Herceptin, and pertuzumab from 10/7/2020 - 12/22/2020.  She received 4 cycles of dose dense adriamycin and cyclophosphamide from 12/29/2020 - 2/9/2021.  She underwent right breast mastectomy and sentinel lymph node biopsy on 3/8/2021.  Pathology showed rare residual tumor cells with therapy related changes in the tumor bed.  Residual tumor is <1% of the tumor bed volume.  There was no lymphovascular invasion and surgical margins were negative.  A single sentinel lymph node was benign.  We discussed starting letrozole at our clinic visit on 3/30/2021, unfortunately she did not receive the prescription and so did not start it.  She was initiated on adjuvant HER2 targeted therapy  4/13/2021.  She received 4 cycles of adjuvant Phesgo, then discontinued due to diarrhea.  She was then on biosimilar trastuzumab (Trazimera) since 7/6/2021, however, continued to have diarrhea refractory to antidiarrheals, therefore all HER2 targeted therapy was abandoned on 8/3/2021. Of note, she received 7 months total HER2 treatment (3 months neoadjuvant + 4 months adjuvant).  She started letrozole on 8/3/2021.  She continued to have diarrhea, which was attributed to letrozole.  On 6/8/2022, treatment was changed to exemestane. This was stopped after a month due to profuse diarrhea again. She has been off of endocrine therapy since that time.     Interval History:  Talya is here in routine follow-up. She missed follow-up in February. She has been doing well but has been under some stress with a few family members going through cancer. Her brother who she is very close to is undergoing treatment for mesothelioma.     She notes her prior diarrhea continues to be much improved. She on average has 2 looser stools per day. No abdominal pain or nausea. Her appetite is fair. She has been maintaining her weight.     She had stopped smoking a few months ago but resumed when her family members were diagnosed with cancer. She is currently smoking 3-4 cigarettes daily.     No fevers/chills or recent infections. No change to breathing. No lumps/bumps. No new or different pain.       Past Medical/Surgical History:  1.  Breast cancer as per HPI  2.  Hypertension  3.  Diabetes, diet controlled   4.  Hypothyroidism  5.  Hypohidrosis ectodermal dysplasia    Allergies:  Allergies as of 05/16/2023 - Reviewed 05/16/2023   Allergen Reaction Noted     Bacitracin-neomycin-polymyxin  04/18/2003     Doxycycline  09/20/2022     Latex  09/30/2005     Current Medications:  Current Outpatient Medications   Medication Sig Dispense Refill     amLODIPine (NORVASC) 5 MG tablet TAKE ONE TABLET BY MOUTH TWICE DAILY *NEED TO SCHEDULE WELLNESS EXAM  180 tablet 3     benazepril (LOTENSIN) 20 MG tablet TAKE ONE TABLET BY MOUTH TWICE DAILY *PLEASE SCHEDULE WELLNESS EXAM 180 tablet 3     furosemide (LASIX) 80 MG tablet TAKE ONE TABLET BY MOUTH TWICE DAILY 180 tablet 3     potassium chloride ER (KLOR-CON M) 20 MEQ CR tablet Take 2 tablets (40 mEq) by mouth 3 times daily 540 tablet 1     Vitamin D, Cholecalciferol, 25 MCG (1000 UT) TABS Take 1,000 Units by mouth daily       zolpidem (AMBIEN) 5 MG tablet Take 1 tablet (5 mg) by mouth nightly as needed for sleep 30 tablet 1     atorvastatin (LIPITOR) 20 MG tablet Take 1 tablet (20 mg) by mouth daily (Patient not taking: Reported on 5/16/2023) 90 tablet 3     exemestane (AROMASIN) 25 MG tablet Take 1 tablet (25 mg) by mouth daily (Patient not taking: Reported on 5/16/2023) 30 tablet 11     nicotine (COMMIT) 2 MG lozenge Place 1 lozenge (2 mg) inside cheek every hour as needed for smoking cessation (Patient not taking: Reported on 5/16/2023) 90 lozenge 1     triamcinolone (KENALOG) 0.1 % external cream APPLY TOPICALLY 2 TIMES DAILY AS NEEDED FOR IRRITATION (Patient not taking: Reported on 5/16/2023) 80 g 0     varenicline (CHANTIX) 1 MG tablet Take 1 tablet (1 mg) by mouth 2 times daily (Patient not taking: Reported on 5/16/2023) 180 tablet 1      Genetics Breast Actionable Panel on 10/1/2020 was negative for mutation.    Physical Exam:  BP (!) 163/81   Pulse 83   Temp 97  F (36.1  C) (Oral)   Resp 16   Wt 71.7 kg (158 lb)   LMP  (LMP Unknown)   SpO2 96%   BMI 26.29 kg/m     Wt Readings from Last 4 Encounters:   05/16/23 71.7 kg (158 lb)   11/22/22 74.4 kg (164 lb 0.4 oz)   09/20/22 71.8 kg (158 lb 4 oz)   09/02/22 72.1 kg (159 lb)   General:  Well appearing adult female in NAD.  Alert and oriented x 3  HEENT:  Normocephalic.  Sclera anicteric.  (+) alopecia, wearing a wig.  Lymph:  No palpable cervical, supraclavicular, or axillary LAD.  Chest:  CTA bilaterally.  No wheezes or crackles.  Left chest port-a-cath in  place.  CV:  RRR.   Breast:  Right breast mastectomy.  There are no discretely palpable masses of either the right chest wall or within the left breast. Left nipple is everted.  Abd:  Soft/ND/NT +BS   Ext: Trace edema bilaterally   Neuro:  Cranial nerves grossly intact.  Psych:  Mood and affect appear normal.  Skin: Hyperpigmented, chronically inflamed skin with tracks in bilateral axilla. No signs of cellulitis or active drainage.     Laboratory/Imaging Studies   05/16/23 10:01   Creatinine 0.73   GFR Estimate 83   Calcium 9.3   Albumin 4.2   WBC 5.9   Hemoglobin 14.5   Hematocrit 42.2   Platelet Count 234   RBC Count 4.17    (H)   MCH 34.8 (H)   MCHC 34.4   RDW 14.2   % Neutrophils 71   % Lymphocytes 20   % Monocytes 7   % Eosinophils 1   % Basophils 1   Absolute Basophils 0.1   Absolute Eosinophils 0.1   Absolute Immature Granulocytes 0.0   Absolute Lymphocytes 1.2   Absolute Monocytes 0.4   % Immature Granulocytes 0   Absolute Neutrophils 4.2   Absolute NRBCs 0.0   NRBCs per 100 WBC 0       ASSESSMENT/PLAN:  77 year old female with a history of hypohidrosis ectodermal dysplasia, HTN, dyslipidemia, COPD, diabetes, and OA with a h/o T2N0M0, right breast cancer, ER positive (50%), MA negative, and HER2 positive.  She is s/p treatment with 12 weeks THP, 4 cycles ddAC, right breast mastectomy ([id number]), right axillary SLNBx, and 4 months adjuvant HER2 therapy (7 months total HER2 therapy), stopped due to diarrhea.  On endocrine therapy from 8/3/2021 - 07/2022.    1.  Right breast cancer:   Ms. Zuleta is 2 years, 2.5 months out from excision of a right breast cancer.  She did not tolerate either letrozole or exemestane due to profuse diarrhea on both medications. Next recommended treatment is tamoxifen but she is an active smoker so we cannot start tamoxifen until she stops smoking. She is motivated to do this. Will follow-up again at next visit. Ideally, would like to complete a total of 5 years of endocrine  therapy.    No clinical concerns for recurrence today. Will follow-up next visit with mammogram and DEXA in 3 months and then can move to follow-up every 4 months.     2.  Diarrhea: Much improved.     3.  Hypohidrotic ectodermal dysplasia: Largely involving the bilateral axillae with a small patch on the left lower lateral chest wall.  She is using triamcinolone ointment. Encouraged follow-up with Dermatology.     4.  Chemotherapy induced peripheral neuropathy/balance issues: I reviewed again neuropathy is likely permanent now. Declined PT/OT.     5.  Bone Health/Osteopenia:  DEXA 4/13/2021 with a lowest T-score of -1.2 in the right femoral neck c/w osteopenia.  Zometa 4 mg IV every 6 months x 3 years for prevention of breast cancer bone metastases was initiated on 5/23/2022.  Next dose will be given today. Will repeat DEXA this summer.     6.  At risk for cardiomyopathy:  2/2 h/o anthracycline and HER2 targeted therapy as well as personal history of hypertension, hyperlipidemia, and diabetes.  Post-treatment echocardiogram performed showed a retained LVEF. Recommend strict management of cardiac risk factors under the guidance of her PCP.    7. Tobacco cessation: She will continue to work on cessation. Has nicotine lozenges to use PRN.       Hoda Mcintosh PA-C

## 2023-05-16 ENCOUNTER — INFUSION THERAPY VISIT (OUTPATIENT)
Dept: ONCOLOGY | Facility: CLINIC | Age: 78
End: 2023-05-16
Attending: PHYSICIAN ASSISTANT
Payer: COMMERCIAL

## 2023-05-16 ENCOUNTER — APPOINTMENT (OUTPATIENT)
Dept: LAB | Facility: CLINIC | Age: 78
End: 2023-05-16
Attending: PHYSICIAN ASSISTANT
Payer: COMMERCIAL

## 2023-05-16 VITALS
HEART RATE: 83 BPM | RESPIRATION RATE: 16 BRPM | DIASTOLIC BLOOD PRESSURE: 81 MMHG | OXYGEN SATURATION: 96 % | TEMPERATURE: 97 F | BODY MASS INDEX: 26.29 KG/M2 | SYSTOLIC BLOOD PRESSURE: 163 MMHG | WEIGHT: 158 LBS

## 2023-05-16 DIAGNOSIS — T50.905A ADVERSE EFFECT OF DRUG, INITIAL ENCOUNTER: ICD-10-CM

## 2023-05-16 DIAGNOSIS — Z79.811 LONG TERM (CURRENT) USE OF AROMATASE INHIBITORS: ICD-10-CM

## 2023-05-16 DIAGNOSIS — M85.80 OSTEOPENIA, UNSPECIFIED LOCATION: ICD-10-CM

## 2023-05-16 DIAGNOSIS — M85.851 OSTEOPENIA OF NECK OF RIGHT FEMUR: Primary | ICD-10-CM

## 2023-05-16 DIAGNOSIS — C50.811 MALIGNANT NEOPLASM OF OVERLAPPING SITES OF RIGHT BREAST IN FEMALE, ESTROGEN RECEPTOR POSITIVE (H): ICD-10-CM

## 2023-05-16 DIAGNOSIS — D75.89 MACROCYTOSIS: ICD-10-CM

## 2023-05-16 DIAGNOSIS — Z92.21 STATUS POST CHEMOTHERAPY: ICD-10-CM

## 2023-05-16 DIAGNOSIS — C50.811 MALIGNANT NEOPLASM OF OVERLAPPING SITES OF RIGHT BREAST IN FEMALE, ESTROGEN RECEPTOR POSITIVE (H): Primary | ICD-10-CM

## 2023-05-16 DIAGNOSIS — Z12.31 ENCOUNTER FOR SCREENING MAMMOGRAM FOR BREAST CANCER: ICD-10-CM

## 2023-05-16 DIAGNOSIS — Z17.0 MALIGNANT NEOPLASM OF OVERLAPPING SITES OF RIGHT BREAST IN FEMALE, ESTROGEN RECEPTOR POSITIVE (H): ICD-10-CM

## 2023-05-16 DIAGNOSIS — Z17.0 MALIGNANT NEOPLASM OF OVERLAPPING SITES OF RIGHT BREAST IN FEMALE, ESTROGEN RECEPTOR POSITIVE (H): Primary | ICD-10-CM

## 2023-05-16 DIAGNOSIS — Z78.0 ASYMPTOMATIC MENOPAUSAL STATE: ICD-10-CM

## 2023-05-16 LAB
ALBUMIN SERPL BCG-MCNC: 4.2 G/DL (ref 3.5–5.2)
BASOPHILS # BLD AUTO: 0.1 10E3/UL (ref 0–0.2)
BASOPHILS NFR BLD AUTO: 1 %
CALCIUM SERPL-MCNC: 9.3 MG/DL (ref 8.8–10.2)
CREAT SERPL-MCNC: 0.73 MG/DL (ref 0.51–0.95)
EOSINOPHIL # BLD AUTO: 0.1 10E3/UL (ref 0–0.7)
EOSINOPHIL NFR BLD AUTO: 1 %
ERYTHROCYTE [DISTWIDTH] IN BLOOD BY AUTOMATED COUNT: 14.2 % (ref 10–15)
GFR SERPL CREATININE-BSD FRML MDRD: 83 ML/MIN/1.73M2
HCT VFR BLD AUTO: 42.2 % (ref 35–47)
HGB BLD-MCNC: 14.5 G/DL (ref 11.7–15.7)
IMM GRANULOCYTES # BLD: 0 10E3/UL
IMM GRANULOCYTES NFR BLD: 0 %
LYMPHOCYTES # BLD AUTO: 1.2 10E3/UL (ref 0.8–5.3)
LYMPHOCYTES NFR BLD AUTO: 20 %
MCH RBC QN AUTO: 34.8 PG (ref 26.5–33)
MCHC RBC AUTO-ENTMCNC: 34.4 G/DL (ref 31.5–36.5)
MCV RBC AUTO: 101 FL (ref 78–100)
MONOCYTES # BLD AUTO: 0.4 10E3/UL (ref 0–1.3)
MONOCYTES NFR BLD AUTO: 7 %
NEUTROPHILS # BLD AUTO: 4.2 10E3/UL (ref 1.6–8.3)
NEUTROPHILS NFR BLD AUTO: 71 %
NRBC # BLD AUTO: 0 10E3/UL
NRBC BLD AUTO-RTO: 0 /100
PLATELET # BLD AUTO: 234 10E3/UL (ref 150–450)
RBC # BLD AUTO: 4.17 10E6/UL (ref 3.8–5.2)
WBC # BLD AUTO: 5.9 10E3/UL (ref 4–11)

## 2023-05-16 PROCEDURE — 96365 THER/PROPH/DIAG IV INF INIT: CPT

## 2023-05-16 PROCEDURE — 36591 DRAW BLOOD OFF VENOUS DEVICE: CPT | Performed by: INTERNAL MEDICINE

## 2023-05-16 PROCEDURE — 82565 ASSAY OF CREATININE: CPT | Performed by: INTERNAL MEDICINE

## 2023-05-16 PROCEDURE — 82040 ASSAY OF SERUM ALBUMIN: CPT | Performed by: INTERNAL MEDICINE

## 2023-05-16 PROCEDURE — G0463 HOSPITAL OUTPT CLINIC VISIT: HCPCS | Performed by: PHYSICIAN ASSISTANT

## 2023-05-16 PROCEDURE — 250N000011 HC RX IP 250 OP 636: Performed by: PHYSICIAN ASSISTANT

## 2023-05-16 PROCEDURE — 99214 OFFICE O/P EST MOD 30 MIN: CPT | Performed by: PHYSICIAN ASSISTANT

## 2023-05-16 PROCEDURE — 85025 COMPLETE CBC W/AUTO DIFF WBC: CPT | Performed by: PHYSICIAN ASSISTANT

## 2023-05-16 PROCEDURE — 82310 ASSAY OF CALCIUM: CPT | Performed by: INTERNAL MEDICINE

## 2023-05-16 PROCEDURE — 250N000011 HC RX IP 250 OP 636: Performed by: INTERNAL MEDICINE

## 2023-05-16 PROCEDURE — 258N000003 HC RX IP 258 OP 636: Performed by: INTERNAL MEDICINE

## 2023-05-16 RX ORDER — HEPARIN SODIUM (PORCINE) LOCK FLUSH IV SOLN 100 UNIT/ML 100 UNIT/ML
5 SOLUTION INTRAVENOUS DAILY PRN
Status: DISCONTINUED | OUTPATIENT
Start: 2023-05-16 | End: 2023-05-16 | Stop reason: HOSPADM

## 2023-05-16 RX ORDER — ZOLEDRONIC ACID 0.04 MG/ML
4 INJECTION, SOLUTION INTRAVENOUS ONCE
Status: COMPLETED | OUTPATIENT
Start: 2023-05-16 | End: 2023-05-16

## 2023-05-16 RX ORDER — HEPARIN SODIUM (PORCINE) LOCK FLUSH IV SOLN 100 UNIT/ML 100 UNIT/ML
5 SOLUTION INTRAVENOUS
Status: DISCONTINUED | OUTPATIENT
Start: 2023-05-16 | End: 2023-05-16 | Stop reason: HOSPADM

## 2023-05-16 RX ADMIN — ZOLEDRONIC ACID 4 MG: 0.04 INJECTION, SOLUTION INTRAVENOUS at 11:43

## 2023-05-16 RX ADMIN — SODIUM CHLORIDE 250 ML: 9 INJECTION, SOLUTION INTRAVENOUS at 11:43

## 2023-05-16 RX ADMIN — Medication 5 ML: at 12:06

## 2023-05-16 RX ADMIN — Medication 5 ML: at 09:56

## 2023-05-16 ASSESSMENT — PAIN SCALES - GENERAL: PAINLEVEL: NO PAIN (0)

## 2023-05-16 NOTE — NURSING NOTE
"Oncology Rooming Note    May 16, 2023 10:13 AM   Talya Zuleta is a 78 year old female who presents for:    Chief Complaint   Patient presents with     Port Draw     Labs drawn via port by RN in lab. VS taken.      Oncology Clinic Visit     Breast Ca     Initial Vitals: BP (!) 163/81   Pulse 83   Temp 97  F (36.1  C) (Oral)   Resp 16   Wt 71.7 kg (158 lb)   LMP  (LMP Unknown)   SpO2 96%   BMI 26.29 kg/m   Estimated body mass index is 26.29 kg/m  as calculated from the following:    Height as of 9/2/22: 1.651 m (5' 5\").    Weight as of this encounter: 71.7 kg (158 lb). Body surface area is 1.81 meters squared.  No Pain (0) Comment: Data Unavailable   No LMP recorded (lmp unknown). Patient is postmenopausal.  Allergies reviewed: Yes  Medications reviewed: Yes    Medications: Medication refills not needed today.  Pharmacy name entered into EPIC:    [facility] - [place], [place] - [address]  [id number] - [place], [place] - [address]    Clinical concerns:      Tiffany Garcia CMA              "

## 2023-05-16 NOTE — NURSING NOTE
Chief Complaint   Patient presents with     Port Draw     Labs drawn via port by RN in lab. VS taken.      Labs drawn via Port accessed using 20g flat needle. Line flushed and Heparin locked. Vital signs taken. Checked into next appointment.     Sejal Bose RN

## 2023-05-16 NOTE — LETTER
5/16/2023         RE: Talya Zuleta  [address] [facility] [id number]        Dear Colleague,    Thank you for referring your patient, Talya Zuleta, to the [facility]. Please see a copy of my visit note below.    Oncology Follow Up:  Date on this visit: May 16, 2023    Diagnosis:  ER positive, HER-2 positive right breast cancer, clinical stage T2N0M0 s/p THP-ddAC, right breast mastectomy ([id number]), SLN biopsy, and 4 months adjuvant HP.  On endocrine therapy.    Primary Physician: Darrell Douglas    History Of Present Illness:  Ms. Zuleta is a 78 year old female with right breast cancer.  Routine screening mammogram in 07/2020 showed right breast asymmetry.  Diagnostic mammograms and ultrasound showed a 2.7 cm mass in the right breast at 12:00, 5 cm from the nipple with ductal extension including a 6 mm mass at 3 cm from the nipple.  Contrast enhanced mammogram showed the right breast mass, including extension, to measure 4.9 cm.  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  Estrogen receptor was moderate in 50% and progesterone receptor staining was negative.  HER-2 was negative by IHC; HER2 FISH showed approximately 10% of tumor cells to have 6.8 HER2 signals/nucleus and a HER2/CEN17 ratio of 1.6.  The HER2 positive cells were noted to be the large pleomorphic cells.  Multiple neutrophils were noted to be infiltrating through tumor stroma.    She received 12 weeks of neoadjuvant Taxol, Herceptin, and pertuzumab from 10/7/2020 - 12/22/2020.  She received 4 cycles of dose dense adriamycin and cyclophosphamide from 12/29/2020 - 2/9/2021.  She underwent right breast mastectomy and sentinel lymph node biopsy on 3/8/2021.  Pathology showed rare residual tumor cells with therapy related changes in the tumor bed.  Residual tumor is <1% of the tumor bed volume.  There was no lymphovascular invasion and  surgical margins were negative.  A single sentinel lymph node was benign.  We discussed starting letrozole at our clinic visit on 3/30/2021, unfortunately she did not receive the prescription and so did not start it.  She was initiated on adjuvant HER2 targeted therapy 4/13/2021.  She received 4 cycles of adjuvant Phesgo, then discontinued due to diarrhea.  She was then on biosimilar trastuzumab (Trazimera) since 7/6/2021, however, continued to have diarrhea refractory to antidiarrheals, therefore all HER2 targeted therapy was abandoned on 8/3/2021. Of note, she received 7 months total HER2 treatment (3 months neoadjuvant + 4 months adjuvant).  She started letrozole on 8/3/2021.  She continued to have diarrhea, which was attributed to letrozole.  On 6/8/2022, treatment was changed to exemestane. This was stopped after a month due to profuse diarrhea again. She has been off of endocrine therapy since that time.     Interval History:  Talya is here in routine follow-up. She missed follow-up in February. She has been doing well but has been under some stress with a few family members going through cancer. Her brother who she is very close to is undergoing treatment for mesothelioma.     She notes her prior diarrhea continues to be much improved. She on average has 2 looser stools per day. No abdominal pain or nausea. Her appetite is fair. She has been maintaining her weight.     She had stopped smoking a few months ago but resumed when her family members were diagnosed with cancer. She is currently smoking 3-4 cigarettes daily.     No fevers/chills or recent infections. No change to breathing. No lumps/bumps. No new or different pain.       Past Medical/Surgical History:  1.  Breast cancer as per HPI  2.  Hypertension  3.  Diabetes, diet controlled   4.  Hypothyroidism  5.  Hypohidrosis ectodermal dysplasia    Allergies:  Allergies as of 05/16/2023 - Reviewed 05/16/2023   Allergen Reaction Noted     Bacitracin-neomycin-polymyxin  04/18/2003    Doxycycline  09/20/2022    Latex  09/30/2005     Current Medications:  Current Outpatient Medications   Medication Sig Dispense Refill    amLODIPine (NORVASC) 5 MG tablet TAKE ONE TABLET BY MOUTH TWICE DAILY *NEED TO SCHEDULE WELLNESS EXAM 180 tablet 3    benazepril (LOTENSIN) 20 MG tablet TAKE ONE TABLET BY MOUTH TWICE DAILY *PLEASE SCHEDULE WELLNESS EXAM 180 tablet 3    furosemide (LASIX) 80 MG tablet TAKE ONE TABLET BY MOUTH TWICE DAILY 180 tablet 3    potassium chloride ER (KLOR-CON M) 20 MEQ CR tablet Take 2 tablets (40 mEq) by mouth 3 times daily 540 tablet 1    Vitamin D, Cholecalciferol, 25 MCG (1000 UT) TABS Take 1,000 Units by mouth daily      zolpidem (AMBIEN) 5 MG tablet Take 1 tablet (5 mg) by mouth nightly as needed for sleep 30 tablet 1    atorvastatin (LIPITOR) 20 MG tablet Take 1 tablet (20 mg) by mouth daily (Patient not taking: Reported on 5/16/2023) 90 tablet 3    exemestane (AROMASIN) 25 MG tablet Take 1 tablet (25 mg) by mouth daily (Patient not taking: Reported on 5/16/2023) 30 tablet 11    nicotine (COMMIT) 2 MG lozenge Place 1 lozenge (2 mg) inside cheek every hour as needed for smoking cessation (Patient not taking: Reported on 5/16/2023) 90 lozenge 1    triamcinolone (KENALOG) 0.1 % external cream APPLY TOPICALLY 2 TIMES DAILY AS NEEDED FOR IRRITATION (Patient not taking: Reported on 5/16/2023) 80 g 0    varenicline (CHANTIX) 1 MG tablet Take 1 tablet (1 mg) by mouth 2 times daily (Patient not taking: Reported on 5/16/2023) 180 tablet 1      Genetics Breast Actionable Panel on 10/1/2020 was negative for mutation.    Physical Exam:  BP (!) 163/81   Pulse 83   Temp 97  F (36.1  C) (Oral)   Resp 16   Wt 71.7 kg (158 lb)   LMP  (LMP Unknown)   SpO2 96%   BMI 26.29 kg/m     Wt Readings from Last 4 Encounters:   05/16/23 71.7 kg (158 lb)   11/22/22 74.4 kg (164 lb 0.4 oz)   09/20/22 71.8 kg (158 lb 4 oz)   09/02/22 72.1 kg (159 lb)   General:   Well appearing adult female in NAD.  Alert and oriented x 3  HEENT:  Normocephalic.  Sclera anicteric.  (+) alopecia, wearing a wig.  Lymph:  No palpable cervical, supraclavicular, or axillary LAD.  Chest:  CTA bilaterally.  No wheezes or crackles.  Left chest port-a-cath in place.  CV:  RRR.   Breast:  Right breast mastectomy.  There are no discretely palpable masses of either the right chest wall or within the left breast. Left nipple is everted.  Abd:  Soft/ND/NT +BS   Ext: Trace edema bilaterally   Neuro:  Cranial nerves grossly intact.  Psych:  Mood and affect appear normal.  Skin: Hyperpigmented, chronically inflamed skin with tracks in bilateral axilla. No signs of cellulitis or active drainage.     Laboratory/Imaging Studies   05/16/23 10:01   Creatinine 0.73   GFR Estimate 83   Calcium 9.3   Albumin 4.2   WBC 5.9   Hemoglobin 14.5   Hematocrit 42.2   Platelet Count 234   RBC Count 4.17    (H)   MCH 34.8 (H)   MCHC 34.4   RDW 14.2   % Neutrophils 71   % Lymphocytes 20   % Monocytes 7   % Eosinophils 1   % Basophils 1   Absolute Basophils 0.1   Absolute Eosinophils 0.1   Absolute Immature Granulocytes 0.0   Absolute Lymphocytes 1.2   Absolute Monocytes 0.4   % Immature Granulocytes 0   Absolute Neutrophils 4.2   Absolute NRBCs 0.0   NRBCs per 100 WBC 0       ASSESSMENT/PLAN:  77 year old female with a history of hypohidrosis ectodermal dysplasia, HTN, dyslipidemia, COPD, diabetes, and OA with a h/o T2N0M0, right breast cancer, ER positive (50%), VA negative, and HER2 positive.  She is s/p treatment with 12 weeks THP, 4 cycles ddAC, right breast mastectomy ([id number]), right axillary SLNBx, and 4 months adjuvant HER2 therapy (7 months total HER2 therapy), stopped due to diarrhea.  On endocrine therapy from 8/3/2021 - 07/2022.    1.  Right breast cancer:   Ms. Zuleta is 2 years, 2.5 months out from excision of a right breast cancer.  She did not tolerate either letrozole or exemestane due to profuse  diarrhea on both medications. Next recommended treatment is tamoxifen but she is an active smoker so we cannot start tamoxifen until she stops smoking. She is motivated to do this. Will follow-up again at next visit. Ideally, would like to complete a total of 5 years of endocrine therapy.    No clinical concerns for recurrence today. Will follow-up next visit with mammogram and DEXA in 3 months and then can move to follow-up every 4 months.     2.  Diarrhea: Much improved.     3.  Hypohidrotic ectodermal dysplasia: Largely involving the bilateral axillae with a small patch on the left lower lateral chest wall.  She is using triamcinolone ointment. Encouraged follow-up with Dermatology.     4.  Chemotherapy induced peripheral neuropathy/balance issues: I reviewed again neuropathy is likely permanent now. Declined PT/OT.     5.  Bone Health/Osteopenia:  DEXA 4/13/2021 with a lowest T-score of -1.2 in the right femoral neck c/w osteopenia.  Zometa 4 mg IV every 6 months x 3 years for prevention of breast cancer bone metastases was initiated on 5/23/2022.  Next dose will be given today. Will repeat DEXA this summer.     6.  At risk for cardiomyopathy:  2/2 h/o anthracycline and HER2 targeted therapy as well as personal history of hypertension, hyperlipidemia, and diabetes.  Post-treatment echocardiogram performed showed a retained LVEF. Recommend strict management of cardiac risk factors under the guidance of her PCP.    7. Tobacco cessation: She will continue to work on cessation. Has nicotine lozenges to use PRN.       Hoda Mcintosh PA-C

## 2023-05-16 NOTE — PROGRESS NOTES
Infusion Nursing Note:  Talya Zuleta presents today for Cycle 3 Day 1 Zometa.    Patient seen by provider today: Yes: Hoda RAMOS   present during visit today: Not Applicable.    Note: Pt confirms she is taking calcium/vit D, side effects of zometa reviewed and all questions answered.       Intravenous Access:  Implanted Port.    Treatment Conditions:  Lab Results   Component Value Date    HGB 14.5 05/16/2023    WBC 5.9 05/16/2023    ANEU 3.7 07/06/2021    ANEUTAUTO 4.2 05/16/2023     05/16/2023      Lab Results   Component Value Date     11/22/2022    POTASSIUM 3.8 11/22/2022    MAG 2.3 07/06/2021    CR 0.73 05/16/2023    JIMBO 9.3 05/16/2023    BILITOTAL 0.3 11/22/2022    ALBUMIN 4.2 05/16/2023    ALT 6 (L) 11/22/2022    AST 16 11/22/2022         Post Infusion Assessment:  Patient tolerated infusion without incident.  Blood return noted pre and post infusion.  Access discontinued per protocol.       Discharge Plan:   Patient declined prescription refills.  AVS to patient via [employer].  Patient will return for next zometa infusion in 6 months.    Patient discharged in stable condition accompanied by: self.  Departure Mode: Ambulatory.  Face to Face time: 0.      Diamond Forte RN

## 2023-05-19 DIAGNOSIS — L30.9 DERMATITIS: ICD-10-CM

## 2023-05-19 DIAGNOSIS — E78.5 HYPERLIPIDEMIA LDL GOAL <100: ICD-10-CM

## 2023-05-19 DIAGNOSIS — I10 HYPERTENSION, UNSPECIFIED TYPE: ICD-10-CM

## 2023-05-19 RX ORDER — FUROSEMIDE 80 MG
TABLET ORAL
Qty: 180 TABLET | Refills: 0 | Status: SHIPPED | OUTPATIENT
Start: 2023-05-19 | End: 2023-08-21

## 2023-05-19 RX ORDER — ATORVASTATIN CALCIUM 20 MG/1
20 TABLET, FILM COATED ORAL DAILY
Qty: 90 TABLET | Refills: 0 | Status: SHIPPED | OUTPATIENT
Start: 2023-05-19 | End: 2023-08-21

## 2023-05-19 RX ORDER — AMLODIPINE BESYLATE 5 MG/1
TABLET ORAL
Qty: 180 TABLET | Refills: 0 | Status: SHIPPED | OUTPATIENT
Start: 2023-05-19 | End: 2023-08-21

## 2023-05-19 RX ORDER — BENAZEPRIL HYDROCHLORIDE 20 MG/1
TABLET ORAL
Qty: 180 TABLET | Refills: 0 | Status: SHIPPED | OUTPATIENT
Start: 2023-05-19 | End: 2023-08-21

## 2023-05-19 RX ORDER — TRIAMCINOLONE ACETONIDE 1 MG/G
CREAM TOPICAL 2 TIMES DAILY PRN
Qty: 80 G | Refills: 0 | Status: SHIPPED | OUTPATIENT
Start: 2023-05-19 | End: 2023-08-21

## 2023-05-19 NOTE — TELEPHONE ENCOUNTER
Okay refill but see us in the next 2-3 months in clinic    Please inform patient    Darrell Douglas MD

## 2023-08-21 ENCOUNTER — OFFICE VISIT (OUTPATIENT)
Dept: FAMILY MEDICINE | Facility: CLINIC | Age: 78
End: 2023-08-21
Payer: COMMERCIAL

## 2023-08-21 VITALS
TEMPERATURE: 96.5 F | OXYGEN SATURATION: 100 % | HEART RATE: 76 BPM | DIASTOLIC BLOOD PRESSURE: 77 MMHG | BODY MASS INDEX: 26.76 KG/M2 | SYSTOLIC BLOOD PRESSURE: 122 MMHG | WEIGHT: 160.8 LBS

## 2023-08-21 DIAGNOSIS — L30.9 DERMATITIS: ICD-10-CM

## 2023-08-21 DIAGNOSIS — S41.112A SKIN TEAR OF UPPER ARM WITHOUT COMPLICATION, LEFT, INITIAL ENCOUNTER: ICD-10-CM

## 2023-08-21 DIAGNOSIS — E78.5 HYPERLIPIDEMIA LDL GOAL <100: ICD-10-CM

## 2023-08-21 DIAGNOSIS — R60.0 BILATERAL LOWER EXTREMITY EDEMA: Primary | ICD-10-CM

## 2023-08-21 DIAGNOSIS — I10 HYPERTENSION, UNSPECIFIED TYPE: ICD-10-CM

## 2023-08-21 DIAGNOSIS — Z85.3 HISTORY OF BREAST CANCER: ICD-10-CM

## 2023-08-21 PROCEDURE — 99214 OFFICE O/P EST MOD 30 MIN: CPT | Performed by: FAMILY MEDICINE

## 2023-08-21 RX ORDER — POTASSIUM CHLORIDE 1500 MG/1
40 TABLET, EXTENDED RELEASE ORAL 2 TIMES DAILY
Qty: 360 TABLET | Refills: 1 | Status: SHIPPED | OUTPATIENT
Start: 2023-08-21 | End: 2024-08-02

## 2023-08-21 RX ORDER — ATORVASTATIN CALCIUM 20 MG/1
20 TABLET, FILM COATED ORAL DAILY
Qty: 90 TABLET | Refills: 3 | Status: SHIPPED | OUTPATIENT
Start: 2023-08-21

## 2023-08-21 RX ORDER — BENAZEPRIL HYDROCHLORIDE 20 MG/1
20 TABLET ORAL 2 TIMES DAILY
Qty: 180 TABLET | Refills: 1 | Status: SHIPPED | OUTPATIENT
Start: 2023-08-21 | End: 2024-08-02

## 2023-08-21 RX ORDER — AMLODIPINE BESYLATE 5 MG/1
5 TABLET ORAL DAILY
Qty: 90 TABLET | Refills: 1 | Status: SHIPPED | OUTPATIENT
Start: 2023-08-21 | End: 2024-08-02

## 2023-08-21 RX ORDER — TRIAMCINOLONE ACETONIDE 1 MG/G
OINTMENT TOPICAL 2 TIMES DAILY PRN
Qty: 80 G | Refills: 0 | Status: SHIPPED | OUTPATIENT
Start: 2023-08-21

## 2023-08-21 RX ORDER — FUROSEMIDE 40 MG
40 TABLET ORAL 2 TIMES DAILY
Qty: 180 TABLET | Refills: 1 | Status: SHIPPED | OUTPATIENT
Start: 2023-08-21 | End: 2024-08-02

## 2023-08-21 RX ORDER — TRIAMCINOLONE ACETONIDE 1 MG/G
CREAM TOPICAL 2 TIMES DAILY PRN
Qty: 80 G | Refills: 0 | Status: SHIPPED | OUTPATIENT
Start: 2023-08-21 | End: 2024-03-08

## 2023-08-21 RX ORDER — TRIAMCINOLONE ACETONIDE 1 MG/G
OINTMENT TOPICAL 2 TIMES DAILY PRN
Qty: 80 G | Refills: 0 | Status: SHIPPED | OUTPATIENT
Start: 2023-08-21 | End: 2023-08-21

## 2023-08-21 NOTE — PROGRESS NOTES
Beatriz Babin is a 78 year old, presenting for the following health issues:  Patient Request (Pre appointment before cancer appointment)      8/21/2023    11:22 AM   Additional Questions   Roomed by Shania   Accompanied by yadira         8/21/2023    11:22 AM   Patient Reported Additional Medications   Patient reports taking the following new medications none     *Would also like to discuss recent fall and medication    History of Present Illness       Reason for visit:  Pre cancer visit    She eats 4 or more servings of fruits and vegetables daily.She consumes 0 sweetened beverage(s) daily.She exercises with enough effort to increase her heart rate 9 or less minutes per day.  She exercises with enough effort to increase her heart rate 3 or less days per week.   She is not taking prescribed medications regularly due to side effects.             Review of Systems   Lots of jams and salsas  Patient makes these    Tore skin on left am when passing out     Jut fell one time    Had been napping just before that    Takes furosemide at 8am and 4 pm    Lots of deaths in recently    Smoking a few cigarettes per day          Objective    /77   Pulse 76   Temp (!) 96.5  F (35.8  C) (Temporal)   Wt 72.9 kg (160 lb 12.8 oz)   LMP  (LMP Unknown)   SpO2 100%   BMI 26.76 kg/m    Body mass index is 26.76 kg/m .  Physical Exam  Constitutional:       Appearance: She is well-developed.   HENT:      Head: Normocephalic and atraumatic.   Eyes:      Conjunctiva/sclera: Conjunctivae normal.   Neck:      Vascular: No carotid bruit.   Cardiovascular:      Rate and Rhythm: Normal rate and regular rhythm.      Heart sounds: Normal heart sounds.   Pulmonary:      Effort: Pulmonary effort is normal. No respiratory distress.      Breath sounds: Normal breath sounds.   Neurological:      Mental Status: She is alert and oriented to person, place, and time.         Patient has chronic appearing moderate bilateral low ext  edema    No cellulitis    Small skin tear lef arm, not worrisome         ASSESSMENT / PLAN:  (R60.0) Bilateral lower extremity edema  (primary encounter diagnosis)  Comment: patient has been on high dose 80 bid furosemide for very long time and the urination is very bothersome to her.  Kidney and heart function is normal.  Reasonable to try lower 40 bid dose, also lowering potassium from 3x daily to 2x daily.  After a few days on this , do lab only appointment to make sure potassium and creat okay.   Plan: furosemide (LASIX) 40 MG tablet, Comprehensive         metabolic panel, CBC with Platelets &         Differential             (E78.5) Hyperlipidemia LDL goal <100  Comment: refill med   Plan: atorvastatin (LIPITOR) 20 MG tablet             (I10) Hypertension, unspecified type  Comment: of note patient has been on 5 mg bid amlodipine.  Decrease to just 5 mg daily.  That should help mitigate some of the possible worsening edema after we lower the loop diuretic.   Plan: amLODIPine (NORVASC) 5 MG tablet, benazepril         (LOTENSIN) 20 MG tablet, potassium chloride ER         (KLOR-CON M) 20 MEQ CR tablet             (L30.9) Dermatitis  Comment: patient wanted both the cream and ointment   Plan: triamcinolone (KENALOG) 0.1 % external cream,         triamcinolone (KENALOG) 0.1 % external         ointment, DISCONTINUED: triamcinolone (KENALOG)        0.1 % external ointment              Hist breast cancer   Per specialist      Skin tear.  Mild.  Discussed in detail.       I reviewed the patient's medications, allergies, medical history, family history, and social history.    Darrell Douglas MD

## 2023-08-21 NOTE — PATIENT INSTRUCTIONS
Use antibiotic ointment 2-3 x daily until elbow skin healed up    When you get new prescriptions from pharmacy, start them    Decrease amlodipine to just one pill once daily    Go to 40 mg twice daily on water pill    Decrease potassium to two pills only twice daily    After being on these new doses for 3-4 days, do a lab only appointment     Plan to see us in a couple months, sooner if needed     Be seen promptly if symptoms acutely worsen

## 2023-08-26 NOTE — PROGRESS NOTES
Oncology Follow Up:  Date on this visit: Aug 28, 2023    Diagnosis:  ER positive, HER-2 positive right breast cancer, clinical stage T2N0M0 s/p THP-ddAC, right breast mastectomy ([id number]), SLN biopsy, and 4 months adjuvant HP.  On endocrine therapy.    Primary Physician: Darrell Douglas    History Of Present Illness:  Ms. Zuleta is a 78 year old female with right breast cancer.  Routine screening mammogram in 07/2020 showed right breast asymmetry.  Diagnostic mammograms and ultrasound showed a 2.7 cm mass in the right breast at 12:00, 5 cm from the nipple with ductal extension including a 6 mm mass at 3 cm from the nipple.  Contrast enhanced mammogram showed the right breast mass, including extension, to measure 4.9 cm.  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  Estrogen receptor was moderate in 50% and progesterone receptor staining was negative.  HER-2 was negative by IHC; HER2 FISH showed approximately 10% of tumor cells to have 6.8 HER2 signals/nucleus and a HER2/CEN17 ratio of 1.6.  The HER2 positive cells were noted to be the large pleomorphic cells.  Multiple neutrophils were noted to be infiltrating through tumor stroma.    She received 12 weeks of neoadjuvant Taxol, Herceptin, and pertuzumab from 10/7/2020 - 12/22/2020.  She received 4 cycles of dose dense adriamycin and cyclophosphamide from 12/29/2020 - 2/9/2021.  She underwent right breast mastectomy and sentinel lymph node biopsy on 3/8/2021.  Pathology showed rare residual tumor cells with therapy related changes in the tumor bed.  Residual tumor is <1% of the tumor bed volume.  There was no lymphovascular invasion and surgical margins were negative.  A single sentinel lymph node was benign.  We discussed starting letrozole at our clinic visit on 3/30/2021, unfortunately she did not receive the prescription and so did not start it.  She was initiated on adjuvant HER2 targeted therapy  4/13/2021.  She received 4 cycles of adjuvant Phesgo, then discontinued due to diarrhea.  She was then on biosimilar trastuzumab (Trazimera) since 7/6/2021, however, continued to have diarrhea refractory to antidiarrheals, therefore all HER2 targeted therapy was abandoned on 8/3/2021. Of note, she received 7 months total HER2 treatment (3 months neoadjuvant + 4 months adjuvant).  She started letrozole on 8/3/2021.  She continued to have diarrhea, which was attributed to letrozole.  On 6/8/2022, treatment was changed to exemestane. This was stopped after a month due to profuse diarrhea again. She has been off of endocrine therapy since that time.     Interval History:    Ms. Zuleta comes into clinic today for routine breast cancer follow-up.  For the past 3 months, she has continued to have diarrhea.  There was a period of time where the diarrhea resolved.  She states after receiving her Zometa infusion in May, however, it seems like the diarrhea became worse again.  She has had some adjustments of cardiac medications and wonders if the diarrhea may be due to one of those medications.  Her primary care physician is going to decrease her furosemide dosing because of this.  She has anywhere from 2-6 bowel movements per day.  The bowel movements are loose to watery in consistency.  One evening, she states she was up all night with repeated bowel movements.  She is not sure why this evening was worse than others.  Despite the repeated bowel movements, her weight has been stable.  She has no nausea or abdominal pain.  She has not had any black or bloody stools.  Approximately a week and a half ago, she had an episode where she suddenly felt weak and passed out.  She was only out momentarily.  She fell, striking her elbow and tearing the skin.  She states her antihypertensive dosing was decreased after this.  She states in general, also for the last 3 months, she has had very thin skin which has also been dry and  hyperpigmented.  She wonders if it is due to the Zometa infusion.  She denies current cough, shortness of breath, or chest pain.  She denies new bone or joint aches or pains.  She has persistent peripheral neuropathy unchanged from prior.    Past Medical/Surgical History:  1.  Breast cancer as per HPI  2.  Hypertension  3.  Diabetes, diet controlled   4.  Hypothyroidism  5.  Hypohidrosis ectodermal dysplasia    Allergies:  Allergies as of 08/28/2023 - Reviewed 08/21/2023   Allergen Reaction Noted     Bacitracin-neomycin-polymyxin  04/18/2003     Doxycycline  09/20/2022     Latex  09/30/2005     Current Medications:  Current Outpatient Medications   Medication Sig Dispense Refill     amLODIPine (NORVASC) 5 MG tablet Take 1 tablet (5 mg) by mouth daily 90 tablet 1     atorvastatin (LIPITOR) 20 MG tablet Take 1 tablet (20 mg) by mouth daily 90 tablet 3     benazepril (LOTENSIN) 20 MG tablet Take 1 tablet (20 mg) by mouth 2 times daily 180 tablet 1     furosemide (LASIX) 40 MG tablet Take 1 tablet (40 mg) by mouth 2 times daily 180 tablet 1     nicotine (COMMIT) 2 MG lozenge Place 1 lozenge (2 mg) inside cheek every hour as needed for smoking cessation (Patient not taking: Reported on 5/16/2023) 90 lozenge 1     potassium chloride ER (KLOR-CON M) 20 MEQ CR tablet Take 2 tablets (40 mEq) by mouth 2 times daily 360 tablet 1     triamcinolone (KENALOG) 0.1 % external cream Apply topically 2 times daily as needed for irritation 80 g 0     triamcinolone (KENALOG) 0.1 % external ointment Apply topically 2 times daily as needed for irritation 80 g 0     varenicline (CHANTIX) 1 MG tablet Take 1 tablet (1 mg) by mouth 2 times daily (Patient not taking: Reported on 5/16/2023) 180 tablet 1     Vitamin D, Cholecalciferol, 25 MCG (1000 UT) TABS Take 1,000 Units by mouth daily       zolpidem (AMBIEN) 5 MG tablet TAKE 1 TABLET (5 MG) BY MOUTH NIGHTLY AS NEEDED FOR SLEEP 30 tablet 0      Genetics Breast Actionable Panel on 10/1/2020  was negative for mutation.    Physical Exam:  BP (!) 164/83 (BP Location: Left arm, Patient Position: Sitting, Cuff Size: Adult Regular)   Pulse 68   Temp 97.7  F (36.5  C) (Oral)   Resp 16   Wt 74.3 kg (163 lb 12.8 oz)   LMP  (LMP Unknown)   SpO2 98%   BMI 27.26 kg/m    General:  Well appearing adult female in NAD.  Alert and oriented x 3  HEENT:  Normocephalic.  Sclera anicteric.  (+) alopecia, wearing a wig.  Lymph:  No palpable cervical, supraclavicular, or axillary LAD.  Chest:  CTA bilaterally.  No wheezes or crackles.  Left chest port-a-cath in place.  CV:  RRR.   Breast:  Right breast mastectomy.  There are no discretely palpable masses of either the right chest wall or within the left breast. Left nipple is everted.  Abd:  Soft/ND/NT.  Ext: 1+ pitting edema bilaterally   Neuro:  Cranial nerves grossly intact.  Gait stable.  Able to climb on the exam table unaided.  Psych:  Mood and affect appear normal.  Skin: Hyperpigmented, chronically inflamed skin with tracks in bilateral axilla. No signs of cellulitis or active drainage.     Laboratory/Imaging Studies:  8/28/2023 Left breast screening mammogram:  I personally reviewed the images in clinic today.  There are no concerning findings and no significant change when compared to prior.    8/28/2023 DEXA bone density scan:  Lowest T-score = -1.3 in the right femoral neck.    8/28/2023 Labs:  Electrolytes, kidney, and liver function tests are wnl.  TSH is wnl.  WBC, hemoglobin and platelets are wnl.  MCV is elevated at 103.    ASSESSMENT/PLAN:  78 year old female with a h/o hypohidrosis ectodermal dysplasia, HTN, dyslipidemia, COPD, diabetes, and OA with a h/o T2N0M0, right breast cancer, ER positive (50%), KS negative, and HER2 positive.  She is s/p treatment with 12 weeks THP, 4 cycles ddAC, right breast mastectomy ([id number]), right axillary SLNBx, and 4 months adjuvant HER2 therapy (7 months total HER2 therapy), stopped due to diarrhea.  On endocrine  therapy from 8/3/2021 - 07/2022, also stopped due to diarrhea.    1.  Right breast cancer:   Ms. Zuleta is 2 years, 5.5 months out from excision of a right breast cancer.  She did not tolerate either letrozole or exemestane due to profuse diarrhea on both medications. It is notable that she continues to have diarrhea off of these medications.  We have previously discussed a trial of tamoxifen, but this is contraindicated given her ongoing smoking.     She is asymptomatic of disease recurrence on history taken today and clinical exam is without concerning findings.  I personally reviewed the images of left breast screening mammogram performed today which is without concerning findings.  - Return to clinic in 4 months.    2.  Diarrhea: Worse since last visit.  Given persistent diarrhea despite stopping all of her adjuvant cancer therapies, it is clear her diarrhea is not due to these medications.    - Although not common, diarrhea is a potential side effect of Zometa.  Will therefore hold further Zometa for now.  - Recommend colonoscopy with biopsies.  She declines this.  - Recommended GI referral.  She also declines this at this time.  - She is going to discuss with her prescribing physician whether this may be due to some of her cardiac medications.  I am not convinced these medications are the cause.    3.  Skin changes:  Thinning, hyperpigmentation, and increased dryness.  Although Zometa can cause dermatitis, it does not typically present this way.  She denies use of topical corticosteroids outside of the axilla.  - given hyperpigmentation in the context of diarrhea and recent episode of syncope, will check an a.m. cortisol to screen for adrenal insufficiency  - Dermatology referral was placed.    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  She is using triamcinolone ointment.     5.  Chemotherapy induced peripheral neuropathy/balance issues:  She mentions her neuropathy  again today. I reviewed neuropathy is likely permanent now.     6.  Bone Health/Osteopenia:  I personally reviewed the DEXA performed today which showed a lowest T-score of -1.3.  This is within osteopenia range, but is overall stable from 2 years ago. Plan was to treat with Zometa 4 mg IV every 6 months x 3 years for prevention of breast cancer bone metastases.  This was initiated on 5/23/2022 and she has completed 3 doses thus far.  She believes diarrhea and skin changes occurred shortly after her last dose of Zometa.   - Hold further Zometa pending further work up of her diarrhea and skin changes as above.    7.  At risk for cardiomyopathy:  2/2 h/o anthracycline and HER2 targeted therapy as well as personal history of hypertension, hyperlipidemia, and diabetes.  Post-treatment echocardiogram performed showed a retained LVEF. Recommend ongoing strict management of cardiac risk factors under the guidance of her PCP.    8. Tobacco cessation: She reports she continues to smoke 5 cigarettes per day.  I again counseled her on smoking cessation.    9.  Drug Induced Insomnia:  Prescription for ambien was refilled today.    10.  Macrocytosis:  Will check a vitamin B12 level.    11.  Follow Up:    Dermatology visit within 1 month. Labs and return visit with me in 4 months.

## 2023-08-28 ENCOUNTER — ANCILLARY PROCEDURE (OUTPATIENT)
Dept: BONE DENSITY | Facility: CLINIC | Age: 78
End: 2023-08-28
Attending: PHYSICIAN ASSISTANT
Payer: COMMERCIAL

## 2023-08-28 ENCOUNTER — ANCILLARY PROCEDURE (OUTPATIENT)
Dept: MAMMOGRAPHY | Facility: CLINIC | Age: 78
End: 2023-08-28
Attending: PHYSICIAN ASSISTANT
Payer: COMMERCIAL

## 2023-08-28 ENCOUNTER — ANCILLARY ORDERS (OUTPATIENT)
Dept: ONCOLOGY | Facility: CLINIC | Age: 78
End: 2023-08-28

## 2023-08-28 ENCOUNTER — ONCOLOGY VISIT (OUTPATIENT)
Dept: ONCOLOGY | Facility: CLINIC | Age: 78
End: 2023-08-28
Attending: INTERNAL MEDICINE
Payer: COMMERCIAL

## 2023-08-28 VITALS
TEMPERATURE: 97.7 F | SYSTOLIC BLOOD PRESSURE: 164 MMHG | OXYGEN SATURATION: 98 % | HEART RATE: 68 BPM | WEIGHT: 163.8 LBS | DIASTOLIC BLOOD PRESSURE: 83 MMHG | BODY MASS INDEX: 27.26 KG/M2 | RESPIRATION RATE: 16 BRPM

## 2023-08-28 DIAGNOSIS — Z78.0 ASYMPTOMATIC MENOPAUSAL STATE: ICD-10-CM

## 2023-08-28 DIAGNOSIS — Z17.0 MALIGNANT NEOPLASM OF OVERLAPPING SITES OF RIGHT BREAST IN FEMALE, ESTROGEN RECEPTOR POSITIVE (H): Primary | ICD-10-CM

## 2023-08-28 DIAGNOSIS — Z92.21 STATUS POST CHEMOTHERAPY: ICD-10-CM

## 2023-08-28 DIAGNOSIS — D75.89 MACROCYTOSIS: ICD-10-CM

## 2023-08-28 DIAGNOSIS — M85.80 OSTEOPENIA, UNSPECIFIED LOCATION: ICD-10-CM

## 2023-08-28 DIAGNOSIS — L81.9 HYPERPIGMENTATION OF SKIN: ICD-10-CM

## 2023-08-28 DIAGNOSIS — R19.7 DIARRHEA, UNSPECIFIED TYPE: ICD-10-CM

## 2023-08-28 DIAGNOSIS — F19.982 DRUG-INDUCED INSOMNIA (H): ICD-10-CM

## 2023-08-28 DIAGNOSIS — C50.811 MALIGNANT NEOPLASM OF OVERLAPPING SITES OF RIGHT BREAST IN FEMALE, ESTROGEN RECEPTOR POSITIVE (H): Primary | ICD-10-CM

## 2023-08-28 DIAGNOSIS — Z12.31 ENCOUNTER FOR SCREENING MAMMOGRAM FOR BREAST CANCER: ICD-10-CM

## 2023-08-28 DIAGNOSIS — M85.80 OSTEOPENIA, UNSPECIFIED LOCATION: Primary | ICD-10-CM

## 2023-08-28 DIAGNOSIS — R55 SYNCOPE, UNSPECIFIED SYNCOPE TYPE: ICD-10-CM

## 2023-08-28 LAB
ALBUMIN SERPL BCG-MCNC: 4 G/DL (ref 3.5–5.2)
ALP SERPL-CCNC: 54 U/L (ref 35–104)
ALT SERPL W P-5'-P-CCNC: <5 U/L (ref 0–50)
ANION GAP SERPL CALCULATED.3IONS-SCNC: 11 MMOL/L (ref 7–15)
AST SERPL W P-5'-P-CCNC: 16 U/L (ref 0–45)
BASOPHILS # BLD AUTO: 0.1 10E3/UL (ref 0–0.2)
BASOPHILS NFR BLD AUTO: 1 %
BILIRUB SERPL-MCNC: 0.3 MG/DL
BUN SERPL-MCNC: 8.4 MG/DL (ref 8–23)
CALCIUM SERPL-MCNC: 8.9 MG/DL (ref 8.8–10.2)
CHLORIDE SERPL-SCNC: 106 MMOL/L (ref 98–107)
CREAT SERPL-MCNC: 0.72 MG/DL (ref 0.51–0.95)
DEPRECATED HCO3 PLAS-SCNC: 24 MMOL/L (ref 22–29)
EOSINOPHIL # BLD AUTO: 0.1 10E3/UL (ref 0–0.7)
EOSINOPHIL NFR BLD AUTO: 1 %
ERYTHROCYTE [DISTWIDTH] IN BLOOD BY AUTOMATED COUNT: 14.2 % (ref 10–15)
GFR SERPL CREATININE-BSD FRML MDRD: 85 ML/MIN/1.73M2
GLUCOSE SERPL-MCNC: 86 MG/DL (ref 70–99)
HCT VFR BLD AUTO: 38 % (ref 35–47)
HGB BLD-MCNC: 13.2 G/DL (ref 11.7–15.7)
IMM GRANULOCYTES # BLD: 0 10E3/UL
IMM GRANULOCYTES NFR BLD: 1 %
LYMPHOCYTES # BLD AUTO: 1.3 10E3/UL (ref 0.8–5.3)
LYMPHOCYTES NFR BLD AUTO: 21 %
MCH RBC QN AUTO: 35.7 PG (ref 26.5–33)
MCHC RBC AUTO-ENTMCNC: 34.7 G/DL (ref 31.5–36.5)
MCV RBC AUTO: 103 FL (ref 78–100)
MONOCYTES # BLD AUTO: 0.5 10E3/UL (ref 0–1.3)
MONOCYTES NFR BLD AUTO: 8 %
NEUTROPHILS # BLD AUTO: 4.3 10E3/UL (ref 1.6–8.3)
NEUTROPHILS NFR BLD AUTO: 68 %
NRBC # BLD AUTO: 0 10E3/UL
NRBC BLD AUTO-RTO: 0 /100
PLATELET # BLD AUTO: 242 10E3/UL (ref 150–450)
POTASSIUM SERPL-SCNC: 3.7 MMOL/L (ref 3.4–5.3)
PROT SERPL-MCNC: 6.7 G/DL (ref 6.4–8.3)
RBC # BLD AUTO: 3.7 10E6/UL (ref 3.8–5.2)
SODIUM SERPL-SCNC: 141 MMOL/L (ref 136–145)
TSH SERPL DL<=0.005 MIU/L-ACNC: 1.52 UIU/ML (ref 0.3–4.2)
VIT B12 SERPL-MCNC: 477 PG/ML (ref 232–1245)
WBC # BLD AUTO: 6.2 10E3/UL (ref 4–11)

## 2023-08-28 PROCEDURE — 85025 COMPLETE CBC W/AUTO DIFF WBC: CPT | Performed by: INTERNAL MEDICINE

## 2023-08-28 PROCEDURE — 250N000011 HC RX IP 250 OP 636: Mod: JZ | Performed by: INTERNAL MEDICINE

## 2023-08-28 PROCEDURE — G0463 HOSPITAL OUTPT CLINIC VISIT: HCPCS | Performed by: INTERNAL MEDICINE

## 2023-08-28 PROCEDURE — 84443 ASSAY THYROID STIM HORMONE: CPT | Performed by: INTERNAL MEDICINE

## 2023-08-28 PROCEDURE — 99215 OFFICE O/P EST HI 40 MIN: CPT | Performed by: INTERNAL MEDICINE

## 2023-08-28 PROCEDURE — 36591 DRAW BLOOD OFF VENOUS DEVICE: CPT | Performed by: INTERNAL MEDICINE

## 2023-08-28 PROCEDURE — 80053 COMPREHEN METABOLIC PANEL: CPT | Performed by: INTERNAL MEDICINE

## 2023-08-28 PROCEDURE — 82607 VITAMIN B-12: CPT | Performed by: INTERNAL MEDICINE

## 2023-08-28 PROCEDURE — 77063 BREAST TOMOSYNTHESIS BI: CPT | Mod: 52 | Performed by: RADIOLOGY

## 2023-08-28 PROCEDURE — 77080 DXA BONE DENSITY AXIAL: CPT | Performed by: INTERNAL MEDICINE

## 2023-08-28 PROCEDURE — 77067 SCR MAMMO BI INCL CAD: CPT | Mod: 52 | Performed by: RADIOLOGY

## 2023-08-28 PROCEDURE — 99207 DX WRIST/HEEL/RADIUS: CPT | Performed by: INTERNAL MEDICINE

## 2023-08-28 RX ORDER — ZOLPIDEM TARTRATE 5 MG/1
5 TABLET ORAL
Qty: 30 TABLET | Refills: 0 | Status: SHIPPED | OUTPATIENT
Start: 2023-08-28 | End: 2023-10-23

## 2023-08-28 RX ORDER — HEPARIN SODIUM (PORCINE) LOCK FLUSH IV SOLN 100 UNIT/ML 100 UNIT/ML
5 SOLUTION INTRAVENOUS ONCE
Status: COMPLETED | OUTPATIENT
Start: 2023-08-28 | End: 2023-08-28

## 2023-08-28 RX ADMIN — Medication 5 ML: at 16:00

## 2023-08-28 ASSESSMENT — PAIN SCALES - GENERAL: PAINLEVEL: MODERATE PAIN (4)

## 2023-08-28 NOTE — NURSING NOTE
"Oncology Rooming Note    August 28, 2023 3:04 PM   Talya Zuleta is a 78 year old female who presents for:    Chief Complaint   Patient presents with    Oncology Clinic Visit     Malignant Neoplasm of Right Breast     Initial Vitals: BP (!) 164/83 (BP Location: Left arm, Patient Position: Sitting, Cuff Size: Adult Regular)   Pulse 68   Temp 97.7  F (36.5  C) (Oral)   Resp 16   Wt 74.3 kg (163 lb 12.8 oz)   LMP  (LMP Unknown)   SpO2 98%   BMI 27.26 kg/m   Estimated body mass index is 27.26 kg/m  as calculated from the following:    Height as of 9/2/22: 1.651 m (5' 5\").    Weight as of this encounter: 74.3 kg (163 lb 12.8 oz). Body surface area is 1.85 meters squared.  Moderate Pain (4) Comment: Data Unavailable   No LMP recorded (lmp unknown). Patient is postmenopausal.  Allergies reviewed: Yes  Medications reviewed: Yes    Medications: Medication refills not needed today.  Pharmacy name entered into EPIC:    [facility] [place] - [id number]  [id number] - [place], [place] - [address] AT [address] OF [facility]    Clinical concerns: Pt presents today for follow up with Dr Alvarez.       Briana Dial LPN  8/28/2023              "

## 2023-08-28 NOTE — LETTER
8/28/2023         RE: Talya Zuleta  [address] [place] [address]        Dear Colleague,    Thank you for referring your patient, Talya Zuleta, to the [facility]. Please see a copy of my visit note below.    Oncology Follow Up:  Date on this visit: Aug 28, 2023    Diagnosis:  ER positive, HER-2 positive right breast cancer, clinical stage T2N0M0 s/p THP-ddAC, right breast mastectomy ([id number]), SLN biopsy, and 4 months adjuvant HP.  On endocrine therapy.    Primary Physician: Darrell Douglas    History Of Present Illness:  Ms. Zuleta is a 78 year old female with right breast cancer.  Routine screening mammogram in 07/2020 showed right breast asymmetry.  Diagnostic mammograms and ultrasound showed a 2.7 cm mass in the right breast at 12:00, 5 cm from the nipple with ductal extension including a 6 mm mass at 3 cm from the nipple.  Contrast enhanced mammogram showed the right breast mass, including extension, to measure 4.9 cm.  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  Estrogen receptor was moderate in 50% and progesterone receptor staining was negative.  HER-2 was negative by IHC; HER2 FISH showed approximately 10% of tumor cells to have 6.8 HER2 signals/nucleus and a HER2/CEN17 ratio of 1.6.  The HER2 positive cells were noted to be the large pleomorphic cells.  Multiple neutrophils were noted to be infiltrating through tumor stroma.    She received 12 weeks of neoadjuvant Taxol, Herceptin, and pertuzumab from 10/7/2020 - 12/22/2020.  She received 4 cycles of dose dense adriamycin and cyclophosphamide from 12/29/2020 - 2/9/2021.  She underwent right breast mastectomy and sentinel lymph node biopsy on 3/8/2021.  Pathology showed rare residual tumor cells with therapy related changes in the tumor bed.  Residual tumor is <1% of the tumor bed volume.  There was no lymphovascular invasion and  surgical margins were negative.  A single sentinel lymph node was benign.  We discussed starting letrozole at our clinic visit on 3/30/2021, unfortunately she did not receive the prescription and so did not start it.  She was initiated on adjuvant HER2 targeted therapy 4/13/2021.  She received 4 cycles of adjuvant Phesgo, then discontinued due to diarrhea.  She was then on biosimilar trastuzumab (Trazimera) since 7/6/2021, however, continued to have diarrhea refractory to antidiarrheals, therefore all HER2 targeted therapy was abandoned on 8/3/2021. Of note, she received 7 months total HER2 treatment (3 months neoadjuvant + 4 months adjuvant).  She started letrozole on 8/3/2021.  She continued to have diarrhea, which was attributed to letrozole.  On 6/8/2022, treatment was changed to exemestane. This was stopped after a month due to profuse diarrhea again. She has been off of endocrine therapy since that time.     Interval History:    Ms. Zuleta comes into clinic today for routine breast cancer follow-up.  For the past 3 months, she has continued to have diarrhea.  There was a period of time where the diarrhea resolved.  She states after receiving her Zometa infusion in May, however, it seems like the diarrhea became worse again.  She has had some adjustments of cardiac medications and wonders if the diarrhea may be due to one of those medications.  Her primary care physician is going to decrease her furosemide dosing because of this.  She has anywhere from 2-6 bowel movements per day.  The bowel movements are loose to watery in consistency.  One evening, she states she was up all night with repeated bowel movements.  She is not sure why this evening was worse than others.  Despite the repeated bowel movements, her weight has been stable.  She has no nausea or abdominal pain.  She has not had any black or bloody stools.  Approximately a week and a half ago, she had an episode where she suddenly felt weak and  passed out.  She was only out momentarily.  She fell, striking her elbow and tearing the skin.  She states her antihypertensive dosing was decreased after this.  She states in general, also for the last 3 months, she has had very thin skin which has also been dry and hyperpigmented.  She wonders if it is due to the Zometa infusion.  She denies current cough, shortness of breath, or chest pain.  She denies new bone or joint aches or pains.  She has persistent peripheral neuropathy unchanged from prior.    Past Medical/Surgical History:  1.  Breast cancer as per HPI  2.  Hypertension  3.  Diabetes, diet controlled   4.  Hypothyroidism  5.  Hypohidrosis ectodermal dysplasia    Allergies:  Allergies as of 08/28/2023 - Reviewed 08/21/2023   Allergen Reaction Noted    Bacitracin-neomycin-polymyxin  04/18/2003    Doxycycline  09/20/2022    Latex  09/30/2005     Current Medications:  Current Outpatient Medications   Medication Sig Dispense Refill    amLODIPine (NORVASC) 5 MG tablet Take 1 tablet (5 mg) by mouth daily 90 tablet 1    atorvastatin (LIPITOR) 20 MG tablet Take 1 tablet (20 mg) by mouth daily 90 tablet 3    benazepril (LOTENSIN) 20 MG tablet Take 1 tablet (20 mg) by mouth 2 times daily 180 tablet 1    furosemide (LASIX) 40 MG tablet Take 1 tablet (40 mg) by mouth 2 times daily 180 tablet 1    nicotine (COMMIT) 2 MG lozenge Place 1 lozenge (2 mg) inside cheek every hour as needed for smoking cessation (Patient not taking: Reported on 5/16/2023) 90 lozenge 1    potassium chloride ER (KLOR-CON M) 20 MEQ CR tablet Take 2 tablets (40 mEq) by mouth 2 times daily 360 tablet 1    triamcinolone (KENALOG) 0.1 % external cream Apply topically 2 times daily as needed for irritation 80 g 0    triamcinolone (KENALOG) 0.1 % external ointment Apply topically 2 times daily as needed for irritation 80 g 0    varenicline (CHANTIX) 1 MG tablet Take 1 tablet (1 mg) by mouth 2 times daily (Patient not taking: Reported on 5/16/2023)  180 tablet 1    Vitamin D, Cholecalciferol, 25 MCG (1000 UT) TABS Take 1,000 Units by mouth daily      zolpidem (AMBIEN) 5 MG tablet TAKE 1 TABLET (5 MG) BY MOUTH NIGHTLY AS NEEDED FOR SLEEP 30 tablet 0      Genetics Breast Actionable Panel on 10/1/2020 was negative for mutation.    Physical Exam:  BP (!) 164/83 (BP Location: Left arm, Patient Position: Sitting, Cuff Size: Adult Regular)   Pulse 68   Temp 97.7  F (36.5  C) (Oral)   Resp 16   Wt 74.3 kg (163 lb 12.8 oz)   LMP  (LMP Unknown)   SpO2 98%   BMI 27.26 kg/m    General:  Well appearing adult female in NAD.  Alert and oriented x 3  HEENT:  Normocephalic.  Sclera anicteric.  (+) alopecia, wearing a wig.  Lymph:  No palpable cervical, supraclavicular, or axillary LAD.  Chest:  CTA bilaterally.  No wheezes or crackles.  Left chest port-a-cath in place.  CV:  RRR.   Breast:  Right breast mastectomy.  There are no discretely palpable masses of either the right chest wall or within the left breast. Left nipple is everted.  Abd:  Soft/ND/NT.  Ext: 1+ pitting edema bilaterally   Neuro:  Cranial nerves grossly intact.  Gait stable.  Able to climb on the exam table unaided.  Psych:  Mood and affect appear normal.  Skin: Hyperpigmented, chronically inflamed skin with tracks in bilateral axilla. No signs of cellulitis or active drainage.     Laboratory/Imaging Studies:  8/28/2023 Left breast screening mammogram:  I personally reviewed the images in clinic today.  There are no concerning findings and no significant change when compared to prior.    8/28/2023 DEXA bone density scan:  Lowest T-score = -1.3 in the right femoral neck.    8/28/2023 Labs:  Electrolytes, kidney, and liver function tests are wnl.  TSH is wnl.  WBC, hemoglobin and platelets are wnl.  MCV is elevated at 103.    ASSESSMENT/PLAN:  78 year old female with a h/o hypohidrosis ectodermal dysplasia, HTN, dyslipidemia, COPD, diabetes, and OA with a h/o T2N0M0, right breast cancer, ER positive  (50%), ID negative, and HER2 positive.  She is s/p treatment with 12 weeks THP, 4 cycles ddAC, right breast mastectomy ([id number]), right axillary SLNBx, and 4 months adjuvant HER2 therapy (7 months total HER2 therapy), stopped due to diarrhea.  On endocrine therapy from 8/3/2021 - 07/2022, also stopped due to diarrhea.    1.  Right breast cancer:   Ms. Zuleta is 2 years, 5.5 months out from excision of a right breast cancer.  She did not tolerate either letrozole or exemestane due to profuse diarrhea on both medications. It is notable that she continues to have diarrhea off of these medications.  We have previously discussed a trial of tamoxifen, but this is contraindicated given her ongoing smoking.     She is asymptomatic of disease recurrence on history taken today and clinical exam is without concerning findings.  I personally reviewed the images of left breast screening mammogram performed today which is without concerning findings.  - Return to clinic in 4 months.    2.  Diarrhea: Worse since last visit.  Given persistent diarrhea despite stopping all of her adjuvant cancer therapies, it is clear her diarrhea is not due to these medications.    - Although not common, diarrhea is a potential side effect of Zometa.  Will therefore hold further Zometa for now.  - Recommend colonoscopy with biopsies.  She declines this.  - Recommended GI referral.  She also declines this at this time.  - She is going to discuss with her prescribing physician whether this may be due to some of her cardiac medications.  I am not convinced these medications are the cause.    3.  Skin changes:  Thinning, hyperpigmentation, and increased dryness.  Although Zometa can cause dermatitis, it does not typically present this way.  She denies use of topical corticosteroids outside of the axilla.  - given hyperpigmentation in the context of diarrhea and recent episode of syncope, will check an a.m. cortisol to screen for adrenal  insufficiency  - Dermatology referral was placed.    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  She is using triamcinolone ointment.     5.  Chemotherapy induced peripheral neuropathy/balance issues:  She mentions her neuropathy again today. I reviewed neuropathy is likely permanent now.     6.  Bone Health/Osteopenia:  I personally reviewed the DEXA performed today which showed a lowest T-score of -1.3.  This is within osteopenia range, but is overall stable from 2 years ago. Plan was to treat with Zometa 4 mg IV every 6 months x 3 years for prevention of breast cancer bone metastases.  This was initiated on 5/23/2022 and she has completed 3 doses thus far.  She believes diarrhea and skin changes occurred shortly after her last dose of Zometa.   - Hold further Zometa pending further work up of her diarrhea and skin changes as above.    7.  At risk for cardiomyopathy:  2/2 h/o anthracycline and HER2 targeted therapy as well as personal history of hypertension, hyperlipidemia, and diabetes.  Post-treatment echocardiogram performed showed a retained LVEF. Recommend ongoing strict management of cardiac risk factors under the guidance of her PCP.    8. Tobacco cessation: She reports she continues to smoke 5 cigarettes per day.  I again counseled her on smoking cessation.    9.  Drug Induced Insomnia:  Prescription for ambien was refilled today.    10.  Macrocytosis:  Will check a vitamin B12 level.    11.  Follow Up:    Dermatology visit within 1 month. Labs and return visit with me in 4 months.      Sincerely,        Perlita Alvarez MD

## 2023-09-06 ENCOUNTER — LAB (OUTPATIENT)
Dept: LAB | Facility: CLINIC | Age: 78
End: 2023-09-06
Payer: COMMERCIAL

## 2023-09-06 DIAGNOSIS — R60.0 BILATERAL LOWER EXTREMITY EDEMA: ICD-10-CM

## 2023-09-06 LAB
BASOPHILS # BLD AUTO: 0 10E3/UL (ref 0–0.2)
BASOPHILS NFR BLD AUTO: 1 %
EOSINOPHIL # BLD AUTO: 0.1 10E3/UL (ref 0–0.7)
EOSINOPHIL NFR BLD AUTO: 1 %
ERYTHROCYTE [DISTWIDTH] IN BLOOD BY AUTOMATED COUNT: 14.1 % (ref 10–15)
HCT VFR BLD AUTO: 41.7 % (ref 35–47)
HGB BLD-MCNC: 14.1 G/DL (ref 11.7–15.7)
IMM GRANULOCYTES # BLD: 0 10E3/UL
IMM GRANULOCYTES NFR BLD: 0 %
LYMPHOCYTES # BLD AUTO: 1.6 10E3/UL (ref 0.8–5.3)
LYMPHOCYTES NFR BLD AUTO: 21 %
MCH RBC QN AUTO: 35.4 PG (ref 26.5–33)
MCHC RBC AUTO-ENTMCNC: 33.8 G/DL (ref 31.5–36.5)
MCV RBC AUTO: 105 FL (ref 78–100)
MONOCYTES # BLD AUTO: 0.6 10E3/UL (ref 0–1.3)
MONOCYTES NFR BLD AUTO: 8 %
NEUTROPHILS # BLD AUTO: 5.3 10E3/UL (ref 1.6–8.3)
NEUTROPHILS NFR BLD AUTO: 69 %
PLATELET # BLD AUTO: 242 10E3/UL (ref 150–450)
RBC # BLD AUTO: 3.98 10E6/UL (ref 3.8–5.2)
WBC # BLD AUTO: 7.6 10E3/UL (ref 4–11)

## 2023-09-06 PROCEDURE — 80053 COMPREHEN METABOLIC PANEL: CPT

## 2023-09-06 PROCEDURE — 85025 COMPLETE CBC W/AUTO DIFF WBC: CPT

## 2023-09-06 PROCEDURE — 36415 COLL VENOUS BLD VENIPUNCTURE: CPT

## 2023-09-07 LAB
ALBUMIN SERPL BCG-MCNC: 4.4 G/DL (ref 3.5–5.2)
ALP SERPL-CCNC: 54 U/L (ref 35–104)
ALT SERPL W P-5'-P-CCNC: 8 U/L (ref 0–50)
ANION GAP SERPL CALCULATED.3IONS-SCNC: 13 MMOL/L (ref 7–15)
AST SERPL W P-5'-P-CCNC: 24 U/L (ref 0–45)
BILIRUB SERPL-MCNC: 0.4 MG/DL
BUN SERPL-MCNC: 12.7 MG/DL (ref 8–23)
CALCIUM SERPL-MCNC: 9.5 MG/DL (ref 8.8–10.2)
CHLORIDE SERPL-SCNC: 104 MMOL/L (ref 98–107)
CREAT SERPL-MCNC: 0.89 MG/DL (ref 0.51–0.95)
DEPRECATED HCO3 PLAS-SCNC: 24 MMOL/L (ref 22–29)
EGFRCR SERPLBLD CKD-EPI 2021: 66 ML/MIN/1.73M2
GLUCOSE SERPL-MCNC: 95 MG/DL (ref 70–99)
POTASSIUM SERPL-SCNC: 5.1 MMOL/L (ref 3.4–5.3)
PROT SERPL-MCNC: 7.3 G/DL (ref 6.4–8.3)
SODIUM SERPL-SCNC: 141 MMOL/L (ref 136–145)

## 2023-09-10 NOTE — RESULT ENCOUNTER NOTE
Potassium and kidney test ( creatinine ) are normal.    Other labs are okay.    Darrell Douglas MD

## 2023-09-11 DIAGNOSIS — R23.4 THINNING OF SKIN: ICD-10-CM

## 2023-09-11 DIAGNOSIS — L81.9 HYPERPIGMENTATION OF SKIN: Primary | ICD-10-CM

## 2023-10-22 DIAGNOSIS — F19.982 DRUG-INDUCED INSOMNIA (H): ICD-10-CM

## 2023-10-23 RX ORDER — ZOLPIDEM TARTRATE 5 MG/1
5 TABLET ORAL
Qty: 30 TABLET | Refills: 0 | Status: SHIPPED | OUTPATIENT
Start: 2023-10-23 | End: 2023-11-27

## 2023-10-23 NOTE — TELEPHONE ENCOUNTER
"Zolpiderm 5mg tab  Last prescribing provider: 8/28/23 by Dr. Alvarez    Last clinic visit date: 8/28/23    Recommendations for requested medication (if none, N/A): 8/28/23, \" Drug Induced Insomnia:  Prescription for ambien was refilled today.\"    Any other pertinent information (if none, N/A): NA    Refilled: Y/N, if NO, why?    "

## 2023-11-27 DIAGNOSIS — F19.982 DRUG-INDUCED INSOMNIA (H): ICD-10-CM

## 2023-11-27 RX ORDER — ZOLPIDEM TARTRATE 5 MG/1
5 TABLET ORAL
Qty: 30 TABLET | Refills: 0 | Status: SHIPPED | OUTPATIENT
Start: 2023-11-27 | End: 2024-01-02

## 2023-11-27 NOTE — TELEPHONE ENCOUNTER
"Zolpiderm 5mg tab  Last prescribing provider: Dr. Perlita Alvarez    Last clinic visit date: 8/28/23    Recommendations for requested medication (if none, N/A): 8/28/23, \" Drug Induced Insomnia:  Prescription for ambien was refilled today.\"    Any other pertinent information (if none, N/A): NA    Refilled: Y/N, if NO, why?    "

## 2023-12-17 NOTE — PROGRESS NOTES
Oncology Follow Up:  Date on this visit: Dec 18, 2023    Diagnosis:  ER positive, HER-2 positive right breast cancer, clinical stage T2N0M0 s/p THP-ddAC, right breast mastectomy ([id number]), SLN biopsy, and 4 months adjuvant HP.  On endocrine therapy.    Primary Physician: Darrell Douglas    History Of Present Illness:  Ms. Zuleta is a 78 year old female with right breast cancer.  Routine screening mammogram in 07/2020 showed right breast asymmetry.  Diagnostic mammograms and ultrasound showed a 2.7 cm mass in the right breast at 12:00, 5 cm from the nipple with ductal extension including a 6 mm mass at 3 cm from the nipple.  Contrast enhanced mammogram showed the right breast mass, including extension, to measure 4.9 cm.  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  Estrogen receptor was moderate in 50% and progesterone receptor staining was negative.  HER-2 was negative by IHC; HER2 FISH showed approximately 10% of tumor cells to have 6.8 HER2 signals/nucleus and a HER2/CEN17 ratio of 1.6.  The HER2 positive cells were noted to be the large pleomorphic cells.  Multiple neutrophils were noted to be infiltrating through tumor stroma.    She received 12 weeks of neoadjuvant Taxol, Herceptin, and pertuzumab from 10/7/2020 - 12/22/2020 and 4 cycles of dose dense adriamycin and cyclophosphamide from 12/29/2020 - 2/9/2021.  She underwent right breast mastectomy and sentinel lymph node biopsy on 3/8/2021.  Pathology showed rare residual tumor cells with therapy related changes in the tumor bed.  Residual tumor is <1% of the tumor bed volume.  There was no lymphovascular invasion and surgical margins were negative.  A single sentinel lymph node was benign.  We discussed starting letrozole at our clinic visit on 3/30/2021, unfortunately she did not receive the prescription and so did not start it.  She was initiated on adjuvant HER2 targeted therapy 4/13/2021.   She received 4 cycles of adjuvant Phesgo, then discontinued due to diarrhea.  She was then on biosimilar trastuzumab (Trazimera) since 7/6/2021, however, continued to have diarrhea refractory to antidiarrheals, therefore all HER2 targeted therapy was abandoned on 8/3/2021. Of note, she received 7 months total HER2 treatment (3 months neoadjuvant + 4 months adjuvant).  She started letrozole on 8/3/2021.  She continued to have diarrhea, which was attributed to letrozole.  On 6/8/2022, treatment was changed to exemestane. This was stopped after a month due to profuse diarrhea again. She has been off of endocrine therapy since that time.     Interval History:    Ms. Zuleta comes into clinic today for routine breast cancer follow up.  In general, she has not had any major changes to her health since her last visit.  She does report she is under increased stress.  Her , unfortunately, has been diagnosed with lung cancer.  He recently completed radiation and has now started cancer treatment infusions.  She reports ongoing neuropathy in her hands and feet.  This interferes with her ability to walk long distances.  She states when she walks short distances, she does not use any aid, however with longer distances she is ambulating with a walker.  She also has difficulty holding onto any types of objects for a long period of time.  She states that when she rests her elbow on a table, she will get some pain in the upper arm as well as tingling in the lower arm and hand.      She has ongoing episodes of diarrhea.  Her diarrhea is characterized as loose stools that come and go.  It tends to be triggered by eating.  She therefore avoids eating if she is going to be heading out of the house.  There are days where she does not have this symptom.  She has no abdominal bloating or increased gassiness.  She reports full range of motion of her bilateral upper extremities.  She denies any concerning lumps or masses of either  the right chest wall or the left breast.  She has no new bone or joint aches or pains.  She denies shortness of breath or chest pain.  She states she continues to have hyperpigmentation, thinning of the skin, and easy scarring.  She has not scheduled an appoint with dermatology as she has been so busy with Leo's appointments that she has been unable to do so.    Past Medical/Surgical History:  1.  Breast cancer as per HPI  2.  Hypertension  3.  Diabetes, diet controlled   4.  Hypothyroidism  5.  Hypohidrosis ectodermal dysplasia    Allergies:  Allergies as of 12/18/2023 - Reviewed 08/28/2023   Allergen Reaction Noted    Bacitracin-neomycin-polymyxin  04/18/2003    Doxycycline  09/20/2022    Latex  09/30/2005     Current Medications:  Current Outpatient Medications   Medication Sig Dispense Refill    amLODIPine (NORVASC) 5 MG tablet Take 1 tablet (5 mg) by mouth daily 90 tablet 1    atorvastatin (LIPITOR) 20 MG tablet Take 1 tablet (20 mg) by mouth daily 90 tablet 3    benazepril (LOTENSIN) 20 MG tablet Take 1 tablet (20 mg) by mouth 2 times daily 180 tablet 1    furosemide (LASIX) 40 MG tablet Take 1 tablet (40 mg) by mouth 2 times daily 180 tablet 1    potassium chloride ER (KLOR-CON M) 20 MEQ CR tablet Take 2 tablets (40 mEq) by mouth 2 times daily 360 tablet 1    triamcinolone (KENALOG) 0.1 % external cream Apply topically 2 times daily as needed for irritation 80 g 0    triamcinolone (KENALOG) 0.1 % external ointment Apply topically 2 times daily as needed for irritation 80 g 0    Vitamin D, Cholecalciferol, 25 MCG (1000 UT) TABS Take 1,000 Units by mouth daily      zolpidem (AMBIEN) 5 MG tablet TAKE 1 TABLET (5 MG) BY MOUTH NIGHTLY AS NEEDED FOR SLEEP 30 tablet 0      Genetics Breast Actionable Panel on 10/1/2020 was negative for mutation.    Physical Exam:  BP (!) 203/88   Pulse 77   Temp 98  F (36.7  C)   Resp 16   Wt 74.4 kg (164 lb)   LMP  (LMP Unknown)   SpO2 100%   BMI 27.29 kg/m    LMP  (LMP  Unknown)   General:  Well appearing adult female in NAD.  Alert and oriented x 3  HEENT:  Normocephalic.  Sclera anicteric.  (+) alopecia, wearing a wig.  Lymph:  No palpable cervical, supraclavicular, or axillary LAD.  Chest:  CTA bilaterally.  No wheezes or crackles.  Left chest port-a-cath remains in place.  CV:  RRR.   Breast:  Right breast mastectomy.  There are no discretely palpable masses of either the right chest wall or within the left breast. Left nipple is everted.  Abd:  Soft/ND  Ext: 1+ pitting edema bilaterally   Neuro:  Cranial nerves grossly intact.  Shuffling gait.  Able to climb on the exam table unaided.  Psych:  Mood and affect appear normal.  Skin: Hyperpigmented, chronically inflamed fibrotic feeling skin with tracks in bilateral axilla. No signs of cellulitis or active drainage.     Laboratory/Imaging Studies:  I personally reviewed the below laboratories:    12/18/2023 Labs:  Electrolytes are wnl.  Kidney and liver function tests are wnl.  a.m. cortisol is wnl.    WBC is wnl.  Hemoglobin is wnl.  Platelets are wnl.  MCV remains elevated, but stable at 102    ASSESSMENT/PLAN:  78 year old female with a h/o hypohidrosis ectodermal dysplasia, HTN, dyslipidemia, COPD, diabetes, and OA with a h/o T2N0M0, right breast cancer, ER positive (50%), [place] negative, and HER2 positive.  She is s/p treatment with 12 weeks THP, 4 cycles ddAC, right breast mastectomy ([id number]), right axillary SLNBx, and 4 months adjuvant HER2 therapy (7 months total HER2 therapy), stopped due to diarrhea.  On endocrine therapy from 8/3/2021 - 07/2022, also stopped due to diarrhea.    1.  Right breast cancer:   Ms. Zuleta is 2 years, 9 months out from excision of a right breast cancer.  She did not tolerate either letrozole or exemestane due to profuse diarrhea on both medications. She continues to have diarrhea off of these medications, but declines restarting AI therapy.  The benefits of aromatase inhibitors in preventing  breast cancer recurrence were again discussed today, she declines to this treatment.  We have previously discussed a trial of tamoxifen, but this is contraindicated given her ongoing smoking.     She is asymptomatic of disease recurrence on history taken today and clinical exam is without concerning findings.    - Return to clinic in 4 months.  - Next mammogram of the left breast will be due around 8/28/2024    2.  Diarrhea: Persistent diarrhea despite stopping all of her adjuvant cancer therapies, it is clear her diarrhea is not due to these medications.  Given postprandial diarrhea, may be due to a food intolerance such as lactose or gluten.  Other conditions such as microscopic colitis should be considered.   - I again recommend GI consultation and colonoscopy with biopsies.  She declines at this time, stating she is too busy with her 's recent cancer diagnosis.    3.  Skin changes:  Thinning, hyperpigmentation, and increased dryness.    - Serum cortisol to screen for adrenal insufficiency was drawn today and is wnl.  - Dermatology referral was previously placed; she did not schedule it.  She declines another referral today.    4.  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  Unfortunately there is no reversal agent.     5.  Stress:  We spent some time discussing her 's cancer diagnosis and the stress this causes her.  She reports a good support system.  Discussed option to participate in cancer support groups.      6.  Bone Health/Osteopenia:  DEXA 8/28/2023 showed a lowest T-score of -1.3 c/w osteopenia.  Zometa 4 mg IV every 6 months x 3 years for prevention of breast cancer bone metastases was initiated on 5/23/2022.  She completed 3 doses and declines further doses.    7. Tobacco cessation: She continues to smoke 5 cigarettes per day.  I again counseled her on smoking cessation.    8.  Follow Up:    Labs and return visit with me in 4 months.

## 2023-12-18 ENCOUNTER — APPOINTMENT (OUTPATIENT)
Dept: LAB | Facility: CLINIC | Age: 78
End: 2023-12-18
Attending: INTERNAL MEDICINE
Payer: COMMERCIAL

## 2023-12-18 ENCOUNTER — ONCOLOGY VISIT (OUTPATIENT)
Dept: ONCOLOGY | Facility: CLINIC | Age: 78
End: 2023-12-18
Attending: INTERNAL MEDICINE
Payer: COMMERCIAL

## 2023-12-18 VITALS
BODY MASS INDEX: 27.29 KG/M2 | TEMPERATURE: 98 F | RESPIRATION RATE: 16 BRPM | SYSTOLIC BLOOD PRESSURE: 203 MMHG | HEART RATE: 77 BPM | WEIGHT: 164 LBS | DIASTOLIC BLOOD PRESSURE: 88 MMHG | OXYGEN SATURATION: 100 %

## 2023-12-18 DIAGNOSIS — F43.9 STRESS: ICD-10-CM

## 2023-12-18 DIAGNOSIS — C50.811 MALIGNANT NEOPLASM OF OVERLAPPING SITES OF RIGHT BREAST IN FEMALE, ESTROGEN RECEPTOR POSITIVE (H): Primary | ICD-10-CM

## 2023-12-18 DIAGNOSIS — L81.9 HYPERPIGMENTATION: ICD-10-CM

## 2023-12-18 DIAGNOSIS — Z92.21 STATUS POST CHEMOTHERAPY: ICD-10-CM

## 2023-12-18 DIAGNOSIS — K90.9 DIARRHEA DUE TO MALABSORPTION: ICD-10-CM

## 2023-12-18 DIAGNOSIS — R42 LIGHTHEADEDNESS: ICD-10-CM

## 2023-12-18 DIAGNOSIS — Z17.0 MALIGNANT NEOPLASM OF OVERLAPPING SITES OF RIGHT BREAST IN FEMALE, ESTROGEN RECEPTOR POSITIVE (H): Primary | ICD-10-CM

## 2023-12-18 DIAGNOSIS — R19.7 DIARRHEA DUE TO MALABSORPTION: ICD-10-CM

## 2023-12-18 LAB
ALBUMIN SERPL BCG-MCNC: 3.9 G/DL (ref 3.5–5.2)
ALP SERPL-CCNC: 57 U/L (ref 40–150)
ALT SERPL W P-5'-P-CCNC: <5 U/L (ref 0–50)
ANION GAP SERPL CALCULATED.3IONS-SCNC: 10 MMOL/L (ref 7–15)
AST SERPL W P-5'-P-CCNC: 20 U/L (ref 0–45)
BASOPHILS # BLD AUTO: 0.1 10E3/UL (ref 0–0.2)
BASOPHILS NFR BLD AUTO: 1 %
BILIRUB SERPL-MCNC: 0.3 MG/DL
BUN SERPL-MCNC: 13.6 MG/DL (ref 8–23)
CALCIUM SERPL-MCNC: 8.9 MG/DL (ref 8.8–10.2)
CHLORIDE SERPL-SCNC: 106 MMOL/L (ref 98–107)
CORTIS SERPL-MCNC: 13.8 UG/DL
CREAT SERPL-MCNC: 0.68 MG/DL (ref 0.51–0.95)
DEPRECATED HCO3 PLAS-SCNC: 26 MMOL/L (ref 22–29)
EGFRCR SERPLBLD CKD-EPI 2021: 88 ML/MIN/1.73M2
EOSINOPHIL # BLD AUTO: 0.1 10E3/UL (ref 0–0.7)
EOSINOPHIL NFR BLD AUTO: 1 %
ERYTHROCYTE [DISTWIDTH] IN BLOOD BY AUTOMATED COUNT: 13.4 % (ref 10–15)
GLUCOSE SERPL-MCNC: 105 MG/DL (ref 70–99)
HCT VFR BLD AUTO: 40.6 % (ref 35–47)
HGB BLD-MCNC: 14 G/DL (ref 11.7–15.7)
IMM GRANULOCYTES # BLD: 0 10E3/UL
IMM GRANULOCYTES NFR BLD: 0 %
LYMPHOCYTES # BLD AUTO: 1.3 10E3/UL (ref 0.8–5.3)
LYMPHOCYTES NFR BLD AUTO: 19 %
MCH RBC QN AUTO: 35.1 PG (ref 26.5–33)
MCHC RBC AUTO-ENTMCNC: 34.5 G/DL (ref 31.5–36.5)
MCV RBC AUTO: 102 FL (ref 78–100)
MONOCYTES # BLD AUTO: 0.5 10E3/UL (ref 0–1.3)
MONOCYTES NFR BLD AUTO: 7 %
NEUTROPHILS # BLD AUTO: 4.8 10E3/UL (ref 1.6–8.3)
NEUTROPHILS NFR BLD AUTO: 72 %
NRBC # BLD AUTO: 0 10E3/UL
NRBC BLD AUTO-RTO: 0 /100
PLATELET # BLD AUTO: 223 10E3/UL (ref 150–450)
POTASSIUM SERPL-SCNC: 3.5 MMOL/L (ref 3.4–5.3)
PROT SERPL-MCNC: 6.7 G/DL (ref 6.4–8.3)
RBC # BLD AUTO: 3.99 10E6/UL (ref 3.8–5.2)
SODIUM SERPL-SCNC: 142 MMOL/L (ref 135–145)
WBC # BLD AUTO: 6.7 10E3/UL (ref 4–11)

## 2023-12-18 PROCEDURE — 80053 COMPREHEN METABOLIC PANEL: CPT | Performed by: INTERNAL MEDICINE

## 2023-12-18 PROCEDURE — 82533 TOTAL CORTISOL: CPT | Performed by: INTERNAL MEDICINE

## 2023-12-18 PROCEDURE — G0463 HOSPITAL OUTPT CLINIC VISIT: HCPCS | Performed by: INTERNAL MEDICINE

## 2023-12-18 PROCEDURE — 85004 AUTOMATED DIFF WBC COUNT: CPT | Performed by: INTERNAL MEDICINE

## 2023-12-18 PROCEDURE — 36591 DRAW BLOOD OFF VENOUS DEVICE: CPT | Performed by: INTERNAL MEDICINE

## 2023-12-18 PROCEDURE — 99214 OFFICE O/P EST MOD 30 MIN: CPT | Performed by: INTERNAL MEDICINE

## 2023-12-18 PROCEDURE — 250N000011 HC RX IP 250 OP 636: Mod: JZ | Performed by: INTERNAL MEDICINE

## 2023-12-18 RX ORDER — HEPARIN SODIUM (PORCINE) LOCK FLUSH IV SOLN 100 UNIT/ML 100 UNIT/ML
5 SOLUTION INTRAVENOUS ONCE
Status: COMPLETED | OUTPATIENT
Start: 2023-12-18 | End: 2023-12-18

## 2023-12-18 RX ADMIN — Medication 5 ML: at 09:23

## 2023-12-18 ASSESSMENT — PAIN SCALES - GENERAL: PAINLEVEL: MODERATE PAIN (5)

## 2023-12-18 NOTE — NURSING NOTE
Chief Complaint   Patient presents with    Port Draw     Labs collected from port by RN. Vitals taken. Checked in for appointment(s).       Port accessed with 20 gauge 3/4 inch gripper needle by RN, labs collected, line flushed with saline and heparin.  Vitals taken. Pt checked in for appointment(s).     Sumaya Ty RN

## 2023-12-18 NOTE — LETTER
12/18/2023         RE: Talya Zuleta  [address] [id number]        Dear Colleague,    Thank you for referring your patient, Talya Zuleta, to the [facility]. Please see a copy of my visit note below.    Oncology Follow Up:  Date on this visit: Dec 18, 2023    Diagnosis:  ER positive, HER-2 positive right breast cancer, clinical stage T2N0M0 s/p THP-ddAC, right breast mastectomy ([id number]), SLN biopsy, and 4 months adjuvant HP.  On endocrine therapy.    Primary Physician: Darrell Douglas    History Of Present Illness:  Ms. Zuleta is a 78 year old female with right breast cancer.  Routine screening mammogram in 07/2020 showed right breast asymmetry.  Diagnostic mammograms and ultrasound showed a 2.7 cm mass in the right breast at 12:00, 5 cm from the nipple with ductal extension including a 6 mm mass at 3 cm from the nipple.  Contrast enhanced mammogram showed the right breast mass, including extension, to measure 4.9 cm.  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  Estrogen receptor was moderate in 50% and progesterone receptor staining was negative.  HER-2 was negative by IHC; HER2 FISH showed approximately 10% of tumor cells to have 6.8 HER2 signals/nucleus and a HER2/CEN17 ratio of 1.6.  The HER2 positive cells were noted to be the large pleomorphic cells.  Multiple neutrophils were noted to be infiltrating through tumor stroma.    She received 12 weeks of neoadjuvant Taxol, Herceptin, and pertuzumab from 10/7/2020 - 12/22/2020 and 4 cycles of dose dense adriamycin and cyclophosphamide from 12/29/2020 - 2/9/2021.  She underwent right breast mastectomy and sentinel lymph node biopsy on 3/8/2021.  Pathology showed rare residual tumor cells with therapy related changes in the tumor bed.  Residual tumor is <1% of the tumor bed volume.  There was no lymphovascular invasion and surgical  margins were negative.  A single sentinel lymph node was benign.  We discussed starting letrozole at our clinic visit on 3/30/2021, unfortunately she did not receive the prescription and so did not start it.  She was initiated on adjuvant HER2 targeted therapy 4/13/2021.  She received 4 cycles of adjuvant Phesgo, then discontinued due to diarrhea.  She was then on biosimilar trastuzumab (Trazimera) since 7/6/2021, however, continued to have diarrhea refractory to antidiarrheals, therefore all HER2 targeted therapy was abandoned on 8/3/2021. Of note, she received 7 months total HER2 treatment (3 months neoadjuvant + 4 months adjuvant).  She started letrozole on 8/3/2021.  She continued to have diarrhea, which was attributed to letrozole.  On 6/8/2022, treatment was changed to exemestane. This was stopped after a month due to profuse diarrhea again. She has been off of endocrine therapy since that time.     Interval History:    Ms. Zuleta comes into clinic today for routine breast cancer follow up.  In general, she has not had any major changes to her health since her last visit.  She does report she is under increased stress.  Her , unfortunately, has been diagnosed with lung cancer.  He recently completed radiation and has now started cancer treatment infusions.  She reports ongoing neuropathy in her hands and feet.  This interferes with her ability to walk long distances.  She states when she walks short distances, she does not use any aid, however with longer distances she is ambulating with a walker.  She also has difficulty holding onto any types of objects for a long period of time.  She states that when she rests her elbow on a table, she will get some pain in the upper arm as well as tingling in the lower arm and hand.      She has ongoing episodes of diarrhea.  Her diarrhea is characterized as loose stools that come and go.  It tends to be triggered by eating.  She therefore avoids eating if she is  going to be heading out of the house.  There are days where she does not have this symptom.  She has no abdominal bloating or increased gassiness.  She reports full range of motion of her bilateral upper extremities.  She denies any concerning lumps or masses of either the right chest wall or the left breast.  She has no new bone or joint aches or pains.  She denies shortness of breath or chest pain.  She states she continues to have hyperpigmentation, thinning of the skin, and easy scarring.  She has not scheduled an appoint with dermatology as she has been so busy with Leo's appointments that she has been unable to do so.    Past Medical/Surgical History:  1.  Breast cancer as per HPI  2.  Hypertension  3.  Diabetes, diet controlled   4.  Hypothyroidism  5.  Hypohidrosis ectodermal dysplasia    Allergies:  Allergies as of 12/18/2023 - Reviewed 08/28/2023   Allergen Reaction Noted    Bacitracin-neomycin-polymyxin  04/18/2003    Doxycycline  09/20/2022    Latex  09/30/2005     Current Medications:  Current Outpatient Medications   Medication Sig Dispense Refill    amLODIPine (NORVASC) 5 MG tablet Take 1 tablet (5 mg) by mouth daily 90 tablet 1    atorvastatin (LIPITOR) 20 MG tablet Take 1 tablet (20 mg) by mouth daily 90 tablet 3    benazepril (LOTENSIN) 20 MG tablet Take 1 tablet (20 mg) by mouth 2 times daily 180 tablet 1    furosemide (LASIX) 40 MG tablet Take 1 tablet (40 mg) by mouth 2 times daily 180 tablet 1    potassium chloride ER (KLOR-CON M) 20 MEQ CR tablet Take 2 tablets (40 mEq) by mouth 2 times daily 360 tablet 1    triamcinolone (KENALOG) 0.1 % external cream Apply topically 2 times daily as needed for irritation 80 g 0    triamcinolone (KENALOG) 0.1 % external ointment Apply topically 2 times daily as needed for irritation 80 g 0    Vitamin D, Cholecalciferol, 25 MCG (1000 UT) TABS Take 1,000 Units by mouth daily      zolpidem (AMBIEN) 5 MG tablet TAKE 1 TABLET (5 MG) BY MOUTH NIGHTLY AS NEEDED FOR  SLEEP 30 tablet 0      Genetics Breast Actionable Panel on 10/1/2020 was negative for mutation.    Physical Exam:  BP (!) 203/88   Pulse 77   Temp 98  F (36.7  C)   Resp 16   Wt 74.4 kg (164 lb)   LMP  (LMP Unknown)   SpO2 100%   BMI 27.29 kg/m    LMP  (LMP Unknown)   General:  Well appearing adult female in NAD.  Alert and oriented x 3  HEENT:  Normocephalic.  Sclera anicteric.  (+) alopecia, wearing a wig.  Lymph:  No palpable cervical, supraclavicular, or axillary LAD.  Chest:  CTA bilaterally.  No wheezes or crackles.  Left chest port-a-cath remains in place.  CV:  RRR.   Breast:  Right breast mastectomy.  There are no discretely palpable masses of either the right chest wall or within the left breast. Left nipple is everted.  Abd:  Soft/ND  Ext: 1+ pitting edema bilaterally   Neuro:  Cranial nerves grossly intact.  Shuffling gait.  Able to climb on the exam table unaided.  Psych:  Mood and affect appear normal.  Skin: Hyperpigmented, chronically inflamed fibrotic feeling skin with tracks in bilateral axilla. No signs of cellulitis or active drainage.     Laboratory/Imaging Studies:  I personally reviewed the below laboratories:    12/18/2023 Labs:  Electrolytes are wnl.  Kidney and liver function tests are wnl.  a.m. cortisol is wnl.    WBC is wnl.  Hemoglobin is wnl.  Platelets are wnl.  MCV remains elevated, but stable at 102    ASSESSMENT/PLAN:  78 year old female with a h/o hypohidrosis ectodermal dysplasia, HTN, dyslipidemia, COPD, diabetes, and OA with a h/o T2N0M0, right breast cancer, ER positive (50%), [place] negative, and HER2 positive.  She is s/p treatment with 12 weeks THP, 4 cycles ddAC, right breast mastectomy ([id number]), right axillary SLNBx, and 4 months adjuvant HER2 therapy (7 months total HER2 therapy), stopped due to diarrhea.  On endocrine therapy from 8/3/2021 - 07/2022, also stopped due to diarrhea.    1.  Right breast cancer:   Ms. Zuleta is 2 years, 9 months out from excision of a  right breast cancer.  She did not tolerate either letrozole or exemestane due to profuse diarrhea on both medications. She continues to have diarrhea off of these medications, but declines restarting AI therapy.  The benefits of aromatase inhibitors in preventing breast cancer recurrence were again discussed today, she declines to this treatment.  We have previously discussed a trial of tamoxifen, but this is contraindicated given her ongoing smoking.     She is asymptomatic of disease recurrence on history taken today and clinical exam is without concerning findings.    - Return to clinic in 4 months.  - Next mammogram of the left breast will be due around 8/28/2024    2.  Diarrhea: Persistent diarrhea despite stopping all of her adjuvant cancer therapies, it is clear her diarrhea is not due to these medications.  Given postprandial diarrhea, may be due to a food intolerance such as lactose or gluten.  Other conditions such as microscopic colitis should be considered.   - I again recommend GI consultation and colonoscopy with biopsies.  She declines at this time, stating she is too busy with her 's recent cancer diagnosis.    3.  Skin changes:  Thinning, hyperpigmentation, and increased dryness.    - Serum cortisol to screen for adrenal insufficiency was drawn today and is wnl.  - Dermatology referral was previously placed; she did not schedule it.  She declines another referral today.    4.  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  Unfortunately there is no reversal agent.     5.  Stress:  We spent some time discussing her 's cancer diagnosis and the stress this causes her.  She reports a good support system.  Discussed option to participate in cancer support groups.      6.  Bone Health/Osteopenia:  DEXA 8/28/2023 showed a lowest T-score of -1.3 c/w osteopenia.  Zometa 4 mg IV every 6 months x 3 years for prevention of breast cancer bone metastases was initiated on 5/23/2022.   She completed 3 doses and declines further doses.    7. Tobacco cessation: She continues to smoke 5 cigarettes per day.  I again counseled her on smoking cessation.    8.  Follow Up:    Labs and return visit with me in 4 months.      Sincerely,        Perlita Alvarez MD

## 2024-01-01 DIAGNOSIS — F19.982 DRUG-INDUCED INSOMNIA (H): ICD-10-CM

## 2024-01-02 RX ORDER — ZOLPIDEM TARTRATE 5 MG/1
5 TABLET ORAL
Qty: 30 TABLET | Refills: 0 | Status: SHIPPED | OUTPATIENT
Start: 2024-01-02 | End: 2024-02-02

## 2024-01-02 NOTE — TELEPHONE ENCOUNTER
Ambien Refill   Last prescribing provider: Dr Alvarez     Last clinic visit date: 12/18/23 Dr Alvarez     Recommendations for requested medication (if none, N/A): Copied from chart note   12/18/23 Dr Alvarez   zolpidem (AMBIEN) 5 MG tablet TAKE 1 TABLET (5 MG) BY MOUTH NIGHTLY AS NEEDED FOR SLEEP 30 tablet 0     Any other pertinent information (if none, N/A): N/A    Refilled: Y/N, if NO, why?

## 2024-02-01 DIAGNOSIS — F19.982 DRUG-INDUCED INSOMNIA (H): ICD-10-CM

## 2024-02-02 RX ORDER — ZOLPIDEM TARTRATE 5 MG/1
5 TABLET ORAL
Qty: 30 TABLET | Refills: 0 | Status: SHIPPED | OUTPATIENT
Start: 2024-02-02 | End: 2024-03-05

## 2024-03-03 DIAGNOSIS — F19.982 DRUG-INDUCED INSOMNIA (H): ICD-10-CM

## 2024-03-05 RX ORDER — ZOLPIDEM TARTRATE 5 MG/1
5 TABLET ORAL
Qty: 30 TABLET | Refills: 0 | Status: SHIPPED | OUTPATIENT
Start: 2024-03-05 | End: 2024-04-10

## 2024-03-05 NOTE — TELEPHONE ENCOUNTER
Medication:Zolpidem   Last prescribing provider:Dr. bernard  Last clinic visit date: 12/18/2023 Dr. bernard  Recommendations for requested medication:for sleep  Any other pertinent information:Routed to Dr. Bernard

## 2024-03-08 DIAGNOSIS — L30.9 DERMATITIS: ICD-10-CM

## 2024-03-08 RX ORDER — TRIAMCINOLONE ACETONIDE 1 MG/G
CREAM TOPICAL 2 TIMES DAILY PRN
Qty: 80 G | Refills: 0 | Status: SHIPPED | OUTPATIENT
Start: 2024-03-08 | End: 2024-08-02

## 2024-03-08 RX ORDER — TRIAMCINOLONE ACETONIDE 1 MG/G
OINTMENT TOPICAL 2 TIMES DAILY PRN
Qty: 80 G | Refills: 0 | OUTPATIENT
Start: 2024-03-08

## 2024-04-10 DIAGNOSIS — F19.982 DRUG-INDUCED INSOMNIA (H): ICD-10-CM

## 2024-04-10 RX ORDER — ZOLPIDEM TARTRATE 5 MG/1
5 TABLET ORAL
Qty: 30 TABLET | Refills: 0 | Status: SHIPPED | OUTPATIENT
Start: 2024-04-10 | End: 2024-05-13

## 2024-04-16 ENCOUNTER — APPOINTMENT (OUTPATIENT)
Dept: LAB | Facility: CLINIC | Age: 79
End: 2024-04-16
Attending: INTERNAL MEDICINE
Payer: COMMERCIAL

## 2024-04-16 ENCOUNTER — ONCOLOGY VISIT (OUTPATIENT)
Dept: ONCOLOGY | Facility: CLINIC | Age: 79
End: 2024-04-16
Attending: INTERNAL MEDICINE
Payer: COMMERCIAL

## 2024-04-16 VITALS
RESPIRATION RATE: 16 BRPM | HEART RATE: 77 BPM | OXYGEN SATURATION: 97 % | DIASTOLIC BLOOD PRESSURE: 65 MMHG | BODY MASS INDEX: 27.97 KG/M2 | WEIGHT: 168.1 LBS | SYSTOLIC BLOOD PRESSURE: 126 MMHG | TEMPERATURE: 98.4 F

## 2024-04-16 DIAGNOSIS — Z92.21 STATUS POST CHEMOTHERAPY: ICD-10-CM

## 2024-04-16 DIAGNOSIS — Z12.31 VISIT FOR SCREENING MAMMOGRAM: ICD-10-CM

## 2024-04-16 DIAGNOSIS — K52.9 CHRONIC DIARRHEA: ICD-10-CM

## 2024-04-16 DIAGNOSIS — Z17.0 MALIGNANT NEOPLASM OF OVERLAPPING SITES OF RIGHT BREAST IN FEMALE, ESTROGEN RECEPTOR POSITIVE (H): Primary | ICD-10-CM

## 2024-04-16 DIAGNOSIS — Q82.4: ICD-10-CM

## 2024-04-16 DIAGNOSIS — C50.811 MALIGNANT NEOPLASM OF OVERLAPPING SITES OF RIGHT BREAST IN FEMALE, ESTROGEN RECEPTOR POSITIVE (H): Primary | ICD-10-CM

## 2024-04-16 DIAGNOSIS — M51.369 DDD (DEGENERATIVE DISC DISEASE), LUMBAR: ICD-10-CM

## 2024-04-16 DIAGNOSIS — L98.9 SKIN LESION: ICD-10-CM

## 2024-04-16 DIAGNOSIS — M50.30 DDD (DEGENERATIVE DISC DISEASE), CERVICAL: ICD-10-CM

## 2024-04-16 LAB
ALBUMIN SERPL BCG-MCNC: 4 G/DL (ref 3.5–5.2)
ALP SERPL-CCNC: 59 U/L (ref 40–150)
ALT SERPL W P-5'-P-CCNC: 8 U/L (ref 0–50)
ANION GAP SERPL CALCULATED.3IONS-SCNC: 13 MMOL/L (ref 7–15)
AST SERPL W P-5'-P-CCNC: 23 U/L (ref 0–45)
BASOPHILS # BLD AUTO: 0.1 10E3/UL (ref 0–0.2)
BASOPHILS NFR BLD AUTO: 1 %
BILIRUB SERPL-MCNC: 0.4 MG/DL
BUN SERPL-MCNC: 12.9 MG/DL (ref 8–23)
CALCIUM SERPL-MCNC: 8.6 MG/DL (ref 8.8–10.2)
CHLORIDE SERPL-SCNC: 105 MMOL/L (ref 98–107)
CREAT SERPL-MCNC: 0.79 MG/DL (ref 0.51–0.95)
DEPRECATED HCO3 PLAS-SCNC: 23 MMOL/L (ref 22–29)
EGFRCR SERPLBLD CKD-EPI 2021: 76 ML/MIN/1.73M2
EOSINOPHIL # BLD AUTO: 0.1 10E3/UL (ref 0–0.7)
EOSINOPHIL NFR BLD AUTO: 1 %
ERYTHROCYTE [DISTWIDTH] IN BLOOD BY AUTOMATED COUNT: 14.2 % (ref 10–15)
GLUCOSE SERPL-MCNC: 86 MG/DL (ref 70–99)
HCT VFR BLD AUTO: 36.9 % (ref 35–47)
HGB BLD-MCNC: 12.8 G/DL (ref 11.7–15.7)
IMM GRANULOCYTES # BLD: 0 10E3/UL
IMM GRANULOCYTES NFR BLD: 0 %
LYMPHOCYTES # BLD AUTO: 1.5 10E3/UL (ref 0.8–5.3)
LYMPHOCYTES NFR BLD AUTO: 23 %
MCH RBC QN AUTO: 35.5 PG (ref 26.5–33)
MCHC RBC AUTO-ENTMCNC: 34.7 G/DL (ref 31.5–36.5)
MCV RBC AUTO: 102 FL (ref 78–100)
MONOCYTES # BLD AUTO: 0.4 10E3/UL (ref 0–1.3)
MONOCYTES NFR BLD AUTO: 6 %
NEUTROPHILS # BLD AUTO: 4.7 10E3/UL (ref 1.6–8.3)
NEUTROPHILS NFR BLD AUTO: 69 %
NRBC # BLD AUTO: 0 10E3/UL
NRBC BLD AUTO-RTO: 0 /100
PLATELET # BLD AUTO: 245 10E3/UL (ref 150–450)
POTASSIUM SERPL-SCNC: 3.8 MMOL/L (ref 3.4–5.3)
PROT SERPL-MCNC: 7.1 G/DL (ref 6.4–8.3)
RBC # BLD AUTO: 3.61 10E6/UL (ref 3.8–5.2)
SODIUM SERPL-SCNC: 141 MMOL/L (ref 135–145)
WBC # BLD AUTO: 6.8 10E3/UL (ref 4–11)

## 2024-04-16 PROCEDURE — 85025 COMPLETE CBC W/AUTO DIFF WBC: CPT | Performed by: INTERNAL MEDICINE

## 2024-04-16 PROCEDURE — 36591 DRAW BLOOD OFF VENOUS DEVICE: CPT | Performed by: INTERNAL MEDICINE

## 2024-04-16 PROCEDURE — 82247 BILIRUBIN TOTAL: CPT | Performed by: INTERNAL MEDICINE

## 2024-04-16 PROCEDURE — 250N000011 HC RX IP 250 OP 636: Performed by: INTERNAL MEDICINE

## 2024-04-16 PROCEDURE — G0463 HOSPITAL OUTPT CLINIC VISIT: HCPCS | Performed by: INTERNAL MEDICINE

## 2024-04-16 PROCEDURE — 99214 OFFICE O/P EST MOD 30 MIN: CPT | Performed by: INTERNAL MEDICINE

## 2024-04-16 RX ORDER — HEPARIN SODIUM (PORCINE) LOCK FLUSH IV SOLN 100 UNIT/ML 100 UNIT/ML
5 SOLUTION INTRAVENOUS ONCE
Status: COMPLETED | OUTPATIENT
Start: 2024-04-16 | End: 2024-04-16

## 2024-04-16 RX ORDER — HEPARIN SODIUM (PORCINE) LOCK FLUSH IV SOLN 100 UNIT/ML 100 UNIT/ML
500 SOLUTION INTRAVENOUS ONCE
Status: COMPLETED | OUTPATIENT
Start: 2024-04-16 | End: 2024-04-16

## 2024-04-16 RX ADMIN — Medication 5 ML: at 14:31

## 2024-04-16 RX ADMIN — Medication 500 UNITS: at 13:25

## 2024-04-16 ASSESSMENT — PAIN SCALES - GENERAL: PAINLEVEL: SEVERE PAIN (6)

## 2024-04-16 NOTE — NURSING NOTE
Chief Complaint   Patient presents with    Oncology Clinic Visit     Breast CA    Port Flush     Port flushed by RN in lab     No lab orders available to draw. Provider, Perlita Alvarez, offline and not able to be contacted. RNCC, Karmen Valles, contacted, but was unsure if any labs were needed.     Patient's port overdue for a flush. Port accessed with 20 gauge, 3/4 inch, flat needle by RN, line flushed with saline and heparin. Port left accessed for patient's next appointment in clinic in case it is determined that labs are needed. Message sent to clinic staff notifying them that no labs were able to be drawn and that port remains accessed. Vitals taken. Pt checked in for appointment(s).     Cici Figueroa RN

## 2024-04-16 NOTE — PROGRESS NOTES
Oncology Follow Up:  Date on this visit: Apr 16, 2024    Diagnosis:  ER positive, HER-2 positive right breast cancer, clinical stage T2N0M0 s/p THP-ddAC, right breast mastectomy ([id number]), SLN biopsy, 4 months adjuvant HP, 1 year adjuvant endocrine therapy, and 3 doses adjuvant Zometa.  She decided to stop all adjuvant therapy due to diarrhea.    Primary Physician: Darrell Douglas    History Of Present Illness:  Ms. Zuleta is a 78 year old female with right breast cancer.  Routine screening mammogram in 07/2020 showed right breast asymmetry.  Diagnostic mammograms and ultrasound showed a 2.7 cm mass in the right breast at 12:00, 5 cm from the nipple with ductal extension including a 6 mm mass at 3 cm from the nipple.  Contrast enhanced mammogram showed the right breast mass, including extension, to measure 4.9 cm.  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  Estrogen receptor was moderate in 50% and progesterone receptor staining was negative.  HER-2 was negative by IHC; HER2 FISH showed approximately 10% of tumor cells to have 6.8 HER2 signals/nucleus and a HER2/CEN17 ratio of 1.6.  The HER2 positive cells were noted to be the large pleomorphic cells.  Multiple neutrophils were noted to be infiltrating through tumor stroma.    She received 12 weeks of neoadjuvant Taxol, Herceptin, and pertuzumab from 10/7/2020 - 12/22/2020 and 4 cycles of dose dense adriamycin and cyclophosphamide from 12/29/2020 - 2/9/2021.  She underwent right breast mastectomy and sentinel lymph node biopsy on 3/8/2021.  Pathology showed rare residual tumor cells with therapy related changes in the tumor bed.  Residual tumor was <1% of the tumor bed volume.  There was no lymphovascular invasion and surgical margins were negative.  A single sentinel lymph node was benign.  We discussed starting letrozole at our clinic visit on 3/30/2021, unfortunately she did not receive the  prescription and so did not start it.  She was initiated on adjuvant HER2 targeted therapy 4/13/2021.  She received 4 cycles of adjuvant Phesgo, then discontinued due to diarrhea.  She was then on biosimilar trastuzumab (Trazimera) starting 7/6/2021, however, continued to have diarrhea refractory to antidiarrheals, therefore all HER2 targeted therapy was abandoned on 8/3/2021. She received 7 months total HER2 treatment (3 months neoadjuvant + 4 months adjuvant).  She started letrozole on 8/3/2021.  She continued to have diarrhea, which she attributed to letrozole.  On 6/8/2022, treatment was changed to exemestane. This was stopped after a month due to profuse diarrhea again.  She has been off of endocrine therapy since that time.  She received 3 doses of Zometa, then declining further doses due to diarrhea.  Diarrhea has continued despite stopping all of her recommended adjuvant breast cancer therapy.    Interval History:    Ms. Zuleta comes into clinic for routine breast cancer surveillance visit.  In general, her health has been okay over the last couple of months.  She states she has remained very busy.  Her  has completed his lung cancer treatment.  In the last couple of months, they have had multiple family members pass away, some from cancer.  She notes ongoing issues with thin skin and hyperpigmentation.  She hasn't seen dermatology yet, due to how busy they have been.  The other day, while washing dishes, she noted a large scab/skin lesion on her left upper forearm.  She is not sure how long it has been there.  She has been applying topical triamcinolone cream to her axillae (to area of hypohidrosis ectodermal dysplasia) and states she sometimes applies this to the forearms as well.      She denies lumps, pain, or swelling of either the left breast or the right chest wall.  She has chronic neck pain unchanged from prior, noting pain when she turns her head a certain way.  In the last 2-3 months she  notes increased low back pain.  Pain is intermittent and worse with prolonged standing.  It is not bothersome enough for her to take pain medicine.  She has a h/o lower extremity sciatica that resolved after her granddaughter, who is a physical therapist, gave her some exercises.  She has ongoing diarrhea on the mornings.  Onset of diarrhea is after eating.  She has 1-5 loose to watery stools.  No blood in the stool; no abdominal cramping.  No diarrhea later in the day.  She continues to smoke 4-5 cigarettes per day.  She has no cough, shortness of breath or chest pain.    Past Medical/Surgical History:  1.  Breast cancer as per HPI  2.  Hypertension  3.  Diabetes, diet controlled   4.  Hypothyroidism  5.  Hypohidrosis ectodermal dysplasia  6.  Chronic diarrhea  7.  Nicotine dependence    Allergies:  Allergies as of 04/16/2024 - Reviewed 08/28/2023   Allergen Reaction Noted    Bacitracin-neomycin-polymyxin  04/18/2003    Doxycycline  09/20/2022    Latex  09/30/2005     Current Medications:  Current Outpatient Medications   Medication Sig Dispense Refill    amLODIPine (NORVASC) 5 MG tablet Take 1 tablet (5 mg) by mouth daily 90 tablet 1    atorvastatin (LIPITOR) 20 MG tablet Take 1 tablet (20 mg) by mouth daily 90 tablet 3    benazepril (LOTENSIN) 20 MG tablet Take 1 tablet (20 mg) by mouth 2 times daily 180 tablet 1    furosemide (LASIX) 40 MG tablet Take 1 tablet (40 mg) by mouth 2 times daily 180 tablet 1    potassium chloride ER (KLOR-CON M) 20 MEQ CR tablet Take 2 tablets (40 mEq) by mouth 2 times daily 360 tablet 1    triamcinolone (KENALOG) 0.1 % external cream Apply topically 2 times daily as needed for irritation 80 g 0    triamcinolone (KENALOG) 0.1 % external ointment Apply topically 2 times daily as needed for irritation 80 g 0    Vitamin D, Cholecalciferol, 25 MCG (1000 UT) TABS Take 1,000 Units by mouth daily      zolpidem (AMBIEN) 5 MG tablet TAKE 1 TABLET (5 MG) BY MOUTH NIGHTLY AS NEEDED FOR SLEEP 30  tablet 0      Genetics Breast Actionable Panel on 10/1/2020 was negative for mutation.    Physical Exam:  /65 (BP Location: Right arm, Patient Position: Sitting, Cuff Size: Adult Regular)   Pulse 77   Temp 98.4  F (36.9  C) (Oral)   Resp 16   Wt 76.2 kg (168 lb 1.6 oz)   LMP  (LMP Unknown)   SpO2 97%   BMI 27.97 kg/m    General:  Well appearing adult female in NAD.  Alert and oriented x 3  HEENT:  Normocephalic.  Sclera anicteric.  (+) alopecia, wearing a wig.  Lymph:  No palpable cervical, supraclavicular, or axillary LAD.  Chest:  CTA bilaterally.  No wheezes or crackles.  Left chest port-a-cath remains in place.  Prolonged expiratory phase of breathing.  CV:  RRR.  GII/VI GET.  Breast:  Right breast mastectomy.  There are no dominant or discretely palpable masses of either the right chest wall or within the left breast. Left nipple is everted and without discharge.  Abd:  Soft/ND  Ext: 1+ pitting edema bilaterally.  Wearing compression stockings.  Neuro:  Cranial nerves grossly intact.  Shuffling gait, ambulates with a walker.  Requires a hand to climb on the exam table.   Psych:  Mood and affect appear normal.  Skin: Hyperpigmented, chronically inflamed fibrotic feeling skin with tracks in bilateral axilla. Multiple areas with lack of pigment over the forearms.  Eschar with surrounding erythematous skin left upper forearm.    Laboratory/Imaging Studies:  I personally reviewed the below laboratories:    4/16/2024 Labs:  Electrolytes are wnl.  Calcium is low at 8.6 mg/dL  Creatinine is 0.79 mg/dL  GFR is reduced at 76 mL/min  Liver function tests ar wnl.    WBC is wnl  Hemoglobin is wnl.  Platelets are wnl.    ASSESSMENT/PLAN:  78 year old female with a h/o hypohidrosis ectodermal dysplasia, HTN, dyslipidemia, COPD, diabetes, and OA with a h/o T2N0M0, right breast cancer, ER positive (50%), MO negative, and HER2 positive.  She is s/p treatment with 12 weeks THP, 4 cycles ddAC, right breast mastectomy  ([id number]), right axillary SLNBx, and 4 months adjuvant HER2 therapy (7 months total HER2 therapy), stopped due to diarrhea.  On endocrine therapy from 8/3/2021 - 07/2022, also stopped due to diarrhea.    1.  Right breast cancer:   Ms. Zuleta is 3 years, 1 months out from excision of a right breast cancer.  She stopped adjuvant HER2 therapy, endocrine therapy and Zometa, all because she attributed her diarrhea to the medications.  She continues to have diarrhea at this time, but declines further breast cancer treatments.     She is asymptomatic of disease recurrence on history taken today and clinical exam is without concerning findings.    - Next mammogram of the left breast is due around 8/28/2024  - Return to clinic in 6 months.    2.  Chronic Diarrhea: Persistent diarrhea despite stopping all of her adjuvant cancer therapies, it is clear her diarrhea is not due to these medications.  Given postprandial diarrhea that occurs in the morning only, may be due to a food intolerance vs IBS.  Other conditions such as microscopic colitis should be considered.   - I again recommended GI consultation and colonoscopy with biopsies.  She again declined these.    3.  Left forearm skin lesion/Skin changes:  She has significant skin thinning, hypopigmentation in areas of abrasion (postinflammatory hypopigmentation?), hyperpigmentation in other areas, and now a large eschar appearing skin lesion of the left forearm (suspect skin tear or abrasion caused this vs primary skin lesion).  - Dermatology referral was previously placed; she did not schedule it.  She is agreeable to my placing another dermatology referral today and agrees to schedule at [facility].  - Advised to avoid applying triamcinolone cream to the forearms as chronic use can thin the skin.    4.  Chemotherapy induced peripheral neuropathy/balance issues:  Unchanged from prior.  G3.  Unfortunately there is no reversal agent.  Now ambulating with a walker.    5.   Cervical and lumbar spine pain:  Intermittent nature makes bone metastases unlikely.  We reviewed the images of PET/CT performed in 09/2020 which shows significant degenerative disc disease in both of these areas with multiple vertebrae now bone on bone.    - Recommended a course of physical therapy, she will talk to her granddaughter who is a physical therapies.  - Declines spine clinic referral at this time.    6. Personal history of nicotine dependence: She continues to smoke 4-5 cigarettes per day.  She declines stop smoking aides.    7.  Follow Up:    Dermatology referral placed.  Mammogram of the left breast 8/28/2024 or later.  Labs and return visit with me in 6 months.

## 2024-04-16 NOTE — LETTER
4/16/2024         RE: Talya Zuleta  [address] [address]        Dear Colleague,    Thank you for referring your patient, Talya Zuleta, to the [facility]. Please see a copy of my visit note below.    Oncology Follow Up:  Date on this visit: Apr 16, 2024    Diagnosis:  ER positive, HER-2 positive right breast cancer, clinical stage T2N0M0 s/p THP-ddAC, right breast mastectomy ([id number]), SLN biopsy, 4 months adjuvant HP, 1 year adjuvant endocrine therapy, and 3 doses adjuvant Zometa.  She decided to stop all adjuvant therapy due to diarrhea.    Primary Physician: Darrell Douglas    History Of Present Illness:  Ms. Zuleta is a 78 year old female with right breast cancer.  Routine screening mammogram in 07/2020 showed right breast asymmetry.  Diagnostic mammograms and ultrasound showed a 2.7 cm mass in the right breast at 12:00, 5 cm from the nipple with ductal extension including a 6 mm mass at 3 cm from the nipple.  Contrast enhanced mammogram showed the right breast mass, including extension, to measure 4.9 cm.  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  Estrogen receptor was moderate in 50% and progesterone receptor staining was negative.  HER-2 was negative by IHC; HER2 FISH showed approximately 10% of tumor cells to have 6.8 HER2 signals/nucleus and a HER2/CEN17 ratio of 1.6.  The HER2 positive cells were noted to be the large pleomorphic cells.  Multiple neutrophils were noted to be infiltrating through tumor stroma.    She received 12 weeks of neoadjuvant Taxol, Herceptin, and pertuzumab from 10/7/2020 - 12/22/2020 and 4 cycles of dose dense adriamycin and cyclophosphamide from 12/29/2020 - 2/9/2021.  She underwent right breast mastectomy and sentinel lymph node biopsy on 3/8/2021.  Pathology showed rare residual tumor cells with therapy related changes in the tumor bed.   Residual tumor was <1% of the tumor bed volume.  There was no lymphovascular invasion and surgical margins were negative.  A single sentinel lymph node was benign.  We discussed starting letrozole at our clinic visit on 3/30/2021, unfortunately she did not receive the prescription and so did not start it.  She was initiated on adjuvant HER2 targeted therapy 4/13/2021.  She received 4 cycles of adjuvant Phesgo, then discontinued due to diarrhea.  She was then on biosimilar trastuzumab (Trazimera) starting 7/6/2021, however, continued to have diarrhea refractory to antidiarrheals, therefore all HER2 targeted therapy was abandoned on 8/3/2021. She received 7 months total HER2 treatment (3 months neoadjuvant + 4 months adjuvant).  She started letrozole on 8/3/2021.  She continued to have diarrhea, which she attributed to letrozole.  On 6/8/2022, treatment was changed to exemestane. This was stopped after a month due to profuse diarrhea again.  She has been off of endocrine therapy since that time.  She received 3 doses of Zometa, then declining further doses due to diarrhea.  Diarrhea has continued despite stopping all of her recommended adjuvant breast cancer therapy.    Interval History:    Ms. Zuleta comes into clinic for routine breast cancer surveillance visit.  In general, her health has been okay over the last couple of months.  She states she has remained very busy.  Her  has completed his lung cancer treatment.  In the last couple of months, they have had multiple family members pass away, some from cancer.  She notes ongoing issues with thin skin and hyperpigmentation.  She hasn't seen dermatology yet, due to how busy they have been.  The other day, while washing dishes, she noted a large scab/skin lesion on her left upper forearm.  She is not sure how long it has been there.  She has been applying topical triamcinolone cream to her axillae (to area of hypohidrosis ectodermal dysplasia) and states she  sometimes applies this to the forearms as well.      She denies lumps, pain, or swelling of either the left breast or the right chest wall.  She has chronic neck pain unchanged from prior, noting pain when she turns her head a certain way.  In the last 2-3 months she notes increased low back pain.  Pain is intermittent and worse with prolonged standing.  It is not bothersome enough for her to take pain medicine.  She has a h/o lower extremity sciatica that resolved after her granddaughter, who is a physical therapist, gave her some exercises.  She has ongoing diarrhea on the mornings.  Onset of diarrhea is after eating.  She has 1-5 loose to watery stools.  No blood in the stool; no abdominal cramping.  No diarrhea later in the day.  She continues to smoke 4-5 cigarettes per day.  She has no cough, shortness of breath or chest pain.    Past Medical/Surgical History:  1.  Breast cancer as per HPI  2.  Hypertension  3.  Diabetes, diet controlled   4.  Hypothyroidism  5.  Hypohidrosis ectodermal dysplasia  6.  Chronic diarrhea  7.  Nicotine dependence    Allergies:  Allergies as of 04/16/2024 - Reviewed 08/28/2023   Allergen Reaction Noted    Bacitracin-neomycin-polymyxin  04/18/2003    Doxycycline  09/20/2022    Latex  09/30/2005     Current Medications:  Current Outpatient Medications   Medication Sig Dispense Refill    amLODIPine (NORVASC) 5 MG tablet Take 1 tablet (5 mg) by mouth daily 90 tablet 1    atorvastatin (LIPITOR) 20 MG tablet Take 1 tablet (20 mg) by mouth daily 90 tablet 3    benazepril (LOTENSIN) 20 MG tablet Take 1 tablet (20 mg) by mouth 2 times daily 180 tablet 1    furosemide (LASIX) 40 MG tablet Take 1 tablet (40 mg) by mouth 2 times daily 180 tablet 1    potassium chloride ER (KLOR-CON M) 20 MEQ CR tablet Take 2 tablets (40 mEq) by mouth 2 times daily 360 tablet 1    triamcinolone (KENALOG) 0.1 % external cream Apply topically 2 times daily as needed for irritation 80 g 0    triamcinolone  (KENALOG) 0.1 % external ointment Apply topically 2 times daily as needed for irritation 80 g 0    Vitamin D, Cholecalciferol, 25 MCG (1000 UT) TABS Take 1,000 Units by mouth daily      zolpidem (AMBIEN) 5 MG tablet TAKE 1 TABLET (5 MG) BY MOUTH NIGHTLY AS NEEDED FOR SLEEP 30 tablet 0      Genetics Breast Actionable Panel on 10/1/2020 was negative for mutation.    Physical Exam:  /65 (BP Location: Right arm, Patient Position: Sitting, Cuff Size: Adult Regular)   Pulse 77   Temp 98.4  F (36.9  C) (Oral)   Resp 16   Wt 76.2 kg (168 lb 1.6 oz)   LMP  (LMP Unknown)   SpO2 97%   BMI 27.97 kg/m    General:  Well appearing adult female in NAD.  Alert and oriented x 3  HEENT:  Normocephalic.  Sclera anicteric.  (+) alopecia, wearing a wig.  Lymph:  No palpable cervical, supraclavicular, or axillary LAD.  Chest:  CTA bilaterally.  No wheezes or crackles.  Left chest port-a-cath remains in place.  Prolonged expiratory phase of breathing.  CV:  RRR.  GII/VI GET.  Breast:  Right breast mastectomy.  There are no dominant or discretely palpable masses of either the right chest wall or within the left breast. Left nipple is everted and without discharge.  Abd:  Soft/ND  Ext: 1+ pitting edema bilaterally.  Wearing compression stockings.  Neuro:  Cranial nerves grossly intact.  Shuffling gait, ambulates with a walker.  Requires a hand to climb on the exam table.   Psych:  Mood and affect appear normal.  Skin: Hyperpigmented, chronically inflamed fibrotic feeling skin with tracks in bilateral axilla. Multiple areas with lack of pigment over the forearms.  Eschar with surrounding erythematous skin left upper forearm.    Laboratory/Imaging Studies:  I personally reviewed the below laboratories:    4/16/2024 Labs:  Electrolytes are wnl.  Calcium is low at 8.6 mg/dL  Creatinine is 0.79 mg/dL  GFR is reduced at 76 mL/min  Liver function tests ar wnl.    WBC is wnl  Hemoglobin is wnl.  Platelets are  lalo.    ASSESSMENT/PLAN:  78 year old female with a h/o hypohidrosis ectodermal dysplasia, HTN, dyslipidemia, COPD, diabetes, and OA with a h/o T2N0M0, right breast cancer, ER positive (50%), NC negative, and HER2 positive.  She is s/p treatment with 12 weeks THP, 4 cycles ddAC, right breast mastectomy ([id number]), right axillary SLNBx, and 4 months adjuvant HER2 therapy (7 months total HER2 therapy), stopped due to diarrhea.  On endocrine therapy from 8/3/2021 - 07/2022, also stopped due to diarrhea.    1.  Right breast cancer:   Ms. Zuleta is 3 years, 1 months out from excision of a right breast cancer.  She stopped adjuvant HER2 therapy, endocrine therapy and Zometa, all because she attributed her diarrhea to the medications.  She continues to have diarrhea at this time, but declines further breast cancer treatments.     She is asymptomatic of disease recurrence on history taken today and clinical exam is without concerning findings.    - Next mammogram of the left breast is due around 8/28/2024  - Return to clinic in 6 months.    2.  Chronic Diarrhea: Persistent diarrhea despite stopping all of her adjuvant cancer therapies, it is clear her diarrhea is not due to these medications.  Given postprandial diarrhea that occurs in the morning only, may be due to a food intolerance vs IBS.  Other conditions such as microscopic colitis should be considered.   - I again recommended GI consultation and colonoscopy with biopsies.  She again declined these.    3.  Left forearm skin lesion/Skin changes:  She has significant skin thinning, hypopigmentation in areas of abrasion (postinflammatory hypopigmentation?), hyperpigmentation in other areas, and now a large eschar appearing skin lesion of the left forearm (suspect skin tear or abrasion caused this vs primary skin lesion).  - Dermatology referral was previously placed; she did not schedule it.  She is agreeable to my placing another dermatology referral today and agrees  to schedule at [facility].  - Advised to avoid applying triamcinolone cream to the forearms as chronic use can thin the skin.    4.  Chemotherapy induced peripheral neuropathy/balance issues:  Unchanged from prior.  G3.  Unfortunately there is no reversal agent.  Now ambulating with a walker.    5.  Cervical and lumbar spine pain:  Intermittent nature makes bone metastases unlikely.  We reviewed the images of PET/CT performed in 09/2020 which shows significant degenerative disc disease in both of these areas with multiple vertebrae now bone on bone.    - Recommended a course of physical therapy, she will talk to her granddaughter who is a physical therapies.  - Declines spine clinic referral at this time.    6. Personal history of nicotine dependence: She continues to smoke 4-5 cigarettes per day.  She declines stop smoking aides.    7.  Follow Up:    Dermatology referral placed.  Mammogram of the left breast 8/28/2024 or later.  Labs and return visit with me in 6 months.        Perlita Alvarez MD

## 2024-05-01 NOTE — DISCHARGE INSTRUCTIONS
[facility]  Home Care Following Anesthesia  For 24 hours after surgery:  1. Get plenty of rest.  A responsible adult must stay with you for at least 24 hours after you leave the surgery center.  2. Do not drive or use heavy equipment.  If you have weakness or tingling, don't drive or use heavy equipment until this feeling goes away.   3. Do not drink alcohol.   4. Avoid strenuous or risky activities.  Ask for help when climbing stairs.  5. You may feel lightheaded.  IF so, sit for a few minutes before standing.  Have someone help you get up.   6. If you have nausea (feel sick to your stomach): Drink only clear liquids such as apple juice, ginger ale, broth or 7-Up.  Rest may also help.  Be sure to drink enough fluids.  Move to a regular diet as you feel able.   7. You may have a slight fever.  Call the doctor if your fever is over 100 F (37.7 C) (taken under the tongue) or lasts longer than 24 hours.  8. You may have a dry mouth, a sore throat, muscle aches or trouble sleeping. These should go away after 24 hours.  9. Do not make important or legal decisions.     Tips for taking pain medications  To get the best pain relief possible, remember these points:    Take pain medications as directed, before pain becomes severe.    Pain medication can upset your stomach: taking it with food may help.    Constipation is a common side effect of pain medication. Drink plenty of  fluids.    Eat foods high in fiber. Take a stool softener if recommended by your doctor or pharmacist.    Do not drink alcohol, drive or operate machinery while taking pain medications.    Ask about other ways to control pain, such as with heat, ice or relaxation.    Tylenol/Acetaminophen Consumption  To help encourage the safe use of acetaminophen, the makers of TYLENOL  have lowered the maximum daily dose for single-ingredient Extra Strength TYLENOL  (acetaminophen) products sold in the [place] from 8 pills per day  (4,000 mg) to 6 pills per day (3,000 mg). The dosing interval has also changed from 2 pills every 4-6 hours to 2 pills every 6 hours.    If you feel your pain relief is insufficient, you may take Tylenol/Acetaminophen in addition to your narcotic pain medication.     Be careful not to exceed 3,000 mg of Tylenol/Acetaminophen in a 24 hour period from all sources.    If you are taking extra strength Tylenol/acetaminophen (500 mg), the maximum dose is 6 tablets in 24 hours.    If you are taking regular strength acetaminophen (325 mg), the maximum dose is 9 tablets in 24 hours.    Call a doctor for any of the followin. Signs of infection (fever, growing tenderness at the surgery site, a large amount of drainage or bleeding, severe pain, foul-smelling drainage, redness, swelling).  2. It has been over 8 to 10 hours since surgery and you are still not able to urinate (pass water).  3. Headache for over 24 hours.  4. Numbness, tingling or weakness the day after surgery (if you had spinal anesthesia).  5. Signs of Covid-19 infection (temperature over 100 degrees, shortness of breath, cough, loss of taste/smell, generalized body aches, persistent headache, chills, sore throat, nausea/vomiting/diarrhea)  Your doctor is:       Dr. Laci Newsome, [facility]: [phone]               Or dial [phone] and ask for the resident on call for:  [facility]  For emergency care, call the:  [facility]:  [phone] (TTY for hearing impaired: [phone])    Caring for Your Louie-Dent Drain    You have been discharged with a Louie-Dent drainage tube. This tube drains fluid from your incision, helping prevent swelling and reducing the risk for infection. The tube is held in place by a few stitches. The drain will be removed when your doctor determines you no longer need it and when the amount of drainage decreases. The color and amount of fluid varies. Right after surgery, the fluid may be bright  red and may become clearer over time.   Dressing:    Keep the skin around the tube dry.    If you have a dressing, change it every day.   o Wash your hands.  o Remove the old bandage. Do not use scissors-you may accidentally cut the tube.  o Check for any redness, swelling, drainage, or broken stitches. (Call your doctor with any of these findings).  o Wash your hands again.  o Wet a cotton swab (Q-tip) and clean around the incision and the tube site. Use normal saline solution (salt and water) or soap and water. Start at the tube site and move outward in a circular motion.   o Pat dry.  o Put a new bandage on the incision and tube site. Make the bandage large enough to cover the whole incision area.   o Tape the bandage in place.  o Throw out old materials and wash your hands.   Home Care:    Tape the tube to the skin below the bandage. Make sure to keep some slack in the tube to keep it from pulling out.     DO NOT sleep on the same side as the tube.    Secure the tube and bulb inside your clothing with a safety pin. This helps keep the tube from being pulled out.     Keep the bulb compressed at all times, except when you empty it.    Empty your drain at least twice a day. Empty it more often if the drain is full.   o Lift the opening of the drain.  o Drain the fluid into a measuring cup.  o Record the amount of fluid each time you empty. Share the information with your doctor at your follow-up visit.   o Squeeze the bulb with your hands until you hear air coming out of the bulb.  o Close the opening.     Tape plastic wrap over the bandage and tube site when you shower.      Stripping  the tube helps keep blood clots from blocking the tube.                         ONLY DO THIS IF YOUR DOCTOR INSTRUCTED YOU TO DO SO!  o Hold the tubing where it leaves the skin with one hand. This keeps it from pulling on the skin.  o Pinch the tubing with the thumb and first finger of your other hand.   o Slowly and firmly pull  your thumb and first finger down the tube (squeezing the tube between your fingers). Keep squeezing the tube as you run your fingers towards the bulb. If the pulling hurts or feels like it is coming out of the skin, STOP. Begin again more gently.  o Let go of the tubing with both hands. If the tube is still blocked, repeat these steps three or four times. Make sure that the bulb is compressed so it creates suction.  When to call your doctor:    New or increased pain around the tube    Redness, warmth, or swelling around the incision or tube    Drainage that is foul smelling    Vomiting    Fever over 101 F degrees    Fluid leaking around the tube    Incision seems not to be healing    Stitches become loose    The tube falls out    Drainage that changes from light pick to dark red    A sudden increase or decrease in the amount of drainage (over 30 ml).  Your drainage record:  Date Time Bulb 1: Amount of drainage (ml or cc) Bulb 2: Amount of drainage (ml or cc) Notes                                                                                                           01-May-2024 12:52

## 2024-05-12 DIAGNOSIS — F19.982 DRUG-INDUCED INSOMNIA (H): ICD-10-CM

## 2024-05-13 RX ORDER — ZOLPIDEM TARTRATE 5 MG/1
5 TABLET ORAL
Qty: 30 TABLET | Refills: 0 | Status: SHIPPED | OUTPATIENT
Start: 2024-05-13 | End: 2024-06-12

## 2024-05-13 NOTE — TELEPHONE ENCOUNTER
Ambien Refill   Last prescribing provider: Dr Alvarez     Last clinic visit date: 4/16/24 Dr Alvarez     Recommendations for requested medication (if none, N/A): Copied from chart note 4/16/24 Dr Alvarez   zolpidem (AMBIEN) 5 MG tablet TAKE 1 TABLET (5 MG) BY MOUTH NIGHTLY AS NEEDED FOR SLEEP 30 tablet 0     Any other pertinent information (if none, N/A): N/A    Refilled: Y/N, if NO, why?

## 2024-05-24 DIAGNOSIS — M85.851 OSTEOPENIA OF NECK OF RIGHT FEMUR: ICD-10-CM

## 2024-05-24 DIAGNOSIS — Z79.811 LONG TERM (CURRENT) USE OF AROMATASE INHIBITORS: ICD-10-CM

## 2024-05-24 DIAGNOSIS — C50.811 MALIGNANT NEOPLASM OF OVERLAPPING SITES OF RIGHT BREAST IN FEMALE, ESTROGEN RECEPTOR POSITIVE (H): Primary | ICD-10-CM

## 2024-05-24 DIAGNOSIS — Z17.0 MALIGNANT NEOPLASM OF OVERLAPPING SITES OF RIGHT BREAST IN FEMALE, ESTROGEN RECEPTOR POSITIVE (H): Primary | ICD-10-CM

## 2024-05-24 DIAGNOSIS — T50.905A ADVERSE EFFECT OF DRUG, INITIAL ENCOUNTER: ICD-10-CM

## 2024-05-24 RX ORDER — ZOLEDRONIC ACID 0.04 MG/ML
4 INJECTION, SOLUTION INTRAVENOUS ONCE
OUTPATIENT
Start: 2024-05-24 | End: 2024-05-24

## 2024-05-24 RX ORDER — HEPARIN SODIUM,PORCINE 10 UNIT/ML
5 VIAL (ML) INTRAVENOUS
OUTPATIENT
Start: 2024-05-24

## 2024-05-24 RX ORDER — HEPARIN SODIUM (PORCINE) LOCK FLUSH IV SOLN 100 UNIT/ML 100 UNIT/ML
5 SOLUTION INTRAVENOUS
OUTPATIENT
Start: 2024-05-24

## 2024-06-12 DIAGNOSIS — F19.982 DRUG-INDUCED INSOMNIA (H): ICD-10-CM

## 2024-06-12 RX ORDER — ZOLPIDEM TARTRATE 5 MG/1
5 TABLET ORAL
Qty: 30 TABLET | Refills: 0 | Status: SHIPPED | OUTPATIENT
Start: 2024-06-12 | End: 2024-07-17

## 2024-06-12 NOTE — TELEPHONE ENCOUNTER
Pending Prescriptions:                       Disp   Refills    zolpidem (AMBIEN) 5 MG tablet [Pharmacy M*30 tab*0            Sig: TAKE 1 TABLET (5 MG) BY MOUTH NIGHTLY AS NEEDED           FOR SLEEP    Last prescribing provider:     Last clinic visit date: 04/16/24 w/    Recommendations for requested medication (if none, N/A): N/A    Any other pertinent information (if none, N/A): N/A    Refilled: Y/N, if NO, why?

## 2024-06-30 ENCOUNTER — HEALTH MAINTENANCE LETTER (OUTPATIENT)
Age: 79
End: 2024-06-30

## 2024-07-17 DIAGNOSIS — F19.982 DRUG-INDUCED INSOMNIA (H): ICD-10-CM

## 2024-07-17 RX ORDER — ZOLPIDEM TARTRATE 5 MG/1
5 TABLET ORAL
Qty: 30 TABLET | Refills: 0 | Status: SHIPPED | OUTPATIENT
Start: 2024-07-17 | End: 2024-08-23

## 2024-07-17 NOTE — TELEPHONE ENCOUNTER
Pending Prescriptions:                       Disp   Refills    zolpidem (AMBIEN) 5 MG tablet [Pharmacy M*30 tab*0            Sig: TAKE 1 TABLET (5 MG) BY MOUTH NIGHTLY AS NEEDED           FOR SLEEP    Last prescribing provider:     Last clinic visit date: 04/16/24 w/    Recommendations for requested medication (if none, N/A): N/A    Any other pertinent information (if none, N/A): N/A    Refilled: Y/N, if NO, why?     Dysphagia Eval

## 2024-08-02 DIAGNOSIS — R60.0 BILATERAL LOWER EXTREMITY EDEMA: ICD-10-CM

## 2024-08-02 DIAGNOSIS — I10 HYPERTENSION, UNSPECIFIED TYPE: ICD-10-CM

## 2024-08-02 DIAGNOSIS — L30.9 DERMATITIS: ICD-10-CM

## 2024-08-02 RX ORDER — TRIAMCINOLONE ACETONIDE 1 MG/G
CREAM TOPICAL 2 TIMES DAILY PRN
Qty: 80 G | Refills: 0 | Status: SHIPPED | OUTPATIENT
Start: 2024-08-02

## 2024-08-02 RX ORDER — FUROSEMIDE 40 MG
40 TABLET ORAL 2 TIMES DAILY
Qty: 180 TABLET | Refills: 0 | Status: SHIPPED | OUTPATIENT
Start: 2024-08-02

## 2024-08-02 RX ORDER — AMLODIPINE BESYLATE 5 MG/1
5 TABLET ORAL DAILY
Qty: 90 TABLET | Refills: 0 | Status: SHIPPED | OUTPATIENT
Start: 2024-08-02

## 2024-08-02 RX ORDER — BENAZEPRIL HYDROCHLORIDE 20 MG/1
20 TABLET ORAL 2 TIMES DAILY
Qty: 180 TABLET | Refills: 0 | Status: SHIPPED | OUTPATIENT
Start: 2024-08-02

## 2024-08-02 RX ORDER — POTASSIUM CHLORIDE 1500 MG/1
40 TABLET, EXTENDED RELEASE ORAL 2 TIMES DAILY
Qty: 360 TABLET | Refills: 0 | Status: SHIPPED | OUTPATIENT
Start: 2024-08-02

## 2024-08-02 NOTE — TELEPHONE ENCOUNTER
I called and left detailed message to call back and schedule Annual Wellness visit  Tete Garcia CMA

## 2024-08-22 DIAGNOSIS — F19.982 DRUG-INDUCED INSOMNIA (H): ICD-10-CM

## 2024-08-23 RX ORDER — ZOLPIDEM TARTRATE 5 MG/1
5 TABLET ORAL
Qty: 30 TABLET | Refills: 0 | Status: SHIPPED | OUTPATIENT
Start: 2024-08-23 | End: 2024-09-30

## 2024-08-23 NOTE — TELEPHONE ENCOUNTER
Zolpidem 5mg tab  Last prescribing provider: Dr. Perlita Alvarez    Last clinic visit date: 4/16/24    Recommendations for requested medication (if none, N/A): NA    Any other pertinent information (if none, N/A): NA    Refilled: Y/N, if NO, why?

## 2024-09-29 DIAGNOSIS — F19.982 DRUG-INDUCED INSOMNIA (H): ICD-10-CM

## 2024-09-30 RX ORDER — ZOLPIDEM TARTRATE 5 MG/1
5 TABLET ORAL
Qty: 30 TABLET | Refills: 0 | Status: SHIPPED | OUTPATIENT
Start: 2024-09-30

## 2024-09-30 NOTE — TELEPHONE ENCOUNTER
Medication:Zolpidem  Last prescribing provider:Dr. Vail  Last clinic visit date: 4/16/2024 Dr. Alvarez  Recommendations for requested medication:for sleep  Any other pertinent information:routed to last provider Dr. Alvarez

## 2024-10-07 ENCOUNTER — TELEPHONE (OUTPATIENT)
Dept: FAMILY MEDICINE | Facility: CLINIC | Age: 79
End: 2024-10-07
Payer: COMMERCIAL

## 2024-10-07 NOTE — TELEPHONE ENCOUNTER
Patient Quality Outreach    Patient is due for the following:   Breast Cancer Screening - Mammogram  IVD  -  LDL (Fasting)  Physical Annual Wellness Visit    Next Steps:   Schedule a Annual Wellness Visit    Type of outreach:    Sent letter.    Next Steps:  Reach out within 90 days via Phone.    Max number of attempts reached: Yes. Will try again in 90 days if patient still on fail list.    Questions for provider review:    None           Tete Garcia, [facility]  Chart routed to chart closed.

## 2024-10-07 NOTE — LETTER
October 7, 2024    To  Talya Zuleta  [address] [place] [place] [address]    Your team at [facility] cares about your health. We have reviewed your chart and based on our findings; we are making the following recommendations to better manage your health.     You are in particular need of attention regarding the following:     Schedule Annual MAMMOGRAPHY. [facility] scheduling number is [phone] or schedule in MyChart (self referral).  1 in 8 women will develop invasive breast cancer during her lifetime and it is the most common non-skin cancer in [place] Women. EARLY detection, new treatments, and a better understanding of the disease have increased survival rates- the 5 year survival rate in the 1960's was 63% and today it is close to 90%.  If you are under/uninsured, we recommend you contact the [facility]. They offer mammograms at no charge or on a sliding fee charge. You can schedule with them at [phone]. Please have them send us the results.   PREVENTATIVE VISIT: Annual Medicare Wellness:Schedule an Annual Medicare Wellness Exam. Please call your [facility] [place] clinic to set up your appointment.    If you have already completed these items, please contact the clinic via phone or   [facility] so your care team can review and update your records. Thank you for   choosing [facility] for your healthcare needs. For any questions,   concerns, or to schedule an appointment please contact our clinic.    Healthy Regards,      Your [facility] Care Team

## 2024-11-12 ENCOUNTER — ONCOLOGY VISIT (OUTPATIENT)
Dept: ONCOLOGY | Facility: CLINIC | Age: 79
End: 2024-11-12
Attending: INTERNAL MEDICINE
Payer: COMMERCIAL

## 2024-11-12 ENCOUNTER — APPOINTMENT (OUTPATIENT)
Dept: LAB | Facility: CLINIC | Age: 79
End: 2024-11-12
Attending: INTERNAL MEDICINE
Payer: COMMERCIAL

## 2024-11-12 VITALS
HEART RATE: 70 BPM | BODY MASS INDEX: 25.88 KG/M2 | RESPIRATION RATE: 16 BRPM | SYSTOLIC BLOOD PRESSURE: 134 MMHG | TEMPERATURE: 97.6 F | DIASTOLIC BLOOD PRESSURE: 68 MMHG | OXYGEN SATURATION: 96 % | WEIGHT: 155.5 LBS

## 2024-11-12 DIAGNOSIS — G62.0 CHEMOTHERAPY-INDUCED PERIPHERAL NEUROPATHY (H): ICD-10-CM

## 2024-11-12 DIAGNOSIS — K52.9 CHRONIC DIARRHEA: ICD-10-CM

## 2024-11-12 DIAGNOSIS — R73.03 PREDIABETES: ICD-10-CM

## 2024-11-12 DIAGNOSIS — F19.982 DRUG-INDUCED INSOMNIA (H): ICD-10-CM

## 2024-11-12 DIAGNOSIS — T45.1X5A CHEMOTHERAPY-INDUCED PERIPHERAL NEUROPATHY (H): ICD-10-CM

## 2024-11-12 DIAGNOSIS — Z17.0 MALIGNANT NEOPLASM OF OVERLAPPING SITES OF RIGHT BREAST IN FEMALE, ESTROGEN RECEPTOR POSITIVE (H): Primary | ICD-10-CM

## 2024-11-12 DIAGNOSIS — C50.811 MALIGNANT NEOPLASM OF OVERLAPPING SITES OF RIGHT BREAST IN FEMALE, ESTROGEN RECEPTOR POSITIVE (H): Primary | ICD-10-CM

## 2024-11-12 PROCEDURE — 250N000011 HC RX IP 250 OP 636: Performed by: INTERNAL MEDICINE

## 2024-11-12 PROCEDURE — G0463 HOSPITAL OUTPT CLINIC VISIT: HCPCS | Performed by: INTERNAL MEDICINE

## 2024-11-12 RX ORDER — HEPARIN SODIUM (PORCINE) LOCK FLUSH IV SOLN 100 UNIT/ML 100 UNIT/ML
500 SOLUTION INTRAVENOUS ONCE
Status: COMPLETED | OUTPATIENT
Start: 2024-11-12 | End: 2024-11-12

## 2024-11-12 RX ORDER — ZOLPIDEM TARTRATE 5 MG/1
5 TABLET ORAL
Qty: 30 TABLET | Refills: 0 | Status: SHIPPED | OUTPATIENT
Start: 2024-11-12

## 2024-11-12 RX ADMIN — HEPARIN SODIUM (PORCINE) LOCK FLUSH IV SOLN 100 UNIT/ML 500 UNITS: 100 SOLUTION at 09:34

## 2024-11-12 ASSESSMENT — PAIN SCALES - GENERAL: PAINLEVEL_OUTOF10: NO PAIN (0)

## 2024-11-12 NOTE — NURSING NOTE
"Chief Complaint   Patient presents with    Port Draw     Labs drawn via port by RN in lab.  VS taken       Port accessed with 20 gauge 3/4\" Power needle by RN, labs collected, line flushed with saline and heparin.  Vitals taken. Pt checked in for appointment(s).      Ariella Hale RN    "

## 2024-11-12 NOTE — NURSING NOTE
"Oncology Rooming Note    November 12, 2024 9:50 AM   Talya Zuleta is a 79 year old female who presents for:    Chief Complaint   Patient presents with    Port Draw     Labs drawn via port by RN in lab.  VS taken    Oncology Clinic Visit     Breast Cancer     Initial Vitals: /68   Pulse 70   Temp 97.6  F (36.4  C) (Oral)   Resp 16   Wt 70.5 kg (155 lb 8 oz)   LMP  (LMP Unknown)   SpO2 96%   BMI 25.88 kg/m   Estimated body mass index is 25.88 kg/m  as calculated from the following:    Height as of 9/2/22: 1.651 m (5' 5\").    Weight as of this encounter: 70.5 kg (155 lb 8 oz). Body surface area is 1.8 meters squared.  No Pain (0) Comment: Data Unavailable   No LMP recorded (lmp unknown). Patient is postmenopausal.  Allergies reviewed: Yes  Medications reviewed: Yes    Medications: MEDICATION REFILLS NEEDED TODAY. Provider was notified.  Pharmacy name entered into EPIC:    [facility] - [place], [place] - [address]  [id number] - [place], [place] - [address] AT [facility]    Frailty Screening:   Is the patient here for a new oncology consult visit in cancer care? 2. No      Clinical concerns: Needs refills for Ambien, reports that the medication isn't working appropriately and that she is still waking up about four times each night.      Darrick Urias, SAM              "

## 2024-11-12 NOTE — Clinical Note
11/12/2024      Talya Zuleta  [address] [facility] [id number]      Dear Colleague,    Thank you for referring your patient, Talya Zuleta, to the [facility]. Please see a copy of my visit note below.    Oncology Follow Up:  Date on this visit: Nov 12, 2024    Diagnosis:  ER positive, HER-2 positive right breast cancer, clinical stage T2N0M0 s/p THP-ddAC, right breast mastectomy ([id number]), SLN biopsy, 4 months adjuvant HP, 1 year adjuvant endocrine therapy, and 3 doses adjuvant Zometa.  She decided to stop all adjuvant therapy due to diarrhea.    Primary Physician: Darrell Douglas    History Of Present Illness:  Ms. Zuleta is a 79 year old female with right breast cancer.  Routine screening mammogram in 07/2020 showed right breast asymmetry.  Diagnostic mammograms and ultrasound showed a 2.7 cm mass in the right breast at 12:00, 5 cm from the nipple with ductal extension including a 6 mm mass at 3 cm from the nipple.  Contrast enhanced mammogram showed the right breast mass, including extension, to measure 4.9 cm.  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  Estrogen receptor was moderate in 50% and progesterone receptor staining was negative.  HER-2 was negative by IHC; HER2 FISH showed approximately 10% of tumor cells to have 6.8 HER2 signals/nucleus and a HER2/CEN17 ratio of 1.6.  The HER2 positive cells were noted to be the large pleomorphic cells.  Multiple neutrophils were noted to be infiltrating through tumor stroma.    She received 12 weeks of neoadjuvant Taxol, Herceptin, and pertuzumab from 10/7/2020 - 12/22/2020 and 4 cycles of dose dense adriamycin and cyclophosphamide from 12/29/2020 - 2/9/2021.  She underwent right breast mastectomy and sentinel lymph node biopsy on 3/8/2021.  Pathology showed rare residual tumor cells with therapy related changes in the tumor bed.  Residual tumor was  <1% of the tumor bed volume.  There was no lymphovascular invasion and surgical margins were negative.  A single sentinel lymph node was benign.  We discussed starting letrozole at our clinic visit on 3/30/2021, unfortunately she did not receive the prescription and so did not start it.  She was initiated on adjuvant HER2 targeted therapy 4/13/2021.  She received 4 cycles of adjuvant Phesgo, then discontinued due to diarrhea.  She was then on biosimilar trastuzumab (Trazimera) starting 7/6/2021, however, continued to have diarrhea refractory to antidiarrheals, therefore all HER2 targeted therapy was abandoned on 8/3/2021. She received 7 months total HER2 treatment (3 months neoadjuvant + 4 months adjuvant).  She started letrozole on 8/3/2021.  She continued to have diarrhea, which she attributed to letrozole.  On 6/8/2022, treatment was changed to exemestane. This was stopped after a month due to profuse diarrhea again.  She has been off of endocrine therapy since that time.  She received 3 doses of Zometa, then declining further doses due to diarrhea.  Diarrhea has continued despite stopping all of her recommended adjuvant breast cancer therapy.    Interval History:        Past Medical/Surgical History:  1.  Breast cancer as per HPI  2.  Hypertension  3.  Diabetes, diet controlled   4.  Hypothyroidism  5.  Hypohidrosis ectodermal dysplasia  6.  Chronic diarrhea  7.  Nicotine dependence    Allergies:  Allergies as of 11/12/2024 - Reviewed 04/16/2024   Allergen Reaction Noted    Bacitracin-neomycin-polymyxin  04/18/2003    Doxycycline  09/20/2022    Latex  09/30/2005     Current Medications:  Current Outpatient Medications   Medication Sig Dispense Refill    amLODIPine (NORVASC) 5 MG tablet Take 1 tablet (5 mg) by mouth daily +++NEED APPOINTMENT+++ 90 tablet 0    atorvastatin (LIPITOR) 20 MG tablet Take 1 tablet (20 mg) by mouth daily 90 tablet 3    benazepril (LOTENSIN) 20 MG tablet TAKE 1 TABLET (20 MG) BY MOUTH 2  TIMES DAILY 180 tablet 0    furosemide (LASIX) 40 MG tablet TAKE 1 TABLET (40 MG) BY MOUTH 2 TIMES DAILY 180 tablet 0    potassium chloride namrata ER (KLOR-CON M20) 20 MEQ CR tablet TAKE 2 TABLETS (40 MEQ) BY MOUTH 2 TIMES DAILY 360 tablet 0    triamcinolone (KENALOG) 0.1 % external cream APPLY TOPICALLY 2 TIMES DAILY AS NEEDED FOR IRRITATION 80 g 0    triamcinolone (KENALOG) 0.1 % external ointment Apply topically 2 times daily as needed for irritation 80 g 0    Vitamin D, Cholecalciferol, 25 MCG (1000 UT) TABS Take 1,000 Units by mouth daily      zolpidem (AMBIEN) 5 MG tablet TAKE 1 TABLET (5 MG) BY MOUTH NIGHTLY AS NEEDED FOR SLEEP 30 tablet 0      Genetics Breast Actionable Panel on 10/1/2020 was negative for mutation.    Physical Exam:    General:  Well appearing adult female in NAD.  Alert and oriented x 3  HEENT:  Normocephalic.  Sclera anicteric.  (+) alopecia, wearing a wig.  Lymph:  No palpable cervical, supraclavicular, or axillary LAD.  Chest:  CTA bilaterally.  No wheezes or crackles.  Left chest port-a-cath remains in place.  Prolonged expiratory phase of breathing.  CV:  RRR.  GII/VI GET.  Breast:  Right breast mastectomy.  There are no dominant or discretely palpable masses of either the right chest wall or within the left breast. Left nipple is everted and without discharge.  Abd:  Soft/ND  Ext: 1+ pitting edema bilaterally.  Wearing compression stockings.  Neuro:  Cranial nerves grossly intact.  Shuffling gait, ambulates with a walker.  Requires a hand to climb on the exam table.   Psych:  Mood and affect appear normal.  Skin: Hyperpigmented, chronically inflamed fibrotic feeling skin with tracks in bilateral axilla. Multiple areas with lack of pigment over the forearms.  Eschar with surrounding erythematous skin left upper forearm.    Laboratory/Imaging Studies:  I personally reviewed the below laboratories:        ASSESSMENT/PLAN:  79 year old female with a h/o hypohidrosis ectodermal dysplasia,  HTN, dyslipidemia, COPD, diabetes, and OA with a h/o T2N0M0, right breast cancer, ER positive (50%), VA negative, and HER2 positive.  She is s/p treatment with 12 weeks THP, 4 cycles ddAC, right breast mastectomy ([id number]), right axillary SLNBx, and 4 months adjuvant HER2 therapy (7 months total HER2 therapy), stopped due to diarrhea.  On endocrine therapy from 8/3/2021 - 07/2022, also stopped due to diarrhea.    1.  Right breast cancer:   Ms. Zuleta is 3 years, 8 months out from excision of a right breast cancer.  She stopped adjuvant HER2 therapy, endocrine therapy and Zometa, all because she attributed her diarrhea to the medications.  She continued to have diarrhea after stopping these medications, but declines further breast cancer treatments.     She is asymptomatic of disease recurrence on history taken today and clinical exam is without concerning findings.    - She was scheduled for left breast mammogram 8/29/2024, however canceled the appointment as her  was in the ICU at the time.  We will work to reschedule this.  - Return to clinic in 6 months.    2.  Chronic Diarrhea: Persistent diarrhea despite stopping all of her cancer therapies, it is clear her diarrhea is not due to these medications.  Given postprandial diarrhea that occurs in the morning only, may be due to a food intolerance vs IBS.  Other conditions such as microscopic colitis should be considered.   - She has declined recommended GI consultation and colonoscopy with biopsies.    3.  Skin changes:  She has significant skin thinning, hypopigmentation in areas of abrasion (postinflammatory hypopigmentation?), and hyperpigmentation in other areas.  - Dermatology referral was previously placed x 2; she did not schedule it either time.      4.  Chemotherapy induced peripheral neuropathy/balance issues:  Onset and worsening after discontinuation of chemotherapy.  Unchanged from prior.  G3.  Unfortunately there is no reversal agent.  Now  ambulating with a walker.    5.  Cervical and lumbar spine pain:  Prior imaging suggestive of severe degenerative joint disease.   - She has declined referral for both physical therapy and spine clinic.    6. Personal history of nicotine dependence: She continues to smoke 4-5 cigarettes per day.  She declines stop smoking aides.    7.  Follow Up:    Mammogram of the left breast within 1-4 weeks.  Labs and return visit with me in 6 months.        Oncology Follow Up:  Date on this visit: Nov 12, 2024    Diagnosis:  ER positive, HER-2 positive right breast cancer, clinical stage T2N0M0 s/p THP-ddAC, right breast mastectomy ([id number]), SLN biopsy, 4 months adjuvant HP, 1 year adjuvant endocrine therapy, and 3 doses adjuvant Zometa.  She decided to stop all adjuvant therapy due to diarrhea.    Primary Physician: Darrell Douglas    History Of Present Illness:  Ms. Zuleta is a 79 year old female with right breast cancer.  Routine screening mammogram in 07/2020 showed right breast asymmetry.  Diagnostic mammograms and ultrasound showed a 2.7 cm mass in the right breast at 12:00, 5 cm from the nipple with ductal extension including a 6 mm mass at 3 cm from the nipple.  Contrast enhanced mammogram showed the right breast mass, including extension, to measure 4.9 cm.  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  Estrogen receptor was moderate in 50% and progesterone receptor staining was negative.  HER-2 was negative by IHC; HER2 FISH showed approximately 10% of tumor cells to have 6.8 HER2 signals/nucleus and a HER2/CEN17 ratio of 1.6.  The HER2 positive cells were noted to be the large pleomorphic cells.  Multiple neutrophils were noted to be infiltrating through tumor stroma.    She received 12 weeks of neoadjuvant Taxol, Herceptin, and pertuzumab from 10/7/2020 - 12/22/2020 and 4 cycles of dose dense adriamycin and cyclophosphamide from 12/29/2020 -  2/9/2021.  She underwent right breast mastectomy and sentinel lymph node biopsy on 3/8/2021.  Pathology showed rare residual tumor cells with therapy related changes in the tumor bed.  Residual tumor was <1% of the tumor bed volume.  There was no lymphovascular invasion and surgical margins were negative.  A single sentinel lymph node was benign.  We discussed starting letrozole at our clinic visit on 3/30/2021, unfortunately she did not receive the prescription and so did not start it.  She was initiated on adjuvant HER2 targeted therapy 4/13/2021.  She received 4 cycles of adjuvant Phesgo, then discontinued due to diarrhea.  She was then on biosimilar trastuzumab (Trazimera) starting 7/6/2021, however, continued to have diarrhea refractory to antidiarrheals, therefore all HER2 targeted therapy was abandoned on 8/3/2021. She received 7 months total HER2 treatment (3 months neoadjuvant + 4 months adjuvant).  She started letrozole on 8/3/2021.  She continued to have diarrhea, which she attributed to letrozole.  On 6/8/2022, treatment was changed to exemestane. This was stopped after a month due to profuse diarrhea again.  She has been off of endocrine therapy since that time.  She received 3 doses of Zometa, then declining further doses due to diarrhea.  Diarrhea has continued despite stopping all of her recommended adjuvant breast cancer therapy.    Interval History:        Past Medical/Surgical History:  1.  Breast cancer as per HPI  2.  Hypertension  3.  Diabetes, diet controlled   4.  Hypothyroidism  5.  Hypohidrosis ectodermal dysplasia  6.  Chronic diarrhea  7.  Nicotine dependence    Allergies:  Allergies as of 11/12/2024 - Reviewed 04/16/2024   Allergen Reaction Noted     Bacitracin-neomycin-polymyxin  04/18/2003     Doxycycline  09/20/2022     Latex  09/30/2005     Current Medications:  Current Outpatient Medications   Medication Sig Dispense Refill     amLODIPine (NORVASC) 5 MG tablet Take 1 tablet (5 mg)  by mouth daily +++NEED APPOINTMENT+++ 90 tablet 0     atorvastatin (LIPITOR) 20 MG tablet Take 1 tablet (20 mg) by mouth daily 90 tablet 3     benazepril (LOTENSIN) 20 MG tablet TAKE 1 TABLET (20 MG) BY MOUTH 2 TIMES DAILY 180 tablet 0     furosemide (LASIX) 40 MG tablet TAKE 1 TABLET (40 MG) BY MOUTH 2 TIMES DAILY 180 tablet 0     potassium chloride namrata ER (KLOR-CON M20) 20 MEQ CR tablet TAKE 2 TABLETS (40 MEQ) BY MOUTH 2 TIMES DAILY 360 tablet 0     triamcinolone (KENALOG) 0.1 % external cream APPLY TOPICALLY 2 TIMES DAILY AS NEEDED FOR IRRITATION 80 g 0     triamcinolone (KENALOG) 0.1 % external ointment Apply topically 2 times daily as needed for irritation 80 g 0     Vitamin D, Cholecalciferol, 25 MCG (1000 UT) TABS Take 1,000 Units by mouth daily       zolpidem (AMBIEN) 5 MG tablet TAKE 1 TABLET (5 MG) BY MOUTH NIGHTLY AS NEEDED FOR SLEEP 30 tablet 0      Genetics Breast Actionable Panel on 10/1/2020 was negative for mutation.    Physical Exam:    General:  Well appearing adult female in NAD.  Alert and oriented x 3  HEENT:  Normocephalic.  Sclera anicteric.  (+) alopecia, wearing a wig.  Lymph:  No palpable cervical, supraclavicular, or axillary LAD.  Chest:  CTA bilaterally.  No wheezes or crackles.  Left chest port-a-cath remains in place.  Prolonged expiratory phase of breathing.  CV:  RRR.  GII/VI GET.  Breast:  Right breast mastectomy.  There are no dominant or discretely palpable masses of either the right chest wall or within the left breast. Left nipple is everted and without discharge.  Abd:  Soft/ND  Ext: 1+ pitting edema bilaterally.  Wearing compression stockings.  Neuro:  Cranial nerves grossly intact.  Shuffling gait, ambulates with a walker.  Requires a hand to climb on the exam table.   Psych:  Mood and affect appear normal.  Skin: Hyperpigmented, chronically inflamed fibrotic feeling skin with tracks in bilateral axilla. Multiple areas with lack of pigment over the forearms.  Eschar with  surrounding erythematous skin left upper forearm.    Laboratory/Imaging Studies:  I personally reviewed the below laboratories:    11/12/2024 Labs:  WBC is wnl.  Hemoglobin is wnl; MCV is elevated at 101  Platelets are wnl.        ASSESSMENT/PLAN:  79 year old female with a h/o hypohidrosis ectodermal dysplasia, HTN, dyslipidemia, COPD, diabetes, and OA with a h/o T2N0M0, right breast cancer, ER positive (50%), AL negative, and HER2 positive.  She is s/p treatment with 12 weeks THP, 4 cycles ddAC, right breast mastectomy ([id number]), right axillary SLNBx, and 4 months adjuvant HER2 therapy (7 months total HER2 therapy), stopped due to diarrhea.  On endocrine therapy from 8/3/2021 - 07/2022, also stopped due to diarrhea.    1.  Right breast cancer:   Ms. Zuleta is 3 years, 8 months out from excision of a right breast cancer.  She stopped adjuvant HER2 therapy, endocrine therapy and Zometa, all because she attributed her diarrhea to the medications.  She continued to have diarrhea after stopping these medications, but declines further breast cancer treatments.     She is asymptomatic of disease recurrence on history taken today and clinical exam is without concerning findings.    - She was scheduled for left breast mammogram 8/29/2024, however canceled the appointment as her  was in the ICU at the time.  We will work to reschedule this.  - Return to clinic in 6 months.    2.  Chronic Diarrhea: Persistent diarrhea despite stopping all of her cancer therapies, it is clear her diarrhea is not due to these medications.  Given postprandial diarrhea that occurs in the morning only, may be due to a food intolerance vs IBS.  Other conditions such as microscopic colitis should be considered.   - She has declined recommended GI consultation and colonoscopy with biopsies.    3.  Skin changes:  She has significant skin thinning, hypopigmentation in areas of abrasion (postinflammatory hypopigmentation?), and  hyperpigmentation in other areas.  - Dermatology referral was previously placed x 2; she did not schedule it either time.      4.  Chemotherapy induced peripheral neuropathy/balance issues:  Onset and worsening after discontinuation of chemotherapy.  Unchanged from prior.  G3.  Unfortunately there is no reversal agent.  Now ambulating with a walker.    5.  Cervical and lumbar spine pain:  Prior imaging suggestive of severe degenerative joint disease.   - She has declined referral for both physical therapy and spine clinic.    6. Personal history of nicotine dependence: She continues to smoke 4-5 cigarettes per day.  She declines stop smoking aides.    7.  Follow Up:    Mammogram of the left breast within 1-4 weeks.  Labs and return visit with me in 6 months.          Again, thank you for allowing me to participate in the care of your patient.        Sincerely,        Perlita Alvarez MD

## 2024-11-12 NOTE — PROGRESS NOTES
Oncology Follow Up:  Date on this visit: Nov 12, 2024    Diagnosis:  ER positive, HER-2 positive right breast cancer, clinical stage T2N0M0 s/p THP-ddAC, right breast mastectomy ([id number]), SLN biopsy, 4 months adjuvant HP, 1 year adjuvant endocrine therapy, and 3 doses adjuvant Zometa.  She decided to stop all adjuvant therapy due to diarrhea.    Primary Physician: Darrell Douglas    History Of Present Illness:  Ms. Zuleta is a 79 year old female with right breast cancer.  Routine screening mammogram in 07/2020 showed right breast asymmetry.  Diagnostic mammograms and ultrasound showed a 2.7 cm mass in the right breast at 12:00, 5 cm from the nipple with ductal extension including a 6 mm mass at 3 cm from the nipple.  Contrast enhanced mammogram showed the right breast mass, including extension, to measure 4.9 cm.  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  Estrogen receptor was moderate in 50% and progesterone receptor staining was negative.  HER-2 was negative by IHC; HER2 FISH showed approximately 10% of tumor cells to have 6.8 HER2 signals/nucleus and a HER2/CEN17 ratio of 1.6.  The HER2 positive cells were noted to be the large pleomorphic cells.  Multiple neutrophils were noted to be infiltrating through tumor stroma.    She received 12 weeks of neoadjuvant Taxol, Herceptin, and pertuzumab from 10/7/2020 - 12/22/2020 and 4 cycles of dose dense adriamycin and cyclophosphamide from 12/29/2020 - 2/9/2021.  She underwent right breast mastectomy and sentinel lymph node biopsy on 3/8/2021.  Pathology showed rare residual tumor cells with therapy related changes in the tumor bed.  Residual tumor was <1% of the tumor bed volume.  There was no lymphovascular invasion and surgical margins were negative.  A single sentinel lymph node was benign.  We discussed starting letrozole at our clinic visit on 3/30/2021, unfortunately she did not receive the  prescription and so did not start it.  She was initiated on adjuvant HER2 targeted therapy 2021.  She received 4 cycles of adjuvant Phesgo, then discontinued due to diarrhea.  She was then on biosimilar trastuzumab (Trazimera) starting 2021, however, continued to have diarrhea refractory to antidiarrheals, therefore all HER2 targeted therapy was abandoned on 8/3/2021. She received 7 months total HER2 treatment (3 months neoadjuvant + 4 months adjuvant).  She started letrozole on 8/3/2021.  She continued to have diarrhea, which she attributed to letrozole.  On 2022, treatment was changed to exemestane. This was stopped after a month due to profuse diarrhea again.  She has been off of endocrine therapy since that time.  She received 3 doses of Zometa, then declining further doses due to diarrhea.  Diarrhea has continued despite stopping all of her recommended adjuvant breast cancer therapy.    Interval History:    Ms. Wan comes in the clinic today for routine breast cancer surveillance visit.  She informs me today that her spouse  on October 3.  She states that he had a rare form of lung cancer that metastasized to his bowel and ultimately he  of a GI perforation.  She feels that she has had good support and her grieving.  She has a son and daughter-in-law who have been very involved and helpful.  Overall, her health has been okay.  She feels that she has let less bothersome skin changes than she did at her last visit.  She still has thin skin that easily tears and bruises.  There are still areas of hyperpigmentation, however she is no longer getting large eschar type lesions.  She has persistent diarrhea.  This seems to be worse in the morning after she takes her a.m. pills.  She does have it at other times of the day after eating.  She has not had any recent incontinent episodes.  She denies significant abdominal cramping prior to the stools.  She states she previously was having black  stools, but this is now resolved.  She notes that she was previously taking iron and is no longer taking iron.  She has chronic cough and shortness of breath that is unchanged from previous.  She denies new bone or joint aches or pains.  She denies concerning lumps, swelling, or pain of either the right chest wall or the left breast.  Her daughter-in-law notes that she has a history of prediabetes and has not had any follow-up for this in some time.  She also notes that Manish recently rolled her ankle when going down the front stairs to get her mail.  She is wondering if she can get a letter so that they will deliver the mail to her doorstep.    Past Medical/Surgical History:  1.  Breast cancer as per HPI  2.  Hypertension  3.  Diabetes, diet controlled   4.  Hypothyroidism  5.  Hypohidrosis ectodermal dysplasia  6.  Chronic diarrhea  7.  Nicotine dependence    Allergies:  Allergies as of 11/12/2024 - Reviewed 04/16/2024   Allergen Reaction Noted    Bacitracin-neomycin-polymyxin  04/18/2003    Doxycycline  09/20/2022    Latex  09/30/2005     Current Medications:  Current Outpatient Medications   Medication Sig Dispense Refill    amLODIPine (NORVASC) 5 MG tablet Take 1 tablet (5 mg) by mouth daily +++NEED APPOINTMENT+++ 90 tablet 0    atorvastatin (LIPITOR) 20 MG tablet Take 1 tablet (20 mg) by mouth daily 90 tablet 3    benazepril (LOTENSIN) 20 MG tablet TAKE 1 TABLET (20 MG) BY MOUTH 2 TIMES DAILY 180 tablet 0    furosemide (LASIX) 40 MG tablet TAKE 1 TABLET (40 MG) BY MOUTH 2 TIMES DAILY 180 tablet 0    potassium chloride namrata ER (KLOR-CON M20) 20 MEQ CR tablet TAKE 2 TABLETS (40 MEQ) BY MOUTH 2 TIMES DAILY 360 tablet 0    triamcinolone (KENALOG) 0.1 % external cream APPLY TOPICALLY 2 TIMES DAILY AS NEEDED FOR IRRITATION 80 g 0    triamcinolone (KENALOG) 0.1 % external ointment Apply topically 2 times daily as needed for irritation 80 g 0    Vitamin D, Cholecalciferol, 25 MCG (1000 UT) TABS Take 1,000 Units by  mouth daily      zolpidem (AMBIEN) 5 MG tablet TAKE 1 TABLET (5 MG) BY MOUTH NIGHTLY AS NEEDED FOR SLEEP 30 tablet 0      Genetics Breast Actionable Panel on 10/1/2020 was negative for mutation.    Physical Exam:    General:  Well appearing adult female in NAD.  Alert and oriented x 3  HEENT:  Normocephalic.  Sclera anicteric.  (+) alopecia, wearing a wig.  Lymph:  No palpable cervical, supraclavicular, or axillary LAD.  Chest:  CTA bilaterally.  No wheezes or crackles.  Left chest port-a-cath remains in place.  Prolonged expiratory phase of breathing.  CV:  RRR.  GII/VI GET.  Breast:  Right breast mastectomy.  There are no dominant or discretely palpable masses of either the right chest wall or within the left breast. Left nipple is everted and without discharge.  Abd:  Soft/ND  Ext: 1+ pitting edema bilaterally.  Wearing compression stockings.  Neuro:  Cranial nerves grossly intact.  Shuffling gait, ambulates with a walker.  Requires a hand to climb on the exam table.   Psych:  Mood and affect appear normal.  Skin: Hyperpigmented, chronically inflamed fibrotic feeling skin with tracks in bilateral axilla. Multiple areas with lack of pigment over the forearms.  Eschar with surrounding erythematous skin left upper forearm.    Laboratory/Imaging Studies:  I personally reviewed the below laboratories:    11/12/2024 Labs:  WBC is wnl.  Hemoglobin is wnl; MCV is elevated at 101  Platelets are wnl.        ASSESSMENT/PLAN:  79 year old female with a h/o hypohidrosis ectodermal dysplasia, HTN, dyslipidemia, COPD, diabetes, and OA with a h/o T2N0M0, right breast cancer, ER positive (50%), WA negative, and HER2 positive.  She is s/p treatment with 12 weeks THP, 4 cycles ddAC, right breast mastectomy ([id number]), right axillary SLNBx, and 4 months adjuvant HER2 therapy (7 months total HER2 therapy), stopped due to diarrhea.  On endocrine therapy from 8/3/2021 - 07/2022, also stopped due to diarrhea.    1.  Right breast cancer:    Ms. Zuleta is 3 years, 8 months out from excision of a right breast cancer.  She stopped adjuvant HER2 therapy, endocrine therapy and Zometa, all because she attributed her diarrhea to the medications.  She continued to have diarrhea after stopping these medications, but declines further breast cancer treatments.     She is asymptomatic of disease recurrence on history taken today and clinical exam is without concerning findings.    - She was scheduled for left breast mammogram 8/29/2024, however canceled the appointment as her  was in the ICU at the time.  We will work to reschedule this.  - Return to clinic in 6 months.    2.  Chronic Diarrhea: Persistent diarrhea despite stopping all of her cancer therapies, it is clear her diarrhea is not due to these medications.  Given postprandial diarrhea that occurs in the morning only, may be due to a food intolerance vs IBS.  Other conditions such as microscopic colitis should be considered.   - She has declined recommended GI consultation and colonoscopy with biopsies.    3.  Skin changes:  She has significant skin thinning, hypopigmentation in areas of abrasion (postinflammatory hypopigmentation?), and hyperpigmentation in other areas.  - Dermatology referral was previously placed x 2; she did not schedule it either time.      4.  Chemotherapy induced peripheral neuropathy/balance issues:  Onset and worsening after discontinuation of chemotherapy.  Unchanged from prior.  G3.  Unfortunately there is no reversal agent.  Now ambulating with a walker.    5.  Cervical and lumbar spine pain:  Prior imaging suggestive of severe degenerative joint disease.   - She has declined referral for both physical therapy and spine clinic.    6. Personal history of nicotine dependence: She continues to smoke 4-5 cigarettes per day.  She declines stop smoking aides.    7.  Follow Up:    Mammogram of the left breast within 1-4 weeks.  Labs and return visit with me in 6  months.

## 2024-11-12 NOTE — PATIENT INSTRUCTIONS
Needs letter to have mail put between the screen door and the door due to peripheral neuropathy increasing risk of falls.

## 2024-11-14 ENCOUNTER — ANCILLARY PROCEDURE (OUTPATIENT)
Dept: MAMMOGRAPHY | Facility: CLINIC | Age: 79
End: 2024-11-14
Attending: INTERNAL MEDICINE
Payer: COMMERCIAL

## 2024-11-14 DIAGNOSIS — Z12.31 VISIT FOR SCREENING MAMMOGRAM: ICD-10-CM

## 2024-11-14 PROCEDURE — 77067 SCR MAMMO BI INCL CAD: CPT | Mod: 52 | Performed by: RADIOLOGY

## 2024-11-14 PROCEDURE — 77063 BREAST TOMOSYNTHESIS BI: CPT | Mod: 52 | Performed by: RADIOLOGY

## 2024-11-15 ENCOUNTER — ANCILLARY ORDERS (OUTPATIENT)
Dept: MAMMOGRAPHY | Facility: CLINIC | Age: 79
End: 2024-11-15

## 2024-11-15 DIAGNOSIS — R92.8 ABNORMAL MAMMOGRAM: Primary | ICD-10-CM

## 2024-11-25 ENCOUNTER — ANCILLARY PROCEDURE (OUTPATIENT)
Dept: MAMMOGRAPHY | Facility: CLINIC | Age: 79
End: 2024-11-25
Attending: INTERNAL MEDICINE
Payer: COMMERCIAL

## 2024-11-25 DIAGNOSIS — R92.8 ABNORMAL MAMMOGRAM: ICD-10-CM

## 2024-11-25 PROCEDURE — 77065 DX MAMMO INCL CAD UNI: CPT | Mod: LT

## 2024-11-25 PROCEDURE — G0279 TOMOSYNTHESIS, MAMMO: HCPCS

## 2024-12-16 DIAGNOSIS — F19.982 DRUG-INDUCED INSOMNIA (H): ICD-10-CM

## 2024-12-17 RX ORDER — ZOLPIDEM TARTRATE 5 MG/1
5 TABLET ORAL
Qty: 30 TABLET | Refills: 0 | Status: SHIPPED | OUTPATIENT
Start: 2024-12-17

## 2024-12-17 NOTE — TELEPHONE ENCOUNTER
Ambien Refill  Last prescribing provider: DR Alvarez     Last clinic visit date: 11/12/24 Dr Alvarez     Recommendations for requested medication (if none, N/A): Copied from chart note   6. Insomnia: prescription for ambien refilled today.     Any other pertinent information (if none, N/A): N/A    Refilled: Y/N, if NO, why?

## 2025-01-22 DIAGNOSIS — F19.982 DRUG-INDUCED INSOMNIA (H): ICD-10-CM

## 2025-01-22 RX ORDER — ZOLPIDEM TARTRATE 5 MG/1
5 TABLET ORAL
Qty: 30 TABLET | Refills: 0 | Status: SHIPPED | OUTPATIENT
Start: 2025-01-22

## 2025-01-22 NOTE — TELEPHONE ENCOUNTER
Pending Prescriptions:                       Disp   Refills    zolpidem (AMBIEN) 5 MG tablet [Pharmacy M*30 tab*0            Sig: TAKE 1 TABLET (5 MG) BY MOUTH NIGHTLY AS NEEDED           FOR SLEEP    Last prescribing provider:     Last clinic visit date: 11/12/24 w/    Recommendations for requested medication (if none, N/A): Copied from last OV note: 6. Insomnia: prescription for ambien refilled today.     Any other pertinent information (if none, N/A): N/A    Refilled: Y/N, if NO, why?

## 2025-02-18 SDOH — HEALTH STABILITY: PHYSICAL HEALTH: ON AVERAGE, HOW MANY DAYS PER WEEK DO YOU ENGAGE IN MODERATE TO STRENUOUS EXERCISE (LIKE A BRISK WALK)?: 0 DAYS

## 2025-02-18 SDOH — HEALTH STABILITY: PHYSICAL HEALTH: ON AVERAGE, HOW MANY MINUTES DO YOU ENGAGE IN EXERCISE AT THIS LEVEL?: 0 MIN

## 2025-02-18 ASSESSMENT — SOCIAL DETERMINANTS OF HEALTH (SDOH): HOW OFTEN DO YOU GET TOGETHER WITH FRIENDS OR RELATIVES?: TWICE A WEEK

## 2025-02-20 ENCOUNTER — OFFICE VISIT (OUTPATIENT)
Dept: FAMILY MEDICINE | Facility: CLINIC | Age: 80
End: 2025-02-20
Payer: COMMERCIAL

## 2025-02-20 VITALS
RESPIRATION RATE: 14 BRPM | TEMPERATURE: 97.6 F | BODY MASS INDEX: 26.2 KG/M2 | HEART RATE: 77 BPM | DIASTOLIC BLOOD PRESSURE: 76 MMHG | OXYGEN SATURATION: 99 % | SYSTOLIC BLOOD PRESSURE: 144 MMHG | HEIGHT: 65 IN | WEIGHT: 157.25 LBS

## 2025-02-20 DIAGNOSIS — I10 HYPERTENSION GOAL BP (BLOOD PRESSURE) < 140/90: ICD-10-CM

## 2025-02-20 DIAGNOSIS — F19.982 DRUG-INDUCED INSOMNIA (H): ICD-10-CM

## 2025-02-20 DIAGNOSIS — E55.9 VITAMIN D DEFICIENCY DISEASE: ICD-10-CM

## 2025-02-20 DIAGNOSIS — E78.5 HYPERLIPIDEMIA LDL GOAL <100: ICD-10-CM

## 2025-02-20 DIAGNOSIS — K52.9 CHRONIC DIARRHEA: ICD-10-CM

## 2025-02-20 DIAGNOSIS — I10 HYPERTENSION, UNSPECIFIED TYPE: ICD-10-CM

## 2025-02-20 DIAGNOSIS — Z00.00 ENCOUNTER FOR MEDICARE ANNUAL WELLNESS EXAM: Primary | ICD-10-CM

## 2025-02-20 DIAGNOSIS — R60.0 BILATERAL LOWER EXTREMITY EDEMA: ICD-10-CM

## 2025-02-20 DIAGNOSIS — Z85.3 HISTORY OF BREAST CANCER: ICD-10-CM

## 2025-02-20 DIAGNOSIS — L98.9 SKIN LESIONS: ICD-10-CM

## 2025-02-20 DIAGNOSIS — R53.83 FATIGUE, UNSPECIFIED TYPE: ICD-10-CM

## 2025-02-20 PROBLEM — C50.811 MALIGNANT NEOPLASM OF OVERLAPPING SITES OF RIGHT BREAST IN FEMALE, ESTROGEN RECEPTOR POSITIVE (H): Status: RESOLVED | Noted: 2020-09-21 | Resolved: 2025-02-20

## 2025-02-20 PROBLEM — Z17.0 MALIGNANT NEOPLASM OF OVERLAPPING SITES OF RIGHT BREAST IN FEMALE, ESTROGEN RECEPTOR POSITIVE (H): Status: RESOLVED | Noted: 2020-09-21 | Resolved: 2025-02-20

## 2025-02-20 RX ORDER — ZOLPIDEM TARTRATE 5 MG/1
5 TABLET ORAL
Qty: 30 TABLET | Refills: 0 | Status: SHIPPED | OUTPATIENT
Start: 2025-02-20

## 2025-02-20 RX ORDER — AMLODIPINE BESYLATE 2.5 MG/1
2.5 TABLET ORAL DAILY
Qty: 90 TABLET | Refills: 0 | Status: SHIPPED | OUTPATIENT
Start: 2025-02-20

## 2025-02-20 RX ORDER — ATORVASTATIN CALCIUM 10 MG/1
10 TABLET, FILM COATED ORAL DAILY
Qty: 90 TABLET | Refills: 0 | Status: SHIPPED | OUTPATIENT
Start: 2025-02-20

## 2025-02-20 RX ORDER — POTASSIUM CHLORIDE 1500 MG/1
TABLET, EXTENDED RELEASE ORAL
Qty: 90 TABLET | Refills: 0 | Status: SHIPPED | OUTPATIENT
Start: 2025-02-20

## 2025-02-20 RX ORDER — BENAZEPRIL HYDROCHLORIDE 10 MG/1
10 TABLET ORAL DAILY
Qty: 90 TABLET | Refills: 0 | Status: SHIPPED | OUTPATIENT
Start: 2025-02-20

## 2025-02-20 RX ORDER — FUROSEMIDE 20 MG/1
20 TABLET ORAL DAILY
Qty: 90 TABLET | Refills: 0 | Status: SHIPPED | OUTPATIENT
Start: 2025-02-20

## 2025-02-20 ASSESSMENT — PAIN SCALES - GENERAL: PAINLEVEL_OUTOF10: NO PAIN (0)

## 2025-02-20 NOTE — PROGRESS NOTES
Preventive Care Visit  [facility], [facility]  Feb 20, 2025      Assessment & Plan     (Z00.00) Encounter for Medicare annual wellness exam  (primary encounter diagnosis)  Comment: discussed multiple issues with patient / son and daughter in law.   Plan: she has been taking med sporadically, every 2-3 days.      (I10) Hypertension, unspecified type  Comment: we will lower the doses of meds and have her take these daily.  After a week on the new regimen do fasting lab appointment then see us in 2 months   Plan: as above     (F19.982) Drug-induced insomnia (H)  Comment: needs refill soon; this goes to local pharmacy   Plan: zolpidem (AMBIEN) 5 MG tablet             (I10) Hypertension goal BP (blood pressure) < 140/90  Comment: going to lower doses but take daily   Plan: benazepril (LOTENSIN) 10 MG tablet, amLODIPine         (NORVASC) 2.5 MG tablet        See us in a couple months     (E78.5) Hyperlipidemia LDL goal <100  Comment: decrease this dose, see us in a couple months  Plan: atorvastatin (LIPITOR) 10 MG tablet, Lipid         panel reflex to direct LDL Fasting,         Comprehensive metabolic panel             (R60.0) Bilateral lower extremity edema  Comment: patient has been taking 40 mg lasix every 2-3 days; go to 20 mg daily , along with one kcl pill, check labs a week later   Plan: potassium chloride namrata ER (KLOR-CON M20) 20         MEQ CR tablet, furosemide (LASIX) 20 MG tablet             (E55.9) Vitamin D deficiency disease  Comment: on over the counter vit D  Plan: Vitamin D Deficiency        Check level     (R53.83) Fatigue, unspecified type  Comment: check   Plan: TSH with free T4 reflex             (K52.9) Chronic diarrhea  Comment:  agree with oncology that colonoscopy is reasonable  Plan: Adult GI  Referral - Procedure Only        They can schedule    (L98.9) Skin lesions  Comment: patient/ family to schedule dermatology consult; has cousin  "with bad skin cancer   Plan: Adult Dermatology  Referral             (Z85.3) History of breast cancer  Comment: sees onc every 6 months   Plan: as above             Nicotine/Tobacco Cessation  She reports that she has been smoking cigarettes. She has a 16.5 pack-year smoking history. She has been exposed to tobacco smoke. She has never used smokeless tobacco.  Nicotine/Tobacco Cessation Plan  Information offered: Patient not interested at this time      BMI  Estimated body mass index is 26.17 kg/m  as calculated from the following:    Height as of this encounter: 1.651 m (5' 5\").    Weight as of this encounter: 71.3 kg (157 lb 4 oz).   Weight management plan: Discussed healthy diet and exercise guidelines    Counseling  Appropriate preventive services were addressed with this patient via screening, questionnaire, or discussion as appropriate for fall prevention, nutrition, physical activity, Tobacco-use cessation, social engagement, weight loss and cognition.  Checklist reviewing preventive services available has been given to the patient.  Reviewed patient's diet, addressing concerns and/or questions.   She is at risk for psychosocial distress and has been provided with information to reduce risk.   Updated plan of care.  Patient reported difficulty with activities of daily living were addressed today.         Beatriz Babin is a 79 year old, presenting for the following:  Wellness Visit (Fasting)        2025    10:30 AM   Additional Questions   Roomed by Tete Garcia   Accompanied by Family           HPI  Takes pills about every other day      in October    Wondered about colonoscopy     Taking furosemide about once every 3 days     Zolpidem almost every night    No naps during day    4-5 hours of sleep    Energy level okay    Getting ready to sell house     Looking at places to move, hopefully senior apartment    Going through stuff    No feeling in hands/ feet, same     4 years " ago breast ca    Neuropathy from cancer treatment    No acid symptoms     Smoking 5-6 cigarettes daily           Health Care Directive  Patient does not have a Health Care Directive: Patient states has Advance Directive and will bring in a copy to clinic.      2/18/2025   General Health   How would you rate your overall physical health? Good   Feel stress (tense, anxious, or unable to sleep) To some extent   (!) STRESS CONCERN      2/18/2025   Nutrition   Diet: Regular (no restrictions)    Low salt    Low fat/cholesterol    Carbohydrate counting    I don't know       Multiple values from one day are sorted in reverse-chronological order         2/18/2025   Exercise   Days per week of moderate/strenous exercise 0 days   Average minutes spent exercising at this level 0 min   (!) EXERCISE CONCERN      2/18/2025   Social Factors   Frequency of gathering with friends or relatives Twice a week   Worry food won't last until get money to buy more No   Food not last or not have enough money for food? No   Do you have housing? (Housing is defined as stable permanent housing and does not include staying ouside in a car, in a tent, in an abandoned building, in an overnight shelter, or couch-surfing.) Yes   Are you worried about losing your housing? No   Lack of transportation? Yes   Unable to get utilities (heat,electricity)? Yes   Want help with housing or utility concern? No    (!) TRANSPORTATION CONCERN PRESENT(!) FINANCIAL RESOURCE STRAIN CONCERN      2/20/2025   Fall Risk   Gait Speed Test Interpretation Less than or equal to 5.00 seconds - PASS           2/18/2025   Activities of Daily Living- Home Safety   Needs help with the following daily activites Transportation    Shopping    Housework    Bathing   Safety concerns in the home None of the above       Multiple values from one day are sorted in reverse-chronological order         2/18/2025   Dental   Dentist two times every year? Yes         2/18/2025   Hearing  Screening   Hearing concerns? None of the above         2/18/2025   Driving Risk Screening   Patient/family members have concerns about driving No         2/18/2025   General Alertness/Fatigue Screening   Have you been more tired than usual lately? No         2/18/2025   Urinary Incontinence Screening   Bothered by leaking urine in past 6 months No            Today's PHQ-2 Score:       2/20/2025    10:23 AM   PHQ-2 ( 1999 [employer])   Q1: Little interest or pleasure in doing things 0   Q2: Feeling down, depressed or hopeless 0   PHQ-2 Score 0    Q1: Little interest or pleasure in doing things Not at all   Q2: Feeling down, depressed or hopeless Not at all   PHQ-2 Score 0       Patient-reported           2/18/2025   Substance Use   Alcohol more than 3/day or more than 7/wk No   Do you have a current opioid prescription? No   How severe/bad is pain from 1 to 10? 1/10   Do you use any other substances recreationally? No     Social History     Tobacco Use    Smoking status: Every Day     Current packs/day: 0.25     Average packs/day: 0.3 packs/day for 66.0 years (16.5 ttl pk-yrs)     Types: Cigarettes     Passive exposure: Current    Smokeless tobacco: Never    Tobacco comments:     4 cigs per day   Vaping Use    Vaping status: Never Used   Substance Use Topics    Alcohol use: Yes     Comment: scotch & water 2-3 glasses a day    Drug use: Never           11/14/2024   LAST FHS-7 RESULTS   1st degree relative breast or ovarian cancer Unknown    No   Any relative bilateral breast cancer No   Any male have breast cancer No   Any ONE woman have BOTH breast AND ovarian cancer No   Any woman with breast cancer before 50yrs No   2 or more relatives with breast AND/OR ovarian cancer No   2 or more relatives with breast AND/OR bowel cancer No       Multiple values from one day are sorted in reverse-chronological order        Mammogram Screening - After age 74- determine frequency with patient based on health status, life expectancy  "and patient goals              Reviewed and updated as needed this visit by Provider                       Current providers sharing in care for this patient include:  Patient Care Team:  Darrell Douglas MD as PCP - General (Family Medicine)  Perlita Alvarez MD as MD (Breast Oncology)  Darrell Douglas MD as Assigned PCP  Perlita Alvarez MD as Assigned Cancer Care Provider  Joceline Marley APRN CNP as Nurse Practitioner (Dermatology)    The following health maintenance items are reviewed in Epic and correct as of today:  Health Maintenance   Topic Date Due    ZOSTER IMMUNIZATION (1 of 2) Never done    RSV VACCINE (1 - 1-dose 75+ series) Never done    NICOTINE/TOBACCO CESSATION COUNSELING Q 1 YR  12/07/2022    MEDICARE ANNUAL WELLNESS VISIT  12/07/2022    LIPID  12/07/2022    ANNUAL REVIEW OF HM ORDERS  12/07/2022    INFLUENZA VACCINE (1) 09/01/2024    COVID-19 Vaccine (5 - 2024-25 season) 09/01/2024    BMP  11/12/2025    MAMMO SCREENING  11/25/2025    FALL RISK ASSESSMENT  02/20/2026    ADVANCE CARE PLANNING  12/07/2026    GLUCOSE  11/12/2027    DTAP/TDAP/TD IMMUNIZATION (3 - Td or Tdap) 02/28/2028    DEXA  08/28/2038    PHQ-2 (once per calendar year)  Completed    Pneumococcal Vaccine: 50+ Years  Completed    HPV IMMUNIZATION  Aged Out    MENINGITIS IMMUNIZATION  Aged Out    LUNG CANCER SCREENING  Discontinued            Objective    Exam  BP (!) 162/89 (BP Location: Left arm, Patient Position: Chair, Cuff Size: Adult Regular)   Pulse 77   Temp 97.6  F (36.4  C) (Temporal)   Resp 14   Ht 1.651 m (5' 5\")   Wt 71.3 kg (157 lb 4 oz)   LMP  (LMP Unknown)   SpO2 99%   BMI 26.17 kg/m     Estimated body mass index is 26.17 kg/m  as calculated from the following:    Height as of this encounter: 1.651 m (5' 5\").    Weight as of this encounter: 71.3 kg (157 lb 4 oz).    Physical Exam  GENERAL: alert and no distress  EYES: Eyes grossly normal to inspection, PERRL and conjunctivae and " sclerae normal  HENT: ear canals and TM's normal, nose and mouth without ulcers or lesions  NECK: no adenopathy, no asymmetry, masses, or scars  RESP: lungs clear to auscultation - no rales, rhonchi or wheezes  CV: regular rate and rhythm, normal S1 S2, no S3 or S4, no murmur, click or rub, no peripheral edema  ABDOMEN: soft, nontender, no hepatosplenomegaly, no masses and bowel sounds normal  MS: no gross musculoskeletal defects noted, no edema  SKIN: scattered moles / keratoses.  Very dry skin.   NEURO: Normal strength and tone, mentation intact and speech normal  PSYCH: mentation appears normal, affect normal/bright        2/20/2025   Mini Cog   Mini-Cog Not Completed (choose reason) Patient declines       Patient declines, there are NO concerns for cognitive deficits.           Signed Electronically by: Darrell Douglas MD

## 2025-02-20 NOTE — PATIENT INSTRUCTIONS
Start new doses of prescriptions     After a week  on the new doses, do a fasting lab appointment     See us in about two months    Call sooner if needed    Could see dermatology for skin check    Call [facility] about colonoscopy     Keep working on healthy diet/exercise                 Patient Education   Preventive Care Advice   This is general advice given by our system to help you stay healthy. However, your care team may have specific advice just for you. Please talk to your care team about your preventive care needs.  Nutrition  Eat 5 or more servings of fruits and vegetables each day.  Try wheat bread, brown rice and whole grain pasta (instead of white bread, rice, and pasta).  Get enough calcium and vitamin D. Check the label on foods and aim for 100% of the RDA (recommended daily allowance).  Lifestyle  Exercise at least 150 minutes each week  (30 minutes a day, 5 days a week).  Do muscle strengthening activities 2 days a week. These help control your weight and prevent disease.  No smoking.  Wear sunscreen to prevent skin cancer.  Have a dental exam and cleaning every 6 months.  Yearly exams  See your health care team every year to talk about:  Any changes in your health.  Any medicines your care team has prescribed.  Preventive care, family planning, and ways to prevent chronic diseases.  Shots (vaccines)   HPV shots (up to age 26), if you've never had them before.  Hepatitis B shots (up to age 59), if you've never had them before.  COVID-19 shot: Get this shot when it's due.  Flu shot: Get a flu shot every year.  Tetanus shot: Get a tetanus shot every 10 years.  Pneumococcal, hepatitis A, and RSV shots: Ask your care team if you need these based on your risk.  Shingles shot (for age 50 and up)  General health tests  Diabetes screening:  Starting at age 35, Get screened for diabetes at least every 3 years.  If you are younger than age 35, ask your care team if you should be screened for  diabetes.  Cholesterol test: At age 39, start having a cholesterol test every 5 years, or more often if advised.  Bone density scan (DEXA): At age 50, ask your care team if you should have this scan for osteoporosis (brittle bones).  Hepatitis C: Get tested at least once in your life.  STIs (sexually transmitted infections)  Before age 24: Ask your care team if you should be screened for STIs.  After age 24: Get screened for STIs if you're at risk. You are at risk for STIs (including HIV) if:  You are sexually active with more than one person.  You don't use condoms every time.  You or a partner was diagnosed with a sexually transmitted infection.  If you are at risk for HIV, ask about PrEP medicine to prevent HIV.  Get tested for HIV at least once in your life, whether you are at risk for HIV or not.  Cancer screening tests  Cervical cancer screening: If you have a cervix, begin getting regular cervical cancer screening tests starting at age 21.  Breast cancer scan (mammogram): If you've ever had breasts, begin having regular mammograms starting at age 40. This is a scan to check for breast cancer.  Colon cancer screening: It is important to start screening for colon cancer at age 45.  Have a colonoscopy test every 10 years (or more often if you're at risk) Or, ask your provider about stool tests like a FIT test every year or Cologuard test every 3 years.  To learn more about your testing options, visit:   .  For help making a decision, visit:   https://bit.ly/md42508.  Prostate cancer screening test: If you have a prostate, ask your care team if a prostate cancer screening test (PSA) at age 55 is right for you.  Lung cancer screening: If you are a current or former smoker ages 50 to 80, ask your care team if ongoing lung cancer screenings are right for you.  For informational purposes only. Not to replace the advice of your health care provider. Copyright   2023 [employer]. All rights reserved.  Clinically reviewed by the [facility]. [employer] 423854 - REV 01/24.  Learning About Activities of Daily Living  What are activities of daily living?     Activities of daily living (ADLs) are the basic self-care tasks you do every day. These include eating, bathing, dressing, and moving around.  As you age, and if you have health problems, you may find that it's harder to do some of these tasks. If so, your doctor can suggest ideas that may help.  To measure what kind of help you may need, your doctor will ask how well you are able to do ADLs. Let your doctor know if there are any tasks that you are having trouble doing. This is an important first step to getting help. And when you have the help you need, you can stay as independent as possible.  How will a doctor assess your ADLs?  Asking about ADLs is part of a routine health checkup your doctor will likely do as you age. Your health check might be done in a doctor's office, in your home, or at a hospital. The goal is to find out if you are having any problems that could make it hard to care for yourself or that make it unsafe for you to be on your own.  To measure your ADLs, your doctor will ask how hard it is for you to do routine tasks. Your doctor may also want to know if you have changed the way you do a task because of a health problem. Your doctor may watch how you:  Walk back and forth.  Keep your balance while you stand or walk.  Move from sitting to standing or from a bed to a chair.  Button or unbutton a shirt or sweater.  Remove and put on your shoes.  It's common to feel a little worried or anxious if you find you can't do all the things you used to be able to do. Talking with your doctor about ADLs is a way to make sure you're as safe as possible and able to care for yourself as well as you can. You may want to bring a caregiver, friend, or family member to your checkup. They can help you talk to your doctor.  Follow-up  care is a key part of your treatment and safety. Be sure to make and go to all appointments, and call your doctor if you are having problems. It's also a good idea to know your test results and keep a list of the medicines you take.  Current as of: October 24, 2023  Content Version: 14.3    2024 [employer].   Care instructions adapted under license by your healthcare professional. If you have questions about a medical condition or this instruction, always ask your healthcare professional. [employer] disclaims any warranty or liability for your use of this information.    Preventing Falls: Care Instructions  Injuries and health problems such as trouble walking or poor eyesight can increase your risk of falling. So can some medicines. But there are things you can do to help prevent falls. You can exercise to get stronger. You can also arrange your home to make it safer.    Talk to your doctor about the medicines you take. Ask if any of them increase the risk of falls and whether they can be changed or stopped.   Try to exercise regularly. It can help improve your strength and balance. This can help lower your risk of falling.         Practice fall safety and prevention.   Wear low-heeled shoes that fit well and give your feet good support. Talk to your doctor if you have foot problems that make this hard.  Carry a cellphone or wear a medical alert device that you can use to call for help.  Use stepladders instead of chairs to reach high objects. Don't climb if you're at risk for falls. Ask for help, if needed.  Wear the correct eyeglasses, if you need them.        Make your home safer.   Remove rugs, cords, clutter, and furniture from walkways.  Keep your house well lit. Use night-lights in hallways and bathrooms.  Install and use sturdy handrails on stairways.  Wear nonskid footwear, even inside. Don't walk barefoot or in socks without shoes.        Be safe outside.   Use handrails, curb cuts,  "and ramps whenever possible.  Keep your hands free by using a shoulder bag or backpack.  Try to walk in well-lit areas. Watch out for uneven ground, changes in pavement, and debris.  Be careful in the winter. Walk on the grass or gravel when sidewalks are slippery. Use de-icer on steps and walkways. Add non-slip devices to shoes.    Put grab bars and nonskid mats in your shower or tub and near the toilet. Try to use a shower chair or bath bench when bathing.   Get into a tub or shower by putting in your weaker leg first. Get out with your strong side first. Have a phone or medical alert device in the bathroom with you.   Where can you learn more?  Go to https://www.[employer]/patiented  Enter G117 in the search box to learn more about \"Preventing Falls: Care Instructions.\"  Current as of: July 31, 2024  Content Version: 14.3    2024 [employer].   Care instructions adapted under license by your healthcare professional. If you have questions about a medical condition or this instruction, always ask your healthcare professional. [employer] disclaims any warranty or liability for your use of this information.    Learning About Stress  What is stress?     Stress is your body's response to a hard situation. Your body can have a physical, emotional, or mental response. Stress is a fact of life for most people, and it affects everyone differently. What causes stress for you may not be stressful for someone else.  A lot of things can cause stress. You may feel stress when you go on a job interview, take a test, or run a race. This kind of short-term stress is normal and even useful. It can help you if you need to work hard or react quickly. For example, stress can help you finish an important job on time.  Long-term stress is caused by ongoing stressful situations or events. Examples of long-term stress include long-term health problems, ongoing problems at work, or conflicts in your family. " Long-term stress can harm your health.  How does stress affect your health?  When you are stressed, your body responds as though you are in danger. It makes hormones that speed up your heart, make you breathe faster, and give you a burst of energy. This is called the fight-or-flight stress response. If the stress is over quickly, your body goes back to normal and no harm is done.  But if stress happens too often or lasts too long, it can have bad effects. Long-term stress can make you more likely to get sick, and it can make symptoms of some diseases worse. If you tense up when you are stressed, you may develop neck, shoulder, or low back pain. Stress is linked to high blood pressure and heart disease.  Stress also harms your emotional health. It can make you koch, tense, or depressed. Your relationships may suffer, and you may not do well at work or school.  What can you do to manage stress?  You can try these things to help manage stress:   Do something active. Exercise or activity can help reduce stress. Walking is a great way to get started. Even everyday activities such as housecleaning or yard work can help.  Try yoga or lizet chi. These techniques combine exercise and meditation. You may need some training at first to learn them.  Do something you enjoy. For example, listen to music or go to a movie. Practice your hobby or do volunteer work.  Meditate. This can help you relax, because you are not worrying about what happened before or what may happen in the future.  Do guided imagery. Imagine yourself in any setting that helps you feel calm. You can use online videos, books, or a teacher to guide you.  Do breathing exercises. For example:  From a standing position, bend forward from the waist with your knees slightly bent. Let your arms dangle close to the floor.  Breathe in slowly and deeply as you return to a standing position. Roll up slowly and lift your head last.  Hold your breath for just a few seconds  "in the standing position.  Breathe out slowly and bend forward from the waist.  Let your feelings out. Talk, laugh, cry, and express anger when you need to. Talking with supportive friends or family, a counselor, or a duran leader about your feelings is a healthy way to relieve stress. Avoid discussing your feelings with people who make you feel worse.  Write. It may help to write about things that are bothering you. This helps you find out how much stress you feel and what is causing it. When you know this, you can find better ways to cope.  What can you do to prevent stress?  You might try some of these things to help prevent stress:  Manage your time. This helps you find time to do the things you want and need to do.  Get enough sleep. Your body recovers from the stresses of the day while you are sleeping.  Get support. Your family, friends, and community can make a difference in how you experience stress.  Limit your news feed. Avoid or limit time on social media or news that may make you feel stressed.  Do something active. Exercise or activity can help reduce stress. Walking is a great way to get started.  Where can you learn more?  Go to https://www.[employer].net/patiented  Enter N032 in the search box to learn more about \"Learning About Stress.\"  Current as of: October 24, 2023  Content Version: 14.3    2024 [employer].   Care instructions adapted under license by your healthcare professional. If you have questions about a medical condition or this instruction, always ask your healthcare professional. [employer] disclaims any warranty or liability for your use of this information.       "

## 2025-03-21 ENCOUNTER — LAB (OUTPATIENT)
Dept: LAB | Facility: CLINIC | Age: 80
End: 2025-03-21
Attending: FAMILY MEDICINE
Payer: COMMERCIAL

## 2025-03-21 DIAGNOSIS — E55.9 VITAMIN D DEFICIENCY DISEASE: ICD-10-CM

## 2025-03-21 DIAGNOSIS — E78.5 HYPERLIPIDEMIA LDL GOAL <100: ICD-10-CM

## 2025-03-21 DIAGNOSIS — R53.83 FATIGUE, UNSPECIFIED TYPE: ICD-10-CM

## 2025-03-21 LAB
ALBUMIN SERPL BCG-MCNC: 3.8 G/DL (ref 3.5–5.2)
ALP SERPL-CCNC: 57 U/L (ref 40–150)
ALT SERPL W P-5'-P-CCNC: 7 U/L (ref 0–50)
ANION GAP SERPL CALCULATED.3IONS-SCNC: 10 MMOL/L (ref 7–15)
AST SERPL W P-5'-P-CCNC: 19 U/L (ref 0–45)
BILIRUB SERPL-MCNC: 0.3 MG/DL
BUN SERPL-MCNC: 7.2 MG/DL (ref 8–23)
CALCIUM SERPL-MCNC: 8.7 MG/DL (ref 8.8–10.4)
CHLORIDE SERPL-SCNC: 102 MMOL/L (ref 98–107)
CHOLEST SERPL-MCNC: 195 MG/DL
CREAT SERPL-MCNC: 0.67 MG/DL (ref 0.51–0.95)
EGFRCR SERPLBLD CKD-EPI 2021: 88 ML/MIN/1.73M2
FASTING STATUS PATIENT QL REPORTED: YES
FASTING STATUS PATIENT QL REPORTED: YES
GLUCOSE SERPL-MCNC: 96 MG/DL (ref 70–99)
HCO3 SERPL-SCNC: 26 MMOL/L (ref 22–29)
HDLC SERPL-MCNC: 78 MG/DL
LDLC SERPL CALC-MCNC: 91 MG/DL
NONHDLC SERPL-MCNC: 117 MG/DL
POTASSIUM SERPL-SCNC: 3.5 MMOL/L (ref 3.4–5.3)
PROT SERPL-MCNC: 6.4 G/DL (ref 6.4–8.3)
SODIUM SERPL-SCNC: 138 MMOL/L (ref 135–145)
TRIGL SERPL-MCNC: 128 MG/DL
TSH SERPL DL<=0.005 MIU/L-ACNC: 1.62 UIU/ML (ref 0.3–4.2)
VIT D+METAB SERPL-MCNC: 41 NG/ML (ref 20–50)

## 2025-03-21 PROCEDURE — 80053 COMPREHEN METABOLIC PANEL: CPT

## 2025-03-21 PROCEDURE — 82306 VITAMIN D 25 HYDROXY: CPT

## 2025-03-21 PROCEDURE — 84443 ASSAY THYROID STIM HORMONE: CPT

## 2025-03-21 PROCEDURE — 80061 LIPID PANEL: CPT

## 2025-03-21 PROCEDURE — 36415 COLL VENOUS BLD VENIPUNCTURE: CPT

## 2025-03-21 PROCEDURE — 250N000011 HC RX IP 250 OP 636: Performed by: FAMILY MEDICINE

## 2025-03-21 RX ORDER — HEPARIN SODIUM (PORCINE) LOCK FLUSH IV SOLN 100 UNIT/ML 100 UNIT/ML
5 SOLUTION INTRAVENOUS ONCE
Status: COMPLETED | OUTPATIENT
Start: 2025-03-21 | End: 2025-03-21

## 2025-03-21 RX ADMIN — HEPARIN 5 ML: 100 SYRINGE at 09:59

## 2025-03-21 NOTE — NURSING NOTE
"Chief Complaint   Patient presents with    Port Draw     Labs drawn via port by RN.      Labs drawn via port by RN. Port accessed with 20G 3/4\" power needle. Flushed with NS and heparin. De-accessed.    Ruba Mahoney RN  "

## 2025-04-01 ENCOUNTER — MYC MEDICAL ADVICE (OUTPATIENT)
Dept: FAMILY MEDICINE | Facility: CLINIC | Age: 80
End: 2025-04-01
Payer: COMMERCIAL

## 2025-04-01 DIAGNOSIS — F19.982 DRUG-INDUCED INSOMNIA (H): ICD-10-CM

## 2025-04-02 RX ORDER — ZOLPIDEM TARTRATE 5 MG/1
5 TABLET ORAL
Qty: 30 TABLET | Refills: 0 | Status: SHIPPED | OUTPATIENT
Start: 2025-04-02

## 2025-04-02 NOTE — TELEPHONE ENCOUNTER
Pt needs Zolpidem refilled.    [facility] to [facility] Pt will be moving to this Sunday. Informed pt to call son and see if this is ok to do. Pt will call back if medication will need [facility] son for this current prescription.    Pt will let us know if she will continue to get [facility] home or if she is wanting a different pharmacy.     Lili Whitney RN on 4/2/2025 at 10:41 AM

## 2025-04-18 ENCOUNTER — TRANSFERRED RECORDS (OUTPATIENT)
Dept: ADMINISTRATIVE | Facility: CLINIC | Age: 80
End: 2025-04-18
Payer: COMMERCIAL

## 2025-04-18 ENCOUNTER — TRANSFERRED RECORDS (OUTPATIENT)
Dept: HEALTH INFORMATION MANAGEMENT | Facility: CLINIC | Age: 80
End: 2025-04-18

## 2025-04-21 ENCOUNTER — TRANSFERRED RECORDS (OUTPATIENT)
Dept: HEALTH INFORMATION MANAGEMENT | Facility: CLINIC | Age: 80
End: 2025-04-21
Payer: COMMERCIAL

## 2025-04-22 NOTE — PROCEDURES
[facility]   [address], [address], [place] [address]     Patient Name: Dominga Zuleta  Gender:  Female  Exam Date: 04/18/2025 Visit Number:  [id number]  Age: 79 Years YOB: 1945  Attending MD: Jonathan Moraes DO Medical Record#:  [id number]    Procedure: Colonoscopy   Indications: Diarrhea      Referring MD: Darrell Douglas MD  Primary MD:      Darrell Douglas MD  Medications: Admitting Medications:   0.9% Normal Saline at TKO   Intra Procedure Medications:   Patient received monitored anesthesia care.     Complications: No immediate complications  ______________________________________________________________________________  Procedure:   An examination of the heart and lungs was performed and found to be within acceptable limits.  .  The patient was therefore deemed a reasonable candidate for endoscopy and sedation.   The risks and benefits of the procedure were explained to the patient.After obtaining informed consent, the patient received monitored anesthesia care and I passed the scope   without difficulty via the rectum to the ileum.  The appendiceal orifice and ic valve were identified.  The scope was retroflexed during the examination  The quality of the prep was good  (Miralax/Gatorade/2 tablets Bisacodyl/Magnesium Citrate).    This was a complete examination throughout the entire colon.    Findings:    Normal finding.  Location - ileum.    Polyp location: ascending colon.  Quantity: 2.  Size:  8 mm, 5 mm.  Polyp shape:  sessile.         Maneuver: polypectomy was performed with a cold snare.       Removal:  complete.  Retrieval: complete.  Bleeding: none.    Polyp location: descending colon.  Quantity: 1.  Size: 9 mm.  Polyp shape: sessile.         Maneuver: polypectomy was performed with a  cold snare.       Removal: complete.  Retrieval: complete.  Bleeding: none.    Diverticulosis.  Location: - sigmoid.    Description:  mild.    Size:  small.     Quantity:  few.  No inflammation present.  Anal canal:  internal hemorrhoid(s) and hypertrophied anal papilla  Remainder of the exam is normal.  Random biopsies were taken throughout the colon to rule out microscopic colitis.    Impression:  Hemorrhoids, internal  Diverticulosis of colon without diverticulitis  Colorectal polyps    Preliminary Plan:  The patient and their physician will receive a copy of the pathology report as well as pathology-based recommendations for future screening or surveillance.  Comments:  Suggest 3 years  Recommendation Comments:  To reduce post-polypectomy bleeding, recommend no NSAIDs (i.e. Aleve, Advil, ibuprofen, naproxen, etc.) for 10 days. To reduce hemorrhoid bleeding, recommend increasing soluble fiber in your diet or by using over-the-counter fiber supplements such as Citrucel, Metamucil, or psyllium (take as directed).  Avoid straining during bowel movements and prolonged time spent on the commode.  Pathology Results:  A: COLON, RANDOM, BIOPSY:           1. Collagenous colitis           2. See comment      B: COLON, ASCENDING, POLYPS:           1. Tubular adenoma (1) and inflammatory polyp (1)           2. Negative for high grade dysplasia           3. Per the colonoscopy report:               a. Polyp sizes: 5 mm and 8 mm               b. Resection: Complete               c. Retrieval: Complete           4. Background collagenous colitis      C: COLON, DESCENDING, POLYP:           1. Tubular adenoma           2. Negative for high grade dysplasia           3. Per the colonoscopy report:               a. Polyp size: 9 mm               b. Resection: Complete               c. Retrieval: Complete      COMMENTS  A. The histologic changes are typical of the collagenous colitis form of microscopic colitis. This diagnosis is further supported by the history of diarrhea and normal appearance of the mucosa on colonoscopy.     Some cases of collagenous colitis have been associated with  medication use; high likelihood associations include NSAIDs, ASA, lansoprazole, ranitidine, sertraline, ticlopidine, and acarbose, with many other medications having been implicated as well.      MICROSCOPIC  A: Performed   B: Performed   C: Performed     Electronically signed by: Tahir oSlorio MD    Interpreted at [facility], [address] [id number], [place] [address]    Orders    Instruction(s)/Education:  Instruction/Education Timeframe Assessment   Colon Polyps  K63.5   Diverticulosis/Diverticulitis  K63.5   Hemorrhoids  K63.5       Final Plan:  Repeat colonoscopy in 5 years.    We will attempt to contact you at appropriate intervals via [place] mail.  We may not be able to find you or contact you at that time, therefore you should know that the responsibility for following our recommendation rests with you.  If you don't hear from us at the time your procedure is due, please contact our office to schedule an appointment.  If your contact information should change, please contact our office so that we can update your record.  Additional Comments:  Discussed biopsy results with you by phone on 4/21/25.  Diagnosis of collagenous colitis noted on biopsies.  Prescription for budesonide sent to your pharmacy at the [facility] in [place] on [address]      _Electronically signed by:___________________  Jonathan Moraes DO                 04/18/2025    cc: Darrell Douglas MD  cc: Darrell Douglas MD

## 2025-04-23 DIAGNOSIS — L30.9 DERMATITIS: ICD-10-CM

## 2025-04-23 DIAGNOSIS — E78.5 HYPERLIPIDEMIA LDL GOAL <100: ICD-10-CM

## 2025-04-23 DIAGNOSIS — I10 HYPERTENSION GOAL BP (BLOOD PRESSURE) < 140/90: ICD-10-CM

## 2025-04-23 DIAGNOSIS — R60.0 BILATERAL LOWER EXTREMITY EDEMA: ICD-10-CM

## 2025-04-23 RX ORDER — FUROSEMIDE 20 MG/1
20 TABLET ORAL DAILY
Qty: 90 TABLET | Refills: 0 | Status: SHIPPED | OUTPATIENT
Start: 2025-04-23

## 2025-04-23 RX ORDER — ATORVASTATIN CALCIUM 10 MG/1
10 TABLET, FILM COATED ORAL DAILY
Qty: 90 TABLET | Refills: 2 | Status: SHIPPED | OUTPATIENT
Start: 2025-04-23

## 2025-04-23 RX ORDER — TRIAMCINOLONE ACETONIDE 1 MG/G
CREAM TOPICAL 2 TIMES DAILY PRN
Qty: 80 G | Refills: 0 | Status: SHIPPED | OUTPATIENT
Start: 2025-04-23

## 2025-04-23 RX ORDER — POTASSIUM CHLORIDE 1500 MG/1
20 TABLET, EXTENDED RELEASE ORAL
Qty: 90 TABLET | Refills: 2 | Status: SHIPPED | OUTPATIENT
Start: 2025-04-23

## 2025-04-23 RX ORDER — AMLODIPINE BESYLATE 2.5 MG/1
2.5 TABLET ORAL DAILY
Qty: 90 TABLET | Refills: 0 | Status: SHIPPED | OUTPATIENT
Start: 2025-04-23

## 2025-04-23 RX ORDER — BENAZEPRIL HYDROCHLORIDE 10 MG/1
10 TABLET ORAL DAILY
Qty: 90 TABLET | Refills: 0 | Status: SHIPPED | OUTPATIENT
Start: 2025-04-23

## 2025-05-12 NOTE — PROGRESS NOTES
Oncology Follow Up:  Date on this visit: May 13, 2025    Diagnosis:  ER positive, HER-2 positive right breast cancer, clinical stage T2N0M0 s/p THP-ddAC, right breast mastectomy ([id number]), SLN biopsy, 4 months adjuvant HP, 1 year adjuvant endocrine therapy, and 3 doses adjuvant Zometa.  She decided to stop all adjuvant therapy due to diarrhea.    Primary Physician: Darrell Douglas    History Of Present Illness:  Ms. Zuleta is an 80 year old female with right breast cancer.  Routine screening mammogram in 07/2020 showed right breast asymmetry.  Diagnostic mammograms and ultrasound showed a 2.7 cm mass in the right breast at 12:00, 5 cm from the nipple with ductal extension including a 6 mm mass at 3 cm from the nipple.  Contrast enhanced mammogram showed the right breast mass, including extension, to measure 4.9 cm.  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  Estrogen receptor was moderate in 50% and progesterone receptor staining was negative.  HER-2 was negative by IHC; HER2 FISH showed approximately 10% of tumor cells to have 6.8 HER2 signals/nucleus and a HER2/CEN17 ratio of 1.6.  The HER2 positive cells were noted to be the large pleomorphic cells.  Multiple neutrophils were noted to be infiltrating through tumor stroma.    She received 12 weeks of neoadjuvant Taxol, Herceptin, and pertuzumab from 10/7/2020 - 12/22/2020 and 4 cycles of dose dense adriamycin and cyclophosphamide from 12/29/2020 - 2/9/2021.  She underwent right breast mastectomy and sentinel lymph node biopsy on 3/8/2021.  Pathology showed rare residual tumor cells with therapy related changes in the tumor bed.  Residual tumor was <1% of the tumor bed volume.  There was no lymphovascular invasion and surgical margins were negative.  A single sentinel lymph node was benign.  We discussed starting letrozole at our clinic visit on 3/30/2021, unfortunately she did not receive the  prescription and so did not start it.  She was initiated on adjuvant HER2 targeted therapy 4/13/2021.  She received 4 cycles of adjuvant Phesgo, then discontinued due to diarrhea.  She was then on biosimilar trastuzumab (Trazimera) starting 7/6/2021, however, continued to have diarrhea refractory to antidiarrheals, therefore all HER2 targeted therapy was abandoned on 8/3/2021. She received 7 months total HER2 treatment (3 months neoadjuvant + 4 months adjuvant).  She started letrozole on 8/3/2021.  She continued to have diarrhea, which she attributed to letrozole.  On 6/8/2022, treatment was changed to exemestane. This was stopped after a month due to profuse diarrhea again.  She has been off of endocrine therapy since that time.  She received 3 doses of Zometa, then declining further doses due to diarrhea.  Diarrhea has continued despite stopping all of her recommended adjuvant breast cancer therapy.    Interval History:    Talya comes into clinic today, alongside her daughter-in-law, for routine breast cancer surveillance.  Since her last visit, her health has been good.  She has moved to a senior living facility in [place].  This has been a good transition for her.  Sometimes, however, she will get to know a resident and then they will no longer be there.  She will have no idea what happened to them and states nobody talks about it.  She tries to go for routine walks when she can.  Since her last visit, she had a colonoscopy with biopsies in mid-April.  Following the colonoscopy, she was started on budesonide.  With the medication, her diarrhea is improved.  She still will have occasional episodes.  She has not had any associated dizziness, lightheadedness, or falls.  She denies abdominal pain.  She has no shortness of breath or chest pain.  She denies concerning lumps, pain, or swelling of either the left breast or the right chest wall.  When she lies down at night, she will experience cramping in her  lower calves and ankles.  She reports dry skin over her lower extremities.  She states her general skin disorder is overall better since starting the oral steroid medication.    Past Medical/Surgical History:  1.  Breast cancer as per HPI  2.  Hypertension  3.  Diabetes, diet controlled   4.  Hypothyroidism  5.  Hypohidrosis ectodermal dysplasia  6.  Chronic diarrhea  7.  Nicotine dependence    Allergies:  Allergies as of 05/13/2025 - Reviewed 02/20/2025   Allergen Reaction Noted    Bacitracin-neomycin-polymyxin  04/18/2003    Doxycycline  09/20/2022    Latex  09/30/2005     Current Medications:  Current Outpatient Medications   Medication Sig Dispense Refill    amLODIPine (NORVASC) 2.5 MG tablet TAKE 1 TABLET (2.5 MG) BY MOUTH DAILY. 90 tablet 0    atorvastatin (LIPITOR) 10 MG tablet TAKE 1 TABLET (10 MG) BY MOUTH DAILY. 90 tablet 2    benazepril (LOTENSIN) 10 MG tablet TAKE 1 TABLET (10 MG) BY MOUTH DAILY. 90 tablet 0    furosemide (LASIX) 20 MG tablet TAKE 1 TABLET (20 MG) BY MOUTH DAILY. 90 tablet 0    Multiple Vitamin (MULTIVITAMIN ADULT PO) Take by mouth.      potassium chloride namrata ER (KLOR-CON M20) 20 MEQ CR tablet TAKE ONE TABLET BY MOUTH DAILY 90 tablet 2    triamcinolone (KENALOG) 0.1 % external cream APPLY TOPICALLY 2 TIMES DAILY AS NEEDED FOR IRRITATION 80 g 0    triamcinolone (KENALOG) 0.1 % external ointment Apply topically 2 times daily as needed for irritation 80 g 0    Vitamin D, Cholecalciferol, 25 MCG (1000 UT) TABS Take 1,000 Units by mouth daily      zolpidem (AMBIEN) 5 MG tablet Take 1 tablet (5 mg) by mouth nightly as needed for sleep. 30 tablet 0      Genetics Breast Actionable Panel on 10/1/2020 was negative for mutation.    Physical Exam:  BP (!) 155/83   Pulse 69   Temp 97.8  F (36.6  C) (Oral)   Resp 17   Wt 70.1 kg (154 lb 9.6 oz)   LMP  (LMP Unknown)   SpO2 96%   BMI 25.73 kg/m    General:  Well appearing adult female in NAD.  Alert and oriented x 3  HEENT:  Normocephalic.   Sclera anicteric.  (+) alopecia, wearing a wig.  Lymph:  No palpable cervical, supraclavicular, or axillary LAD.  Chest:  CTA bilaterally.  No wheezes or crackles.    CV:  RRR.  No audible m/r/g.  Breast:  Right breast mastectomy.  There are no dominant or discretely palpable masses of either the right chest wall or within the left breast. Left nipple is everted and without discharge.  Abd:  Soft/ND  Ext: Unable to examine lower extremities as patient is wearing stirrup style pants  Neuro:  Cranial nerves grossly intact.  Shuffling gait, ambulates with a walker.  Requires a hand to climb on the exam table.   Psych:  Mood and affect appear normal.  Skin: Hyperpigmented, chronically inflamed fibrotic feeling skin with tracks in bilateral axilla, these feel less thick/dense than prior. Multiple areas with lack of pigment over the forearms.    Laboratory/Imaging Studies:  I personally reviewed the below laboratories and images:    5/13/2025 Labs:   Latest Reference Range & Units 05/13/25 10:09   Sodium 135 - 145 mmol/L 141   Potassium 3.4 - 5.3 mmol/L 3.9   Chloride 98 - 107 mmol/L 105   Carbon Dioxide (CO2) 22 - 29 mmol/L 23   Urea Nitrogen 8.0 - 23.0 mg/dL 10.2   Creatinine 0.51 - 0.95 mg/dL 0.71   GFR Estimate >60 mL/min/1.73m2 85   Calcium 8.8 - 10.4 mg/dL 8.9   Anion Gap 7 - 15 mmol/L 13   Albumin 3.5 - 5.2 g/dL 4.0   Protein Total 6.4 - 8.3 g/dL 6.7   Alkaline Phosphatase 40 - 150 U/L 61   ALT 0 - 50 U/L 11   AST 0 - 45 U/L 20   Bilirubin Total <=1.2 mg/dL 0.6   Glucose 70 - 99 mg/dL 96      Latest Reference Range & Units 05/13/25 10:09   WBC 4.0 - 11.0 10e3/uL 7.8   Hemoglobin 11.7 - 15.7 g/dL 12.8   Hematocrit 35.0 - 47.0 % 37.3   Platelet Count 150 - 450 10e3/uL 302      Latest Reference Range & Units 05/13/25 10:09   NRBC/W <1 /100 0   Absolute Neutrophil 1.6 - 8.3 10e3/uL 5.7   Absolute Lymphocytes 0.8 - 5.3 10e3/uL 1.4   Absolute Monocytes 0.0 - 1.3 10e3/uL 0.5   Absolute Eosinophils 0.0 - 0.7 10e3/uL 0.1    Absolute Basophils 0.0 - 0.2 10e3/uL 0.1   Absolute Immature Granulocytes <=0.4 10e3/uL 0.0       4/18/2025 Colonoscopy ([facility]):  Findings:  Normal finding.  Location - ileum.  Polyp location: ascending colon.  Quantity: 2.  Size:  8 mm, 5 mm.  Polyp shape:  sessile.         Maneuver: polypectomy was performed with a cold snare.       Removal:  complete.  Retrieval: complete.  Bleeding: none.  Polyp location: descending colon.  Quantity: 1.  Size: 9 mm.  Polyp shape: sessile.         Maneuver: polypectomy was performed with a  cold snare.       Removal: complete.  Retrieval: complete.  Bleeding: none.  Diverticulosis.  Location: - sigmoid.    Description:  mild.    Size:  small.    Quantity:  few.  No inflammation present.  Anal canal:  internal hemorrhoid(s) and hypertrophied anal papilla  Remainder of the exam is normal.  Random biopsies were taken throughout the colon to rule out microscopic colitis.     Impression:  Hemorrhoids, internal  Diverticulosis of colon without diverticulitis  Colorectal polyps    11/25/2024 Left breast diagnostic mammogram (screening callback):  FINDINGS:    Additional mammographic images were obtained for further  evaluation of possible asymmetry in the left breast on the MLO view  superior. Additional views demonstrate dissipation of this finding  with spot compression. No suspicious finding.                                                                      IMPRESSION: BI-RADS CATEGORY: 1 -  Negative    ASSESSMENT/PLAN:  80 year old female with a h/o hypohidrosis ectodermal dysplasia, HTN, dyslipidemia, COPD, diabetes, and OA with a h/o T2N0M0, right breast cancer, ER positive (50%), DE negative, and HER2 positive.  She is s/p treatment with 12 weeks THP, 4 cycles ddAC, right breast mastectomy ([id number]), right axillary SLNBx, and 4 months adjuvant HER2 therapy (7 months total HER2 therapy), stopped due to diarrhea.  On endocrine therapy from 8/3/2021 -  07/2022, also stopped due to diarrhea.    1.  Right breast cancer:   Ms. Zuleta is 4 years, 2 months out from excision of a right breast cancer.  She stopped adjuvant HER2 therapy, endocrine therapy and Zometa, all because she attributed her diarrhea to the medications.  She continued to have diarrhea after stopping these medications, but declined further breast cancer treatments.     She is asymptomatic of disease recurrence on history taken today and clinical exam is without concerning findings.    - She had screening mammogram callback 11/2024.  I personally reviewed the images of the left breast diagnostic mammogram which was without concerning findings.  Will resume annual screening.  - Labs, visit with me, and left breast screening mammogram 11/26/2025 or later.    2.  Chronic Diarrhea: Persistent diarrhea despite stopping all of her cancer therapies, it is clear her diarrhea is not due to these medications.  Her symptoms were concerning for IBS vs microscopic colitis.  - She had previously declined recommended GI consultation and colonoscopy with biopsies on multiple visits.  Thankfully, her son was able to talk her into proceeding with these.  I reviewed the colonoscopy report from 4/18/2025 which was with a polyp removed from both the ascending and descending colon.  In addition, multiple random biopsies were taken.  Unfortunately, the pathology report for these is not available to me today (I have sent a request for it).  Patient informs me she was started on budesonide after the colonoscopy.  She notes improvement in symptoms with the medication.  Based on this treatment, I suspect the biopsies showed microscopic colitis.      3.  Skin changes:  She has significant skin thinning, hypopigmentation in areas of prior abrasion (postinflammatory hypopigmentation?), and hyperpigmentation in other areas.  - She has declined a referral to dermatology  - Skin appears a bit better on exam today.    4.   Chemotherapy induced peripheral neuropathy/balance issues:  No change since last visit.  Onset and worsening after discontinuation of chemotherapy.  Unchanged from prior.  G3.  Unfortunately there is no reversal agent.  Ambulating with a walker.    5.  Prediabetes:  Hemoglobin A1C 11/2024 was 5.9% and c/w prediabetes.  She has known prediabetes.  Ongoing follow up with PCP    6.  Insomnia:  Continue ambien prn.    7.  Follow Up:    Labs, visit with me, and left breast screening mammogram 11/26/2025 or later.    I spent 35 minutes on the date of the encounter doing chart review, review of test results, interpretation of tests, patient visit, and documentation

## 2025-05-13 ENCOUNTER — ONCOLOGY VISIT (OUTPATIENT)
Dept: ONCOLOGY | Facility: CLINIC | Age: 80
End: 2025-05-13
Attending: INTERNAL MEDICINE
Payer: COMMERCIAL

## 2025-05-13 ENCOUNTER — APPOINTMENT (OUTPATIENT)
Dept: LAB | Facility: CLINIC | Age: 80
End: 2025-05-13
Attending: INTERNAL MEDICINE
Payer: COMMERCIAL

## 2025-05-13 VITALS
BODY MASS INDEX: 25.73 KG/M2 | HEART RATE: 69 BPM | DIASTOLIC BLOOD PRESSURE: 83 MMHG | RESPIRATION RATE: 17 BRPM | SYSTOLIC BLOOD PRESSURE: 155 MMHG | OXYGEN SATURATION: 96 % | WEIGHT: 154.6 LBS | TEMPERATURE: 97.8 F

## 2025-05-13 DIAGNOSIS — Z17.0 MALIGNANT NEOPLASM OF OVERLAPPING SITES OF RIGHT BREAST IN FEMALE, ESTROGEN RECEPTOR POSITIVE (H): Primary | ICD-10-CM

## 2025-05-13 DIAGNOSIS — Z12.31 VISIT FOR SCREENING MAMMOGRAM: ICD-10-CM

## 2025-05-13 DIAGNOSIS — K52.839 MICROSCOPIC COLITIS, UNSPECIFIED MICROSCOPIC COLITIS TYPE: ICD-10-CM

## 2025-05-13 DIAGNOSIS — Z92.21 STATUS POST CHEMOTHERAPY: ICD-10-CM

## 2025-05-13 DIAGNOSIS — C50.811 MALIGNANT NEOPLASM OF OVERLAPPING SITES OF RIGHT BREAST IN FEMALE, ESTROGEN RECEPTOR POSITIVE (H): Primary | ICD-10-CM

## 2025-05-13 DIAGNOSIS — R92.8 ABNORMAL MAMMOGRAM: ICD-10-CM

## 2025-05-13 LAB
ALBUMIN SERPL BCG-MCNC: 4 G/DL (ref 3.5–5.2)
ALP SERPL-CCNC: 61 U/L (ref 40–150)
ALT SERPL W P-5'-P-CCNC: 11 U/L (ref 0–50)
ANION GAP SERPL CALCULATED.3IONS-SCNC: 13 MMOL/L (ref 7–15)
AST SERPL W P-5'-P-CCNC: 20 U/L (ref 0–45)
BASOPHILS # BLD AUTO: 0.1 10E3/UL (ref 0–0.2)
BASOPHILS NFR BLD AUTO: 1 %
BILIRUB SERPL-MCNC: 0.6 MG/DL
BUN SERPL-MCNC: 10.2 MG/DL (ref 8–23)
CALCIUM SERPL-MCNC: 8.9 MG/DL (ref 8.8–10.4)
CHLORIDE SERPL-SCNC: 105 MMOL/L (ref 98–107)
CREAT SERPL-MCNC: 0.71 MG/DL (ref 0.51–0.95)
EGFRCR SERPLBLD CKD-EPI 2021: 85 ML/MIN/1.73M2
EOSINOPHIL # BLD AUTO: 0.1 10E3/UL (ref 0–0.7)
EOSINOPHIL NFR BLD AUTO: 1 %
ERYTHROCYTE [DISTWIDTH] IN BLOOD BY AUTOMATED COUNT: 15.1 % (ref 10–15)
GLUCOSE SERPL-MCNC: 96 MG/DL (ref 70–99)
HCO3 SERPL-SCNC: 23 MMOL/L (ref 22–29)
HCT VFR BLD AUTO: 37.3 % (ref 35–47)
HGB BLD-MCNC: 12.8 G/DL (ref 11.7–15.7)
IMM GRANULOCYTES # BLD: 0 10E3/UL
IMM GRANULOCYTES NFR BLD: 0 %
LYMPHOCYTES # BLD AUTO: 1.4 10E3/UL (ref 0.8–5.3)
LYMPHOCYTES NFR BLD AUTO: 18 %
MCH RBC QN AUTO: 34.3 PG (ref 26.5–33)
MCHC RBC AUTO-ENTMCNC: 34.3 G/DL (ref 31.5–36.5)
MCV RBC AUTO: 100 FL (ref 78–100)
MONOCYTES # BLD AUTO: 0.5 10E3/UL (ref 0–1.3)
MONOCYTES NFR BLD AUTO: 7 %
NEUTROPHILS # BLD AUTO: 5.7 10E3/UL (ref 1.6–8.3)
NEUTROPHILS NFR BLD AUTO: 73 %
NRBC # BLD AUTO: 0 10E3/UL
NRBC BLD AUTO-RTO: 0 /100
PLATELET # BLD AUTO: 302 10E3/UL (ref 150–450)
POTASSIUM SERPL-SCNC: 3.9 MMOL/L (ref 3.4–5.3)
PROT SERPL-MCNC: 6.7 G/DL (ref 6.4–8.3)
RBC # BLD AUTO: 3.73 10E6/UL (ref 3.8–5.2)
SODIUM SERPL-SCNC: 141 MMOL/L (ref 135–145)
WBC # BLD AUTO: 7.8 10E3/UL (ref 4–11)

## 2025-05-13 PROCEDURE — 36591 DRAW BLOOD OFF VENOUS DEVICE: CPT | Performed by: INTERNAL MEDICINE

## 2025-05-13 PROCEDURE — G0463 HOSPITAL OUTPT CLINIC VISIT: HCPCS | Performed by: INTERNAL MEDICINE

## 2025-05-13 PROCEDURE — 250N000011 HC RX IP 250 OP 636: Performed by: INTERNAL MEDICINE

## 2025-05-13 PROCEDURE — 82565 ASSAY OF CREATININE: CPT | Performed by: INTERNAL MEDICINE

## 2025-05-13 PROCEDURE — 85025 COMPLETE CBC W/AUTO DIFF WBC: CPT | Performed by: INTERNAL MEDICINE

## 2025-05-13 RX ORDER — HEPARIN SODIUM (PORCINE) LOCK FLUSH IV SOLN 100 UNIT/ML 100 UNIT/ML
5 SOLUTION INTRAVENOUS ONCE
Status: COMPLETED | OUTPATIENT
Start: 2025-05-13 | End: 2025-05-13

## 2025-05-13 RX ORDER — BUDESONIDE 3 MG/1
3 CAPSULE, COATED PELLETS ORAL EVERY MORNING
COMMUNITY
Start: 2025-04-21

## 2025-05-13 RX ADMIN — Medication 5 ML: at 10:10

## 2025-05-13 ASSESSMENT — PAIN SCALES - GENERAL: PAINLEVEL_OUTOF10: NO PAIN (0)

## 2025-05-13 NOTE — NURSING NOTE
"Chief Complaint   Patient presents with    Port Draw     Labs drawn via port by RN. Port accessed with 20g 3/4\" power needle and vitals WNL. Flushed with saline and heparin. Pt tolerated well. Patient checked into next appointment.       Kathryn Louis RN    "

## 2025-05-13 NOTE — NURSING NOTE
"Oncology Rooming Note    May 13, 2025 10:19 AM   Talya Zuleta is a 80 year old female who presents for:    Chief Complaint   Patient presents with    Port Draw     Labs drawn via port by RN. Port accessed with 20g 3/4\" power needle and vitals WNL. Flushed with saline and heparin. Pt tolerated well. Patient checked into next appointment.      Oncology Clinic Visit     Malignant neoplasm of overlapping sites of right breast in female, estrogen receptor positive      Initial Vitals: BP (!) 155/83   Pulse 69   Temp 97.8  F (36.6  C) (Oral)   Resp 17   Wt 70.1 kg (154 lb 9.6 oz)   LMP  (LMP Unknown)   SpO2 96%   BMI 25.73 kg/m   Estimated body mass index is 25.73 kg/m  as calculated from the following:    Height as of 2/20/25: 1.651 m (5' 5\").    Weight as of this encounter: 70.1 kg (154 lb 9.6 oz). Body surface area is 1.79 meters squared.  No Pain (0) Comment: Data Unavailable   No LMP recorded (lmp unknown). Patient is postmenopausal.  Allergies reviewed: Yes  Medications reviewed: Yes    Medications: Medication refills not needed today.  Pharmacy name entered into EPIC:    [facility] - [place], [place] - [address]  [id number] - [place], [place] - [id number] - [place], [place] - [address]    Frailty Screening:   Is the patient here for a new oncology consult visit in cancer care? 2. No    PHQ9:  Did this patient require a PHQ9?: No      Clinical concerns: none       Gwen Tapia              "

## 2025-05-13 NOTE — LETTER
5/13/2025      Talya Zuleta  [address] [facility] [address]      Dear Colleague,    Thank you for referring your patient, Talya Zuleta, to the [facility]. Please see a copy of my visit note below.    Oncology Follow Up:  Date on this visit: May 13, 2025    Diagnosis:  ER positive, HER-2 positive right breast cancer, clinical stage T2N0M0 s/p THP-ddAC, right breast mastectomy ([id number]), SLN biopsy, 4 months adjuvant HP, 1 year adjuvant endocrine therapy, and 3 doses adjuvant Zometa.  She decided to stop all adjuvant therapy due to diarrhea.    Primary Physician: Darrell Douglas    History Of Present Illness:  Ms. Zuleta is an 80 year old female with right breast cancer.  Routine screening mammogram in 07/2020 showed right breast asymmetry.  Diagnostic mammograms and ultrasound showed a 2.7 cm mass in the right breast at 12:00, 5 cm from the nipple with ductal extension including a 6 mm mass at 3 cm from the nipple.  Contrast enhanced mammogram showed the right breast mass, including extension, to measure 4.9 cm.  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  Estrogen receptor was moderate in 50% and progesterone receptor staining was negative.  HER-2 was negative by IHC; HER2 FISH showed approximately 10% of tumor cells to have 6.8 HER2 signals/nucleus and a HER2/CEN17 ratio of 1.6.  The HER2 positive cells were noted to be the large pleomorphic cells.  Multiple neutrophils were noted to be infiltrating through tumor stroma.    She received 12 weeks of neoadjuvant Taxol, Herceptin, and pertuzumab from 10/7/2020 - 12/22/2020 and 4 cycles of dose dense adriamycin and cyclophosphamide from 12/29/2020 - 2/9/2021.  She underwent right breast mastectomy and sentinel lymph node biopsy on 3/8/2021.  Pathology showed rare residual tumor cells with therapy related changes in the tumor bed.  Residual tumor was  <1% of the tumor bed volume.  There was no lymphovascular invasion and surgical margins were negative.  A single sentinel lymph node was benign.  We discussed starting letrozole at our clinic visit on 3/30/2021, unfortunately she did not receive the prescription and so did not start it.  She was initiated on adjuvant HER2 targeted therapy 4/13/2021.  She received 4 cycles of adjuvant Phesgo, then discontinued due to diarrhea.  She was then on biosimilar trastuzumab (Trazimera) starting 7/6/2021, however, continued to have diarrhea refractory to antidiarrheals, therefore all HER2 targeted therapy was abandoned on 8/3/2021. She received 7 months total HER2 treatment (3 months neoadjuvant + 4 months adjuvant).  She started letrozole on 8/3/2021.  She continued to have diarrhea, which she attributed to letrozole.  On 6/8/2022, treatment was changed to exemestane. This was stopped after a month due to profuse diarrhea again.  She has been off of endocrine therapy since that time.  She received 3 doses of Zometa, then declining further doses due to diarrhea.  Diarrhea has continued despite stopping all of her recommended adjuvant breast cancer therapy.    Interval History:    Talya comes into clinic today, alongside her daughter-in-law, for routine breast cancer surveillance.  Since her last visit, her health has been good.  She has moved to a senior living facility in [place].  This has been a good transition for her.  Sometimes, however, she will get to know a resident and then they will no longer be there.  She will have no idea what happened to them and states nobody talks about it.  She tries to go for routine walks when she can.  Since her last visit, she had a colonoscopy with biopsies in mid-April.  Following the colonoscopy, she was started on budesonide.  With the medication, her diarrhea is improved.  She still will have occasional episodes.  She has not had any associated dizziness, lightheadedness, or  falls.  She denies abdominal pain.  She has no shortness of breath or chest pain.  She denies concerning lumps, pain, or swelling of either the left breast or the right chest wall.  When she lies down at night, she will experience cramping in her lower calves and ankles.  She reports dry skin over her lower extremities.  She states her general skin disorder is overall better since starting the oral steroid medication.    Past Medical/Surgical History:  1.  Breast cancer as per HPI  2.  Hypertension  3.  Diabetes, diet controlled   4.  Hypothyroidism  5.  Hypohidrosis ectodermal dysplasia  6.  Chronic diarrhea  7.  Nicotine dependence    Allergies:  Allergies as of 05/13/2025 - Reviewed 02/20/2025   Allergen Reaction Noted     Bacitracin-neomycin-polymyxin  04/18/2003     Doxycycline  09/20/2022     Latex  09/30/2005     Current Medications:  Current Outpatient Medications   Medication Sig Dispense Refill     amLODIPine (NORVASC) 2.5 MG tablet TAKE 1 TABLET (2.5 MG) BY MOUTH DAILY. 90 tablet 0     atorvastatin (LIPITOR) 10 MG tablet TAKE 1 TABLET (10 MG) BY MOUTH DAILY. 90 tablet 2     benazepril (LOTENSIN) 10 MG tablet TAKE 1 TABLET (10 MG) BY MOUTH DAILY. 90 tablet 0     furosemide (LASIX) 20 MG tablet TAKE 1 TABLET (20 MG) BY MOUTH DAILY. 90 tablet 0     Multiple Vitamin (MULTIVITAMIN ADULT PO) Take by mouth.       potassium chloride namrata ER (KLOR-CON M20) 20 MEQ CR tablet TAKE ONE TABLET BY MOUTH DAILY 90 tablet 2     triamcinolone (KENALOG) 0.1 % external cream APPLY TOPICALLY 2 TIMES DAILY AS NEEDED FOR IRRITATION 80 g 0     triamcinolone (KENALOG) 0.1 % external ointment Apply topically 2 times daily as needed for irritation 80 g 0     Vitamin D, Cholecalciferol, 25 MCG (1000 UT) TABS Take 1,000 Units by mouth daily       zolpidem (AMBIEN) 5 MG tablet Take 1 tablet (5 mg) by mouth nightly as needed for sleep. 30 tablet 0      Genetics Breast Actionable Panel on 10/1/2020 was negative for mutation.    Physical  Exam:  BP (!) 155/83   Pulse 69   Temp 97.8  F (36.6  C) (Oral)   Resp 17   Wt 70.1 kg (154 lb 9.6 oz)   LMP  (LMP Unknown)   SpO2 96%   BMI 25.73 kg/m    General:  Well appearing adult female in NAD.  Alert and oriented x 3  HEENT:  Normocephalic.  Sclera anicteric.  (+) alopecia, wearing a wig.  Lymph:  No palpable cervical, supraclavicular, or axillary LAD.  Chest:  CTA bilaterally.  No wheezes or crackles.    CV:  RRR.  No audible m/r/g.  Breast:  Right breast mastectomy.  There are no dominant or discretely palpable masses of either the right chest wall or within the left breast. Left nipple is everted and without discharge.  Abd:  Soft/ND  Ext: Unable to examine lower extremities as patient is wearing stirrup style pants  Neuro:  Cranial nerves grossly intact.  Shuffling gait, ambulates with a walker.  Requires a hand to climb on the exam table.   Psych:  Mood and affect appear normal.  Skin: Hyperpigmented, chronically inflamed fibrotic feeling skin with tracks in bilateral axilla, these feel less thick/dense than prior. Multiple areas with lack of pigment over the forearms.    Laboratory/Imaging Studies:  I personally reviewed the below laboratories and images:    5/13/2025 Labs:   Latest Reference Range & Units 05/13/25 10:09   Sodium 135 - 145 mmol/L 141   Potassium 3.4 - 5.3 mmol/L 3.9   Chloride 98 - 107 mmol/L 105   Carbon Dioxide (CO2) 22 - 29 mmol/L 23   Urea Nitrogen 8.0 - 23.0 mg/dL 10.2   Creatinine 0.51 - 0.95 mg/dL 0.71   GFR Estimate >60 mL/min/1.73m2 85   Calcium 8.8 - 10.4 mg/dL 8.9   Anion Gap 7 - 15 mmol/L 13   Albumin 3.5 - 5.2 g/dL 4.0   Protein Total 6.4 - 8.3 g/dL 6.7   Alkaline Phosphatase 40 - 150 U/L 61   ALT 0 - 50 U/L 11   AST 0 - 45 U/L 20   Bilirubin Total <=1.2 mg/dL 0.6   Glucose 70 - 99 mg/dL 96      Latest Reference Range & Units 05/13/25 10:09   WBC 4.0 - 11.0 10e3/uL 7.8   Hemoglobin 11.7 - 15.7 g/dL 12.8   Hematocrit 35.0 - 47.0 % 37.3   Platelet Count 150 - 450  10e3/uL 302      Latest Reference Range & Units 05/13/25 10:09   NRBC/W <1 /100 0   Absolute Neutrophil 1.6 - 8.3 10e3/uL 5.7   Absolute Lymphocytes 0.8 - 5.3 10e3/uL 1.4   Absolute Monocytes 0.0 - 1.3 10e3/uL 0.5   Absolute Eosinophils 0.0 - 0.7 10e3/uL 0.1   Absolute Basophils 0.0 - 0.2 10e3/uL 0.1   Absolute Immature Granulocytes <=0.4 10e3/uL 0.0       4/18/2025 Colonoscopy ([facility]):  Findings:  Normal finding.  Location - ileum.  Polyp location: ascending colon.  Quantity: 2.  Size:  8 mm, 5 mm.  Polyp shape:  sessile.         Maneuver: polypectomy was performed with a cold snare.       Removal:  complete.  Retrieval: complete.  Bleeding: none.  Polyp location: descending colon.  Quantity: 1.  Size: 9 mm.  Polyp shape: sessile.         Maneuver: polypectomy was performed with a  cold snare.       Removal: complete.  Retrieval: complete.  Bleeding: none.  Diverticulosis.  Location: - sigmoid.    Description:  mild.    Size:  small.    Quantity:  few.  No inflammation present.  Anal canal:  internal hemorrhoid(s) and hypertrophied anal papilla  Remainder of the exam is normal.  Random biopsies were taken throughout the colon to rule out microscopic colitis.     Impression:  Hemorrhoids, internal  Diverticulosis of colon without diverticulitis  Colorectal polyps    11/25/2024 Left breast diagnostic mammogram (screening callback):  FINDINGS:    Additional mammographic images were obtained for further  evaluation of possible asymmetry in the left breast on the MLO view  superior. Additional views demonstrate dissipation of this finding  with spot compression. No suspicious finding.                                                                      IMPRESSION: BI-RADS CATEGORY: 1 -  Negative    ASSESSMENT/PLAN:  80 year old female with a h/o hypohidrosis ectodermal dysplasia, HTN, dyslipidemia, COPD, diabetes, and OA with a h/o T2N0M0, right breast cancer, ER positive (50%), WA negative, and  HER2 positive.  She is s/p treatment with 12 weeks THP, 4 cycles ddAC, right breast mastectomy ([id number]), right axillary SLNBx, and 4 months adjuvant HER2 therapy (7 months total HER2 therapy), stopped due to diarrhea.  On endocrine therapy from 8/3/2021 - 07/2022, also stopped due to diarrhea.    1.  Right breast cancer:   Ms. Zuleta is 4 years, 2 months out from excision of a right breast cancer.  She stopped adjuvant HER2 therapy, endocrine therapy and Zometa, all because she attributed her diarrhea to the medications.  She continued to have diarrhea after stopping these medications, but declined further breast cancer treatments.     She is asymptomatic of disease recurrence on history taken today and clinical exam is without concerning findings.    - She had screening mammogram callback 11/2024.  I personally reviewed the images of the left breast diagnostic mammogram which was without concerning findings.  Will resume annual screening.  - Labs, visit with me, and left breast screening mammogram 11/26/2025 or later.    2.  Chronic Diarrhea: Persistent diarrhea despite stopping all of her cancer therapies, it is clear her diarrhea is not due to these medications.  Her symptoms were concerning for IBS vs microscopic colitis.  - She had previously declined recommended GI consultation and colonoscopy with biopsies on multiple visits.  Thankfully, her son was able to talk her into proceeding with these.  I reviewed the colonoscopy report from 4/18/2025 which was with a polyp removed from both the ascending and descending colon.  In addition, multiple random biopsies were taken.  Unfortunately, the pathology report for these is not available to me today (I have sent a request for it).  Patient informs me she was started on budesonide after the colonoscopy.  She notes improvement in symptoms with the medication.  Based on this treatment, I suspect the biopsies showed microscopic colitis.      3.  Skin changes:  She  has significant skin thinning, hypopigmentation in areas of prior abrasion (postinflammatory hypopigmentation?), and hyperpigmentation in other areas.  - She has declined a referral to dermatology  - Skin appears a bit better on exam today.    4.  Chemotherapy induced peripheral neuropathy/balance issues:  No change since last visit.  Onset and worsening after discontinuation of chemotherapy.  Unchanged from prior.  G3.  Unfortunately there is no reversal agent.  Ambulating with a walker.    5.  Prediabetes:  Hemoglobin A1C 11/2024 was 5.9% and c/w prediabetes.  She has known prediabetes.  Ongoing follow up with PCP    6.  Insomnia:  Continue ambien prn.    7.  Follow Up:    Labs, visit with me, and left breast screening mammogram 11/26/2025 or later.    I spent 35 minutes on the date of the encounter doing chart review, review of test results, interpretation of tests, patient visit, and documentation             Again, thank you for allowing me to participate in the care of your patient.        Sincerely,        Perlita Alvarez MD    Electronically signed

## 2025-05-21 PROBLEM — D12.6 COLON ADENOMAS: Status: ACTIVE | Noted: 2025-05-21

## (undated) DEVICE — SU ETHILON 3-0 PS-1 18" 1663H

## (undated) DEVICE — LINEN TOWEL PACK X5 5464

## (undated) DEVICE — SUCTION MANIFOLD NEPTUNE 2 SYS 1 PORT 702-025-000

## (undated) DEVICE — ESU PENCIL W/SMOKE EVAC CVPLP2000

## (undated) DEVICE — ESU GROUND PAD ADULT W/CORD E7507

## (undated) DEVICE — DRAPE LAP TRANSVERSE 29421

## (undated) DEVICE — NDL BLUNT 18GA 1" W/O FILTER 305181

## (undated) DEVICE — SOL WATER IRRIG 500ML BOTTLE 2F7113

## (undated) DEVICE — SUCTION CANISTER MEDIVAC LINER 3000ML W/LID 65651-530

## (undated) DEVICE — PACK MINOR CUSTOM ASC

## (undated) DEVICE — DRAPE IOBAN INCISE 23X17" 6650EZ

## (undated) DEVICE — SYR 10ML LL W/O NDL

## (undated) DEVICE — ESU ELEC BLADE 2.75" COATED/INSULATED E1455

## (undated) DEVICE — ADH SKIN CLOSURE PREMIERPRO EXOFIN 1.0ML 3470

## (undated) DEVICE — NDL 25GA 2"  8881200441

## (undated) DEVICE — COVER ULTRASOUND PROBE 6X48" PC1290

## (undated) DEVICE — BLADE KNIFE SURG 11 371111

## (undated) DEVICE — PREP CHLORAPREP 26ML TINTED ORANGE  260815

## (undated) DEVICE — DECANTER BAG 2002S

## (undated) DEVICE — SYR 10ML FINGER CONTROL W/O NDL 309695

## (undated) DEVICE — GLOVE PROTEXIS POWDER FREE SMT 8.0  2D72PT80X

## (undated) DEVICE — PREP PAD ALCOHOL 6818

## (undated) DEVICE — SU VICRYL 3-0 SH 27" J316H

## (undated) DEVICE — SOL NACL 0.9% IRRIG 1000ML BOTTLE 2F7124

## (undated) DEVICE — SOL NACL 0.9% INJ 250ML BAG 2B1322Q

## (undated) DEVICE — PACK MINOR SBA15MIFSE

## (undated) DEVICE — PAD CHUX UNDERPAD 30X30"

## (undated) DEVICE — SU PROLENE 2-0 CT-2 30" 8411H

## (undated) DEVICE — MARKER MARGIN MARKER STD 6 COLOR SGL USE MMS6

## (undated) DEVICE — DRAPE COVER C-ARM SEAMLESS SNAP-KAP 03-KP26 LATEX FREE

## (undated) DEVICE — DECANTER VIAL 2006S

## (undated) DEVICE — GLOVE PROTEXIS MICRO 6.0  2D73PM60

## (undated) DEVICE — SU SILK 3-0 SH 30" K832H

## (undated) DEVICE — SPECIMEN CONTAINER W/10% BUFFERED FORMALIN 120ML 591201

## (undated) DEVICE — SOL WATER IRRIG 1000ML BOTTLE 2F7114

## (undated) DEVICE — DRSG STERI STRIP 1/2X4" R1547

## (undated) DEVICE — SU MONOCRYL 4-0 PS-2 18" UND Y496G

## (undated) DEVICE — DRAIN JACKSON PRATT RESERVOIR 100ML SU130-1305

## (undated) DEVICE — CLIP HORIZON SM RED WIDE SLOT 001201

## (undated) DEVICE — SUTURE BOOTS 051003PBX

## (undated) DEVICE — GLOVE PROTEXIS BLUE W/NEU-THERA 6.5  2D73EB65

## (undated) DEVICE — ESU ELEC NDL 6" COATED/INSULATED E1465-6

## (undated) DEVICE — DRAIN JACKSON PRATT 15FR ROUND SU130-1323

## (undated) DEVICE — SYR 10ML SLIP TIP W/O NDL

## (undated) DEVICE — CLIP HORIZON MED BLUE 002200

## (undated) DEVICE — DRAPE SHEET MED 44X70" 9355

## (undated) DEVICE — SOL NACL 0.9% IRRIG 500ML BOTTLE 2F7123

## (undated) DEVICE — SU PDS II 4-0 FS-2 27" Z422H

## (undated) DEVICE — BNDG COBAN 2"X5YDS CO-FLEX UNSTERILE ASSRTD CLRS LF 5200CP

## (undated) RX ORDER — ONDANSETRON 2 MG/ML
INJECTION INTRAMUSCULAR; INTRAVENOUS
Status: DISPENSED
Start: 2021-03-08

## (undated) RX ORDER — HYDROMORPHONE HYDROCHLORIDE 1 MG/ML
INJECTION, SOLUTION INTRAMUSCULAR; INTRAVENOUS; SUBCUTANEOUS
Status: DISPENSED
Start: 2021-03-08

## (undated) RX ORDER — PROPOFOL 10 MG/ML
INJECTION, EMULSION INTRAVENOUS
Status: DISPENSED
Start: 2020-10-02

## (undated) RX ORDER — FENTANYL CITRATE 50 UG/ML
INJECTION, SOLUTION INTRAMUSCULAR; INTRAVENOUS
Status: DISPENSED
Start: 2021-03-08

## (undated) RX ORDER — FENTANYL CITRATE 50 UG/ML
INJECTION, SOLUTION INTRAMUSCULAR; INTRAVENOUS
Status: DISPENSED
Start: 2020-10-02

## (undated) RX ORDER — LIDOCAINE HYDROCHLORIDE 20 MG/ML
INJECTION, SOLUTION EPIDURAL; INFILTRATION; INTRACAUDAL; PERINEURAL
Status: DISPENSED
Start: 2021-03-08

## (undated) RX ORDER — PROPOFOL 10 MG/ML
INJECTION, EMULSION INTRAVENOUS
Status: DISPENSED
Start: 2021-03-08

## (undated) RX ORDER — BUPIVACAINE HYDROCHLORIDE 5 MG/ML
INJECTION, SOLUTION EPIDURAL; INTRACAUDAL
Status: DISPENSED
Start: 2020-10-02

## (undated) RX ORDER — KETOROLAC TROMETHAMINE 30 MG/ML
INJECTION, SOLUTION INTRAMUSCULAR; INTRAVENOUS
Status: DISPENSED
Start: 2021-03-08

## (undated) RX ORDER — ACETAMINOPHEN 325 MG/1
TABLET ORAL
Status: DISPENSED
Start: 2021-03-08

## (undated) RX ORDER — ISOSULFAN BLUE 50 MG/5ML
INJECTION, SOLUTION SUBCUTANEOUS
Status: DISPENSED
Start: 2021-03-08

## (undated) RX ORDER — LIDOCAINE HYDROCHLORIDE AND EPINEPHRINE 10; 10 MG/ML; UG/ML
INJECTION, SOLUTION INFILTRATION; PERINEURAL
Status: DISPENSED
Start: 2021-03-08

## (undated) RX ORDER — LIDOCAINE HYDROCHLORIDE 10 MG/ML
INJECTION, SOLUTION INFILTRATION; PERINEURAL
Status: DISPENSED
Start: 2020-10-02

## (undated) RX ORDER — EPHEDRINE SULFATE 50 MG/ML
INJECTION, SOLUTION INTRAMUSCULAR; INTRAVENOUS; SUBCUTANEOUS
Status: DISPENSED
Start: 2021-03-08

## (undated) RX ORDER — DEXAMETHASONE SODIUM PHOSPHATE 4 MG/ML
INJECTION, SOLUTION INTRA-ARTICULAR; INTRALESIONAL; INTRAMUSCULAR; INTRAVENOUS; SOFT TISSUE
Status: DISPENSED
Start: 2021-03-08

## (undated) RX ORDER — CEFAZOLIN SODIUM 1 G/3ML
INJECTION, POWDER, FOR SOLUTION INTRAMUSCULAR; INTRAVENOUS
Status: DISPENSED
Start: 2021-03-08

## (undated) RX ORDER — LIDOCAINE HYDROCHLORIDE 20 MG/ML
SOLUTION OROPHARYNGEAL
Status: DISPENSED
Start: 2022-03-01

## (undated) RX ORDER — GABAPENTIN 100 MG/1
CAPSULE ORAL
Status: DISPENSED
Start: 2021-03-08